# Patient Record
Sex: MALE | Race: WHITE | Employment: OTHER | URBAN - NONMETROPOLITAN AREA
[De-identification: names, ages, dates, MRNs, and addresses within clinical notes are randomized per-mention and may not be internally consistent; named-entity substitution may affect disease eponyms.]

---

## 2017-06-05 LAB
ALBUMIN SERPL-MCNC: 5 G/DL
ALP BLD-CCNC: 123 U/L
ALT SERPL-CCNC: 35 U/L
ANION GAP SERPL CALCULATED.3IONS-SCNC: 6 MMOL/L
AST SERPL-CCNC: 33 U/L
BASOPHILS ABSOLUTE: 0 /ΜL
BASOPHILS RELATIVE PERCENT: 0.8 %
BILIRUB SERPL-MCNC: 0.6 MG/DL (ref 0.1–1.4)
BUN BLDV-MCNC: 18 MG/DL
CALCIUM SERPL-MCNC: 9.5 MG/DL
CHLORIDE BLD-SCNC: 103 MMOL/L
CO2: 27 MMOL/L
CREAT SERPL-MCNC: 0.78 MG/DL
EOSINOPHILS ABSOLUTE: 100 /ΜL
EOSINOPHILS RELATIVE PERCENT: 2.1 %
GFR CALCULATED: NORMAL
GLUCOSE BLD-MCNC: 93 MG/DL
HCT VFR BLD CALC: 44.4 % (ref 41–53)
HCT VFR BLD CALC: NORMAL % (ref 41–53)
HEMOGLOBIN: 14.3 G/DL (ref 13.5–17.5)
HEMOGLOBIN: NORMAL G/DL (ref 13.5–17.5)
LYMPHOCYTES ABSOLUTE: 1000 /ΜL
LYMPHOCYTES RELATIVE PERCENT: 16.3 %
MCH RBC QN AUTO: 27.6 PG
MCHC RBC AUTO-ENTMCNC: 32.2 G/DL
MCV RBC AUTO: 85.6 FL
MONOCYTES ABSOLUTE: 1200 /ΜL
MONOCYTES RELATIVE PERCENT: 19.3 %
NEUTROPHILS ABSOLUTE: 4000 /ΜL
NEUTROPHILS RELATIVE PERCENT: 61.5 %
PDW BLD-RTO: 18.2 %
PLATELET # BLD: 249 K/ΜL
PLATELET # BLD: NORMAL K/ΜL
PMV BLD AUTO: NORMAL FL
POTASSIUM SERPL-SCNC: 5.4 MMOL/L
RBC # BLD: 5.18 10^6/ΜL
SODIUM BLD-SCNC: 136 MMOL/L
TOTAL PROTEIN: 7.3
WBC # BLD: 6.4 10^3/ML
WBC # BLD: NORMAL 10^3/ML

## 2017-07-07 ENCOUNTER — OFFICE VISIT (OUTPATIENT)
Dept: OTOLARYNGOLOGY | Age: 57
End: 2017-07-07

## 2017-07-07 VITALS
DIASTOLIC BLOOD PRESSURE: 100 MMHG | TEMPERATURE: 98.3 F | WEIGHT: 231.5 LBS | BODY MASS INDEX: 29.71 KG/M2 | HEART RATE: 96 BPM | RESPIRATION RATE: 24 BRPM | HEIGHT: 74 IN | SYSTOLIC BLOOD PRESSURE: 154 MMHG

## 2017-07-07 DIAGNOSIS — J38.3 FALSE VOCAL CORD LESION: Primary | ICD-10-CM

## 2017-07-07 DIAGNOSIS — Z01.818 PRE-OP TESTING: ICD-10-CM

## 2017-07-07 DIAGNOSIS — J38.3 VOCAL CORD LEUKOPLAKIA: ICD-10-CM

## 2017-07-07 PROCEDURE — G8427 DOCREV CUR MEDS BY ELIG CLIN: HCPCS | Performed by: OTOLARYNGOLOGY

## 2017-07-07 PROCEDURE — 3017F COLORECTAL CA SCREEN DOC REV: CPT | Performed by: OTOLARYNGOLOGY

## 2017-07-07 PROCEDURE — G8419 CALC BMI OUT NRM PARAM NOF/U: HCPCS | Performed by: OTOLARYNGOLOGY

## 2017-07-07 PROCEDURE — 31575 DIAGNOSTIC LARYNGOSCOPY: CPT | Performed by: OTOLARYNGOLOGY

## 2017-07-07 PROCEDURE — 1036F TOBACCO NON-USER: CPT | Performed by: OTOLARYNGOLOGY

## 2017-07-07 PROCEDURE — 99203 OFFICE O/P NEW LOW 30 MIN: CPT | Performed by: OTOLARYNGOLOGY

## 2017-07-07 RX ORDER — IPRATROPIUM BROMIDE AND ALBUTEROL SULFATE 2.5; .5 MG/3ML; MG/3ML
1 SOLUTION RESPIRATORY (INHALATION) EVERY 4 HOURS
COMMUNITY
End: 2018-02-27 | Stop reason: ALTCHOICE

## 2017-07-07 RX ORDER — VITAMIN B COMPLEX
1 CAPSULE ORAL DAILY
COMMUNITY
End: 2018-02-27 | Stop reason: ALTCHOICE

## 2017-07-07 RX ORDER — LOPERAMIDE HYDROCHLORIDE 2 MG/1
2 CAPSULE ORAL DAILY
COMMUNITY

## 2017-07-07 ASSESSMENT — ENCOUNTER SYMPTOMS
NAUSEA: 0
VOICE CHANGE: 0
STRIDOR: 0
CHEST TIGHTNESS: 0
RHINORRHEA: 0
TROUBLE SWALLOWING: 0
SHORTNESS OF BREATH: 0
FACIAL SWELLING: 0
COUGH: 1
ABDOMINAL PAIN: 0
SINUS PRESSURE: 0
DIARRHEA: 0
CHOKING: 0
VOMITING: 0
WHEEZING: 0
APNEA: 0
COLOR CHANGE: 0
SORE THROAT: 1

## 2017-07-10 ENCOUNTER — TELEPHONE (OUTPATIENT)
Dept: OTOLARYNGOLOGY | Age: 57
End: 2017-07-10

## 2017-07-10 ENCOUNTER — TELEPHONE (OUTPATIENT)
Dept: PULMONOLOGY | Age: 57
End: 2017-07-10

## 2017-07-13 ENCOUNTER — TELEPHONE (OUTPATIENT)
Dept: PULMONOLOGY | Age: 57
End: 2017-07-13

## 2017-07-14 ENCOUNTER — TELEPHONE (OUTPATIENT)
Dept: PULMONOLOGY | Age: 57
End: 2017-07-14

## 2017-07-17 ENCOUNTER — TELEPHONE (OUTPATIENT)
Dept: ENT CLINIC | Age: 57
End: 2017-07-17

## 2017-07-17 RX ORDER — CLINDAMYCIN HYDROCHLORIDE 300 MG/1
300 CAPSULE ORAL 3 TIMES DAILY
Qty: 84 CAPSULE | Refills: 0 | Status: SHIPPED | OUTPATIENT
Start: 2017-07-17 | End: 2017-07-17 | Stop reason: SDUPTHER

## 2017-07-17 RX ORDER — CLINDAMYCIN HYDROCHLORIDE 300 MG/1
300 CAPSULE ORAL 3 TIMES DAILY
Qty: 84 CAPSULE | Refills: 0 | Status: SHIPPED | OUTPATIENT
Start: 2017-07-17 | End: 2017-08-14

## 2017-07-18 ENCOUNTER — OFFICE VISIT (OUTPATIENT)
Dept: ENT CLINIC | Age: 57
End: 2017-07-18
Payer: MEDICARE

## 2017-07-18 ENCOUNTER — HOSPITAL ENCOUNTER (OUTPATIENT)
Dept: RADIATION ONCOLOGY | Age: 57
Discharge: HOME | End: 2017-07-18
Payer: MEDICARE

## 2017-07-18 ENCOUNTER — APPOINTMENT (OUTPATIENT)
Dept: RADIATION ONCOLOGY | Age: 57
End: 2017-07-18
Payer: MEDICAID

## 2017-07-18 VITALS
BODY MASS INDEX: 29.57 KG/M2 | SYSTOLIC BLOOD PRESSURE: 120 MMHG | HEIGHT: 74 IN | HEART RATE: 64 BPM | RESPIRATION RATE: 20 BRPM | DIASTOLIC BLOOD PRESSURE: 80 MMHG | WEIGHT: 230.4 LBS | TEMPERATURE: 97.5 F

## 2017-07-18 DIAGNOSIS — C32.0 SQUAMOUS CELL CARCINOMA OF VOCAL CORD (HCC): Primary | ICD-10-CM

## 2017-07-18 DIAGNOSIS — F10.10 ALCOHOL ABUSE: ICD-10-CM

## 2017-07-18 DIAGNOSIS — K22.2 SCHATZKI'S RING: ICD-10-CM

## 2017-07-18 DIAGNOSIS — C20 RECTAL CANCER METASTASIZED TO INTRAPELVIC LYMPH NODE (HCC): ICD-10-CM

## 2017-07-18 DIAGNOSIS — J41.8 MIXED SIMPLE AND MUCOPURULENT CHRONIC BRONCHITIS (HCC): ICD-10-CM

## 2017-07-18 DIAGNOSIS — C77.5 RECTAL CANCER METASTASIZED TO INTRAPELVIC LYMPH NODE (HCC): ICD-10-CM

## 2017-07-18 PROCEDURE — G8419 CALC BMI OUT NRM PARAM NOF/U: HCPCS | Performed by: OTOLARYNGOLOGY

## 2017-07-18 PROCEDURE — 3017F COLORECTAL CA SCREEN DOC REV: CPT | Performed by: OTOLARYNGOLOGY

## 2017-07-18 PROCEDURE — G8926 SPIRO NO PERF OR DOC: HCPCS | Performed by: OTOLARYNGOLOGY

## 2017-07-18 PROCEDURE — 99213 OFFICE O/P EST LOW 20 MIN: CPT | Performed by: OTOLARYNGOLOGY

## 2017-07-18 PROCEDURE — 1036F TOBACCO NON-USER: CPT | Performed by: OTOLARYNGOLOGY

## 2017-07-18 PROCEDURE — G8427 DOCREV CUR MEDS BY ELIG CLIN: HCPCS | Performed by: OTOLARYNGOLOGY

## 2017-07-18 PROCEDURE — 3023F SPIROM DOC REV: CPT | Performed by: OTOLARYNGOLOGY

## 2017-07-18 RX ORDER — FLUCONAZOLE 100 MG/1
100 TABLET ORAL DAILY
Qty: 14 TABLET | Refills: 0 | Status: SHIPPED | OUTPATIENT
Start: 2017-07-18 | End: 2017-08-01

## 2017-07-18 ASSESSMENT — ENCOUNTER SYMPTOMS
CHEST TIGHTNESS: 0
VOMITING: 0
WHEEZING: 0
CHOKING: 0
SINUS PRESSURE: 0
VOICE CHANGE: 0
FACIAL SWELLING: 0
APNEA: 0
SORE THROAT: 0
TROUBLE SWALLOWING: 0
DIARRHEA: 0
NAUSEA: 0
COUGH: 0
COLOR CHANGE: 0
ABDOMINAL PAIN: 0
STRIDOR: 0
RHINORRHEA: 0
SHORTNESS OF BREATH: 0

## 2017-07-19 ENCOUNTER — NURSE TRIAGE (OUTPATIENT)
Dept: ADMINISTRATIVE | Age: 57
End: 2017-07-19

## 2017-07-21 ENCOUNTER — TELEPHONE (OUTPATIENT)
Dept: ENT CLINIC | Age: 57
End: 2017-07-21

## 2017-07-25 ENCOUNTER — OFFICE VISIT (OUTPATIENT)
Dept: PULMONOLOGY | Age: 57
End: 2017-07-25
Payer: MEDICARE

## 2017-07-25 VITALS
DIASTOLIC BLOOD PRESSURE: 88 MMHG | HEIGHT: 74 IN | OXYGEN SATURATION: 91 % | TEMPERATURE: 98.4 F | SYSTOLIC BLOOD PRESSURE: 136 MMHG | WEIGHT: 230.6 LBS | BODY MASS INDEX: 29.59 KG/M2 | HEART RATE: 75 BPM

## 2017-07-25 DIAGNOSIS — J43.1 PANLOBULAR EMPHYSEMA (HCC): Primary | ICD-10-CM

## 2017-07-25 PROCEDURE — 3017F COLORECTAL CA SCREEN DOC REV: CPT | Performed by: INTERNAL MEDICINE

## 2017-07-25 PROCEDURE — G8427 DOCREV CUR MEDS BY ELIG CLIN: HCPCS | Performed by: INTERNAL MEDICINE

## 2017-07-25 PROCEDURE — G8419 CALC BMI OUT NRM PARAM NOF/U: HCPCS | Performed by: INTERNAL MEDICINE

## 2017-07-25 PROCEDURE — 1036F TOBACCO NON-USER: CPT | Performed by: INTERNAL MEDICINE

## 2017-07-25 PROCEDURE — 99214 OFFICE O/P EST MOD 30 MIN: CPT | Performed by: INTERNAL MEDICINE

## 2017-07-25 PROCEDURE — 3023F SPIROM DOC REV: CPT | Performed by: INTERNAL MEDICINE

## 2017-07-25 PROCEDURE — G8926 SPIRO NO PERF OR DOC: HCPCS | Performed by: INTERNAL MEDICINE

## 2017-07-25 ASSESSMENT — ENCOUNTER SYMPTOMS
SHORTNESS OF BREATH: 1
STRIDOR: 1
WHEEZING: 0
COUGH: 1

## 2017-07-26 ENCOUNTER — HOSPITAL ENCOUNTER (OUTPATIENT)
Dept: PET IMAGING | Age: 57
Discharge: HOME | End: 2017-07-26
Payer: MEDICARE

## 2017-07-26 DIAGNOSIS — C32.0 SQUAMOUS CELL CARCINOMA OF VOCAL CORD (HCC): ICD-10-CM

## 2017-07-26 PROCEDURE — 78815 PET IMAGE W/CT SKULL-THIGH: CPT

## 2017-07-26 PROCEDURE — 3430000000 HC RX DIAGNOSTIC RADIOPHARMACEUTICAL: Performed by: OTOLARYNGOLOGY

## 2017-07-26 PROCEDURE — A9552 F18 FDG: HCPCS | Performed by: OTOLARYNGOLOGY

## 2017-07-26 RX ORDER — FLUDEOXYGLUCOSE F 18 200 MCI/ML
13.7 INJECTION, SOLUTION INTRAVENOUS
Status: COMPLETED | OUTPATIENT
Start: 2017-07-26 | End: 2017-07-26

## 2017-07-26 RX ADMIN — FLUDEOXYGLUCOSE F 18 13.7 MILLICURIE: 200 INJECTION, SOLUTION INTRAVENOUS at 08:05

## 2017-07-28 ENCOUNTER — HOSPITAL ENCOUNTER (OUTPATIENT)
Dept: RADIATION ONCOLOGY | Age: 57
Discharge: HOME | End: 2017-07-28
Payer: MEDICARE

## 2017-07-28 PROCEDURE — 99202 OFFICE O/P NEW SF 15 MIN: CPT | Performed by: RADIOLOGY

## 2017-08-03 ENCOUNTER — TELEPHONE (OUTPATIENT)
Dept: ENT CLINIC | Age: 57
End: 2017-08-03

## 2017-08-03 ENCOUNTER — HOSPITAL ENCOUNTER (OUTPATIENT)
Dept: PULMONOLOGY | Age: 57
Discharge: HOME | End: 2017-08-03
Payer: MEDICARE

## 2017-08-03 DIAGNOSIS — J38.3 VOCAL CORD LEUKOPLAKIA: Primary | ICD-10-CM

## 2017-08-03 DIAGNOSIS — F10.10 ALCOHOL ABUSE: ICD-10-CM

## 2017-08-03 DIAGNOSIS — J38.3 DYSPLASIA OF TRUE VOCAL CORD: ICD-10-CM

## 2017-08-03 DIAGNOSIS — J43.1 PANLOBULAR EMPHYSEMA (HCC): ICD-10-CM

## 2017-08-03 DIAGNOSIS — R49.0 DYSPHONIA: ICD-10-CM

## 2017-08-03 DIAGNOSIS — J38.1 VOCAL CORD POLYP: ICD-10-CM

## 2017-08-03 PROCEDURE — 94729 DIFFUSING CAPACITY: CPT

## 2017-08-03 PROCEDURE — 94060 EVALUATION OF WHEEZING: CPT

## 2017-08-03 PROCEDURE — 94726 PLETHYSMOGRAPHY LUNG VOLUMES: CPT

## 2017-08-08 ENCOUNTER — HOSPITAL ENCOUNTER (OUTPATIENT)
Dept: RADIATION ONCOLOGY | Age: 57
End: 2017-08-08
Payer: MEDICARE

## 2017-08-09 ENCOUNTER — TELEPHONE (OUTPATIENT)
Dept: PULMONOLOGY | Age: 57
End: 2017-08-09

## 2017-08-10 ENCOUNTER — HOSPITAL ENCOUNTER (OUTPATIENT)
Dept: RADIATION ONCOLOGY | Age: 57
Discharge: HOME | End: 2017-08-10
Payer: MEDICARE

## 2017-08-10 ENCOUNTER — HOSPITAL ENCOUNTER (OUTPATIENT)
Dept: CT IMAGING | Age: 57
Discharge: HOME | End: 2017-08-10
Payer: MEDICARE

## 2017-08-10 DIAGNOSIS — C32.1 MALIGNANT NEOPLASM OF SUPRAGLOTTIS (HCC): ICD-10-CM

## 2017-08-10 PROCEDURE — 77014 CT GUIDE PLACEMENT RADIATION THERAPY: CPT

## 2017-08-10 PROCEDURE — 77332 RADIATION TREATMENT AID(S): CPT | Performed by: RADIOLOGY

## 2017-08-10 PROCEDURE — 77334 RADIATION TREATMENT AID(S): CPT | Performed by: RADIOLOGY

## 2017-08-15 ENCOUNTER — OFFICE VISIT (OUTPATIENT)
Dept: ENT CLINIC | Age: 57
End: 2017-08-15
Payer: MEDICARE

## 2017-08-15 VITALS
HEART RATE: 80 BPM | SYSTOLIC BLOOD PRESSURE: 132 MMHG | WEIGHT: 230 LBS | BODY MASS INDEX: 29.52 KG/M2 | DIASTOLIC BLOOD PRESSURE: 80 MMHG | HEIGHT: 74 IN | TEMPERATURE: 97.8 F | RESPIRATION RATE: 14 BRPM

## 2017-08-15 DIAGNOSIS — J41.1 MUCOPURULENT CHRONIC BRONCHITIS (HCC): ICD-10-CM

## 2017-08-15 DIAGNOSIS — C32.0 SQUAMOUS CELL CARCINOMA OF VOCAL CORD (HCC): Primary | ICD-10-CM

## 2017-08-15 PROCEDURE — 1036F TOBACCO NON-USER: CPT | Performed by: OTOLARYNGOLOGY

## 2017-08-15 PROCEDURE — 3017F COLORECTAL CA SCREEN DOC REV: CPT | Performed by: OTOLARYNGOLOGY

## 2017-08-15 PROCEDURE — 3023F SPIROM DOC REV: CPT | Performed by: OTOLARYNGOLOGY

## 2017-08-15 PROCEDURE — G8427 DOCREV CUR MEDS BY ELIG CLIN: HCPCS | Performed by: OTOLARYNGOLOGY

## 2017-08-15 PROCEDURE — G8926 SPIRO NO PERF OR DOC: HCPCS | Performed by: OTOLARYNGOLOGY

## 2017-08-15 PROCEDURE — G8419 CALC BMI OUT NRM PARAM NOF/U: HCPCS | Performed by: OTOLARYNGOLOGY

## 2017-08-15 PROCEDURE — 99213 OFFICE O/P EST LOW 20 MIN: CPT | Performed by: OTOLARYNGOLOGY

## 2017-08-15 RX ORDER — GUAIFENESIN 600 MG/1
1200 TABLET, EXTENDED RELEASE ORAL 2 TIMES DAILY
Qty: 60 TABLET | Refills: 5 | Status: SHIPPED | OUTPATIENT
Start: 2017-08-15 | End: 2017-09-14

## 2017-08-15 RX ORDER — SULFAMETHOXAZOLE AND TRIMETHOPRIM 800; 160 MG/1; MG/1
1 TABLET ORAL 2 TIMES DAILY
Qty: 28 TABLET | Refills: 2 | Status: SHIPPED | OUTPATIENT
Start: 2017-08-15 | End: 2017-10-19 | Stop reason: SDUPTHER

## 2017-08-15 ASSESSMENT — ENCOUNTER SYMPTOMS
TROUBLE SWALLOWING: 0
EYE PAIN: 0
ABDOMINAL PAIN: 0
PHOTOPHOBIA: 0
SORE THROAT: 0
RECTAL PAIN: 0
CONSTIPATION: 0
EYE ITCHING: 0
CHOKING: 0
RHINORRHEA: 0
COUGH: 1
EYE REDNESS: 0
ABDOMINAL DISTENTION: 0
SINUS PRESSURE: 0
DIARRHEA: 0
SHORTNESS OF BREATH: 0
CHEST TIGHTNESS: 0
NAUSEA: 0
VOMITING: 0
VOICE CHANGE: 0
EYE DISCHARGE: 0
STRIDOR: 0
COLOR CHANGE: 0
WHEEZING: 0
ANAL BLEEDING: 0
APNEA: 0
BACK PAIN: 0
BLOOD IN STOOL: 0
FACIAL SWELLING: 0

## 2017-08-16 ENCOUNTER — HOSPITAL ENCOUNTER (OUTPATIENT)
Dept: RADIATION ONCOLOGY | Age: 57
Discharge: HOME | End: 2017-08-16
Payer: MEDICARE

## 2017-08-16 PROCEDURE — 77301 RADIOTHERAPY DOSE PLAN IMRT: CPT | Performed by: RADIOLOGY

## 2017-08-16 PROCEDURE — 77338 DESIGN MLC DEVICE FOR IMRT: CPT | Performed by: RADIOLOGY

## 2017-08-16 PROCEDURE — 77300 RADIATION THERAPY DOSE PLAN: CPT | Performed by: RADIOLOGY

## 2017-08-17 PROCEDURE — 77386 HC NTSTY MODUL RAD TX DLVR CPLX: CPT | Performed by: RADIOLOGY

## 2017-08-18 PROCEDURE — 77386 HC NTSTY MODUL RAD TX DLVR CPLX: CPT | Performed by: RADIOLOGY

## 2017-08-21 ENCOUNTER — HOSPITAL ENCOUNTER (OUTPATIENT)
Dept: RADIATION ONCOLOGY | Age: 57
Discharge: HOME | End: 2017-08-21
Payer: MEDICARE

## 2017-08-21 PROCEDURE — 77336 RADIATION PHYSICS CONSULT: CPT | Performed by: RADIOLOGY

## 2017-08-21 PROCEDURE — 77386 HC NTSTY MODUL RAD TX DLVR CPLX: CPT | Performed by: RADIOLOGY

## 2017-08-22 PROCEDURE — 77386 HC NTSTY MODUL RAD TX DLVR CPLX: CPT | Performed by: RADIOLOGY

## 2017-08-23 PROCEDURE — 77386 HC NTSTY MODUL RAD TX DLVR CPLX: CPT | Performed by: RADIOLOGY

## 2017-08-24 PROCEDURE — 77386 HC NTSTY MODUL RAD TX DLVR CPLX: CPT | Performed by: RADIOLOGY

## 2017-08-25 PROCEDURE — 77386 HC NTSTY MODUL RAD TX DLVR CPLX: CPT | Performed by: RADIOLOGY

## 2017-08-28 ENCOUNTER — HOSPITAL ENCOUNTER (OUTPATIENT)
Dept: RADIATION ONCOLOGY | Age: 57
Discharge: HOME | End: 2017-08-28
Payer: MEDICARE

## 2017-08-28 DIAGNOSIS — J38.3 DYSPLASIA OF TRUE VOCAL CORD: Primary | ICD-10-CM

## 2017-08-28 PROCEDURE — 77386 HC NTSTY MODUL RAD TX DLVR CPLX: CPT | Performed by: RADIOLOGY

## 2017-08-28 PROCEDURE — 77336 RADIATION PHYSICS CONSULT: CPT | Performed by: RADIOLOGY

## 2017-08-29 PROCEDURE — 77386 HC NTSTY MODUL RAD TX DLVR CPLX: CPT | Performed by: RADIOLOGY

## 2017-08-30 ENCOUNTER — OFFICE VISIT (OUTPATIENT)
Dept: PULMONOLOGY | Age: 57
End: 2017-08-30
Payer: MEDICARE

## 2017-08-30 VITALS
TEMPERATURE: 98.3 F | SYSTOLIC BLOOD PRESSURE: 134 MMHG | WEIGHT: 230.8 LBS | OXYGEN SATURATION: 94 % | BODY MASS INDEX: 29.62 KG/M2 | HEIGHT: 74 IN | DIASTOLIC BLOOD PRESSURE: 72 MMHG | HEART RATE: 77 BPM

## 2017-08-30 DIAGNOSIS — C14.0 THROAT CANCER (HCC): ICD-10-CM

## 2017-08-30 DIAGNOSIS — J41.1 MUCOPURULENT CHRONIC BRONCHITIS (HCC): Primary | ICD-10-CM

## 2017-08-30 PROCEDURE — G8427 DOCREV CUR MEDS BY ELIG CLIN: HCPCS | Performed by: INTERNAL MEDICINE

## 2017-08-30 PROCEDURE — G8926 SPIRO NO PERF OR DOC: HCPCS | Performed by: INTERNAL MEDICINE

## 2017-08-30 PROCEDURE — 1036F TOBACCO NON-USER: CPT | Performed by: INTERNAL MEDICINE

## 2017-08-30 PROCEDURE — 77386 HC NTSTY MODUL RAD TX DLVR CPLX: CPT | Performed by: RADIOLOGY

## 2017-08-30 PROCEDURE — 3017F COLORECTAL CA SCREEN DOC REV: CPT | Performed by: INTERNAL MEDICINE

## 2017-08-30 PROCEDURE — 99213 OFFICE O/P EST LOW 20 MIN: CPT | Performed by: INTERNAL MEDICINE

## 2017-08-30 PROCEDURE — 3023F SPIROM DOC REV: CPT | Performed by: INTERNAL MEDICINE

## 2017-08-30 PROCEDURE — G8419 CALC BMI OUT NRM PARAM NOF/U: HCPCS | Performed by: INTERNAL MEDICINE

## 2017-08-30 ASSESSMENT — ENCOUNTER SYMPTOMS
SHORTNESS OF BREATH: 1
STRIDOR: 0
CHEST TIGHTNESS: 0
WHEEZING: 0
APNEA: 0
COUGH: 1

## 2017-08-31 PROCEDURE — 77386 HC NTSTY MODUL RAD TX DLVR CPLX: CPT | Performed by: RADIOLOGY

## 2017-09-01 ENCOUNTER — OFFICE VISIT (OUTPATIENT)
Dept: ENT CLINIC | Age: 57
End: 2017-09-01
Payer: MEDICARE

## 2017-09-01 ENCOUNTER — HOSPITAL ENCOUNTER (OUTPATIENT)
Age: 57
Discharge: HOME | End: 2017-09-01
Payer: MEDICARE

## 2017-09-01 VITALS
RESPIRATION RATE: 20 BRPM | DIASTOLIC BLOOD PRESSURE: 80 MMHG | SYSTOLIC BLOOD PRESSURE: 130 MMHG | BODY MASS INDEX: 29.75 KG/M2 | TEMPERATURE: 98.4 F | HEART RATE: 64 BPM | WEIGHT: 231.7 LBS

## 2017-09-01 DIAGNOSIS — C32.0 SQUAMOUS CELL CARCINOMA OF VOCAL CORD (HCC): Primary | ICD-10-CM

## 2017-09-01 DIAGNOSIS — J38.3 DYSPLASIA OF TRUE VOCAL CORD: ICD-10-CM

## 2017-09-01 DIAGNOSIS — R49.0 DYSPHONIA: ICD-10-CM

## 2017-09-01 DIAGNOSIS — J38.1 VOCAL CORD POLYP: ICD-10-CM

## 2017-09-01 DIAGNOSIS — C77.5 RECTAL CANCER METASTASIZED TO INTRAPELVIC LYMPH NODE (HCC): ICD-10-CM

## 2017-09-01 DIAGNOSIS — F10.10 ALCOHOL ABUSE: ICD-10-CM

## 2017-09-01 DIAGNOSIS — T66.XXXD RADIATION ADVERSE EFFECT, SUBSEQUENT ENCOUNTER: ICD-10-CM

## 2017-09-01 DIAGNOSIS — C20 RECTAL CANCER METASTASIZED TO INTRAPELVIC LYMPH NODE (HCC): ICD-10-CM

## 2017-09-01 DIAGNOSIS — K22.2 SCHATZKI'S RING: ICD-10-CM

## 2017-09-01 DIAGNOSIS — J41.1 MUCOPURULENT CHRONIC BRONCHITIS (HCC): ICD-10-CM

## 2017-09-01 DIAGNOSIS — J38.3 VOCAL CORD LEUKOPLAKIA: ICD-10-CM

## 2017-09-01 LAB
ANISOCYTOSIS: ABNORMAL
BASOPHILS # BLD: 0.5 %
BASOPHILS ABSOLUTE: 0 THOU/MM3 (ref 0–0.1)
EOSINOPHIL # BLD: 2.1 %
EOSINOPHILS ABSOLUTE: 0.1 THOU/MM3 (ref 0–0.4)
HCT VFR BLD CALC: 45.4 % (ref 42–52)
HEMOGLOBIN: 15.1 GM/DL (ref 14–18)
LYMPHOCYTES # BLD: 9.1 %
LYMPHOCYTES ABSOLUTE: 0.5 THOU/MM3 (ref 1–4.8)
MCH RBC QN AUTO: 30.2 PG (ref 27–31)
MCHC RBC AUTO-ENTMCNC: 33.3 GM/DL (ref 33–37)
MCV RBC AUTO: 90.8 FL (ref 80–94)
MONOCYTES # BLD: 21.4 %
MONOCYTES ABSOLUTE: 1.2 THOU/MM3 (ref 0.4–1.3)
NUCLEATED RED BLOOD CELLS: 0 /100 WBC
PDW BLD-RTO: 16.1 % (ref 11.5–14.5)
PLATELET # BLD: 237 THOU/MM3 (ref 130–400)
PMV BLD AUTO: 8.8 MCM (ref 7.4–10.4)
RBC # BLD: 5 MILL/MM3 (ref 4.7–6.1)
RBC # BLD: NORMAL 10*6/UL
SEG NEUTROPHILS: 66.9 %
SEGMENTED NEUTROPHILS ABSOLUTE COUNT: 3.9 THOU/MM3 (ref 1.8–7.7)
WBC # BLD: 5.8 THOU/MM3 (ref 4.8–10.8)

## 2017-09-01 PROCEDURE — 3017F COLORECTAL CA SCREEN DOC REV: CPT | Performed by: OTOLARYNGOLOGY

## 2017-09-01 PROCEDURE — 31575 DIAGNOSTIC LARYNGOSCOPY: CPT | Performed by: OTOLARYNGOLOGY

## 2017-09-01 PROCEDURE — G8926 SPIRO NO PERF OR DOC: HCPCS | Performed by: OTOLARYNGOLOGY

## 2017-09-01 PROCEDURE — 3023F SPIROM DOC REV: CPT | Performed by: OTOLARYNGOLOGY

## 2017-09-01 PROCEDURE — G8417 CALC BMI ABV UP PARAM F/U: HCPCS | Performed by: OTOLARYNGOLOGY

## 2017-09-01 PROCEDURE — G8427 DOCREV CUR MEDS BY ELIG CLIN: HCPCS | Performed by: OTOLARYNGOLOGY

## 2017-09-01 PROCEDURE — 36415 COLL VENOUS BLD VENIPUNCTURE: CPT

## 2017-09-01 PROCEDURE — 99213 OFFICE O/P EST LOW 20 MIN: CPT | Performed by: OTOLARYNGOLOGY

## 2017-09-01 PROCEDURE — 77386 HC NTSTY MODUL RAD TX DLVR CPLX: CPT | Performed by: RADIOLOGY

## 2017-09-01 PROCEDURE — 85025 COMPLETE CBC W/AUTO DIFF WBC: CPT

## 2017-09-01 PROCEDURE — 1036F TOBACCO NON-USER: CPT | Performed by: OTOLARYNGOLOGY

## 2017-09-01 ASSESSMENT — ENCOUNTER SYMPTOMS
ABDOMINAL PAIN: 0
NAUSEA: 0
SINUS PRESSURE: 0
CHOKING: 0
COUGH: 0
SHORTNESS OF BREATH: 0
VOMITING: 0
WHEEZING: 0
STRIDOR: 0
RHINORRHEA: 0
CHEST TIGHTNESS: 0
DIARRHEA: 0
FACIAL SWELLING: 0
COLOR CHANGE: 0
SORE THROAT: 0
VOICE CHANGE: 0
APNEA: 0
TROUBLE SWALLOWING: 0

## 2017-09-05 ENCOUNTER — HOSPITAL ENCOUNTER (OUTPATIENT)
Dept: RADIATION ONCOLOGY | Age: 57
Discharge: HOME | End: 2017-09-05
Payer: MEDICARE

## 2017-09-05 PROCEDURE — 77336 RADIATION PHYSICS CONSULT: CPT | Performed by: RADIOLOGY

## 2017-09-05 PROCEDURE — 77386 HC NTSTY MODUL RAD TX DLVR CPLX: CPT | Performed by: RADIOLOGY

## 2017-09-06 PROCEDURE — 77386 HC NTSTY MODUL RAD TX DLVR CPLX: CPT | Performed by: RADIOLOGY

## 2017-09-07 PROCEDURE — 77386 HC NTSTY MODUL RAD TX DLVR CPLX: CPT | Performed by: RADIOLOGY

## 2017-09-07 PROCEDURE — 77300 RADIATION THERAPY DOSE PLAN: CPT | Performed by: RADIOLOGY

## 2017-09-08 PROCEDURE — 77386 HC NTSTY MODUL RAD TX DLVR CPLX: CPT | Performed by: RADIOLOGY

## 2017-09-11 ENCOUNTER — HOSPITAL ENCOUNTER (OUTPATIENT)
Dept: RADIATION ONCOLOGY | Age: 57
Discharge: HOME | End: 2017-09-11
Payer: MEDICARE

## 2017-09-11 PROCEDURE — 77386 HC NTSTY MODUL RAD TX DLVR CPLX: CPT

## 2017-09-11 PROCEDURE — 77338 DESIGN MLC DEVICE FOR IMRT: CPT

## 2017-09-11 PROCEDURE — 77336 RADIATION PHYSICS CONSULT: CPT

## 2017-09-13 PROCEDURE — 77386 HC NTSTY MODUL RAD TX DLVR CPLX: CPT | Performed by: RADIOLOGY

## 2017-09-14 ENCOUNTER — CLINICAL DOCUMENTATION (OUTPATIENT)
Dept: NUTRITION | Age: 57
End: 2017-09-14

## 2017-09-14 ENCOUNTER — OFFICE VISIT (OUTPATIENT)
Dept: ENT CLINIC | Age: 57
End: 2017-09-14
Payer: MEDICARE

## 2017-09-14 VITALS
HEART RATE: 80 BPM | TEMPERATURE: 98.1 F | SYSTOLIC BLOOD PRESSURE: 126 MMHG | DIASTOLIC BLOOD PRESSURE: 78 MMHG | WEIGHT: 224.1 LBS | BODY MASS INDEX: 28.77 KG/M2 | RESPIRATION RATE: 18 BRPM

## 2017-09-14 DIAGNOSIS — C77.5 RECTAL CANCER METASTASIZED TO INTRAPELVIC LYMPH NODE (HCC): ICD-10-CM

## 2017-09-14 DIAGNOSIS — T66.XXXD RADIATION ADVERSE EFFECT, SUBSEQUENT ENCOUNTER: ICD-10-CM

## 2017-09-14 DIAGNOSIS — J41.1 MUCOPURULENT CHRONIC BRONCHITIS (HCC): ICD-10-CM

## 2017-09-14 DIAGNOSIS — F10.10 ALCOHOL ABUSE: ICD-10-CM

## 2017-09-14 DIAGNOSIS — J38.3 VOCAL CORD LEUKOPLAKIA: ICD-10-CM

## 2017-09-14 DIAGNOSIS — K22.2 SCHATZKI'S RING: ICD-10-CM

## 2017-09-14 DIAGNOSIS — J38.3 FALSE VOCAL CORD LESION: ICD-10-CM

## 2017-09-14 DIAGNOSIS — C32.0 SQUAMOUS CELL CARCINOMA OF VOCAL CORD (HCC): Primary | ICD-10-CM

## 2017-09-14 DIAGNOSIS — C20 RECTAL CANCER METASTASIZED TO INTRAPELVIC LYMPH NODE (HCC): ICD-10-CM

## 2017-09-14 PROCEDURE — 77386 HC NTSTY MODUL RAD TX DLVR CPLX: CPT

## 2017-09-14 PROCEDURE — G8427 DOCREV CUR MEDS BY ELIG CLIN: HCPCS | Performed by: OTOLARYNGOLOGY

## 2017-09-14 PROCEDURE — G8417 CALC BMI ABV UP PARAM F/U: HCPCS | Performed by: OTOLARYNGOLOGY

## 2017-09-14 PROCEDURE — 3023F SPIROM DOC REV: CPT | Performed by: OTOLARYNGOLOGY

## 2017-09-14 PROCEDURE — 3017F COLORECTAL CA SCREEN DOC REV: CPT | Performed by: OTOLARYNGOLOGY

## 2017-09-14 PROCEDURE — 1036F TOBACCO NON-USER: CPT | Performed by: OTOLARYNGOLOGY

## 2017-09-14 PROCEDURE — G8926 SPIRO NO PERF OR DOC: HCPCS | Performed by: OTOLARYNGOLOGY

## 2017-09-14 PROCEDURE — 99213 OFFICE O/P EST LOW 20 MIN: CPT | Performed by: OTOLARYNGOLOGY

## 2017-09-14 PROCEDURE — 31575 DIAGNOSTIC LARYNGOSCOPY: CPT | Performed by: OTOLARYNGOLOGY

## 2017-09-14 ASSESSMENT — ENCOUNTER SYMPTOMS
RHINORRHEA: 0
SINUS PRESSURE: 0
CHOKING: 0
COUGH: 0
CHEST TIGHTNESS: 0
ABDOMINAL PAIN: 0
VOMITING: 0
DIARRHEA: 0
NAUSEA: 0
APNEA: 0
TROUBLE SWALLOWING: 0
FACIAL SWELLING: 0
STRIDOR: 0
VOICE CHANGE: 0
COLOR CHANGE: 0
SHORTNESS OF BREATH: 0
SORE THROAT: 0
WHEEZING: 0

## 2017-09-15 PROCEDURE — 77386 HC NTSTY MODUL RAD TX DLVR CPLX: CPT | Performed by: RADIOLOGY

## 2017-09-18 ENCOUNTER — HOSPITAL ENCOUNTER (OUTPATIENT)
Dept: RADIATION ONCOLOGY | Age: 57
Discharge: HOME | End: 2017-09-18
Payer: MEDICARE

## 2017-09-18 PROCEDURE — 77336 RADIATION PHYSICS CONSULT: CPT | Performed by: RADIOLOGY

## 2017-09-18 PROCEDURE — 77386 HC NTSTY MODUL RAD TX DLVR CPLX: CPT | Performed by: RADIOLOGY

## 2017-09-19 PROCEDURE — 77386 HC NTSTY MODUL RAD TX DLVR CPLX: CPT | Performed by: RADIOLOGY

## 2017-09-20 PROCEDURE — 77386 HC NTSTY MODUL RAD TX DLVR CPLX: CPT | Performed by: RADIOLOGY

## 2017-09-21 PROCEDURE — 77386 HC NTSTY MODUL RAD TX DLVR CPLX: CPT | Performed by: RADIOLOGY

## 2017-09-22 PROCEDURE — 77386 HC NTSTY MODUL RAD TX DLVR CPLX: CPT | Performed by: RADIOLOGY

## 2017-09-25 ENCOUNTER — HOSPITAL ENCOUNTER (OUTPATIENT)
Dept: RADIATION ONCOLOGY | Age: 57
Discharge: HOME | End: 2017-09-25
Payer: MEDICARE

## 2017-09-25 PROCEDURE — 77336 RADIATION PHYSICS CONSULT: CPT | Performed by: RADIOLOGY

## 2017-09-25 PROCEDURE — 77386 HC NTSTY MODUL RAD TX DLVR CPLX: CPT | Performed by: RADIOLOGY

## 2017-09-26 PROCEDURE — 77386 HC NTSTY MODUL RAD TX DLVR CPLX: CPT | Performed by: RADIOLOGY

## 2017-09-27 PROCEDURE — 77386 HC NTSTY MODUL RAD TX DLVR CPLX: CPT | Performed by: RADIOLOGY

## 2017-09-28 PROCEDURE — 77386 HC NTSTY MODUL RAD TX DLVR CPLX: CPT | Performed by: RADIOLOGY

## 2017-09-29 ENCOUNTER — HOSPITAL ENCOUNTER (OUTPATIENT)
Age: 57
Discharge: HOME | End: 2017-09-29
Payer: MEDICARE

## 2017-09-29 LAB
ANISOCYTOSIS: ABNORMAL
BASOPHILS # BLD: 0.4 %
BASOPHILS ABSOLUTE: 0 THOU/MM3 (ref 0–0.1)
EOSINOPHIL # BLD: 2.6 %
EOSINOPHILS ABSOLUTE: 0.1 THOU/MM3 (ref 0–0.4)
HCT VFR BLD CALC: 45.1 % (ref 42–52)
HEMOGLOBIN: 15.2 GM/DL (ref 14–18)
LYMPHOCYTES # BLD: 8.4 %
LYMPHOCYTES ABSOLUTE: 0.5 THOU/MM3 (ref 1–4.8)
MCH RBC QN AUTO: 30.9 PG (ref 27–31)
MCHC RBC AUTO-ENTMCNC: 33.6 GM/DL (ref 33–37)
MCV RBC AUTO: 92 FL (ref 80–94)
MONOCYTES # BLD: 21.8 %
MONOCYTES ABSOLUTE: 1.2 THOU/MM3 (ref 0.4–1.3)
NUCLEATED RED BLOOD CELLS: 0 /100 WBC
PDW BLD-RTO: 15.3 % (ref 11.5–14.5)
PLATELET # BLD: 226 THOU/MM3 (ref 130–400)
PMV BLD AUTO: 8.3 MCM (ref 7.4–10.4)
RBC # BLD: 4.9 MILL/MM3 (ref 4.7–6.1)
RBC # BLD: NORMAL 10*6/UL
SEG NEUTROPHILS: 66.8 %
SEGMENTED NEUTROPHILS ABSOLUTE COUNT: 3.6 THOU/MM3 (ref 1.8–7.7)
WBC # BLD: 5.4 THOU/MM3 (ref 4.8–10.8)

## 2017-09-29 PROCEDURE — 36415 COLL VENOUS BLD VENIPUNCTURE: CPT

## 2017-09-29 PROCEDURE — 85025 COMPLETE CBC W/AUTO DIFF WBC: CPT

## 2017-09-29 PROCEDURE — 77386 HC NTSTY MODUL RAD TX DLVR CPLX: CPT | Performed by: RADIOLOGY

## 2017-10-02 NOTE — PROGRESS NOTES
RADIATION ONCOLOGY [facility]  [facility], [address]  Ph. 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 P16 negative       History of: Adenocarcinoma of the Distal Rectum, T4 N2 M0, Stage IIIB      Treatment Course Number: 1    Treatment Site (s) Modality Dose (cGy From To Fractions/  Elapsed Days   FVC Mass + Nodes 6MV photons 4400 cGy 2017 9/15/2017 20/ 29   FVC Mass Boost 6MV photons 2200 cGy 2017 2017 10/ 11                     Cumulative Dose: 6600 cGy, 30 Fractions, 43 Elapsed Days,   Treatment Modifiers: Intensity/Volume Modulated Arc Therapy; Custom MLC Blocking; Custom Aquaplast Mask Immobilization. History: Mr. Burgess Huff is a 50-year-old gentleman with a history of cancer of the rectum, successfully treated with neoadjuvant chemoradiotherapy and surgical resection in . Some years ago, he developed intermittent hoarseness. Previous otolaryngology evaluation, including biopsies in July and 2011 did not reveal a malignancy. Recent repeat evaluation including CT scanning of the neck soft tissues showed a suspicious lesion prompting re-assessment. Fiberoptic examination in 2017 showed a granular mass involving the right false vocal cord. PET/CT scan showed a hypermetabolic soft tissue mass corresponding to the fiber optic examination. There was no radiographically evident or hypermetabolic lymphadenopathy. Biopsy confirmed squamous cell carcinoma.     Pain Ratin-8/10 (throat pain, starting Week 2 of therapy course) ECOG Performance Status: 1    Treatment Tolerance/Response: Mr. Burgess Huff developed unexpectedly early and pronounced throat pain during his therapy course. After only 9 treatments he had a pain rating of 7 and this increased to around 8 throughout the remainder of the treatment course. The pain was managed with \"MLB Solution\" and Oxycodone/APAP 5/325 mg up to every 4 hours. He also developed moderate skin reaction with erythema and tenderness. Some skin creases were present in the lower aspect of the neck and he developed brisk skin reaction and some peeling in these areas. He did not develop any candidiasis during the course of therapy. Mr. Huber Walker was able to complete his treatment course doubt any prolonged unplanned treatment interruptions. He did not have any frankly unexpected side effects during the therapy, though his throat pain was early onset, and somewhat more pronounced than is typical.    Systemic Therapy: none planned    Follow Up Plan: Mr. Huber Walker received instructions for post-treatment throat and skin care. He is scheduled for a checkup in early November 2017. He was instructed to call for an earlier appointment if he has any additional problems, questions or concerns, or if his severe pain persists and he requires additional pain medication. He will continue care in otolaryngology, and with his other physicians as well.     Electronically signed by Summer Mai MD on 10/2/2017 at 1:19 PM    Radiation Oncology    Cc:  Todd Miller; Janneth Tejada; Kayla Centeno; [address] [place] [address] and Tumor Registry; [facility] Tumor Registry

## 2017-10-19 ENCOUNTER — HOSPITAL ENCOUNTER (OUTPATIENT)
Age: 57
Discharge: HOME | End: 2017-10-19
Payer: MEDICARE

## 2017-10-19 ENCOUNTER — OFFICE VISIT (OUTPATIENT)
Dept: ENT CLINIC | Age: 57
End: 2017-10-19
Payer: MEDICARE

## 2017-10-19 VITALS
DIASTOLIC BLOOD PRESSURE: 96 MMHG | HEIGHT: 74 IN | TEMPERATURE: 97.6 F | HEART RATE: 80 BPM | SYSTOLIC BLOOD PRESSURE: 144 MMHG | BODY MASS INDEX: 29.63 KG/M2 | RESPIRATION RATE: 20 BRPM | WEIGHT: 230.9 LBS

## 2017-10-19 DIAGNOSIS — C32.0 SQUAMOUS CELL CARCINOMA OF VOCAL CORD (HCC): Primary | ICD-10-CM

## 2017-10-19 DIAGNOSIS — J38.3 VOCAL CORD LEUKOPLAKIA: ICD-10-CM

## 2017-10-19 DIAGNOSIS — C77.5 RECTAL CANCER METASTASIZED TO INTRAPELVIC LYMPH NODE (HCC): ICD-10-CM

## 2017-10-19 DIAGNOSIS — C20 RECTAL CANCER METASTASIZED TO INTRAPELVIC LYMPH NODE (HCC): ICD-10-CM

## 2017-10-19 DIAGNOSIS — T66.XXXD RADIATION ADVERSE EFFECT, SUBSEQUENT ENCOUNTER: ICD-10-CM

## 2017-10-19 DIAGNOSIS — J41.1 MUCOPURULENT CHRONIC BRONCHITIS (HCC): ICD-10-CM

## 2017-10-19 DIAGNOSIS — K22.2 SCHATZKI'S RING: ICD-10-CM

## 2017-10-19 DIAGNOSIS — J38.1 VOCAL CORD POLYP: ICD-10-CM

## 2017-10-19 DIAGNOSIS — F10.10 ALCOHOL ABUSE: ICD-10-CM

## 2017-10-19 DIAGNOSIS — Z79.899 LONG-TERM USE OF HIGH-RISK MEDICATION: ICD-10-CM

## 2017-10-19 DIAGNOSIS — J38.3 FALSE VOCAL CORD LESION: ICD-10-CM

## 2017-10-19 DIAGNOSIS — J38.3 DYSPLASIA OF TRUE VOCAL CORD: ICD-10-CM

## 2017-10-19 LAB
CREAT SERPL-MCNC: 0.6 MG/DL (ref 0.4–1.2)
GFR SERPL CREATININE-BSD FRML MDRD: > 90 ML/MIN/1.73M2

## 2017-10-19 PROCEDURE — G8427 DOCREV CUR MEDS BY ELIG CLIN: HCPCS | Performed by: OTOLARYNGOLOGY

## 2017-10-19 PROCEDURE — 82565 ASSAY OF CREATININE: CPT

## 2017-10-19 PROCEDURE — 31575 DIAGNOSTIC LARYNGOSCOPY: CPT | Performed by: OTOLARYNGOLOGY

## 2017-10-19 PROCEDURE — G8926 SPIRO NO PERF OR DOC: HCPCS | Performed by: OTOLARYNGOLOGY

## 2017-10-19 PROCEDURE — 36415 COLL VENOUS BLD VENIPUNCTURE: CPT

## 2017-10-19 PROCEDURE — 1036F TOBACCO NON-USER: CPT | Performed by: OTOLARYNGOLOGY

## 2017-10-19 PROCEDURE — G8417 CALC BMI ABV UP PARAM F/U: HCPCS | Performed by: OTOLARYNGOLOGY

## 2017-10-19 PROCEDURE — 3017F COLORECTAL CA SCREEN DOC REV: CPT | Performed by: OTOLARYNGOLOGY

## 2017-10-19 PROCEDURE — 3023F SPIROM DOC REV: CPT | Performed by: OTOLARYNGOLOGY

## 2017-10-19 PROCEDURE — 99213 OFFICE O/P EST LOW 20 MIN: CPT | Performed by: OTOLARYNGOLOGY

## 2017-10-19 PROCEDURE — G8484 FLU IMMUNIZE NO ADMIN: HCPCS | Performed by: OTOLARYNGOLOGY

## 2017-10-19 RX ORDER — SULFAMETHOXAZOLE AND TRIMETHOPRIM 800; 160 MG/1; MG/1
1 TABLET ORAL 2 TIMES DAILY
Qty: 60 TABLET | Refills: 2 | Status: SHIPPED | OUTPATIENT
Start: 2017-10-19 | End: 2017-11-18

## 2017-10-19 ASSESSMENT — ENCOUNTER SYMPTOMS
SORE THROAT: 0
COLOR CHANGE: 0
VOICE CHANGE: 0
SINUS PRESSURE: 0
CHEST TIGHTNESS: 0
RHINORRHEA: 0
FACIAL SWELLING: 0
COUGH: 0
DIARRHEA: 0
VOMITING: 0
NAUSEA: 0
CHOKING: 0
WHEEZING: 0
STRIDOR: 0
ABDOMINAL PAIN: 0
SHORTNESS OF BREATH: 0
TROUBLE SWALLOWING: 0
APNEA: 0

## 2017-10-19 NOTE — PROGRESS NOTES
Gastrointestinal: Negative for abdominal pain, diarrhea, nausea and vomiting. Endocrine: Negative for cold intolerance, heat intolerance, polydipsia and polyuria. Genitourinary: Negative for dysuria, enuresis and hematuria. Musculoskeletal: Negative for arthralgias, gait problem, neck pain and neck stiffness. Skin: Negative for color change and rash. Allergic/Immunologic: Negative for environmental allergies, food allergies and immunocompromised state. Neurological: Negative for dizziness, syncope, facial asymmetry, speech difficulty, light-headedness and headaches. Hematological: Negative for adenopathy. Does not bruise/bleed easily. Psychiatric/Behavioral: Negative for confusion and sleep disturbance. The patient is not nervous/anxious. Objective:     BP (!) 144/96 (Site: Left Arm, Position: Sitting)   Pulse 80   Temp 97.6 °F (36.4 °C) (Oral)   Resp 20   Ht 6' 2.02\" (1.88 m)   Wt 230 lb 14.4 oz (104.7 kg)   BMI 29.63 kg/m²     Physical Exam     PROCEDURE: FIBEROPTIC LARYNGOSCOPY    A fiberoptic laryngoscopy was performed under topical anesthesia, after using Afrin and Lidocaine spray in the nasal fossa. The nasal fossa, nasopharynx, hypopharynx and larynx were carefully examined. Base of tongue was symmetrical. Epiglottis appeared normal and was not retrodisplaced. True vocal cords had normal mobility. There was no erythema. No mucosal lesions or masses were noted. No pooling in the pyriform sinuses. Mucosa was dry and him crusting was noted. Nothing like the severe crusting from staph aureus he had preoperatively    Data:  All of the past medical history, past surgical history, family history, social history, allergies and current medications were reviewed with the patient. Assessment & Plan   Diagnoses and all orders for this visit:    1. 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 Mucopurulent chronic bronchitis ([address]     3. Vocal cord leukoplakia  [place] LARYNGOSCOPY FLEXIBLE DIAGNOSTIC   4. Alcohol abuse     5. Schatzki's ring     6. Radiation adverse effect, subsequent encounter  [place] LARYNGOSCOPY FLEXIBLE DIAGNOSTIC   7. False vocal cord lesion  [place] LARYNGOSCOPY FLEXIBLE DIAGNOSTIC   8. Vocal cord polyp  [place] LARYNGOSCOPY FLEXIBLE DIAGNOSTIC   9. Rectal cancer metastasized to intrapelvic lymph node ([facility]     10. Dysplasia of true vocal cord     11. Long-term use of high-risk medication  Creatinine       The findings were explained and his questions were answered. I will prescribe another month of antibiotics and have his kidney function tested. I will see him back in 1 month. Return in about 1 month (around 11/19/2017) for Follow-Up. Kathleen Mir CMA ([facility]), am scribing for, and in the presence of Dr. Benito Rosas. Electronically signed by Sina Coleman on 10/19/17 at 11:22 AM.     (Please note that portions of this note were completed with a voice recognition program. Efforts were made to edit the dictations but occasionally words are mis-transcribed.)    I agree to the above documentation placed by my scribe. I have personally evaluated this patient. Additional findings are as noted. I reviewed the scribe's note and agree with the documented findings and plan of care. Any areas of disagreement are corrected. I agree with the chief complaint, past medical history, past surgical history, allergies, medications, social and family history as documented unless otherwise noted below.      Electronically signed by Mario De Leon MD on 11/5/2017 at 1:42 PM

## 2017-11-06 ENCOUNTER — HOSPITAL ENCOUNTER (OUTPATIENT)
Dept: RADIATION ONCOLOGY | Age: 57
Discharge: HOME | End: 2017-11-06
Payer: MEDICARE

## 2017-11-06 PROCEDURE — 99211 OFF/OP EST MAY X REQ PHY/QHP: CPT | Performed by: RADIOLOGY

## 2017-11-06 NOTE — PROGRESS NOTES
Dr. Valentina Renteria. At that time microlaryngoscopy showed a mass involving the right false vocal cord. Biopsy confirmed invasive squamous cell carcinoma. After discussing options, Mr. Steven Alvarez opted to undergo organ preservation with definitive radiation therapy. He completed to his course of treatment in late September 2017. He had greater than usual throat pain from the treatment, likely related to surrounding mucosal dysplasia. The pain was managed with an topical M LB solution and pain medication but remained initiated throughout the course of therapy. INTERVAL HISTORY: Mr. Steven Alvarez returns today for a post-treatment checkup. He was seen by Dr. Valentina Renteria 10/19/2017. He underwent fiber-optic laryngoscopy, and no residual mass was evident. There was note of dry mucosa but no other abnormalities were described. Mr. Steven Alvarez states that his throat pain is improving but he still does have pain as described above. He states this is worse at night and he attributes it to increased dryness. He does keep water at the bedside to help moisten his throat, and he does use over-the-counter preparations for dry mouth. He does not have any other complaints related to radiation therapy at this time. Follow-up scanning has not yet been performed. TESTS:   9/29/2017: WBCs 5.4. Hemoglobin 15.2. Hematocrit 45.1. Platelets 983.       MEDICATIONS:   Current Outpatient Prescriptions   Medication Sig Dispense Refill    sulfamethoxazole-trimethoprim (BACTRIM DS;SEPTRA DS) 800-160 MG per tablet Take 1 tablet by mouth 2 times daily Stay hydrated 60 tablet 2    mometasone-formoterol (DULERA) 100-5 MCG/ACT inhaler Inhale 2 puffs into the lungs 2 times daily 3 Inhaler 3    Dextromethorphan Polistirex (ROBITUSSIN 12 HOUR COUGH PO) Take by mouth      tiotropium (SPIRIVA RESPIMAT) 2.5 MCG/ACT AERS inhaler Inhale 2 puffs into the lungs daily      loperamide (IMODIUM) 2 MG capsule Take 2 mg by mouth as needed for Diarrhea      b complex vitamins capsule Take 1 capsule by mouth daily      ipratropium-albuterol (DUONEB) 0.5-2.5 (3) MG/3ML SOLN nebulizer solution Take 1 vial by nebulization every 4 hours      Multiple Vitamins-Minerals (CENTRUM MEN PO) Take by mouth daily      acetaminophen (TYLENOL) 650 MG CR tablet Take 650 mg by mouth as needed for Pain      albuterol sulfate HFA (VENTOLIN HFA) 108 (90 BASE) MCG/ACT inhaler Inhale 2 puffs into the lungs every 4 hours as needed for Wheezing or Shortness of Breath 3 Inhaler 3     No current facility-administered medications for this encounter. REVIEW OF SYSTEMS:  General/Constitutional: Fatigue. Weight is at pre-treatment baseline. HEENT: Throat pain as described above and xerostomia as noted. Hoarseness persists. Otherwise negative. Respiratory: Dyspnea on exertion. Nonproductive cough. Otherwise negative. Cardiovascular: Negative. Skin: Some dryness. No residual problems related to radiation therapy. GI: Previous APR as described above. Otherwise negative. : Negative. Musculoskeletal: Negative. Metabolic/endocrine negative. Neurologic: Negative. All others negative. EXAMINATION:   GENERAL: Pleasant, well-developed adult male in no obvious cardiorespiratory distress. Alert and oriented ×3. VITAL SIGNS:  Weight 104.1 kg. Temperature 35.8 Celsius. Pulse 67. Respirations 20. Blood pressure 143/88. Pulse oximetry 96% on room air. HEENT: PERRL/EOMI.  ears, nose and lips unremarkable on external examination. Oral cavity and oropharynx shows some moderate xerostomia. No visible mucosal lesions or exudates. NECK: No JVD. No palpable cervical lymphadenopathy. LYMPH: No supraclavicular or axillary adenopathy. LUNGS: Somewhat distant breath sounds, clear to auscultation with exception of few rhonchi. HEART: None tachycardic. ABDOMEN: Unremarkable bowel sounds. EXTREMITIES: No clubbing or cyanosis.     NEUROLOGIC EXAMINATION: Cranial nerves grossly intact. No obvious focal neurologic deficits. Gait unremarkable. FIBEROPTIC LARYNGOSCOPY:  The scope was present still lysed and prepped with anti-followed solution. The nasal passages were prepped with 2% lidocaine spray and Lobo-Synephrine spray. The scope was introduced into the right nostril and advanced along the nasal passage to the nasopharynx. The nasopharynx mucosa was pink and moist without abnormalities. Scope was advanced to the level of the oral pharynx. The vallecula and base of tongue were unremarkable. Pharyngeal walls showed no concerning lesions. There was some mild edema involving the supraglottis including the arytenoids. No supraglottic masses were visualized. The vocal cords moved well bilaterally. There were no abnormal lesions in any of the visualized structures, nor any exudates. The scope was withdrawn without incident. ASSESSMENT AND PLAN: Mr. Steven Alvarez is recuperating fairly well from his treatment. He continues to have some issues with pain but the discomfort is improving over time. He has some xerostomia but does not have any unexpected side effects related to treatment. Examination indicates clinical complete response. We reviewed follow-up recommendations regarding treatment. We should pursue post-treatment baseline imaging. I again reminded Mr. Steven Alvarez that follow-up for head and neck cancers requires frequent and meticulous examination. We reviewed the critical nature of examinations within the first 2 years. I also reminded him that we will need to continue monitoring thyroid function due to significant likelihood of future development of hypothyroidism. I will schedule my next appointment with Mr. Steven Alvarez 4 January 2018. As usual, he was instructed to call for an earlier appointment if he has any questions problems or concerns. He will of course continue care in otolaryngology and with his other physicians.       ATTESTATION: 30 minutes face-to-face time spent with patient, >50% in counseling/education/coordination of care. [facility] Oncology  Electronically signed by Shad Powell MD on 11/6/17 at 5:50 PM      Cc: All follow up physicians; Emil Brito; [facility]. This document was created using a voice-recognition program.  Computer generated transcription errors may be present.

## 2017-12-15 ENCOUNTER — OFFICE VISIT (OUTPATIENT)
Dept: ENT CLINIC | Age: 57
End: 2017-12-15
Payer: MEDICARE

## 2017-12-15 VITALS
WEIGHT: 235 LBS | HEART RATE: 72 BPM | DIASTOLIC BLOOD PRESSURE: 76 MMHG | RESPIRATION RATE: 20 BRPM | BODY MASS INDEX: 29.22 KG/M2 | SYSTOLIC BLOOD PRESSURE: 138 MMHG | HEIGHT: 75 IN | TEMPERATURE: 97.8 F

## 2017-12-15 DIAGNOSIS — T66.XXXD RADIATION ADVERSE EFFECT, SUBSEQUENT ENCOUNTER: ICD-10-CM

## 2017-12-15 DIAGNOSIS — C32.0 SQUAMOUS CELL CARCINOMA OF VOCAL CORD (HCC): ICD-10-CM

## 2017-12-15 DIAGNOSIS — B37.0 THRUSH: Primary | ICD-10-CM

## 2017-12-15 DIAGNOSIS — K21.9 GASTROESOPHAGEAL REFLUX DISEASE WITHOUT ESOPHAGITIS: ICD-10-CM

## 2017-12-15 PROCEDURE — G8427 DOCREV CUR MEDS BY ELIG CLIN: HCPCS | Performed by: OTOLARYNGOLOGY

## 2017-12-15 PROCEDURE — G8484 FLU IMMUNIZE NO ADMIN: HCPCS | Performed by: OTOLARYNGOLOGY

## 2017-12-15 PROCEDURE — G8417 CALC BMI ABV UP PARAM F/U: HCPCS | Performed by: OTOLARYNGOLOGY

## 2017-12-15 PROCEDURE — 3017F COLORECTAL CA SCREEN DOC REV: CPT | Performed by: OTOLARYNGOLOGY

## 2017-12-15 PROCEDURE — 1036F TOBACCO NON-USER: CPT | Performed by: OTOLARYNGOLOGY

## 2017-12-15 PROCEDURE — 99213 OFFICE O/P EST LOW 20 MIN: CPT | Performed by: OTOLARYNGOLOGY

## 2017-12-15 PROCEDURE — 31575 DIAGNOSTIC LARYNGOSCOPY: CPT | Performed by: OTOLARYNGOLOGY

## 2017-12-15 RX ORDER — RANITIDINE 150 MG/1
150 TABLET ORAL 2 TIMES DAILY
Qty: 60 TABLET | Refills: 3 | Status: SHIPPED | OUTPATIENT
Start: 2017-12-15 | End: 2019-04-25 | Stop reason: ALTCHOICE

## 2017-12-15 RX ORDER — CETIRIZINE HYDROCHLORIDE 10 MG/1
10 TABLET ORAL DAILY
COMMUNITY
End: 2018-02-27 | Stop reason: ALTCHOICE

## 2017-12-15 RX ORDER — MECLIZINE HYDROCHLORIDE 25 MG/1
25 TABLET ORAL 3 TIMES DAILY PRN
COMMUNITY
End: 2018-02-27

## 2017-12-15 RX ORDER — ROSUVASTATIN CALCIUM 20 MG/1
20 TABLET, COATED ORAL DAILY
COMMUNITY
End: 2020-08-18

## 2017-12-15 ASSESSMENT — ENCOUNTER SYMPTOMS
FACIAL SWELLING: 0
STRIDOR: 0
COUGH: 0
DIARRHEA: 0
SORE THROAT: 0
SHORTNESS OF BREATH: 0
TROUBLE SWALLOWING: 0
ABDOMINAL PAIN: 0
SINUS PRESSURE: 0
COLOR CHANGE: 0
RHINORRHEA: 0
APNEA: 0
NAUSEA: 0
WHEEZING: 0
CHOKING: 0
VOICE CHANGE: 1
CHEST TIGHTNESS: 0
VOMITING: 0

## 2017-12-15 NOTE — PROGRESS NOTES
 Multiple Vitamins-Minerals (CENTRUM MEN PO) Take by mouth daily      acetaminophen (TYLENOL) 650 MG CR tablet Take 650 mg by mouth as needed for Pain      albuterol sulfate HFA (VENTOLIN HFA) 108 (90 BASE) MCG/ACT inhaler Inhale 2 puffs into the lungs every 4 hours as needed for Wheezing or Shortness of Breath 3 Inhaler 3     No current facility-administered medications for this visit. Past Medical History:   Diagnosis Date    Arthritis     COPD (chronic obstructive pulmonary disease) ([facility]     Pneumonia 01/2017 1/2017 and 2/2017    Thyroid disease       Past Surgical History:   Procedure Laterality Date    COLONOSCOPY  2016    EYE SURGERY      CROSS EYED    HAND SURGERY Bilateral 1995    KNEE ARTHROSCOPY Right 1996    LARYNGOSCOPY  07/12/2017    Biopsy    MICROLARYNGOSCOPY W BIOPSY      RECTAL SURGERY  08/29/2016    colostemy bag     ROTATOR CUFF REPAIR Left 80'S     Family History   Problem Relation Age of Onset    Prostate Cancer Father 48    Cancer Father     Heart Disease Father     Diabetes Mother     Heart Disease Mother     Stroke Mother     Heart Disease Brother     High Blood Pressure Other     Cancer Other      LUNG,PROSTATE    Hearing Loss Other      Social History   Substance Use Topics    Smoking status: Former Smoker     Packs/day: 2.25     Years: 32.00     Start date: 9/20/1976     Quit date: 2/5/2006    Smokeless tobacco: Never Used    Alcohol use No       Subjective:      Review of Systems   Constitutional: Negative for activity change, appetite change, chills, diaphoresis, fatigue, fever and unexpected weight change. HENT: Positive for voice change. Negative for congestion, dental problem, ear discharge, ear pain, facial swelling, hearing loss, mouth sores, nosebleeds, postnasal drip, rhinorrhea, sinus pressure, sneezing, sore throat, tinnitus and trouble swallowing. Eyes: Negative for visual disturbance.    Respiratory: Negative for apnea, cough, choking, chest tightness, shortness of breath, wheezing and stridor. Cardiovascular: Negative for chest pain, palpitations and leg swelling. Gastrointestinal: Negative for abdominal pain, diarrhea, nausea and vomiting. Endocrine: Negative for cold intolerance, heat intolerance, polydipsia and polyuria. Genitourinary: Negative for dysuria, enuresis and hematuria. Musculoskeletal: Negative for arthralgias, gait problem, neck pain and neck stiffness. Skin: Negative for color change and rash. Allergic/Immunologic: Negative for environmental allergies, food allergies and immunocompromised state. Neurological: Negative for dizziness, syncope, facial asymmetry, speech difficulty, light-headedness and headaches. Hematological: Negative for adenopathy. Does not bruise/bleed easily. Psychiatric/Behavioral: Negative for confusion and sleep disturbance. The patient is not nervous/anxious. Objective:   /76 (Site: Right Arm, Position: Sitting)   Pulse 72   Temp 97.8 °F (36.6 °C) (Oral)   Resp 20   Ht 6' 2.5\" (1.892 m)   Wt 235 lb (106.6 kg)   BMI 29.77 kg/m²     Physical Exam   Ears: Normal ear canals and tympanic membranes  Neck: No palpable adenopathy. Mild subcutaneous edema as expected from the radiation    PROCEDURE: FIBEROPTIC LARYNGOSCOPY    A fiberoptic laryngoscopy was performed under topical anesthesia, after using Afrin and Lidocaine spray in the nasal fossa. The nasal fossa, nasopharynx, hypopharynx and larynx were carefully examined. Base of tongue was symmetrical. Epiglottis appeared normal and was not retrodisplaced. True vocal cords had normal mobility. There was a maroon-colored erythema of the arytenoid area bilaterally, with edema. Moderate erythema of the entire hypopharynx. No masses or mucosal lesions were noted. No pooling in the pyriform sinuses. Primary site revealed no evidence of persistent carcinoma (right false vocal cord).      Data:  All of the past medical

## 2017-12-15 NOTE — LETTER
[facility]  [facility]  [address]  [address]  Phone: [phone]  Fax: [phone]    Ally Andres MD        December 15, 2017    Vidya Dial, [address] [facility]. [facility] [address]    Patient: Martín Sanchez   MR Number: [id number]   YOB: 1960   Date of Visit: 12/15/2017     Dear Vidya Dial,    I recently saw your patient, Martín Sanchez, regarding Surveillance for his commercial carcinoma of the right false vocal cord. He has no sign of recurrence but he is having inflammation in his hypopharynx and larynx causing pain. Suspect this is a combination of thrush from his Dulera and acid reflux. Below are the relevant portions of my assessment and plan of care. Assessment & Plan   Diagnoses and all orders for this visit:    1. Thrush  Clotrimazole (MYCELEX) 10 MG LOZG lozenge   2. 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 Gastroesophageal reflux disease without esophagitis  ranitidine (ZANTAC) 150 MG tablet   4. Radiation adverse effect, subsequent encounter  [place] LARYNGOSCOPY FLEXIBLE DIAGNOSTIC    ranitidine (ZANTAC) 150 MG tablet    Clotrimazole (MYCELEX) 10 MG LOZG lozenge       The findings were explained and his questions were answered. The maroon erythema with edema is a new finding. This could easily be thrush. He is instructed to be sure to rinse not just his mouth but also his throat after using the [facility]. This could be aggravated by acid reflux, so I'm placing him on ranitidine as well and a GERD regimen. She is to stop the Q-tips in his ears    Return for Follow-Up, GERD, thrush and CA larynx. If you have questions, please do not hesitate to call me. I look forward to following Crystal Espinal along with you.     Sincerely,          Ally Andres MD

## 2017-12-19 ENCOUNTER — TELEPHONE (OUTPATIENT)
Dept: PULMONOLOGY | Age: 57
End: 2017-12-19

## 2017-12-19 DIAGNOSIS — J43.1 PANLOBULAR EMPHYSEMA (HCC): Primary | ICD-10-CM

## 2017-12-19 NOTE — TELEPHONE ENCOUNTER
PT called and said that he would like to start going to pulmonary rehab. He does not have an appt until May but said that he would like to try to start going there prior to the appointment, could you put in an order for this or would you like to see the pt sooner? Thank You.

## 2017-12-26 ENCOUNTER — TELEPHONE (OUTPATIENT)
Dept: PULMONOLOGY | Age: 57
End: 2017-12-26

## 2017-12-26 DIAGNOSIS — J41.1 MUCOPURULENT CHRONIC BRONCHITIS (HCC): Primary | ICD-10-CM

## 2018-01-11 ENCOUNTER — HOSPITAL ENCOUNTER (OUTPATIENT)
Dept: RADIATION ONCOLOGY | Age: 58
Discharge: HOME | End: 2018-01-11
Payer: MEDICARE

## 2018-01-11 DIAGNOSIS — C32.1 MALIGNANT NEOPLASM OF FALSE VOCAL CORDS (HCC): Primary | ICD-10-CM

## 2018-01-11 PROCEDURE — 99211 OFF/OP EST MAY X REQ PHY/QHP: CPT | Performed by: RADIOLOGY

## 2018-01-11 NOTE — PROGRESS NOTES
mouth daily      ipratropium-albuterol (DUONEB) 0.5-2.5 (3) MG/3ML SOLN nebulizer solution Take 1 vial by nebulization every 4 hours      Multiple Vitamins-Minerals (CENTRUM MEN PO) Take by mouth daily      acetaminophen (TYLENOL) 650 MG CR tablet Take 650 mg by mouth as needed for Pain      albuterol sulfate HFA (VENTOLIN HFA) 108 (90 BASE) MCG/ACT inhaler Inhale 2 puffs into the lungs every 4 hours as needed for Wheezing or Shortness of Breath 3 Inhaler 3     No current facility-administered medications for this encounter. REVIEW OF SYSTEMS:  Constitutional: Denies weight change, fever, chills. HEENT: Throat pain as described above. Voice remains week. , right ear pain, mouth pain. Respiratory: Some dyspnea on exertion. Denies cough, hemoptysis. Cardiovascular: Denies chest pain, palpitations, syncope. GI: Denies nausea, vomiting, hematemesis. Colostomy functioning normally. : Denies dysuria, hematuria. Musculoskeletal: Some aches and pains related to DJD. No new complaints. Integument: No rashes, ulcerations. Metabolic/endocrine: No new complaints. Neurological: No complaints. All others negative. EXAMINATION:   GENERAL: Pleasant, well-developed adult male. Alert and oriented ×3. No cardiovascular distress. VITAL SIGNS:  Weight 108.7 kg. Temperature 35.9. Pulse 80. Respirations 20. Blood pressure 140/90. Pulse oximetry 93% on room air. HEENT: PERRL/EOMI. stable disconjugate gaze. Ears, nose and lips unremarkable on external examination. Oral cavity with some mild xerostomia without lesion or exudate. NECK: No JVD. No cervical lymphadenopathy. LYMPH: No supraclavicular or axillary adenopathy. LUNGS: Somewhat distant breath sounds, clear to auscultation. HEART: Non-tachycardic. ABDOMEN: Nondistended, nontender with unremarkable bowel sounds. EXTREMITIES: No clubbing or cyanosis.   NEUROLOGIC EXAMINATION: Cranial nerves grossly intact with exception of disconjugate

## 2018-01-15 DIAGNOSIS — C14.0 THROAT CANCER (HCC): ICD-10-CM

## 2018-01-15 DIAGNOSIS — J41.1 MUCOPURULENT CHRONIC BRONCHITIS (HCC): ICD-10-CM

## 2018-01-18 ENCOUNTER — HOSPITAL ENCOUNTER (OUTPATIENT)
Dept: PET IMAGING | Age: 58
Discharge: HOME | End: 2018-01-18
Payer: MEDICARE

## 2018-01-18 DIAGNOSIS — C32.1 MALIGNANT NEOPLASM OF FALSE VOCAL CORDS (HCC): ICD-10-CM

## 2018-01-18 PROCEDURE — 3430000000 HC RX DIAGNOSTIC RADIOPHARMACEUTICAL: Performed by: RADIOLOGY

## 2018-01-18 PROCEDURE — 78815 PET IMAGE W/CT SKULL-THIGH: CPT

## 2018-01-18 PROCEDURE — A9552 F18 FDG: HCPCS | Performed by: RADIOLOGY

## 2018-01-18 RX ORDER — FLUDEOXYGLUCOSE F 18 200 MCI/ML
13.1 INJECTION, SOLUTION INTRAVENOUS
Status: COMPLETED | OUTPATIENT
Start: 2018-01-18 | End: 2018-01-18

## 2018-01-18 RX ADMIN — FLUDEOXYGLUCOSE F 18 13.1 MILLICURIE: 200 INJECTION, SOLUTION INTRAVENOUS at 07:33

## 2018-01-30 ENCOUNTER — OFFICE VISIT (OUTPATIENT)
Dept: ENT CLINIC | Age: 58
End: 2018-01-30
Payer: MEDICARE

## 2018-01-30 VITALS
SYSTOLIC BLOOD PRESSURE: 120 MMHG | WEIGHT: 238.6 LBS | DIASTOLIC BLOOD PRESSURE: 74 MMHG | HEART RATE: 78 BPM | RESPIRATION RATE: 20 BRPM | BODY MASS INDEX: 30.62 KG/M2 | HEIGHT: 74 IN | TEMPERATURE: 97.9 F

## 2018-01-30 DIAGNOSIS — T66.XXXD RADIATION ADVERSE EFFECT, SUBSEQUENT ENCOUNTER: ICD-10-CM

## 2018-01-30 DIAGNOSIS — J41.1 MUCOPURULENT CHRONIC BRONCHITIS (HCC): ICD-10-CM

## 2018-01-30 DIAGNOSIS — C32.0 SQUAMOUS CELL CARCINOMA OF VOCAL CORD (HCC): Primary | ICD-10-CM

## 2018-01-30 PROCEDURE — 3017F COLORECTAL CA SCREEN DOC REV: CPT | Performed by: OTOLARYNGOLOGY

## 2018-01-30 PROCEDURE — 99213 OFFICE O/P EST LOW 20 MIN: CPT | Performed by: OTOLARYNGOLOGY

## 2018-01-30 PROCEDURE — 3023F SPIROM DOC REV: CPT | Performed by: OTOLARYNGOLOGY

## 2018-01-30 PROCEDURE — G8417 CALC BMI ABV UP PARAM F/U: HCPCS | Performed by: OTOLARYNGOLOGY

## 2018-01-30 PROCEDURE — G8484 FLU IMMUNIZE NO ADMIN: HCPCS | Performed by: OTOLARYNGOLOGY

## 2018-01-30 PROCEDURE — 31575 DIAGNOSTIC LARYNGOSCOPY: CPT | Performed by: OTOLARYNGOLOGY

## 2018-01-30 PROCEDURE — G8926 SPIRO NO PERF OR DOC: HCPCS | Performed by: OTOLARYNGOLOGY

## 2018-01-30 PROCEDURE — 1036F TOBACCO NON-USER: CPT | Performed by: OTOLARYNGOLOGY

## 2018-01-30 PROCEDURE — G8427 DOCREV CUR MEDS BY ELIG CLIN: HCPCS | Performed by: OTOLARYNGOLOGY

## 2018-01-30 ASSESSMENT — ENCOUNTER SYMPTOMS
NAUSEA: 0
FACIAL SWELLING: 0
VOICE CHANGE: 1
DIARRHEA: 0
TROUBLE SWALLOWING: 0
COLOR CHANGE: 0
SINUS PRESSURE: 0
WHEEZING: 0
ABDOMINAL PAIN: 0
SHORTNESS OF BREATH: 0
CHEST TIGHTNESS: 0
VOMITING: 0
APNEA: 0
STRIDOR: 0
RHINORRHEA: 0
CHOKING: 0
COUGH: 0
SORE THROAT: 0

## 2018-01-30 NOTE — PROGRESS NOTES
state.   Neurological: Negative for dizziness, syncope, facial asymmetry, speech difficulty, light-headedness and headaches. Hematological: Negative for adenopathy. Does not bruise/bleed easily. Psychiatric/Behavioral: Negative for confusion and sleep disturbance. The patient is not nervous/anxious. Objective:   /74 (Site: Left Arm, Position: Sitting)   Pulse 78   Temp 97.9 °F (36.6 °C) (Oral)   Resp 20   Ht 6' 2\" (1.88 m)   Wt 238 lb 9.6 oz (108.2 kg)   BMI 30.63 kg/m²     Physical Exam     PROCEDURE: FIBEROPTIC LARYNGOSCOPY     A fiberoptic laryngoscopy was performed under topical anesthesia, after using Afrin and Lidocaine spray in the nasal fossa. The nasal fossa, nasopharynx, hypopharynx and larynx were carefully examined. Base of tongue was symmetrical. Epiglottis appeared normal and was not retrodisplaced. True vocal cords had normal mobility. There was edema and mild erythema of the entire hypopharynx. No masses or mucosal lesions were noted. No pooling in the pyriform sinuses. Primary site revealed no evidence of persistent carcinoma (right false vocal cord). Data:  All of the past medical history, past surgical history, family history, social history, allergies and current medications were reviewed with the patient. Assessment & Plan   Diagnoses and all orders for this visit:    1. 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 Radiation adverse effect, subsequent encounter  [place] LARYNGOSCOPY FLEXIBLE DIAGNOSTIC   3. Mucopurulent chronic bronchitis ([address]         The findings were explained and his questions were answered. Return in about 1 month (around 2/28/2018) for Follow-Up. Ame Cruz. Ghazala Lr MD    **This report has been created using voice recognition software. It may contain minor errors which are inherent in voice recognition technology. **

## 2018-01-30 NOTE — LETTER
[facility]  [facility]  [address] [facility]  Phone: [phone]  Fax: [phone]    Elsi Tipton MD        March 4, 2018    Boyd Castillo, CNP  [address] [facility] [place] [facility] [address]    Patient: Caitlyn Turner   MR Number: [id number]   YOB: 1960   Date of Visit: 1/30/2018     Dear Boyd Castillo,    I recently saw your patient, Caitlyn Turner, regarding His specific carcinoma cord. This time there is no evidence of persistent tumor on fiberoptic laryngoscopy. I apologize for the delay in getting this to you    Below are the relevant portions of my assessment and plan of care. Assessment & Plan   Diagnoses and all orders for this visit:    1. 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 Radiation adverse effect, subsequent encounter  [place] LARYNGOSCOPY FLEXIBLE DIAGNOSTIC   3. Mucopurulent chronic bronchitis ([facility] [address]         The findings were explained and his questions were answered. Return in about 1 month (around 2/28/2018) for Follow-Up. If you have questions, please do not hesitate to call me. I look forward to following [facility]ly along with you.     Sincerely,          Elsi Tipton MD

## 2018-02-27 ENCOUNTER — OFFICE VISIT (OUTPATIENT)
Dept: ENT CLINIC | Age: 58
End: 2018-02-27
Payer: MEDICARE

## 2018-02-27 ENCOUNTER — HOSPITAL ENCOUNTER (OUTPATIENT)
Dept: CARDIAC REHAB | Age: 58
Setting detail: THERAPIES SERIES
Discharge: HOME | End: 2018-02-27
Payer: MEDICARE

## 2018-02-27 VITALS — BODY MASS INDEX: 29.52 KG/M2 | HEIGHT: 74 IN | WEIGHT: 230 LBS

## 2018-02-27 VITALS
DIASTOLIC BLOOD PRESSURE: 68 MMHG | HEIGHT: 74 IN | HEART RATE: 72 BPM | RESPIRATION RATE: 14 BRPM | SYSTOLIC BLOOD PRESSURE: 122 MMHG | BODY MASS INDEX: 29.39 KG/M2 | WEIGHT: 229 LBS | TEMPERATURE: 98 F

## 2018-02-27 DIAGNOSIS — B95.8 TRACHEITIS DUE TO STAPHYLOCOCCUS INFECTION: ICD-10-CM

## 2018-02-27 DIAGNOSIS — J04.10 TRACHEITIS DUE TO STAPHYLOCOCCUS INFECTION: ICD-10-CM

## 2018-02-27 DIAGNOSIS — T66.XXXD RADIATION ADVERSE EFFECT, SUBSEQUENT ENCOUNTER: ICD-10-CM

## 2018-02-27 DIAGNOSIS — C32.0 SQUAMOUS CELL CARCINOMA OF VOCAL CORD (HCC): Primary | ICD-10-CM

## 2018-02-27 PROCEDURE — G8427 DOCREV CUR MEDS BY ELIG CLIN: HCPCS | Performed by: OTOLARYNGOLOGY

## 2018-02-27 PROCEDURE — 1036F TOBACCO NON-USER: CPT | Performed by: OTOLARYNGOLOGY

## 2018-02-27 PROCEDURE — 3017F COLORECTAL CA SCREEN DOC REV: CPT | Performed by: OTOLARYNGOLOGY

## 2018-02-27 PROCEDURE — 97150 GROUP THERAPEUTIC PROCEDURES: CPT

## 2018-02-27 PROCEDURE — 31575 DIAGNOSTIC LARYNGOSCOPY: CPT | Performed by: OTOLARYNGOLOGY

## 2018-02-27 PROCEDURE — 99213 OFFICE O/P EST LOW 20 MIN: CPT | Performed by: OTOLARYNGOLOGY

## 2018-02-27 PROCEDURE — G8484 FLU IMMUNIZE NO ADMIN: HCPCS | Performed by: OTOLARYNGOLOGY

## 2018-02-27 PROCEDURE — G8417 CALC BMI ABV UP PARAM F/U: HCPCS | Performed by: OTOLARYNGOLOGY

## 2018-02-27 RX ORDER — SULFAMETHOXAZOLE AND TRIMETHOPRIM 800; 160 MG/1; MG/1
1 TABLET ORAL 2 TIMES DAILY
Qty: 60 TABLET | Refills: 1 | Status: SHIPPED | OUTPATIENT
Start: 2018-02-27 | End: 2018-03-29

## 2018-02-27 ASSESSMENT — ENCOUNTER SYMPTOMS
RHINORRHEA: 0
SINUS PRESSURE: 0
CHOKING: 0
WHEEZING: 0
APNEA: 0
SORE THROAT: 0
TROUBLE SWALLOWING: 0
ABDOMINAL PAIN: 0
COUGH: 0
CHEST TIGHTNESS: 0
SHORTNESS OF BREATH: 0
COLOR CHANGE: 0
DIARRHEA: 0
FACIAL SWELLING: 0
VOICE CHANGE: 0
STRIDOR: 0
NAUSEA: 0
VOMITING: 0

## 2018-02-27 NOTE — PLAN OF CARE
does this happen?: depends on activity t/o day, but usually happens about every day. How intense out of 5 is it: 3. How long does it last?: 2-3 min. Fatigue:  none (), mild (x), moderate (), high (). Cough/ Sputum Production:  () No cough    () Non productive cough  () Productive cough only w/ infection    () Productive cough daily <1 Tbsp    (x) Productive cough daily >1Tbsp    () Nasal congestion    Wheeze:  () never, (x) rarely, () occasionally, () often, () only during activity  Blood in phlegm:  (x) never, () rarely, () occasionally, () often  Chest Pain:  (x) never, () rarely, () occasionally, () often. Postnasal drainage:  () never, (x) rarely, () occasionally, () often  Reflux/ Heartburn: (x) never, () rarely, () occasionally, () often  Swelling in ankles:  () never, (x) rarely, () occasionally, () often  Trouble swallowing:  () never, (x) rarely, () occasionally, () often  Extremity pain:  () never, (x) rarely, () occasionally, () often  Feelings of Anxiety, Panic, Fear or Isolation:  (x) never, () rarely, () occasionally, () often  Symptoms of Depression per pt:  (x) never, () rarely, () occasionally, () often    MUSCULOSKELETAL and EXERCISE ASSESSMENT      Any Physical Limitations/ Problems?: (x) Strength, () ROM, () Posture, () Functional ability, () Orthopedic limits, () Transferring abilities, (x) Exercise Tolerance, (x) Exercise Hypoxia, () Gait and Balance    PAIN ASSESSMENT  Location: legs, Duration: with the activity, goes away when activity stops., Intensity: 2/10, Type: muscle aches, usually after activity    ADL ASSESSMENT   Please see [facility]    NUTRITION ASSESSMENT  Height: 6'2\" . Weight:  230 lbs. BMI: 29.6. Any recent weight changes?: No.  Diet habits (type, patterns, etc.); pretty healthy, does like to snack. Who does the shopping/ preparing?: patient. Fluid intake:  11 cups/day.   Does patient take supplemental

## 2018-02-27 NOTE — PLAN OF CARE
predictable time frame. Other Program Components:   (X)6 Minute Walk Test (6 MWT) at program initiation and discharge   (X)Evaluation for oxygen needs while exercising   (X)Titrate Oxygen to maintain >87% SpO2   (X)Stop Exercise or Apply Oxygen if patient desaturates <88%    Measurable Endurance Goal:    -Aerobic endurance goal to be measured in minutes. Start endurance training per patient tolerance at 1-15 minutes per exercise type, progressing to a total of 31-60 minutes using various modes of training (see Exercise Prescription on Individualized Treatment Plan 'ITP'). -Measurable Muscular Strength Goal: Starting at 1-3 lbs x 8 reps, progressing daily/weekly to 5-10 lbs x 15 reps    NOTE:  If patient is a current smoker, patient will participate in tobacco cessation program during Pulmonary Rehab.       Physician Signature/Date/Time:

## 2018-02-27 NOTE — PLAN OF CARE
ASSESSMENT      Current Oxygen Use  Activity:  L/min  () Continuous  () Breath Actuated      Oxygen Titration  () Maintaining >87% Spo2  () Not maintaining -  L/min needed on       Current Home Exercise:  Frequency:  x/wk  Intensity:  /10  Duration:  min  Mode: EXERCISE  RE ASSESSMENT      Current Oxygen Use  Activity:  L/min  () Continuous  () Breath Actuated      Oxygen Titration  () Maintaining >87% Spo2  () Not maintaining -  L/min needed on       Current Home Exercise:  Frequency:  x/wk  Intensity:  /10  Duration:  min  Mode:   EXERCISE  DISCHARGE        Final Oxygen Use  Activity:  L/min  () Continuous  () Breath Actuated      Oxygen Titration  () Maintaining >87% Spo2  () Not maintaining -  L/min needed on       Current Home Exercise:  Frequency:  x/wk  Intensity:  /10  Duration:  min  Mode:       6 Minute Walk Test (6MWT):  O2 used: none  1160 ft walked = 2.68 METs  SpO2: 86-94%  HR: 82-96   RPD: 2  RPE: 3  Number of Breaks: none  Avg time for break:  secs  Assist device used: none         6 Minute Walk Test (6MWT):  O2 used:   ft walked =  METs  SpO2:  %  HR:    RPD:   RPE:  Number of Breaks: Avg time for break:  secs  Assist device used:             OUTCOMES:  6MWD Improvement:  > 98'?  () Yes  () No     Exercise Prescription    THR: 81 - 130 bpm      Frequency:  2-3x/wk in [place], 1-3x/wk home activity    Intensity:  METs (from 6MWT)      Duration:  15-30 total minutes up to 55 minutes max    Modality:  Aerobic (TM, AD, UBE, NuStep), 6 ST, RT 1-10# 8-15 reps    Progression:  Increase 30s - 2 min/mode for Aerobic activity and ST, and increase Intensity 2-5% each week if RPE <5, RPD <5, SpO2 >87% and pt is within [facility] above.    Exercise Prescription    () Pt exercising within THR    Frequency:  2-3x/wk in [place], 1-3x/wk home activity    Intensity:  3-5 on Leilani Dyspnea/ Fatigue scale    Duration:  15-30 total minutes up to 55 minutes max    Modality:  Aerobic (TM, AD, UBE, NuStep), 6 ST, RT 1-10# 8-15 min  UBE:  level,  min  ST:  min  RT  #,  reps Current Levels:  TM:  mph,  min  AD:  level,  min  NuStep:  W,  min  UBE:  level,  min  ST:  min  RT  #,  reps Current Levels:  TM:  mph,  min  AD:  level,  min  NuStep:  W,  min  UBE:  level,  min  ST:  min  RT  #,  reps   Final Levels:  TM:  mph,  min  AD:  level,  min  NuStep:  W,  min  UBE:  level,  min  ST:  min  RT  #,  reps     Physical Limitations  Initial Assessment    () Weakness  () Inflexible  () Poor posture  () Gait abnormality  () Balance  () Orthopedic Issues   Physical Limitation  Plan      (x) Strengthen through squats and RT  (x) Appropriate stretches shown  (x)Verbal cues and reminders for posture and gait given  (x) Proper modalities chosen for ortho issues  (x) Give Home activity / stretches/ Warmup/ [place] info   Physical Limitations Reassessment      () Pt progressing  () Pt not progressing Physical Limitations Reassessment      () Pt progressing  () Pt not progressing Physical Limitations Reassessment      () Pt progressing  () Pt not progressing     Physical Limitations  Discharge      () Issues Addressed  () PT/OT suggested       Exercise Goals   Initial Assessment:    (x) Start to, and commit to exercising regularly     (x) Learn about home activity recommendations        (x) Increase PF shown by increasing 6MWD   Exercise Goals   Plan      -Initiate [place] 2x/week  -Encourage home exercise    -Attend Home Activity EDUCATION class        -Slowly, safely, progress in [place]    Exercise Goals   Reassessment      () Pt is coming regularly  () Pt is sporadic    () Pt has had class  () Pt has not had class        () Pt is progressing  () Pt is not progressing Exercise Goals   Reassessment      () Pt is coming regularly  () Pt is sporadic    () Pt has had class  () Pt has not had class        () Pt is progressing  () Pt is not progressing Exercise Goals   Reassessment      () Pt is coming regularly  () Pt is sporadic    () Pt has had class  () Pt has not had class        () Pt is progressing  () Pt is not progressing   Exercise Goals Discharge      () GOAL Met?  () GOAL not met -      () Pt had EDUCATION class  Date:   () Pt did not have class    () GOAL Met  See 6MWD above  () GOAL not Met:     Exercise Intervention Initial Assessment        () Transportation needed      (x) EDUCATION needed          (x) Motivation needed   Exercise Intervention/ Education   Plan      () [employer] set up        () EDUCATION:  Home activity class to be given        () Positive encouragement, support and motivation to be given   Exercise Intervention/ Education Reassessment      () Pt is attending  () Pt is not attending      () Pt has had class  () Pt has not had class      () Pt is progressing  () Pt not progressing Exercise Intervention/ Education Reassessment      () Pt is attending  () Pt is not attending      () Pt has had class  () Pt has not had class      () Pt is progressing  () Pt not progressing Exercise Intervention/ Education Reassessment      () Pt is attending  () Pt is not attending      () Pt has had class  () Pt has not had class      () Pt is progressing  () Pt not progressing Exercise Intervention/ Edcuation Discharge      () Pt attended most  () Pt cancelled a lot    () Pt has had class  Date: See above  () Pt did not have class    () Pt progressed and did well  () Pt had difficulty-               NUTRITION   INITIAL ASSESSMENT      Height:  62   Weight: 230 lbs  BMI: 29.6    30 day goal: 227 Lbs (2-4lbs)    (x) Overweight  () Underweight  () Normal  () Teeth issues  () GI issues  (x) Nutrition knowledge needed  () DM   NUTRITION   PLAN        Current Weight:  lbs      Goal achieved?:  Next 30 day goal: Lbs   NUTRITION  RE ASSESSMENT      Current Weight:  lbs      Goal achieved?:  Next 30 day goal: Lbs NUTRITION  RE ASSESSMENT      Current Weight:  lbs      Goal achieved?:  Next 30 day goal: Lbs NUTRITION  RE ASSESSMENT      Current Weight:  lbs      Goal achieved?:  Next 30 day goal: Lbs   NUTRITION DISCHARGE        Current Weight:  lbs  BMI:    Goal achieved?:     Nutrition Goals  Initial Assessment  Pt is not Diabetic  () Pt is DM.   Increase nutrition knowledge and glucose control through diet and exercise      (x) Learn weight strategies to lose weight        (x) Learn about general nutrition          -Achievable weight goal for the end of the program:  220 Lbs (2-4lbs per month recommended)   Nutrition Goals   Plan      () DM:  Attend nutrition class and learn about quality carbohydrates and exercises role in DM    () Attend nutrition class          () Attend nutrition class          () Initiate exercise and give pt hints and tips for weight and will have class for information   Nutrition Goals  Reassessment      () DM: Pt has had class  () DM: Pt has not had class           () Pt has had class  () Pt has not had class       () Pt has had class  () Pt has not had class       () Pt progressing  () Pt not progressing     Nutrition Goals  Reassessment      () DM: Pt has had class  () DM: Pt has not had class           () Pt has had class  () Pt has not had class       () Pt has had class  () Pt has not had class       () Pt progressing  () Pt not progressing     Nutrition Goals  Reassessment      () DM: Pt has had class  () DM: Pt has not had class           () Pt has had class  () Pt has not had class       () Pt has had class  () Pt has not had class       () Pt progressing  () Pt not progressing     Nutrition Goals  Discharge      () Pt had nutrition class  Date:  () Pt has not had class        () Pt has had class  Date: See above  () Pt has not had class    () Pt has class  Date: See above  () Pt has not had class      () Final weight goal achieved  () Pt did not reach desired weight goal - (readdressed nutrition and exercise strategies for future goal attainment)        Nutrition Intervention/ Education   Initial Assessment    (x) Pt needs Nutrition education class  () Pt has not had class        Re assessment of support:  Good, pt has been attending PSYCHO SOCIAL  RE ASSESSMENT    () Pt is coping well  () Pt needs more assistance-              () Pt recommended to contact physician for assistance                See below          () Pt has had class  () Pt has not had           Re assessment of support:  Good, pt has been attending PSYCHO SOCIAL  RE ASSESSMENT    () Pt is coping well  () Pt needs more assistance-              () Pt recommended to contact physician for assistance                See below          () Pt has had class  () Pt has not had class        Re assessment of support:  Good, pt has been attending   PSYCHO SOCIAL DISCHARGE      (x) Pt attended class     Date:              Post Rehab PHQ-9   Depression Score:                Post Rehab UCSD SOB  Score:      (x) Pt is less SOB with ADLs            () Pt had class  Date: See above  () Pt did not have class        () Pt completed program  () Pt had to stop         Psychosocial Goals   Initial Assessment    (x) Maintain/ Decrease Depression Levels      (x) Maintain/ Decrease Anxiety Levels        (x) Learn about Emotional Health          (x) Increase QoL   (CAT tool)          (x) Give the CAT tool for HR QoL             Psychosocial Goals  Plan      () Attend Stress/ Anxiety/ Dyspnea - Panic control class      () Attend Stress/ Anxiety/ Dyspnea - Panic control class      () Attend Stress/ Anxiety/ Dyspnea - Panic control class      () Initiate IL, get stronger, more endurance and more confidence      Pre Rehab  CAT score:   / 40     Psychosocial Goals  Reassessment      () Pt has had class  () Pt has not had class      () Pt has had class  () Pt has not had class      () Pt has had class  () Pt has not had class      () Pt is progressing  () Pt is not progressing Psychosocial Goals  Reassessment      () Pt has had class  () Pt has not had class      () Pt has had class  () Pt has not had class      () Pt ache, usually with activity   Pain   Plan      () N/A    () Pts pain is under control  () Pt encouraged to contact physician for pain control   Pain   Reassessment      () N/A    () Pts pain is under control  () Pt encouraged to contact physician for pain control Pain   Reassessment      () N/A    () Pts pain is under control  () Pt encouraged to contact physician for pain control Pain   Reassessment      () N/A    () Pts pain is under control  () Pt encouraged to contact physician for pain control Pain   Discharge      () N/A    () Pts pain is under control  () Pt encouraged to contact physician for pain control           OXYGEN   INITIAL ASSESSMENT    RESTING:  (x) RA  () O2:  L/min  () Continuous  () Breath Actuated  94% SpO2 / 18 bpm    Prescribed Oxygen Use:  No Home O2    DME Company for O2:  N/A   OXYGEN   PLAN      RESTING:  (x) Monitor needs, administer supplemental oxygen if SpO2 <88% OXYGEN   RE ASSESSMENT    RESTING:  (x) Pt SpO2 >87%  () Pt needs higher flow  () Pt needs less flow OXYGEN   RE ASSESSMENT    RESTING:  (x) Pt SpO2 >87%  () Pt needs higher flow  () Pt needs less flow OXYGEN   RE ASSESSMENT    RESTING:  (x) Pt SpO2 >87%  () Pt needs higher flow  () Pt needs less flow OXYGEN   DISCHARGE      RESTING:  () Pt SpO2 remained >87% on * L/min at rest    () Pts doctor and company contacted for change in Rx   Oxygen Goals   Initial Assessment    (x) Wean, Titrate down, or get off O2 if possible   Oxygen Goals   Plan      (x) Closely monitor SpO2 and HR using pulse oximetry for saturation and HR response, and titrate if appropriate   Oxygen Goals  Reassessment      On exertion:  () Pt maintaining FiO2  () Pt tolerating lower O2 needs  () Pt needing more O2   Oxygen Goals  Reassessment      On exertion:  () Pt maintaining FiO2  () Pt tolerating lower O2 needs  () Pt needing more O2 Oxygen Goals  Reassessment      On exertion:  () Pt maintaining FiO2  () Pt tolerating lower O2 needs  () Pt needing more O2     Oxygen Goals   Discharge      With Exertion:  () Pt maintained FiO2  () Pt was found to need less O2 - notification sent to physician  () Pt was found to need more O2, notification sent to physician     Oxygen Intervention/ Education  Initial Assessment    (x) Learn / Review about O2 use, safety and travel      () Pt does not wear or have home oxygen    Oxygen Intervention/ Education  Plan      () Attend O2 Use, safety and travel class        () Assess for SpO2 with each exercise   Oxygen Intervention/ Education  Reassessment      () Pt has had class  () Pt has not had class      () Pt is >87%  () Pt has been found to desaturate - physician notified Oxygen Intervention/ Education  Reassessment      () Pt has had class  () Pt has not had class      () Pt is >87%  () Pt has been found to desaturate - physician notified Oxygen Intervention/ Education  Reassessment      () Pt has had class  () Pt has not had class      () Pt is >87%  () Pt has been found to desaturate - physician notified   Oxygen Intervention/ Education  Discharge      () Pt had class  Date:   () Pt did not have class      () Pt did well  () Pt needed to be put on oxygen           TOBACCO USE  INITIAL ASSESSMENT    (x) Pt currently not smoking    () Pt currently smoking        () Pt needs Tobacco Cessation counseling          Smoked 2.5 ppd for 35 years  Pt quit in 2005.    TOBACCO USE  PLAN      () N/A      Does pt want to quit:   Does pt have a quit date:    () Tobacco Cessation counseling Education if applicable        () Encouraged to contact physician for tools/ nicotine replacement etc.   TOBACCO USE  RE ASSESSMENT    () N/A      Any change in Tobacco use status () N  ()Y      () Pt attended counseling education session            () Pt has contacted physician TOBACCO USE  RE ASSESSMENT    () N/A      Any change in Tobacco use status () N  ()Y      () Pt attended counseling education session            () Pt has contacted physician

## 2018-02-27 NOTE — PROGRESS NOTES
[facility]  [facility]  [address] [facility] [facility]  [address]  Dept: [phone]  Dept Fax: [phone]  Loc: [phone]    Wynelle Lefort is a 61 y.o. male who was referred by No ref. provider found for:  Chief Complaint   Patient presents with    1 Month Follow-Up     Patient is here for 1 month follow up GERD, thrush, and CA larynx   . HPI:     Wynelle Lefort is a 61 y.o. 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 He feels that his voice is getting stronger and he is feeling better gradually. Does still have a cough and prior to radiation he had a very severe staph aureus infection with thick secretions stuck to his subglottic area and trachea. At laryngoscopy these had to be physically removed with forceps because a suction would not lift them away from the tracheal wall. Indigo Olivera History:      Allergies   Allergen Reactions    Betadine [Povidone Iodine]      Surgery prep     Current Outpatient Prescriptions   Medication Sig Dispense Refill    sulfamethoxazole-trimethoprim (BACTRIM DS;SEPTRA DS) 800-160 MG per tablet Take 1 tablet by mouth 2 times daily Stay well hydrated 60 tablet 1    mometasone-formoterol (DULERA) 100-5 MCG/ACT inhaler Inhale 2 puffs into the lungs 2 times daily 3 Inhaler 3    rosuvastatin (CRESTOR) 20 MG tablet Take 20 mg by mouth daily      ranitidine (ZANTAC) 150 MG tablet Take 1 tablet by mouth 2 times daily 60 tablet 3    Dextromethorphan Polistirex (ROBITUSSIN 12 HOUR COUGH PO) Take by mouth      tiotropium (SPIRIVA RESPIMAT) 2.5 MCG/ACT AERS inhaler Inhale 2 puffs into the lungs daily      loperamide (IMODIUM) 2 MG capsule Take 2 mg by mouth as needed for Diarrhea      acetaminophen (TYLENOL) 650 MG CR tablet Take 650 mg by mouth as needed for Pain      albuterol sulfate HFA (VENTOLIN HFA) 108 (90 BASE) MCG/ACT inhaler Inhale 2 puffs into the lungs every 4 hours as needed for Wheezing or Shortness of

## 2018-03-15 NOTE — PROGRESS NOTES
August Ramirez. [facility]  General Surgery  New Patient Evaluation in Office  Pt Name: Georgie Eaton  Date of Birth 1960   Today's Date: 3/16/2018  Medical Record Number: [id number]  Referring Provider: No ref. provider found  Primary Care Provider: Junior Socorro CNP  Chief Complaint   Patient presents with   [facility] Surgical Consult     new pt-self ref-cyst elbow     ASSESSMENT      1. Lipoma of left upper extremity       Past Medical History:   Diagnosis Date    Arthritis     COPD (chronic obstructive pulmonary disease) ([address]     Pneumonia 01/2017 1/2017 and 2/2017    [facility]al [facility] ([facility] [address] [facility]     Thyroid disease           PLANS      1. Schedule Jan Mason for excision of left arm lipoma  The risks complications and benefits of the procedure were discussed with the patient including, but not limited to, infection, bleeding, recurrence, injury to adjacent tissues or organs and open wounds. The patient was given an opportunity to ask questions. Once answered, the patient is agreeable to proceed with the procedure. 2. Status: office procedure  3. Planned anesthesia: local     SUBJECTIVE      Jan Mason is a 61 y.o. male seen in the consultation for evaluation of a subcutaneous mass. The mass was first noted a few years ago. The mass is located left elbow, is not tender. It gradually enlarging since it was first noticed. He admits to personal history of malignancy. He denies fatigue, night sweats, weight loss, recent or current infections and immunosuppression. Current treatment has been observation which has been unsuccessful.   Past Medical History  Past Medical History:   Diagnosis Date    Arthritis     COPD (chronic obstructive pulmonary disease) ([address]     Pneumonia 01/2017 1/2017 and 2/2017    Rectal cancer ([facility] [address]     Thyroid disease      Past Surgical History  Past Surgical History:   Procedure Laterality Date    COLONOSCOPY  2016    EYE SURGERY      CROSS EYED    HAND SURGERY Bilateral 1995    KNEE ARTHROSCOPY Right 1996    LARYNGOSCOPY  07/12/2017    Biopsy    MICROLARYNGOSCOPY W BIOPSY      RECTAL SURGERY  08/29/2016    [place]    ROTATOR CUFF REPAIR Left 1990'S     Medications  Current Outpatient Prescriptions   Medication Sig Dispense Refill    sulfamethoxazole-trimethoprim (BACTRIM DS;SEPTRA DS) 800-160 MG per tablet Take 1 tablet by mouth 2 times daily Stay well hydrated 60 tablet 1    mometasone-formoterol (DULERA) 100-5 MCG/ACT inhaler Inhale 2 puffs into the lungs 2 times daily 3 Inhaler 3    rosuvastatin (CRESTOR) 20 MG tablet Take 20 mg by mouth daily      ranitidine (ZANTAC) 150 MG tablet Take 1 tablet by mouth 2 times daily 60 tablet 3    Dextromethorphan Polistirex (ROBITUSSIN 12 HOUR COUGH PO) Take by mouth 2 times daily       tiotropium (SPIRIVA RESPIMAT) 2.5 MCG/ACT AERS inhaler Inhale 2 puffs into the lungs daily      loperamide (IMODIUM) 2 MG capsule Take 2 mg by mouth as needed for Diarrhea      acetaminophen (TYLENOL) 650 MG CR tablet Take 650 mg by mouth as needed for Pain      albuterol sulfate HFA (VENTOLIN HFA) 108 (90 BASE) MCG/ACT inhaler Inhale 2 puffs into the lungs every 4 hours as needed for Wheezing or Shortness of Breath 3 Inhaler 3     No current facility-administered medications for this visit. Allergies  is allergic to betadine [povidone iodine]. Family History  family history includes Cancer in his father and another family member; Diabetes in his mother; Hearing Loss in an other family member; Heart Disease in his brother, father, and mother; High Blood Pressure in an other family member; Prostate Cancer (age of onset: 48) in his father; Stroke in his mother. Social History   reports that he quit smoking about 12 years ago. He started smoking about 41 years ago. He has a 72.00 pack-year smoking history. He has never used smokeless tobacco. He reports that he does not drink alcohol or use drugs.   [address] Maintenance   Topic Date Due    Hepatitis C screen  1960    HIV screen  11/15/1974    DTaP/Tdap/Td vaccine (1 - Tdap) 11/15/1978    Pneumococcal highest risk (1 of 3 - PCV13) 11/15/1978    Shingles Vaccine (1 of 2 - 2 Dose Series) 11/15/2009    Colon cancer screen colonoscopy  11/15/2009    Smoker: low dose lung CT screening  03/16/2017    Lipid screen  06/12/2017    Flu vaccine (1) 09/01/2017     Review of Systems  Constitutional: Negative for activity change, appetite change, chills, diaphoresis, fatigue, fever and unexpected weight change. HENT: Negative for congestion, dental problem, drooling, ear discharge, ear pain, facial swelling, hearing loss, mouth sores, nosebleeds, postnasal drip, rhinorrhea, sinus pressure, sneezing, sore throat, tinnitus, trouble swallowing and voice change. Eyes: Negative for photophobia, pain, discharge, redness, itching and visual disturbance. Respiratory: Negative for apnea, cough, choking, chest tightness, shortness of breath, wheezing and stridor. Cardiovascular: Negative for chest pain, palpitations and leg swelling. Gastrointestinal: Negative for abdominal distention, abdominal pain, anal bleeding, blood in stool, constipation, diarrhea, nausea, rectal pain and vomiting. Genitourinary: Negative for decreased urine volume, difficulty urinating, discharge, dysuria, enuresis, flank pain, frequency, genital sores, hematuria, penile pain, penile swelling, scrotal swelling, testicular pain and urgency. Musculoskeletal: Negative for arthralgias, back pain, gait problem, joint swelling, myalgias, neck pain and neck stiffness. Skin: Negative for color change, pallor, rash and wound. Neurological: Negative for dizziness, tremors, seizures, syncope, facial asymmetry, speech difficulty, weakness, light-headedness, numbness and headaches. Hematological: Negative for adenopathy. Does not bruise/bleed easily.    Psychiatric/Behavioral: Negative for

## 2018-03-16 ENCOUNTER — OFFICE VISIT (OUTPATIENT)
Dept: SURGERY | Age: 58
End: 2018-03-16
Payer: MEDICARE

## 2018-03-16 VITALS
BODY MASS INDEX: 30.03 KG/M2 | DIASTOLIC BLOOD PRESSURE: 80 MMHG | HEIGHT: 74 IN | TEMPERATURE: 97 F | RESPIRATION RATE: 18 BRPM | SYSTOLIC BLOOD PRESSURE: 138 MMHG | HEART RATE: 80 BPM | WEIGHT: 234 LBS

## 2018-03-16 DIAGNOSIS — D17.22 LIPOMA OF LEFT UPPER EXTREMITY: Primary | ICD-10-CM

## 2018-03-16 PROCEDURE — G8417 CALC BMI ABV UP PARAM F/U: HCPCS | Performed by: SURGERY

## 2018-03-16 PROCEDURE — 3017F COLORECTAL CA SCREEN DOC REV: CPT | Performed by: SURGERY

## 2018-03-16 PROCEDURE — 99202 OFFICE O/P NEW SF 15 MIN: CPT | Performed by: SURGERY

## 2018-03-16 PROCEDURE — G8427 DOCREV CUR MEDS BY ELIG CLIN: HCPCS | Performed by: SURGERY

## 2018-03-16 PROCEDURE — 1036F TOBACCO NON-USER: CPT | Performed by: SURGERY

## 2018-03-16 PROCEDURE — G8484 FLU IMMUNIZE NO ADMIN: HCPCS | Performed by: SURGERY

## 2018-03-16 ASSESSMENT — ENCOUNTER SYMPTOMS
SHORTNESS OF BREATH: 0
DIARRHEA: 0
CHEST TIGHTNESS: 0
FACIAL SWELLING: 0
EYE DISCHARGE: 0
EYE PAIN: 0
SINUS PRESSURE: 0
EYE REDNESS: 0
COUGH: 0
TROUBLE SWALLOWING: 0
ABDOMINAL PAIN: 0
BACK PAIN: 0
APNEA: 0
EYE ITCHING: 0
CONSTIPATION: 0
WHEEZING: 0
RECTAL PAIN: 0
ANAL BLEEDING: 0
PHOTOPHOBIA: 0
SORE THROAT: 0
BLOOD IN STOOL: 0
RHINORRHEA: 0
ABDOMINAL DISTENTION: 0
CHOKING: 0
STRIDOR: 0
VOMITING: 0
NAUSEA: 0
COLOR CHANGE: 0
VOICE CHANGE: 0

## 2018-03-27 ENCOUNTER — HOSPITAL ENCOUNTER (OUTPATIENT)
Dept: CARDIAC REHAB | Age: 58
Setting detail: THERAPIES SERIES
Discharge: HOME | End: 2018-03-27
Payer: MEDICARE

## 2018-03-27 NOTE — PLAN OF CARE
Maintain/ Decrease Depression Levels      (x) Maintain/ Decrease Anxiety Levels        (x) Learn about Emotional Health          (x) Increase QoL   (CAT tool)          (x) Give the CAT tool for HR QoL             Psychosocial Goals   Discharge      (x) Pt had class            (x) Pt had class            (x) Pt had class  Date: See above  () Pt did not have class      Post rehab CAT below          Post Rehab   CAT score:   / 40          OUTCOMES    PHQ-9:  Did pt drop a severity level or maintain in the minimal range?  () Y  () N    UCSD SOB  Did pt decrease their number by 5 or more?  () Y  () N    CAT scores:  Did pt drop by 2 or more points?  () Y  () N        Psychosocial Intervention/ Education   Initial Assessment    () Pt is being treated for depression/ anxiety        (x) Pt would benefit from NHs Psychosocial Issues Class (Stress, Depression, Anxiety, and Intimacy   Psychosocial Intervention/ Education Discharge      () Pt did not need any changes   () Pt needed changes to treatment      (x) Pt had class  Date: See above  () Pt did not have class         Pain   Initial Assessment    () N/A  Location: legs  Duration: usually stops when activity stops.   Intensity:  2/10  Type: muscle ache, usually with activity   Pain   Discharge      () N/A    () Pts pain is under control  () Pt encouraged to contact physician for pain control       OXYGEN   INITIAL ASSESSMENT    RESTING:  (x) RA  () O2:  L/min  () Continuous  () Breath Actuated  94% SpO2 / 18 bpm    Prescribed Oxygen Use:  No Home O2    DME Company for O2:  N/A   OXYGEN   DISCHARGE      RESTING:  () Pt SpO2 remained >87% on * L/min at rest    () Pts doctor and company contacted for change in Rx   Oxygen Goals   Initial Assessment    (x) Wean, Titrate down, or get off O2 if possible   Oxygen Goals   Discharge      With Exertion:  () Pt maintained FiO2  () Pt was found to need less O2 - notification sent to physician  () Pt was found to need more O2, Tracy Gonzales, RCP Respiratory Therapist Sign

## 2018-03-29 ENCOUNTER — TELEPHONE (OUTPATIENT)
Dept: PULMONOLOGY | Age: 58
End: 2018-03-29

## 2018-03-29 DIAGNOSIS — J41.1 CHRONIC BRONCHITIS, MUCOPURULENT (HCC): Primary | ICD-10-CM

## 2018-03-29 RX ORDER — BUDESONIDE AND FORMOTEROL FUMARATE DIHYDRATE 80; 4.5 UG/1; UG/1
2 AEROSOL RESPIRATORY (INHALATION) 2 TIMES DAILY
Qty: 3 INHALER | Refills: 3 | Status: SHIPPED | OUTPATIENT
Start: 2018-03-29 | End: 2018-06-25 | Stop reason: SDUPTHER

## 2018-04-03 ENCOUNTER — OFFICE VISIT (OUTPATIENT)
Dept: ENT CLINIC | Age: 58
End: 2018-04-03
Payer: MEDICARE

## 2018-04-03 VITALS
RESPIRATION RATE: 18 BRPM | TEMPERATURE: 97.8 F | WEIGHT: 234 LBS | DIASTOLIC BLOOD PRESSURE: 62 MMHG | BODY MASS INDEX: 30.04 KG/M2 | HEART RATE: 74 BPM | SYSTOLIC BLOOD PRESSURE: 120 MMHG

## 2018-04-03 DIAGNOSIS — B95.8 TRACHEITIS DUE TO STAPHYLOCOCCUS INFECTION: ICD-10-CM

## 2018-04-03 DIAGNOSIS — C32.0 SQUAMOUS CELL CARCINOMA OF VOCAL CORD (HCC): Primary | ICD-10-CM

## 2018-04-03 DIAGNOSIS — T66.XXXD RADIATION ADVERSE EFFECT, SUBSEQUENT ENCOUNTER: ICD-10-CM

## 2018-04-03 DIAGNOSIS — J04.10 TRACHEITIS DUE TO STAPHYLOCOCCUS INFECTION: ICD-10-CM

## 2018-04-03 PROCEDURE — G8427 DOCREV CUR MEDS BY ELIG CLIN: HCPCS | Performed by: OTOLARYNGOLOGY

## 2018-04-03 PROCEDURE — G8417 CALC BMI ABV UP PARAM F/U: HCPCS | Performed by: OTOLARYNGOLOGY

## 2018-04-03 PROCEDURE — 3017F COLORECTAL CA SCREEN DOC REV: CPT | Performed by: OTOLARYNGOLOGY

## 2018-04-03 PROCEDURE — 1036F TOBACCO NON-USER: CPT | Performed by: OTOLARYNGOLOGY

## 2018-04-03 PROCEDURE — 31575 DIAGNOSTIC LARYNGOSCOPY: CPT | Performed by: OTOLARYNGOLOGY

## 2018-04-03 ASSESSMENT — ENCOUNTER SYMPTOMS
CHOKING: 0
CHEST TIGHTNESS: 0
COLOR CHANGE: 0
BLOOD IN STOOL: 0
DIARRHEA: 0
FACIAL SWELLING: 0
WHEEZING: 0
RECTAL PAIN: 0
VOMITING: 0
APNEA: 0
SHORTNESS OF BREATH: 0
SINUS PRESSURE: 0
EYE ITCHING: 0
COUGH: 0
ABDOMINAL PAIN: 0
RHINORRHEA: 0
PHOTOPHOBIA: 0
EYE PAIN: 0
EYE DISCHARGE: 0
CONSTIPATION: 0
EYE REDNESS: 0
TROUBLE SWALLOWING: 0
ABDOMINAL DISTENTION: 0
NAUSEA: 0
STRIDOR: 0
BACK PAIN: 0
ANAL BLEEDING: 0
VOICE CHANGE: 0
SORE THROAT: 0

## 2018-05-15 ENCOUNTER — OFFICE VISIT (OUTPATIENT)
Dept: ENT CLINIC | Age: 58
End: 2018-05-15
Payer: MEDICARE

## 2018-05-15 VITALS
TEMPERATURE: 97.9 F | WEIGHT: 235.8 LBS | DIASTOLIC BLOOD PRESSURE: 74 MMHG | RESPIRATION RATE: 20 BRPM | HEART RATE: 84 BPM | SYSTOLIC BLOOD PRESSURE: 126 MMHG | BODY MASS INDEX: 30.27 KG/M2

## 2018-05-15 DIAGNOSIS — J04.10 TRACHEITIS DUE TO STAPHYLOCOCCUS INFECTION: ICD-10-CM

## 2018-05-15 DIAGNOSIS — B95.8 TRACHEITIS DUE TO STAPHYLOCOCCUS INFECTION: ICD-10-CM

## 2018-05-15 DIAGNOSIS — K21.9 GASTROESOPHAGEAL REFLUX DISEASE WITHOUT ESOPHAGITIS: ICD-10-CM

## 2018-05-15 DIAGNOSIS — F10.10 ALCOHOL ABUSE: ICD-10-CM

## 2018-05-15 DIAGNOSIS — T66.XXXD RADIATION ADVERSE EFFECT, SUBSEQUENT ENCOUNTER: ICD-10-CM

## 2018-05-15 DIAGNOSIS — C32.0 SQUAMOUS CELL CARCINOMA OF VOCAL CORD (HCC): Primary | ICD-10-CM

## 2018-05-15 PROCEDURE — 99213 OFFICE O/P EST LOW 20 MIN: CPT | Performed by: OTOLARYNGOLOGY

## 2018-05-15 PROCEDURE — G8417 CALC BMI ABV UP PARAM F/U: HCPCS | Performed by: OTOLARYNGOLOGY

## 2018-05-15 PROCEDURE — 1036F TOBACCO NON-USER: CPT | Performed by: OTOLARYNGOLOGY

## 2018-05-15 PROCEDURE — 3017F COLORECTAL CA SCREEN DOC REV: CPT | Performed by: OTOLARYNGOLOGY

## 2018-05-15 PROCEDURE — G8427 DOCREV CUR MEDS BY ELIG CLIN: HCPCS | Performed by: OTOLARYNGOLOGY

## 2018-05-15 PROCEDURE — 31575 DIAGNOSTIC LARYNGOSCOPY: CPT | Performed by: OTOLARYNGOLOGY

## 2018-05-15 ASSESSMENT — ENCOUNTER SYMPTOMS
COUGH: 0
CHEST TIGHTNESS: 0
NAUSEA: 0
ABDOMINAL PAIN: 0
VOICE CHANGE: 0
SORE THROAT: 0
SINUS PRESSURE: 0
TROUBLE SWALLOWING: 0
RHINORRHEA: 0
STRIDOR: 0
COLOR CHANGE: 0
SHORTNESS OF BREATH: 0
APNEA: 0
VOMITING: 0
CHOKING: 0
FACIAL SWELLING: 0
WHEEZING: 0
DIARRHEA: 0

## 2018-05-18 ENCOUNTER — HOSPITAL ENCOUNTER (OUTPATIENT)
Dept: RADIATION ONCOLOGY | Age: 58
Discharge: HOME | End: 2018-05-18
Payer: MEDICARE

## 2018-05-18 PROCEDURE — 99211 OFF/OP EST MAY X REQ PHY/QHP: CPT | Performed by: RADIOLOGY

## 2018-05-30 ENCOUNTER — OFFICE VISIT (OUTPATIENT)
Dept: PULMONOLOGY | Age: 58
End: 2018-05-30
Payer: MEDICARE

## 2018-05-30 VITALS
BODY MASS INDEX: 30.03 KG/M2 | SYSTOLIC BLOOD PRESSURE: 128 MMHG | HEART RATE: 75 BPM | DIASTOLIC BLOOD PRESSURE: 86 MMHG | WEIGHT: 234 LBS | HEIGHT: 74 IN | OXYGEN SATURATION: 91 % | TEMPERATURE: 98.8 F

## 2018-05-30 DIAGNOSIS — J41.1 MUCOPURULENT CHRONIC BRONCHITIS (HCC): Primary | ICD-10-CM

## 2018-05-30 DIAGNOSIS — J43.9 PULMONARY EMPHYSEMA, UNSPECIFIED EMPHYSEMA TYPE (HCC): ICD-10-CM

## 2018-05-30 DIAGNOSIS — C14.0 THROAT CANCER (HCC): ICD-10-CM

## 2018-05-30 PROCEDURE — 3023F SPIROM DOC REV: CPT | Performed by: NURSE PRACTITIONER

## 2018-05-30 PROCEDURE — 1036F TOBACCO NON-USER: CPT | Performed by: NURSE PRACTITIONER

## 2018-05-30 PROCEDURE — G8926 SPIRO NO PERF OR DOC: HCPCS | Performed by: NURSE PRACTITIONER

## 2018-05-30 PROCEDURE — G8417 CALC BMI ABV UP PARAM F/U: HCPCS | Performed by: NURSE PRACTITIONER

## 2018-05-30 PROCEDURE — G8427 DOCREV CUR MEDS BY ELIG CLIN: HCPCS | Performed by: NURSE PRACTITIONER

## 2018-05-30 PROCEDURE — 3017F COLORECTAL CA SCREEN DOC REV: CPT | Performed by: NURSE PRACTITIONER

## 2018-05-30 PROCEDURE — 99214 OFFICE O/P EST MOD 30 MIN: CPT | Performed by: NURSE PRACTITIONER

## 2018-05-30 ASSESSMENT — ENCOUNTER SYMPTOMS
HEMOPTYSIS: 0
WHEEZING: 0
NAUSEA: 0
DIARRHEA: 0
COUGH: 1
SPUTUM PRODUCTION: 1
BLURRED VISION: 0
DOUBLE VISION: 0
HOARSE VOICE: 1
VOMITING: 0
SHORTNESS OF BREATH: 1

## 2018-05-30 ASSESSMENT — COPD QUESTIONNAIRES: COPD: 1

## 2018-06-21 ENCOUNTER — OFFICE VISIT (OUTPATIENT)
Dept: ENT CLINIC | Age: 58
End: 2018-06-21
Payer: MEDICARE

## 2018-06-21 ENCOUNTER — TELEPHONE (OUTPATIENT)
Dept: PULMONOLOGY | Age: 58
End: 2018-06-21

## 2018-06-21 VITALS
WEIGHT: 233 LBS | DIASTOLIC BLOOD PRESSURE: 84 MMHG | RESPIRATION RATE: 16 BRPM | SYSTOLIC BLOOD PRESSURE: 126 MMHG | HEART RATE: 80 BPM | TEMPERATURE: 98.3 F | HEIGHT: 74 IN | BODY MASS INDEX: 29.9 KG/M2

## 2018-06-21 DIAGNOSIS — R49.0 DYSPHONIA: ICD-10-CM

## 2018-06-21 DIAGNOSIS — J38.3 DYSPLASIA OF TRUE VOCAL CORD: ICD-10-CM

## 2018-06-21 DIAGNOSIS — T66.XXXD RADIATION ADVERSE EFFECT, SUBSEQUENT ENCOUNTER: ICD-10-CM

## 2018-06-21 DIAGNOSIS — K21.9 GASTROESOPHAGEAL REFLUX DISEASE WITHOUT ESOPHAGITIS: ICD-10-CM

## 2018-06-21 DIAGNOSIS — C32.0 SQUAMOUS CELL CARCINOMA OF VOCAL CORD (HCC): Primary | ICD-10-CM

## 2018-06-21 PROCEDURE — 31575 DIAGNOSTIC LARYNGOSCOPY: CPT | Performed by: OTOLARYNGOLOGY

## 2018-06-21 PROCEDURE — G8427 DOCREV CUR MEDS BY ELIG CLIN: HCPCS | Performed by: OTOLARYNGOLOGY

## 2018-06-21 PROCEDURE — 3017F COLORECTAL CA SCREEN DOC REV: CPT | Performed by: OTOLARYNGOLOGY

## 2018-06-21 PROCEDURE — 99213 OFFICE O/P EST LOW 20 MIN: CPT | Performed by: OTOLARYNGOLOGY

## 2018-06-21 PROCEDURE — G8417 CALC BMI ABV UP PARAM F/U: HCPCS | Performed by: OTOLARYNGOLOGY

## 2018-06-21 PROCEDURE — 1036F TOBACCO NON-USER: CPT | Performed by: OTOLARYNGOLOGY

## 2018-06-21 ASSESSMENT — ENCOUNTER SYMPTOMS
SORE THROAT: 0
COLOR CHANGE: 0
CHOKING: 0
FACIAL SWELLING: 0
APNEA: 0
SHORTNESS OF BREATH: 0
CHEST TIGHTNESS: 0
VOICE CHANGE: 0
STRIDOR: 0
NAUSEA: 0
WHEEZING: 0
ABDOMINAL PAIN: 0
SINUS PRESSURE: 0
RHINORRHEA: 0
DIARRHEA: 0
TROUBLE SWALLOWING: 0
COUGH: 0
VOMITING: 0

## 2018-06-25 DIAGNOSIS — J41.1 CHRONIC BRONCHITIS, MUCOPURULENT (HCC): ICD-10-CM

## 2018-06-25 RX ORDER — BUDESONIDE AND FORMOTEROL FUMARATE DIHYDRATE 80; 4.5 UG/1; UG/1
2 AEROSOL RESPIRATORY (INHALATION) 2 TIMES DAILY
Qty: 3 INHALER | Refills: 3 | Status: SHIPPED | OUTPATIENT
Start: 2018-06-25 | End: 2019-04-17 | Stop reason: SDUPTHER

## 2018-07-02 DIAGNOSIS — J41.1 MUCOPURULENT CHRONIC BRONCHITIS (HCC): ICD-10-CM

## 2018-07-02 RX ORDER — ALBUTEROL SULFATE 90 UG/1
2 AEROSOL, METERED RESPIRATORY (INHALATION) EVERY 4 HOURS PRN
Qty: 3 INHALER | Refills: 3 | Status: SHIPPED | OUTPATIENT
Start: 2018-07-02 | End: 2019-09-26 | Stop reason: SDUPTHER

## 2018-07-02 NOTE — TELEPHONE ENCOUNTER
Sandra Frost called requesting a refill on the following medications:  Requested Prescriptions     Pending Prescriptions Disp Refills    albuterol sulfate HFA (VENTOLIN HFA) 108 (90 Base) MCG/ACT inhaler 3 Inhaler 3     Sig: Inhale 2 puffs into the lungs every 4 hours as needed for Wheezing or Shortness of Breath     Pharmacy verified:  [address]      Date of last visit: 5/30/2018  Date of next visit (if applicable): 51/98/9769

## 2018-07-13 LAB
ALBUMIN SERPL-MCNC: NORMAL G/DL
ALP BLD-CCNC: NORMAL U/L
ALT SERPL-CCNC: 35 U/L
ALT SERPL-CCNC: NORMAL U/L
ANION GAP SERPL CALCULATED.3IONS-SCNC: 4 MMOL/L
AST SERPL-CCNC: 30 U/L
AST SERPL-CCNC: NORMAL U/L
BILIRUB SERPL-MCNC: NORMAL MG/DL (ref 0.1–1.4)
BUN BLDV-MCNC: 15 MG/DL
CALCIUM SERPL-MCNC: 9 MG/DL
CHLORIDE BLD-SCNC: 107 MMOL/L
CHOLESTEROL, TOTAL: 140 MG/DL
CHOLESTEROL/HDL RATIO: 3.5
CO2: 27 MMOL/L
CREAT SERPL-MCNC: 0.83 MG/DL
GFR CALCULATED: NORMAL
GLUCOSE BLD-MCNC: 98 MG/DL
HDLC SERPL-MCNC: 39 MG/DL (ref 35–70)
LDL CHOLESTEROL CALCULATED: 95 MG/DL (ref 0–160)
POTASSIUM SERPL-SCNC: 5 MMOL/L
SODIUM BLD-SCNC: 138 MMOL/L
TOTAL PROTEIN: NORMAL
TRIGL SERPL-MCNC: 130 MG/DL
VLDLC SERPL CALC-MCNC: 26 MG/DL

## 2018-08-20 ENCOUNTER — OFFICE VISIT (OUTPATIENT)
Dept: UROLOGY | Age: 58
End: 2018-08-20
Payer: MEDICARE

## 2018-08-20 VITALS
HEIGHT: 74 IN | DIASTOLIC BLOOD PRESSURE: 82 MMHG | BODY MASS INDEX: 29.52 KG/M2 | SYSTOLIC BLOOD PRESSURE: 138 MMHG | WEIGHT: 230 LBS

## 2018-08-20 DIAGNOSIS — N40.1 BPH WITH OBSTRUCTION/LOWER URINARY TRACT SYMPTOMS: ICD-10-CM

## 2018-08-20 DIAGNOSIS — N13.8 BPH WITH OBSTRUCTION/LOWER URINARY TRACT SYMPTOMS: ICD-10-CM

## 2018-08-20 DIAGNOSIS — R39.198 DECREASED URINE STREAM: Primary | ICD-10-CM

## 2018-08-20 DIAGNOSIS — N52.9 ERECTILE DYSFUNCTION, UNSPECIFIED ERECTILE DYSFUNCTION TYPE: ICD-10-CM

## 2018-08-20 LAB
BILIRUBIN URINE: NEGATIVE
BLOOD URINE, POC: NEGATIVE
CHARACTER, URINE: CLEAR
COLOR, URINE: YELLOW
GLUCOSE URINE: NEGATIVE MG/DL
KETONES, URINE: NEGATIVE
LEUKOCYTE CLUMPS, URINE: NEGATIVE
NITRITE, URINE: NEGATIVE
PH, URINE: 5.5
POST VOID RESIDUAL (PVR): 45 ML
PROSTATE SPECIFIC ANTIGEN: 0.95 NG/ML
PROSTATE SPECIFIC ANTIGEN: 0.95 NG/ML
PROTEIN, URINE: NEGATIVE MG/DL
SPECIFIC GRAVITY, URINE: 1.02 (ref 1–1.03)
UROBILINOGEN, URINE: 0.2 EU/DL

## 2018-08-20 PROCEDURE — 51798 US URINE CAPACITY MEASURE: CPT | Performed by: NURSE PRACTITIONER

## 2018-08-20 PROCEDURE — G8427 DOCREV CUR MEDS BY ELIG CLIN: HCPCS | Performed by: NURSE PRACTITIONER

## 2018-08-20 PROCEDURE — 81003 URINALYSIS AUTO W/O SCOPE: CPT | Performed by: NURSE PRACTITIONER

## 2018-08-20 PROCEDURE — 99214 OFFICE O/P EST MOD 30 MIN: CPT | Performed by: NURSE PRACTITIONER

## 2018-08-20 PROCEDURE — 3017F COLORECTAL CA SCREEN DOC REV: CPT | Performed by: NURSE PRACTITIONER

## 2018-08-20 PROCEDURE — G8417 CALC BMI ABV UP PARAM F/U: HCPCS | Performed by: NURSE PRACTITIONER

## 2018-08-20 PROCEDURE — 1036F TOBACCO NON-USER: CPT | Performed by: NURSE PRACTITIONER

## 2018-08-20 RX ORDER — SILDENAFIL CITRATE 20 MG/1
20-100 TABLET ORAL PRN
Qty: 30 TABLET | Refills: 5 | Status: ON HOLD | OUTPATIENT
Start: 2018-08-20 | End: 2019-07-12 | Stop reason: ALTCHOICE

## 2018-08-20 RX ORDER — TAMSULOSIN HYDROCHLORIDE 0.4 MG/1
0.4 CAPSULE ORAL NIGHTLY
Qty: 30 CAPSULE | Refills: 5 | Status: SHIPPED | OUTPATIENT
Start: 2018-08-20 | End: 2019-04-02

## 2018-08-20 NOTE — PROGRESS NOTES
daily      Dextromethorphan Polistirex (ROBITUSSIN 12 HOUR COUGH PO) Take by mouth 2 times daily       loperamide (IMODIUM) 2 MG capsule Take 2 mg by mouth as needed for Diarrhea      acetaminophen (TYLENOL) 650 MG CR tablet Take 650 mg by mouth as needed for Pain      ranitidine (ZANTAC) 150 MG tablet Take 1 tablet by mouth 2 times daily 60 tablet 3     No current facility-administered medications for this visit. Past Medical History  Katrina Byrd  has a past medical history of Arthritis; COPD (chronic obstructive pulmonary disease) ([facility] [address] Pneumonia; Rectal cancer ([facility] [address] and Thyroid disease. Past Surgical History  The patient  has a past surgical history that includes MICROLARYNGOSCOPY W BIOPSY; Knee arthroscopy (Right, 1996); Hand surgery (Bilateral, 1995); Rotator cuff repair (Left, 1990'S); eye surgery; Colonoscopy (2016); Rectal surgery (08/29/2016); and laryngoscopy (07/12/2017). Family History  This patient's family history includes Cancer in his father and another family member; Diabetes in his mother; Hearing Loss in an other family member; Heart Disease in his brother, father, and mother; High Blood Pressure in an other family member; Prostate Cancer (age of onset: 48) in his father; Stroke in his mother. Social History  Katrina Byrd  reports that he quit smoking about 12 years ago. He started smoking about 43 years ago. He has a 69.75 pack-year smoking history. He has never used smokeless tobacco. He reports that he does not drink alcohol or use drugs. Subjective:     Review of Systems  No problems with ears, nose or throat. No problems with eyes. No chest pain, shortness of breath, abdominal pain, extremity pain or weakness, and no neurological deficits. No rashes.  symptoms per HPI. The remainder of the review of symptoms is negative.     Objective:     PE:   Vitals:    08/20/18 0828   BP: 138/82   Weight: 230 lb (104.3 kg)   Height: 6' 2\" (1.88 m)       Constitutional: Alert and nocturia. PVR is 45 ml today. Will start Flomax 0.4 mg daily, side effect profile discussed with patient. Ed secondary to hx of Rectal cancer, S/p radiation & chemo- Plan to start Revatio  mg PRN, if no improvement, penile injections as next step    PSA for prostate cancer screening. Unable to perform [place] today. + family hx of prostate cancer     Return in about 3 months (around 11/20/2018) for BPH, ED.     JOSH Madrigal  Urology

## 2018-08-30 ENCOUNTER — HOSPITAL ENCOUNTER (OUTPATIENT)
Dept: RADIATION ONCOLOGY | Age: 58
Discharge: HOME | End: 2018-08-30
Payer: MEDICARE

## 2018-08-30 DIAGNOSIS — C32.1 MALIGNANT NEOPLASM OF SUPRAGLOTTIS (HCC): Primary | ICD-10-CM

## 2018-08-30 PROCEDURE — 99211 OFF/OP EST MAY X REQ PHY/QHP: CPT | Performed by: RADIOLOGY

## 2018-08-31 ENCOUNTER — OFFICE VISIT (OUTPATIENT)
Dept: ENT CLINIC | Age: 58
End: 2018-08-31
Payer: MEDICARE

## 2018-08-31 VITALS
HEIGHT: 74 IN | SYSTOLIC BLOOD PRESSURE: 130 MMHG | TEMPERATURE: 97.6 F | RESPIRATION RATE: 12 BRPM | DIASTOLIC BLOOD PRESSURE: 72 MMHG | HEART RATE: 80 BPM | WEIGHT: 228 LBS | BODY MASS INDEX: 29.26 KG/M2

## 2018-08-31 DIAGNOSIS — C32.0 SQUAMOUS CELL CARCINOMA OF VOCAL CORD (HCC): Primary | ICD-10-CM

## 2018-08-31 DIAGNOSIS — R49.0 DYSPHONIA: ICD-10-CM

## 2018-08-31 DIAGNOSIS — T66.XXXD RADIATION ADVERSE EFFECT, SUBSEQUENT ENCOUNTER: ICD-10-CM

## 2018-08-31 PROCEDURE — G8427 DOCREV CUR MEDS BY ELIG CLIN: HCPCS | Performed by: OTOLARYNGOLOGY

## 2018-08-31 PROCEDURE — 3017F COLORECTAL CA SCREEN DOC REV: CPT | Performed by: OTOLARYNGOLOGY

## 2018-08-31 PROCEDURE — 1036F TOBACCO NON-USER: CPT | Performed by: OTOLARYNGOLOGY

## 2018-08-31 PROCEDURE — 99213 OFFICE O/P EST LOW 20 MIN: CPT | Performed by: OTOLARYNGOLOGY

## 2018-08-31 PROCEDURE — 31575 DIAGNOSTIC LARYNGOSCOPY: CPT | Performed by: OTOLARYNGOLOGY

## 2018-08-31 PROCEDURE — G8417 CALC BMI ABV UP PARAM F/U: HCPCS | Performed by: OTOLARYNGOLOGY

## 2018-08-31 ASSESSMENT — ENCOUNTER SYMPTOMS
FACIAL SWELLING: 0
APNEA: 0
COUGH: 0
SHORTNESS OF BREATH: 0
WHEEZING: 0
SORE THROAT: 0
RHINORRHEA: 0
CHEST TIGHTNESS: 0
COLOR CHANGE: 0
VOMITING: 0
NAUSEA: 0
CHOKING: 0
DIARRHEA: 0
ABDOMINAL PAIN: 0
TROUBLE SWALLOWING: 0
STRIDOR: 0
VOICE CHANGE: 0
SINUS PRESSURE: 0

## 2018-08-31 NOTE — PROGRESS NOTES
visual disturbance. Respiratory: Negative for apnea, cough, choking, chest tightness, shortness of breath, wheezing and stridor. Cardiovascular: Negative for chest pain, palpitations and leg swelling. Gastrointestinal: Negative for abdominal pain, diarrhea, nausea and vomiting. Endocrine: Negative for cold intolerance, heat intolerance, polydipsia and polyuria. Genitourinary: Negative for dysuria, enuresis and hematuria. Musculoskeletal: Negative for arthralgias, gait problem, neck pain and neck stiffness. Skin: Negative for color change and rash. Allergic/Immunologic: Negative for environmental allergies, food allergies and immunocompromised state. Neurological: Negative for dizziness, syncope, facial asymmetry, speech difficulty, light-headedness and headaches. Hematological: Negative for adenopathy. Does not bruise/bleed easily. Psychiatric/Behavioral: Negative for confusion and sleep disturbance. The patient is not nervous/anxious. Objective:     /72 (Site: Left Arm, Position: Sitting)   Pulse 80   Temp 97.6 °F (36.4 °C) (Oral)   Resp 12   Ht 6' 2\" (1.88 m)   Wt 228 lb (103.4 kg)   BMI 29.27 kg/m²     Physical Exam  Nose:.convex septal deviation to the left, right posterior-inferior spur,       PROCEDURE: FIBEROPTIC LARYNGOSCOPY    A fiberoptic laryngoscopy was performed under topical anesthesia, after using Afrin and Lidocaine spray in the nasal fossa. The nasal fossa, nasopharynx, hypopharynx and larynx were carefully examined. Base of tongue was symmetrical. Epiglottis appeared normal and was not retrodisplaced. True vocal cords had normal mobility. There was Mild erythema with minimal swelling of left aryepiglottic fold. No mucosal lesions or masses were noted. No pooling in the pyriform sinuses    Data:  All of the past medical history, past surgical history, family history, social history, allergies and current medications were reviewed with the patient. Assessment & Plan   Diagnoses and all orders for this visit:     Diagnosis Orders   1. 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 Radiation adverse effect, subsequent encounter  FL LARYNGOSCOPY FLEXIBLE DIAGNOSTIC    PET CT Skull Base To Mid Thigh   3. Dysphonia  FL LARYNGOSCOPY FLEXIBLE DIAGNOSTIC    PET CT Skull Base To Mid Thigh       The findings were explained and his questions were answered. I talked to the patient about getting a PET/CT Scan. I spoke with Dr. Liane Wells and she will cancel her MRI that was scheduled for 9/12/18. Return in about 5 weeks (around 10/5/2018) for Follow-Up PET/CT, laryngeal CA. I, Carmen Deluna CMA ([facility]), am scribing for, and in the presence of Dr. Yodit Arciniega. Electronically signed by Senthil Trejo CMA ([facility]) on 8/31/18 at 1:49 PM.     (Please note that portions of this note were completed with a voice recognition program. Efforts were made to edit the dictations but occasionally words are mis-transcribed.)    I agree to the above documentation placed by my scribe. I have personally evaluated this patient. Additional findings are as noted. I reviewed the scribe's note and agree with the documented findings and plan of care. Any areas of disagreement are corrected. I agree with the chief complaint, past medical history, past surgical history, allergies, medications, social and family history as documented unless otherwise noted below.      Electronically signed by Marilee Park MD on 9/16/2018 at 7:00 PM

## 2018-08-31 NOTE — LETTER
[address] [facility] [address] [address] [address] [place] [address] [address] [address]  Phone: [phone]  Fax: [phone]    Wolfgang Lira MD        September 16, 2018    Saad Mortensen, APRN - [facility]  [address] [place] [address]    Patient: Lorraine Mejia   MR Number: [id number]   YOB: 1960   Date of Visit: 8/31/2018     Dear Saad Mortensen,    I recently saw your patient, Lorraine Mejia, regarding His laryngeal carcinoma. He still has some edema and swelling of the left aryepiglottic fold. I ordered a PET/CT, and fortunately this showed no evidence of local, regional, or distant uptake. There was some increased uptake in some of the musculature of her shoulder without any CT correlation. Below are the relevant portions of my assessment and plan of care. Assessment & Plan   Diagnoses and all orders for this visit:     Diagnosis Orders   1. 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 Radiation adverse effect, subsequent encounter  DE LARYNGOSCOPY FLEXIBLE DIAGNOSTIC    PET CT Skull Base To Mid Thigh   3. Dysphonia  DE LARYNGOSCOPY FLEXIBLE DIAGNOSTIC    PET CT Skull Base To Mid Thigh       The findings were explained and his questions were answered. I talked to the patient about getting a PET/CT Scan. I spoke with Dr. Berkley West and she will cancel her MRI that was scheduled for 9/12/18. Return in about 5 weeks (around 10/5/2018) for Follow-Up PET/CT, laryngeal CA. If you have questions, please do not hesitate to call me. I look forward to following Bruce Console along with you.     Sincerely,          Wolfgang Lira MD

## 2018-09-10 ENCOUNTER — HOSPITAL ENCOUNTER (OUTPATIENT)
Dept: PET IMAGING | Age: 58
Discharge: HOME | End: 2018-09-10
Payer: MEDICARE

## 2018-09-10 DIAGNOSIS — R49.0 DYSPHONIA: ICD-10-CM

## 2018-09-10 DIAGNOSIS — C32.0 SQUAMOUS CELL CARCINOMA OF VOCAL CORD (HCC): ICD-10-CM

## 2018-09-10 DIAGNOSIS — T66.XXXD RADIATION ADVERSE EFFECT, SUBSEQUENT ENCOUNTER: ICD-10-CM

## 2018-09-10 PROCEDURE — 78815 PET IMAGE W/CT SKULL-THIGH: CPT

## 2018-09-10 PROCEDURE — A9552 F18 FDG: HCPCS | Performed by: OTOLARYNGOLOGY

## 2018-09-10 PROCEDURE — 3430000000 HC RX DIAGNOSTIC RADIOPHARMACEUTICAL: Performed by: OTOLARYNGOLOGY

## 2018-09-10 RX ORDER — FLUDEOXYGLUCOSE F 18 200 MCI/ML
10 INJECTION, SOLUTION INTRAVENOUS
Status: COMPLETED | OUTPATIENT
Start: 2018-09-10 | End: 2018-09-10

## 2018-09-10 RX ADMIN — FLUDEOXYGLUCOSE F 18 13.13 MILLICURIE: 200 INJECTION, SOLUTION INTRAVENOUS at 07:49

## 2018-09-13 ENCOUNTER — TELEPHONE (OUTPATIENT)
Dept: ENT CLINIC | Age: 58
End: 2018-09-13

## 2018-09-13 NOTE — TELEPHONE ENCOUNTER
I discussed with dr Favio Garcia- he said to let the patient know that the previous areas of concern have all significantly improved.

## 2018-09-13 NOTE — TELEPHONE ENCOUNTER
Patient called in and states he just had his Pet CT Scan done and he would like results since he does not come back in to see Dr Shanel Westbrook until 10/5/18. Patient was notified that we will call him back after Rosalva or Dr Shanel Westbrook reviews the results.

## 2018-11-06 ENCOUNTER — OFFICE VISIT (OUTPATIENT)
Dept: ENT CLINIC | Age: 58
End: 2018-11-06
Payer: MEDICARE

## 2018-11-06 VITALS
BODY MASS INDEX: 29.71 KG/M2 | HEART RATE: 80 BPM | TEMPERATURE: 97.6 F | WEIGHT: 231.5 LBS | SYSTOLIC BLOOD PRESSURE: 132 MMHG | HEIGHT: 74 IN | DIASTOLIC BLOOD PRESSURE: 90 MMHG | RESPIRATION RATE: 20 BRPM

## 2018-11-06 DIAGNOSIS — K21.9 GASTROESOPHAGEAL REFLUX DISEASE WITHOUT ESOPHAGITIS: ICD-10-CM

## 2018-11-06 DIAGNOSIS — R49.0 DYSPHONIA: ICD-10-CM

## 2018-11-06 DIAGNOSIS — C32.0 SQUAMOUS CELL CARCINOMA OF VOCAL CORD (HCC): Primary | ICD-10-CM

## 2018-11-06 DIAGNOSIS — T66.XXXD RADIATION ADVERSE EFFECT, SUBSEQUENT ENCOUNTER: ICD-10-CM

## 2018-11-06 DIAGNOSIS — J41.1 MUCOPURULENT CHRONIC BRONCHITIS (HCC): ICD-10-CM

## 2018-11-06 DIAGNOSIS — F10.10 ALCOHOL ABUSE: ICD-10-CM

## 2018-11-06 DIAGNOSIS — K22.2 SCHATZKI'S RING: ICD-10-CM

## 2018-11-06 PROCEDURE — G8484 FLU IMMUNIZE NO ADMIN: HCPCS | Performed by: OTOLARYNGOLOGY

## 2018-11-06 PROCEDURE — G8417 CALC BMI ABV UP PARAM F/U: HCPCS | Performed by: OTOLARYNGOLOGY

## 2018-11-06 PROCEDURE — 1036F TOBACCO NON-USER: CPT | Performed by: OTOLARYNGOLOGY

## 2018-11-06 PROCEDURE — 3017F COLORECTAL CA SCREEN DOC REV: CPT | Performed by: OTOLARYNGOLOGY

## 2018-11-06 PROCEDURE — 31575 DIAGNOSTIC LARYNGOSCOPY: CPT | Performed by: OTOLARYNGOLOGY

## 2018-11-06 PROCEDURE — G8926 SPIRO NO PERF OR DOC: HCPCS | Performed by: OTOLARYNGOLOGY

## 2018-11-06 PROCEDURE — 99213 OFFICE O/P EST LOW 20 MIN: CPT | Performed by: OTOLARYNGOLOGY

## 2018-11-06 PROCEDURE — G8427 DOCREV CUR MEDS BY ELIG CLIN: HCPCS | Performed by: OTOLARYNGOLOGY

## 2018-11-06 PROCEDURE — 3023F SPIROM DOC REV: CPT | Performed by: OTOLARYNGOLOGY

## 2018-11-06 ASSESSMENT — ENCOUNTER SYMPTOMS
APNEA: 0
SINUS PRESSURE: 0
FACIAL SWELLING: 0
COUGH: 1
NAUSEA: 0
WHEEZING: 0
VOICE CHANGE: 0
DIARRHEA: 0
SHORTNESS OF BREATH: 0
VOMITING: 0
RHINORRHEA: 0
TROUBLE SWALLOWING: 0
ABDOMINAL PAIN: 0
SORE THROAT: 0
COLOR CHANGE: 0
CHOKING: 0
CHEST TIGHTNESS: 0
STRIDOR: 0

## 2018-11-06 NOTE — LETTER
[address]  Phone: [phone]  Fax: [phone]    Sarah Reynolds MD        November 25, 2018    Blinda Sallies, APRN - CNP  [facility] [address]    Patient: Sissy Rodgers   MR Number: [id number]   YOB: 1960   Date of Visit: 11/6/2018     Dear Mavis Warren,    I recently saw your patient, Sissy Rodgers, regarding His laryngeal carcinoma. He has no evidence of recurrence. His PET scan was clean. Below are the relevant portions of my assessment and plan of care. Assessment & Plan   Diagnoses and all orders for this visit:     Diagnosis Orders   1. 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 Radiation adverse effect, subsequent encounter  NM LARYNGOSCOPY FLEXIBLE DIAGNOSTIC   3. Dysphonia  NM LARYNGOSCOPY FLEXIBLE DIAGNOSTIC   4. Gastroesophageal reflux disease without esophagitis     5. Alcohol abuse  NM LARYNGOSCOPY FLEXIBLE DIAGNOSTIC   6. Schatzki's ring     7. Mucopurulent chronic bronchitis ([address]         The findings were explained and his questions were answered. I advised him to see his PCP for his cough and chest heaviness. Return in 2 months. If you have questions, please do not hesitate to call me. I look forward to following Gordon Go along with you.     Sincerely,          Sarah Reynolds MD

## 2018-11-06 NOTE — PROGRESS NOTES
puffs into the lungs 2 times daily 3 Inhaler 3    rosuvastatin (CRESTOR) 20 MG tablet Take 20 mg by mouth daily      Dextromethorphan Polistirex (ROBITUSSIN 12 HOUR COUGH PO) Take by mouth 2 times daily       loperamide (IMODIUM) 2 MG capsule Take 2 mg by mouth as needed for Diarrhea      acetaminophen (TYLENOL) 650 MG CR tablet Take 650 mg by mouth as needed for Pain      ranitidine (ZANTAC) 150 MG tablet Take 1 tablet by mouth 2 times daily 60 tablet 3     No current facility-administered medications for this visit. Past Medical History:   Diagnosis Date    Arthritis     [facility]ronic obstructive [facility] disease) [facility] [address]     Pneumonia 01/2017 1/2017 and 2/2017    Rectal cancer ([facility] [address]     Thyroid disease       Past Surgical History:   Procedure Laterality Date    COLONOSCOPY  2016    EYE SURGERY      CROSS EYED    HAND SURGERY Bilateral 1995    KNEE ARTHROSCOPY Right 1996    LARYNGOSCOPY  07/12/2017    Biopsy    MICROLARYNGOSCOPY W BIOPSY      RECTAL SURG[place]  08/29/[place]    ROTATOR CUFF REPAIR Left 80'S     Family History   Problem Relation Age of Onset    Prostate Cancer Father 48    Cancer Father     Heart Disease Father     Diabetes Mother     Heart Disease Mother     Stroke Mother     Heart Disease Brother     High Blood Pressure Other     Cancer Other         LUNG,PROSTATE    Hearing Loss Other      Social History   Substance Use Topics    Smoking status: Former Smoker     Packs/day: 2.25     Years: 31.00     Start date: 64     Quit date: 2/5/2006    Smokeless tobacco: Never Used    Alcohol use No       Subjective:      Review of Systems   Constitutional: Negative for activity change, appetite change, chills, diaphoresis, fatigue, fever and unexpected weight change.    HENT: Negative for congestion, dental problem, ear discharge, ear pain, facial swelling, hearing loss, mouth sores, nosebleeds, postnasal drip, rhinorrhea, sinus pressure,

## 2018-11-16 ENCOUNTER — OFFICE VISIT (OUTPATIENT)
Dept: PULMONOLOGY | Age: 58
End: 2018-11-16
Payer: MEDICARE

## 2018-11-16 VITALS
HEIGHT: 74 IN | WEIGHT: 233.2 LBS | DIASTOLIC BLOOD PRESSURE: 82 MMHG | SYSTOLIC BLOOD PRESSURE: 138 MMHG | TEMPERATURE: 97.7 F | BODY MASS INDEX: 29.93 KG/M2 | OXYGEN SATURATION: 92 % | HEART RATE: 73 BPM

## 2018-11-16 DIAGNOSIS — J43.9 PULMONARY EMPHYSEMA, UNSPECIFIED EMPHYSEMA TYPE (HCC): ICD-10-CM

## 2018-11-16 DIAGNOSIS — J41.1 MUCOPURULENT CHRONIC BRONCHITIS (HCC): Primary | ICD-10-CM

## 2018-11-16 DIAGNOSIS — C14.0 THROAT CANCER (HCC): ICD-10-CM

## 2018-11-16 PROCEDURE — 99214 OFFICE O/P EST MOD 30 MIN: CPT | Performed by: NURSE PRACTITIONER

## 2018-11-16 PROCEDURE — 1036F TOBACCO NON-USER: CPT | Performed by: NURSE PRACTITIONER

## 2018-11-16 PROCEDURE — G8484 FLU IMMUNIZE NO ADMIN: HCPCS | Performed by: NURSE PRACTITIONER

## 2018-11-16 PROCEDURE — 3023F SPIROM DOC REV: CPT | Performed by: NURSE PRACTITIONER

## 2018-11-16 PROCEDURE — G8427 DOCREV CUR MEDS BY ELIG CLIN: HCPCS | Performed by: NURSE PRACTITIONER

## 2018-11-16 PROCEDURE — 3017F COLORECTAL CA SCREEN DOC REV: CPT | Performed by: NURSE PRACTITIONER

## 2018-11-16 PROCEDURE — G8417 CALC BMI ABV UP PARAM F/U: HCPCS | Performed by: NURSE PRACTITIONER

## 2018-11-16 PROCEDURE — G8926 SPIRO NO PERF OR DOC: HCPCS | Performed by: NURSE PRACTITIONER

## 2018-11-16 ASSESSMENT — ENCOUNTER SYMPTOMS
SPUTUM PRODUCTION: 1
HEMOPTYSIS: 0
WHEEZING: 0
CHEST TIGHTNESS: 0
DIARRHEA: 0
COUGH: 1
SHORTNESS OF BREATH: 1
STRIDOR: 0
NAUSEA: 0

## 2018-11-16 ASSESSMENT — COPD QUESTIONNAIRES: COPD: 1

## 2018-11-16 NOTE — PROGRESS NOTES
are normal.   Neck: Normal range of motion. Neck supple. No tracheal deviation present. Cardiovascular: Normal rate, regular rhythm and normal heart sounds. No murmur heard. Pulmonary/Chest: Effort normal and breath sounds normal. No respiratory distress. He has no wheezes. He has no rales. He exhibits no tenderness. Abdominal: Soft. Bowel sounds are normal. He exhibits no distension. There is no tenderness. Musculoskeletal: Normal range of motion. He exhibits no edema. Neurological: He is alert and oriented to person, place, and time. Skin: Skin is warm and dry. Psychiatric: He has a normal mood and affect. His behavior is normal. Judgment and thought content normal.   Vitals reviewed. Test results   Lung Nodule Screening     [] Qualifies    [x] Does not qualify   [] Declined    [] Completed   Quit 2006 currently under surveillance with PET/CT last imaging 9/10/18     [facility] annual screening for lung cancer with low-dose computed tomography (LDCT) in adults aged 54 to [de-identified] years who have a 30 pack-year smoking history and currently smoke or have quit within the past 15 years. Screeningshould be discontinued once a person has not smoked for 15 years or develops a health problem that substantially limits life expectancy or the ability or willingness to have curative lung surgery. 910/18  PET CT SKULL BASE MID THIGH RESTAGE  1. Mostly activity seen in the oral pharyngeal region on the prior exam has resolved. Minimal activity is seen in the region of the vocal cords which is considered physiologic. 2. Areas of increased activity are otherwise seen in the neck and right shoulder muscles with no CT correlate. 3. No evidence of metabolically active distant metastases. Assessment      Diagnosis Orders   1. Mucopurulent chronic bronchitis (HCC)  Spirometry With Bronchodilator   2.  Throat cancer ([address]     3. Pulmonary emphysema, unspecified emphysema type ([address]  Spirometry With Bronchodilator         Plan   -Recovering from exacerbation due to environmental dust covered empirically with ATB's advised to finish course no active wheezing  -Doing well on current regimen, counciled on using respiratory protection in high airborne particle situations   -Consuella Innocent prior to next appointment to follow Pulmonary function last test 2017  -Denies need for refill at this time advised to call office if needs  -Advised to maintain pneumonia vaccine with PCP and to take flu vaccine this coming season.  -Advised patient to call office with any changes, questions, or concerns regarding respiratory status    Will see Shikha Doll in: 12 months with spirometry    Chiquita Nissen CNP  11/16/2018

## 2019-01-29 ENCOUNTER — OFFICE VISIT (OUTPATIENT)
Dept: ENT CLINIC | Age: 59
End: 2019-01-29
Payer: MEDICARE

## 2019-01-29 VITALS
HEART RATE: 80 BPM | HEIGHT: 74 IN | SYSTOLIC BLOOD PRESSURE: 138 MMHG | DIASTOLIC BLOOD PRESSURE: 64 MMHG | TEMPERATURE: 97.5 F | WEIGHT: 237.4 LBS | RESPIRATION RATE: 20 BRPM | BODY MASS INDEX: 30.47 KG/M2

## 2019-01-29 DIAGNOSIS — R49.0 DYSPHONIA: ICD-10-CM

## 2019-01-29 DIAGNOSIS — K21.9 GASTROESOPHAGEAL REFLUX DISEASE WITHOUT ESOPHAGITIS: ICD-10-CM

## 2019-01-29 DIAGNOSIS — C32.0 SQUAMOUS CELL CARCINOMA OF VOCAL CORD (HCC): Primary | ICD-10-CM

## 2019-01-29 DIAGNOSIS — F10.10 ALCOHOL ABUSE: ICD-10-CM

## 2019-01-29 DIAGNOSIS — T66.XXXD RADIATION ADVERSE EFFECT, SUBSEQUENT ENCOUNTER: ICD-10-CM

## 2019-01-29 DIAGNOSIS — K22.2 SCHATZKI'S RING: ICD-10-CM

## 2019-01-29 PROCEDURE — G8427 DOCREV CUR MEDS BY ELIG CLIN: HCPCS | Performed by: OTOLARYNGOLOGY

## 2019-01-29 PROCEDURE — G8417 CALC BMI ABV UP PARAM F/U: HCPCS | Performed by: OTOLARYNGOLOGY

## 2019-01-29 PROCEDURE — G8484 FLU IMMUNIZE NO ADMIN: HCPCS | Performed by: OTOLARYNGOLOGY

## 2019-01-29 PROCEDURE — 99213 OFFICE O/P EST LOW 20 MIN: CPT | Performed by: OTOLARYNGOLOGY

## 2019-01-29 PROCEDURE — 3017F COLORECTAL CA SCREEN DOC REV: CPT | Performed by: OTOLARYNGOLOGY

## 2019-01-29 PROCEDURE — 1036F TOBACCO NON-USER: CPT | Performed by: OTOLARYNGOLOGY

## 2019-01-29 PROCEDURE — 31575 DIAGNOSTIC LARYNGOSCOPY: CPT | Performed by: OTOLARYNGOLOGY

## 2019-01-29 ASSESSMENT — ENCOUNTER SYMPTOMS
NAUSEA: 0
CHEST TIGHTNESS: 0
APNEA: 0
WHEEZING: 0
ABDOMINAL PAIN: 0
DIARRHEA: 0
STRIDOR: 0
SINUS PRESSURE: 0
SHORTNESS OF BREATH: 0
VOICE CHANGE: 0
RHINORRHEA: 0
FACIAL SWELLING: 0
COLOR CHANGE: 0
SORE THROAT: 0
VOMITING: 0
COUGH: 0
TROUBLE SWALLOWING: 0
CHOKING: 0

## 2019-04-02 ENCOUNTER — OFFICE VISIT (OUTPATIENT)
Dept: ENT CLINIC | Age: 59
End: 2019-04-02
Payer: MEDICARE

## 2019-04-02 VITALS
SYSTOLIC BLOOD PRESSURE: 130 MMHG | TEMPERATURE: 97.4 F | BODY MASS INDEX: 30.42 KG/M2 | DIASTOLIC BLOOD PRESSURE: 80 MMHG | HEART RATE: 84 BPM | HEIGHT: 74 IN | RESPIRATION RATE: 20 BRPM | WEIGHT: 237 LBS

## 2019-04-02 DIAGNOSIS — K22.2 SCHATZKI'S RING: ICD-10-CM

## 2019-04-02 DIAGNOSIS — J04.0 ACUTE LARYNGITIS: ICD-10-CM

## 2019-04-02 DIAGNOSIS — J02.9 SORE THROAT: ICD-10-CM

## 2019-04-02 DIAGNOSIS — F10.10 ALCOHOL ABUSE: ICD-10-CM

## 2019-04-02 DIAGNOSIS — T66.XXXD RADIATION ADVERSE EFFECT, SUBSEQUENT ENCOUNTER: ICD-10-CM

## 2019-04-02 DIAGNOSIS — R49.0 DYSPHONIA: ICD-10-CM

## 2019-04-02 DIAGNOSIS — K21.9 GASTROESOPHAGEAL REFLUX DISEASE WITHOUT ESOPHAGITIS: ICD-10-CM

## 2019-04-02 DIAGNOSIS — C32.0 SQUAMOUS CELL CARCINOMA OF VOCAL CORD (HCC): Primary | ICD-10-CM

## 2019-04-02 PROCEDURE — G8417 CALC BMI ABV UP PARAM F/U: HCPCS | Performed by: OTOLARYNGOLOGY

## 2019-04-02 PROCEDURE — G8427 DOCREV CUR MEDS BY ELIG CLIN: HCPCS | Performed by: OTOLARYNGOLOGY

## 2019-04-02 PROCEDURE — 1036F TOBACCO NON-USER: CPT | Performed by: OTOLARYNGOLOGY

## 2019-04-02 PROCEDURE — 99213 OFFICE O/P EST LOW 20 MIN: CPT | Performed by: OTOLARYNGOLOGY

## 2019-04-02 PROCEDURE — 31575 DIAGNOSTIC LARYNGOSCOPY: CPT | Performed by: OTOLARYNGOLOGY

## 2019-04-02 PROCEDURE — 3017F COLORECTAL CA SCREEN DOC REV: CPT | Performed by: OTOLARYNGOLOGY

## 2019-04-02 RX ORDER — SULFAMETHOXAZOLE AND TRIMETHOPRIM 800; 160 MG/1; MG/1
1 TABLET ORAL 2 TIMES DAILY
Qty: 28 TABLET | Refills: 2 | Status: SHIPPED | OUTPATIENT
Start: 2019-04-02 | End: 2019-04-16

## 2019-04-02 ASSESSMENT — ENCOUNTER SYMPTOMS
RHINORRHEA: 0
NAUSEA: 0
SORE THROAT: 1
FACIAL SWELLING: 0
WHEEZING: 0
STRIDOR: 0
SHORTNESS OF BREATH: 0
SINUS PRESSURE: 0
COUGH: 0
COLOR CHANGE: 0
CHEST TIGHTNESS: 0
APNEA: 0
CHOKING: 0
VOICE CHANGE: 1
VOMITING: 0
ABDOMINAL PAIN: 0
TROUBLE SWALLOWING: 0
DIARRHEA: 0

## 2019-04-02 NOTE — PROGRESS NOTES
[address] [facility]  [place] [address]  Kerona Jacobo Hortalícias [address] [address]  Dept: [phone]  Dept Fax: [phone]  Loc: [phone]    Raiza Robin is a 60 y.o. male who was referred byNo ref. provider found for:  Chief Complaint   Patient presents with    Follow-up     2 month follow up. Patient currently has a sore throat x 2 weeks. Hellen Melton HPI:     Raiza Robin is a 60 y.o. 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 He complains of having a sore throat for 2 weeks. It hurts more in the evening when he is at home relaxing. Also has lots of phlegm. He does have a productive cough. The phlegm is green/yellow in color. He is on Symbicort and Spiriva inhalers. He is not smoking. History:      Allergies   Allergen Reactions    Betadine [Povidone Iodine] Rash     Surgery prep     Current Outpatient Medications   Medication Sig Dispense Refill    sulfamethoxazole-trimethoprim (BACTRIM DS;SEPTRA DS) 800-160 MG per tablet Take 1 tablet by mouth 2 times daily for 14 days 28 tablet 2    tiotropium (SPIRIVA RESPIMAT) 2.5 MCG/ACT AERS inhaler Inhale 2 puffs into the lungs daily 3 Inhaler 3    budesonide-formoterol (SYMBICORT) 80-4.5 MCG/ACT AERO Inhale 2 puffs into the lungs 2 times daily 3 Inhaler 3    rosuvastatin (CRESTOR) 20 MG tablet Take 20 mg by mouth daily      sildenafil (REVATIO) 20 MG tablet Take 1-5 tablets by mouth as needed (ED) 30 tablet 5    albuterol sulfate HFA (VENTOLIN HFA) 108 (90 Base) MCG/ACT inhaler Inhale 2 puffs into the lungs every 4 hours as needed for Wheezing or Shortness of Breath 3 Inhaler 3    ranitidine (ZANTAC) 150 MG tablet Take 1 tablet by mouth 2 times daily 60 tablet 3    Dextromethorphan Polistirex (ROBITUSSIN 12 HOUR COUGH PO) Take by mouth 2 times daily       loperamide (IMODIUM) 2 MG capsule Take 2 mg by mouth as needed for Diarrhea      acetaminophen (TYLENOL) 650 MG CR tablet Take 650 mg by mouth as needed for Pain       No current facility-administered medications for this visit. Past Medical History:   Diagnosis Date    Arthritis     [facility]     Pneumonia 2017 and 2017    Rectal cancer [facility]     Thyroid disease       Past Surgical History:   Procedure Laterality Date    COLONOSCOPY  2016    EYE SURGERY      CROSS EYED    HAND SURGERY Bilateral 1995    KNEE ARTHROSCOPY Right 1996    LARYNGOSCOPY  2017    Biopsy    MICROLARYNGOSCOPY W BIOPSY      RE[place]    ROTATOR CUFF REPAIR Left 80'S     Family History   Problem Relation Age of Onset    Prostate Cancer Father 48    Cancer Father     Heart Disease Father     Diabetes Mother     Heart Disease Mother     Stroke Mother     Heart Disease Brother     High Blood Pressure Other     Cancer Other         LUNG,PROSTATE    Hearing Loss Other      Social History     Tobacco Use    Smoking status: Former Smoker     Packs/day: 2.25     Years: 31.00     Pack years: 69.75     Start date:      Last attempt to quit: 2006     Years since quittin.1    Smokeless tobacco: Never Used   Substance Use Topics    Alcohol use: No     Alcohol/week: 0.0 oz       Subjective:      Review of Systems   Constitutional: Negative for activity change, appetite change, chills, diaphoresis, fatigue, fever and unexpected weight change. HENT: Positive for sore throat and voice change. Negative for congestion, dental problem, ear discharge, ear pain, facial swelling, hearing loss, mouth sores, nosebleeds, postnasal drip, rhinorrhea, sinus pressure, sneezing, tinnitus and trouble swallowing. Eyes: Negative for visual disturbance. Respiratory: Negative for apnea, cough, choking, chest tightness, shortness of breath, wheezing and stridor.     Cardiovascular: Negative for chest pain, palpitations and leg swelling. Gastrointestinal: Negative for abdominal pain, diarrhea, nausea and vomiting. Endocrine: Negative for cold intolerance, heat intolerance, polydipsia and polyuria. Genitourinary: Negative for dysuria, enuresis and hematuria. Musculoskeletal: Negative for arthralgias, gait problem, neck pain and neck stiffness. Skin: Negative for color change and rash. Allergic/Immunologic: Negative for environmental allergies, food allergies and immunocompromised state. Neurological: Negative for dizziness, syncope, facial asymmetry, speech difficulty, light-headedness and headaches. Hematological: Negative for adenopathy. Does not bruise/bleed easily. Psychiatric/Behavioral: Negative for confusion and sleep disturbance. The patient is not nervous/anxious. Objective:     /80   Pulse 84   Temp 97.4 °F (36.3 °C)   Resp 20   Ht 6' 2\" (1.88 m)   Wt 237 lb (107.5 kg)   BMI 30.43 kg/m²     Physical Exam     PROCEDURE: FIBEROPTIC LARYNGOSCOPY    A fiberoptic laryngoscopy was performed under topical anesthesia, after using Afrin and Lidocaine spray in the nasal fossa. The nasal fossa, nasopharynx, hypopharynx and larynx were carefully examined. Bilateral nasal obstruction, right septal spur Base of tongue was symmetrical. Epiglottis appeared normal and was not retrodisplaced. True vocal cords had normal mobility. There was no erythema. No mucosal lesions or masses were noted. No pooling in the pyriform sinuses. Fibrinous crusting on right side extending down below vocal cords, mucosal irregularities on right vocal cord. Culture taken with flexible metal cotton-tipped applicator    Data:  All of the past medical history, past surgical history, family history,social history, allergies and current medications were reviewed with the patient. Assessment & Plan   Diagnoses and all orders for this visit:     Diagnosis Orders   1.  Squamous cell carcinoma of false vocal

## 2019-04-06 LAB
AEROBIC CULTURE: ABNORMAL
GRAM STAIN RESULT: ABNORMAL
ORGANISM: ABNORMAL
ORGANISM: ABNORMAL

## 2019-04-08 DIAGNOSIS — J02.9 SORE THROAT: ICD-10-CM

## 2019-04-08 DIAGNOSIS — R49.0 DYSPHONIA: ICD-10-CM

## 2019-04-08 DIAGNOSIS — B37.0 THRUSH: Primary | ICD-10-CM

## 2019-04-08 RX ORDER — FLUCONAZOLE 100 MG/1
100 TABLET ORAL DAILY
Qty: 14 TABLET | Refills: 2 | Status: SHIPPED | OUTPATIENT
Start: 2019-04-08 | End: 2019-04-22

## 2019-04-08 RX ORDER — CLOTRIMAZOLE 10 MG/1
10 LOZENGE ORAL; TOPICAL
Qty: 70 TABLET | Refills: 1 | Status: SHIPPED | OUTPATIENT
Start: 2019-04-08 | End: 2019-05-06 | Stop reason: SDUPTHER

## 2019-04-17 DIAGNOSIS — J41.1 CHRONIC BRONCHITIS, MUCOPURULENT (HCC): ICD-10-CM

## 2019-04-17 RX ORDER — BUDESONIDE AND FORMOTEROL FUMARATE DIHYDRATE 80; 4.5 UG/1; UG/1
2 AEROSOL RESPIRATORY (INHALATION) 2 TIMES DAILY
Qty: 3 INHALER | Refills: 3 | Status: SHIPPED | OUTPATIENT
Start: 2019-04-17 | End: 2020-03-18

## 2019-04-25 ENCOUNTER — OFFICE VISIT (OUTPATIENT)
Dept: ENT CLINIC | Age: 59
End: 2019-04-25
Payer: MEDICARE

## 2019-04-25 VITALS
RESPIRATION RATE: 14 BRPM | WEIGHT: 230 LBS | HEIGHT: 74 IN | DIASTOLIC BLOOD PRESSURE: 76 MMHG | BODY MASS INDEX: 29.52 KG/M2 | SYSTOLIC BLOOD PRESSURE: 124 MMHG | TEMPERATURE: 97.7 F | HEART RATE: 72 BPM

## 2019-04-25 DIAGNOSIS — J04.0 ACUTE LARYNGITIS: ICD-10-CM

## 2019-04-25 DIAGNOSIS — K21.9 GASTROESOPHAGEAL REFLUX DISEASE WITHOUT ESOPHAGITIS: ICD-10-CM

## 2019-04-25 DIAGNOSIS — C32.0 SQUAMOUS CELL CARCINOMA OF VOCAL CORD (HCC): ICD-10-CM

## 2019-04-25 DIAGNOSIS — B37.0 THRUSH: ICD-10-CM

## 2019-04-25 DIAGNOSIS — T66.XXXD RADIATION ADVERSE EFFECT, SUBSEQUENT ENCOUNTER: ICD-10-CM

## 2019-04-25 DIAGNOSIS — R49.0 DYSPHONIA: Primary | ICD-10-CM

## 2019-04-25 PROCEDURE — 1036F TOBACCO NON-USER: CPT | Performed by: OTOLARYNGOLOGY

## 2019-04-25 PROCEDURE — 3017F COLORECTAL CA SCREEN DOC REV: CPT | Performed by: OTOLARYNGOLOGY

## 2019-04-25 PROCEDURE — G8417 CALC BMI ABV UP PARAM F/U: HCPCS | Performed by: OTOLARYNGOLOGY

## 2019-04-25 PROCEDURE — G8427 DOCREV CUR MEDS BY ELIG CLIN: HCPCS | Performed by: OTOLARYNGOLOGY

## 2019-04-25 PROCEDURE — 31575 DIAGNOSTIC LARYNGOSCOPY: CPT | Performed by: OTOLARYNGOLOGY

## 2019-04-25 PROCEDURE — 99213 OFFICE O/P EST LOW 20 MIN: CPT | Performed by: OTOLARYNGOLOGY

## 2019-04-25 ASSESSMENT — ENCOUNTER SYMPTOMS
VOICE CHANGE: 0
CHOKING: 0
APNEA: 0
VOMITING: 0
SINUS PRESSURE: 0
ABDOMINAL PAIN: 0
RHINORRHEA: 0
WHEEZING: 0
SORE THROAT: 1
STRIDOR: 0
TROUBLE SWALLOWING: 0
COUGH: 0
CHEST TIGHTNESS: 0
SHORTNESS OF BREATH: 0
NAUSEA: 0
FACIAL SWELLING: 0
COLOR CHANGE: 0
DIARRHEA: 0

## 2019-04-25 NOTE — PROGRESS NOTES
[address] [place] [facility]  [place] [address]  Patrick Jacobo Hortal[address]cias [address] [address]  Dept: [phone]  Dept Fax: [phone]  Loc: [phone]    Gretchen Ellis is a 60 y.o. male who was referred byNo ref. provider found for:  Chief Complaint   Patient presents with    Follow-up     Patient is here for 2 week follow up for throat check    . HPI:     Gretchen Ellis is a 60 y.o. male who presents today for follow-up on a rather pronounced laryngitis scoped 2 weeks ago. Cultures revealed the following:  Organism Abnormal  04/02/2019 12:00  [facility]   Proteus mirabilis    Aerobic Culture 04/02/2019 12:00  [facility]   light growth    Organism Abnormal  04/02/2019 12:00  [facility]   Candida lipolytica    Aerobic Culture 04/02/2019 12:00  [facility]   light growth      . History:      Allergies   Allergen Reactions    Betadine [Povidone Iodine] Rash     Surgery prep     Current Outpatient Medications   Medication Sig Dispense Refill    Sulfamethoxazole-Trimethoprim (BACTRIM PO) Take by mouth      Fluconazole (DIFLUCAN PO) Take by mouth      Clotrimazole (MYCELEX) 10 MG LOZG lozenge Take by mouth 5 times daily      budesonide-formoterol (SYMBICORT) 80-4.5 MCG/ACT AERO Inhale 2 puffs into the lungs 2 times daily 3 Inhaler 3    tiotropium (SPIRIVA RESPIMAT) 2.5 MCG/ACT AERS inhaler Inhale 2 puffs into the lungs daily 3 Inhaler 3    sildenafil (REVATIO) 20 MG tablet Take 1-5 tablets by mouth as needed (ED) 30 tablet 5    albuterol sulfate HFA (VENTOLIN HFA) 108 (90 Base) MCG/ACT inhaler Inhale 2 puffs into the lungs every 4 hours as needed for Wheezing or Shortness of Breath 3 Inhaler 3    rosuvastatin (CRESTOR) 20 MG tablet Take 20 mg by mouth daily      Dextromethorphan Polistirex (ROBITUSSIN 12 HOUR COUGH PO) Take by mouth 2 times daily       loperamide (IMODIUM) 2 MG capsule Take 2 mg by mouth as needed for Diarrhea      acetaminophen (TYLENOL) 650 MG CR tablet Take 650 mg by mouth as needed for Pain      Clotrimazole (MYCELEX) 10 MG LOZG lozenge DISSOLVE 1 TABLET BY MOUTH FIVE TIMES DAILY FOR 14 DAYS 70 lozenge 1     No current facility-administered medications for this visit. Past Medical History:   Diagnosis Date    Arthritis     [facility]     Pneumonia 2017 and 2017    [facility] ([facility]     Thyroid disease       Past Surgical History:   Procedure Laterality Date    COLONOSCOPY      EYE SURGERY      CROSS EYED    HAND SURGERY Bilateral     KNEE ARTHROSCOPY Right     LARYNGOSCOPY  2017    Biopsy    MICROLARYNGOSCOPY W BIOPSY      RE[place]AL [place]    ROTATOR CUFF REPAIR Left 80'S     Family History   Problem Relation Age of Onset    Prostate Cancer Father 48    Cancer Father     Heart Disease Father     Diabetes Mother     Heart Disease Mother     Stroke Mother     Heart Disease Brother     High Blood Pressure Other     Cancer Other         LUNG,PROSTATE    Hearing Loss Other      Social History     Tobacco Use    Smoking status: Former Smoker     Packs/day: 2.25     Years: 31.00     Pack years: 69.75     Start date:      Last attempt to quit: 2006     Years since quittin.2    Smokeless tobacco: Never Used   Substance Use Topics    Alcohol use: No     Alcohol/week: 0.0 oz       Subjective:      Review of Systems   Constitutional: Negative for activity change, appetite change, chills, diaphoresis, fatigue, fever and unexpected weight change. HENT: Positive for sore throat. Negative for congestion, dental problem, ear discharge, ear pain, facial swelling, hearing loss, mouth sores, nosebleeds, postnasal drip, rhinorrhea, sinus pressure, sneezing, tinnitus, trouble swallowing and voice change.     Eyes: Negative for visual

## 2019-05-06 DIAGNOSIS — R49.0 DYSPHONIA: ICD-10-CM

## 2019-05-06 DIAGNOSIS — B37.0 THRUSH: ICD-10-CM

## 2019-05-06 DIAGNOSIS — J02.9 SORE THROAT: ICD-10-CM

## 2019-05-06 NOTE — TELEPHONE ENCOUNTER
After Visit Summary   5/18/2018    Syd Joyce    MRN: [id number]           Patient Information     Date Of Birth          1954        Visit Information        Provider Department      5/18/2018 8:30 PM BED 4  SLEEP [facility]        Today's Diagnoses     Benign essential hypertension        Snoring        BMI 32.0-32.9,adult           Follow-ups after your visit        Your next 10 appointments already scheduled     May 29, 2018 11:15 AM CDT   Neuro Treatment with Kaden Maguire, PT   [facility] ([facility])    [address] [place] [address] [address]  [facility] [address]   [phone]            May 30, 2018  1:30 PM CDT   Return Sleep Patient with Bennett Ezra Goltz, PA-C   [facility] ([facility])    [address] [place] [address] [address]  [place] [address]   [phone]            Jun 11, 2018 11:45 AM CDT   Neuro Treatment with Danisha Lopez, PT   [facility] ([facility])    [address] [place] [address] [address]  [facility] [address]   [phone]            Jun 21, 2018 12:00 PM CDT   SHORT with Shan Arenas MD   [facility]    [address] [place] [address] [phone]            Jun 25, 2018 11:45 AM CDT   Neuro Treatment with Danisha Lopez, PT   [facility] ([facility])    [address]  [facility] [address]   [phone]            Jul 09, 2018  1:30 PM CDT   Neuro Treatment with Danisha Lopez, PT   [facility] ([facility])    [address]  [facility] [address]   [phone]              Who to contact     If you have questions or need follow up information about today's clinic visit or your schedule please contact [facility] directly at [phone].  Normal or non-critical lab and imaging results will be  Noted. I will discuss with Lauren tomorrow. His note is not done and I am unsure if this medication is needed going forward. communicated to you by MyChart, letter or phone within 4 business days after the clinic has received the results. If you do not hear from us within 7 days, please contact the clinic through [employer] or phone. If you have a critical or abnormal lab result, we will notify you by phone as soon as possible.  Submit refill requests through [employer] or call your pharmacy and they will forward the refill request to us. Please allow 3 business days for your refill to be completed.          Additional Information About Your Visit        [employer] Information     [employer] gives you secure access to your electronic health record. If you see a primary care provider, you can also send messages to your care team and make appointments. If you have questions, please call your primary care clinic.  If you do not have a primary care provider, please call [phone] and they will assist you.        Care EveryWhere ID     This is your Care EveryWhere ID. This could be used by other organizations to access your [facility] medical records  [id number]         Blood Pressure from Last 3 Encounters:   04/19/18 132/70   04/03/18 148/77   02/28/18 158/78    Weight from Last 3 Encounters:   04/19/18 95.7 kg (211 lb)   04/03/18 97.1 kg (214 lb)   02/28/18 100.2 kg (220 lb 14.4 oz)              We Performed the Following     Comprehensive Sleep Study        Primary Care Provider Office Phone # Fax #    Shan Arenas [phone] [address]  [facility] [address]        Equal Access to Services     GAIL GRIFFITHS : Hadii aad ku hadasho Soomaali, waaxda luqadaha, qaybta kaalmada adeegyada, waxay evelyn hayflavio kang . So [facility] [phone].    ATENCIÓN: Si habla español, tiene a persaud disposición servicios gratuitos de asistencia lingüística. Llame al [phone].    We comply with applicable federal civil rights laws and [place] laws. We do not discriminate on the basis of race, color, national origin,  age, disability, sex, sexual orientation, or gender identity.            Thank you!     Thank you for choosing [facility]  for your care. Our goal is always to provide you with excellent care. Hearing back from our patients is one way we can continue to improve our services. Please take a few minutes to complete the written survey that you may receive in the mail after your visit with us. Thank you!             Your Updated Medication List - Protect others around you: Learn how to safely use, store and throw away your medicines at www.disposemymeds.org.          This list is accurate as of 5/18/18 11:59 PM.  Always use your most recent med list.                   Brand Name Dispense Instructions for use Diagnosis    amLODIPine 10 MG tablet    NORVASC    90 tablet    TAKE 1 TABLET(10 MG) BY MOUTH DAILY    Essential hypertension with goal blood pressure less than 130/80       aspirin 81 MG EC tablet     90 tablet    Take 1 tablet (81 mg) by mouth daily Start tomorrow morning.    Atherosclerosis of native coronary artery of native heart without angina pectoris       blood glucose monitoring meter device kit     1 kit    Use to test blood sugar 4 to 5 times weekly or as directed.    Refill clinic medication management patient       blood glucose monitoring test strip    ACCU-CHEK MED PLUS    150 each    USE 4 TO 5 TIMES PER WEEK OR AS DIRECTED    Refill clinic medication management patient       carvedilol 25 MG tablet    COREG    360 tablet    Take 2 tablets (50 mg) by mouth 2 times daily Hold IF heart rate less than 55.        cyclobenzaprine 10 MG tablet    FLEXERIL    90 tablet    Take 0.5-1 tablets (5-10 mg) by mouth 3 times daily as needed for muscle spasms    S/P lumbar laminectomy       lisinopril-hydrochlorothiazide 20-12.5 MG per tablet    PRINZIDE/ZESTORETIC    90 tablet    TAKE 1 TABLET BY MOUTH DAILY    Essential hypertension with goal blood pressure less than 130/80       losartan 100  MG tablet    COZAAR    90 tablet    TAKE 1 TABLET BY MOUTH EVERY DAY    Essential hypertension with goal blood pressure less than 130/80       metFORMIN 500 MG 24 hr tablet    GLUCOPHAGE-XR    180 tablet    TAKE 2 TABLETS BY MOUTH EVERY MORNING    Type 2 diabetes mellitus with diabetic autonomic neuropathy, without long-term current use of insulin (H)       pantoprazole 40 MG EC tablet    PROTONIX    90 tablet    TAKE 1 TABLET(40 MG) BY MOUTH DAILY    Encounter for medication refill       simvastatin 20 MG tablet    ZOCOR    90 tablet    Take 1 tablet (20 mg) by mouth every evening        TRADJENTA 5 MG Tabs tablet   Generic drug:  linagliptin     90 tablet    TAKE 1 TABLET(5 MG) BY MOUTH DAILY    Type 2 diabetes mellitus with diabetic autonomic neuropathy, without long-term current use of insulin (H)       TUMS PO      Take 2 chew tab by mouth daily (with dinner)        zolpidem 10 MG tablet    AMBIEN    90 tablet    TAKE 1 TABLET BY MOUTH EVERY NIGHT AT BEDTIME    Encounter for medication refill

## 2019-06-07 ENCOUNTER — OFFICE VISIT (OUTPATIENT)
Dept: ENT CLINIC | Age: 59
End: 2019-06-07
Payer: MEDICARE

## 2019-06-07 VITALS
DIASTOLIC BLOOD PRESSURE: 82 MMHG | HEART RATE: 80 BPM | BODY MASS INDEX: 29.52 KG/M2 | TEMPERATURE: 97.5 F | WEIGHT: 230 LBS | RESPIRATION RATE: 20 BRPM | SYSTOLIC BLOOD PRESSURE: 128 MMHG | HEIGHT: 74 IN

## 2019-06-07 DIAGNOSIS — K22.2 SCHATZKI'S RING: ICD-10-CM

## 2019-06-07 DIAGNOSIS — B37.0 THRUSH: ICD-10-CM

## 2019-06-07 DIAGNOSIS — K21.9 GASTROESOPHAGEAL REFLUX DISEASE WITHOUT ESOPHAGITIS: ICD-10-CM

## 2019-06-07 DIAGNOSIS — T66.XXXD RADIATION ADVERSE EFFECT, SUBSEQUENT ENCOUNTER: ICD-10-CM

## 2019-06-07 DIAGNOSIS — J04.10 TRACHEITIS DUE TO STAPHYLOCOCCUS INFECTION: ICD-10-CM

## 2019-06-07 DIAGNOSIS — J38.3 DYSPLASIA OF TRUE VOCAL CORD: ICD-10-CM

## 2019-06-07 DIAGNOSIS — Z51.81 ENCOUNTER FOR MEDICATION MONITORING: ICD-10-CM

## 2019-06-07 DIAGNOSIS — C32.0 SQUAMOUS CELL CARCINOMA OF VOCAL CORD (HCC): Primary | ICD-10-CM

## 2019-06-07 DIAGNOSIS — B95.8 TRACHEITIS DUE TO STAPHYLOCOCCUS INFECTION: ICD-10-CM

## 2019-06-07 PROCEDURE — 31575 DIAGNOSTIC LARYNGOSCOPY: CPT | Performed by: OTOLARYNGOLOGY

## 2019-06-07 PROCEDURE — G8427 DOCREV CUR MEDS BY ELIG CLIN: HCPCS | Performed by: OTOLARYNGOLOGY

## 2019-06-07 PROCEDURE — 3017F COLORECTAL CA SCREEN DOC REV: CPT | Performed by: OTOLARYNGOLOGY

## 2019-06-07 PROCEDURE — 99213 OFFICE O/P EST LOW 20 MIN: CPT | Performed by: OTOLARYNGOLOGY

## 2019-06-07 PROCEDURE — 1036F TOBACCO NON-USER: CPT | Performed by: OTOLARYNGOLOGY

## 2019-06-07 PROCEDURE — G8417 CALC BMI ABV UP PARAM F/U: HCPCS | Performed by: OTOLARYNGOLOGY

## 2019-06-07 ASSESSMENT — ENCOUNTER SYMPTOMS
ABDOMINAL PAIN: 0
DIARRHEA: 0
APNEA: 0
TROUBLE SWALLOWING: 0
SORE THROAT: 0
RHINORRHEA: 0
CHEST TIGHTNESS: 0
VOMITING: 0
VOICE CHANGE: 0
NAUSEA: 0
STRIDOR: 0
CHOKING: 0
COLOR CHANGE: 0
SHORTNESS OF BREATH: 0
SINUS PRESSURE: 0
FACIAL SWELLING: 0
WHEEZING: 0
COUGH: 0

## 2019-06-07 NOTE — PROGRESS NOTES
[address], NOSE AND THROAT  [address]  [place] [address] [address]  Dept: [phone]  Dept Fax: [phone]  Loc: [phone]    Gretchen Ellis is a 60 y.o. male who was referred byNo ref. provider found for:  Chief Complaint   Patient presents with    1 Month Follow-Up     Patient here for 1 Month Follow-up and scope   . HPI:     Gretchen Ellis is a 60 y.o. male who presents today for 1 month follow up and scope. Patient is asking to have another culture done, he doesn't feel all cleared up. He had a slight improvement but it is not clearing up completely. He is thinking that the radiation has something to do with this. The Mucinex is not helping to thin out the mucus. He took the Fluconazole for a month. History: Allergies   Allergen Reactions    Betadine [Povidone Iodine] Rash     Surgery prep     Current Outpatient Medications   Medication Sig Dispense Refill    budesonide-formoterol (SYMBICORT) 80-4.5 MCG/ACT AERO Inhale 2 puffs into the lungs 2 times daily 3 Inhaler 3    tiotropium (SPIRIVA RESPIMAT) 2.5 MCG/ACT AERS inhaler Inhale 2 puffs into the lungs daily 3 Inhaler 3    albuterol sulfate HFA (VENTOLIN HFA) 108 (90 Base) MCG/ACT inhaler Inhale 2 puffs into the lungs every 4 hours as needed for Wheezing or Shortness of Breath 3 Inhaler 3    rosuvastatin (CRESTOR) 20 MG tablet Take 20 mg by mouth daily      Dextromethorphan Polistirex (ROBITUSSIN 12 HOUR COUGH PO) Take by mouth 2 times daily as needed       loperamide (IMODIUM) 2 MG capsule Take 2 mg by mouth as needed for Diarrhea      acetaminophen (TYLENOL) 650 MG CR tablet Take 650 mg by mouth as needed for Pain      sildenafil (REVATIO) 20 MG tablet Take 1-5 tablets by mouth as needed (ED) 30 tablet 5     No current facility-administered medications for this visit.       Past Medical History:   Diagnosis Date    Arthritis     COPD (chronic obstructive pulmonary disease) ([facility] [address]     Pneumonia 2017 and 2017    [facility]     Thyroid disease       Past Surgical History:   Procedure Laterality Date    COLONOSCOPY  2016    EYE SURGERY      CROSS EYED    HAND SURGERY Bilateral 1995    KNEE ARTHROSCOPY Right 1996    LARYNGOSCOPY  2017    Biopsy    MICROLARYNGOSCOPY W BIOPSY      [place]    ROTATOR CUFF REPAIR Left 80'S     Family History   Problem Relation Age of Onset    Prostate Cancer Father 48    Cancer Father     Heart Disease Father     Diabetes Mother     Heart Disease Mother     Stroke Mother     Heart Disease Brother     High Blood Pressure Other     Cancer Other         LUNG,PROSTATE    Hearing Loss Other      Social History     Tobacco Use    Smoking status: Former Smoker     Packs/day: 2.25     Years: 31.00     Pack years: 69.75     Start date:      Last attempt to quit: 2006     Years since quittin.3    Smokeless tobacco: Never Used   Substance Use Topics    Alcohol use: No     Alcohol/week: 0.0 oz       Subjective:       Review of Systems   Constitutional: Negative for activity change, appetite change, chills, diaphoresis, fatigue, fever and unexpected weight change. HENT: Negative for congestion, dental problem, ear discharge, ear pain, facial swelling, hearing loss, mouth sores, nosebleeds, postnasal drip, rhinorrhea, sinus pressure, sneezing, sore throat, tinnitus, trouble swallowing and voice change. Eyes: Negative for visual disturbance. Respiratory: Negative for apnea, cough, choking, chest tightness, shortness of breath, wheezing and stridor. Cardiovascular: Negative for chest pain, palpitations and leg swelling. Gastrointestinal: Negative for abdominal pain, diarrhea, nausea and vomiting. Endocrine: Negative for cold intolerance, heat intolerance, polydipsia and polyuria.    Genitourinary: Negative for dysuria, enuresis and hematuria. Musculoskeletal: Negative for arthralgias, gait problem, neck pain and neck stiffness. Skin: Negative for color change and rash. Allergic/Immunologic: Negative for environmental allergies, food allergies and immunocompromised state. Neurological: Negative for dizziness, syncope, facial asymmetry, speech difficulty, light-headedness and headaches. Hematological: Negative for adenopathy. Does not bruise/bleed easily. Psychiatric/Behavioral: Negative for confusion and sleep disturbance. The patient is not nervous/anxious. Objective:     /82 (Site: Left Upper Arm, Position: Sitting, Cuff Size: Medium Adult)   Pulse 80   Temp 97.5 °F (36.4 °C) (Oral)   Resp 20   Ht 6' 2\" (1.88 m)   Wt 230 lb (104.3 kg)   BMI 29.53 kg/m²     Physical Exam  Neck: No palpable adenopathy    PROCEDURE: FIBEROPTIC LARYNGOSCOPY    A fiberoptic laryngoscopy was performed under topical anesthesia, after using Afrin and Lidocaine spray in the nasal fossa. The nasal fossa, nasopharynx, hypopharynx and larynx were carefully examined. Base of tongue was symmetrical. Epiglottis appeared normal and was not retrodisplaced. True vocal cords had normal mobility. There was diffuse erythema and some edema but no sign of tumor. The appearance of the larynx was symmetrical.  No mucosal lesions or masses were noted. No pooling in the pyriform sinuses. Ulcer was taken of the larynx directly using a flexible culture swab. Data:  All of the past medical history, past surgical history, family history,social history, allergies and current medications were reviewed with the patient. Assessment & Plan   Diagnoses and all orders for this visit:     Diagnosis Orders   1. Squamous cell carcinoma of false vocal cord (HCC)  Throat culture    Comprehensive Metabolic Panel    Hepatic Function Panel    KY LARYNGOSCOPY FLEXIBLE DIAGNOSTIC   2.  Radiation adverse effect, subsequent encounter  Throat culture    Comprehensive Metabolic Panel    Hepatic Function Panel    RI LARYNGOSCOPY FLEXIBLE DIAGNOSTIC   3. Gastroesophageal reflux disease without esophagitis     4. Schatzki's ring     5. Dysplasia of true vocal cord     6. Tracheitis due to Staphylococcus infection  Throat culture   7. Encounter for medication monitoring  Comprehensive Metabolic Panel    Hepatic Function Panel   8. Thrush  Throat culture       The findings were explained and his questions were answered. Options were discussed including taking a larynx culture, drawing labs. He agreed. Return in about 1 month (around 7/7/2019). I, Anna Lomax CMA ([facility]), am scribing for, and in the presence of Dr. Lucho Senior. Electronically signed by Kuldip Ritter CMA ([facility]) on 6/7/19 at 9:16 AM.     (Please note that portions of this note were completed with a voice recognition program. Efforts were made to edit the dictations butoccasionally words are mis-transcribed.)    I agree to the above documentation placed by my scribe. I have personally evaluated this patient. Additional findings are as noted. I reviewed the scribe's note and agree with the documented findings and plan of care. Any areas of disagreement are corrected. I agree with the chief complaint, past medical history, past surgical history, allergies, medications, social and family history as documented unless otherwise noted below.      Electronically signed by Inocente Harrison MD on 6/17/2019 at 7:15 PM

## 2019-06-07 NOTE — PROGRESS NOTES
[address], NOSE AND THROAT  [address]  [place] [address] [address]  Dept: [phone]  Dept Fax: [phone]  Loc: [phone]    Lamar Hatch is a 60 y.o. male who was referred byNo ref. provider found for:  Chief Complaint   Patient presents with    1 Month Follow-Up     Patient here for 1 Month Follow-up and scope   . HPI:     Lamar Hatch is a 60 y.o. male who presents today for ***. History: Allergies   Allergen Reactions    Betadine [Povidone Iodine] Rash     Surgery prep     Current Outpatient Medications   Medication Sig Dispense Refill    budesonide-formoterol (SYMBICORT) 80-4.5 MCG/ACT AERO Inhale 2 puffs into the lungs 2 times daily 3 Inhaler 3    tiotropium (SPIRIVA RESPIMAT) 2.5 MCG/ACT AERS inhaler Inhale 2 puffs into the lungs daily 3 Inhaler 3    albuterol sulfate HFA (VENTOLIN HFA) 108 (90 Base) MCG/ACT inhaler Inhale 2 puffs into the lungs every 4 hours as needed for Wheezing or Shortness of Breath 3 Inhaler 3    rosuvastatin (CRESTOR) 20 MG tablet Take 20 mg by mouth daily      Dextromethorphan Polistirex (ROBITUSSIN 12 HOUR COUGH PO) Take by mouth 2 times daily as needed       loperamide (IMODIUM) 2 MG capsule Take 2 mg by mouth as needed for Diarrhea      acetaminophen (TYLENOL) 650 MG CR tablet Take 650 mg by mouth as needed for Pain      sildenafil (REVATIO) 20 MG tablet Take 1-5 tablets by mouth as needed (ED) 30 tablet 5     No current facility-administered medications for this visit.       Past Medical History:   Diagnosis Date    Arthritis     [facility]) ([facility] [address]     Pneumonia 01/2017 1/2017 and 2/2017    Rectal cancer ([facility] [address]     Thyroid disease       Past Surgical History:   Procedure Laterality Date    COLONOSCOPY  2016    EYE SURGERY      CROSS EYED    HAND SURGERY Bilateral 1995    KNEE ARTHROSCOPY Right 1996    LARYNGOSCOPY  07/12/2017    Biopsy    MICROLARYNGOSCOPY W BIOPSY      RECT[place]    ROTATOR CUFF REPAIR Left 80'S     Family History   Problem Relation Age of Onset    Prostate Cancer Father 48    Cancer Father     Heart Disease Father     Diabetes Mother     Heart Disease Mother     Stroke Mother     Heart Disease Brother     High Blood Pressure Other     Cancer Other         LUNG,PROSTATE    Hearing Loss Other      Social History     Tobacco Use    Smoking status: Former Smoker     Packs/day: 2.25     Years: 31.00     Pack years: 69.75     Start date:      Last attempt to quit: 2006     Years since quittin.3    Smokeless tobacco: Never Used   Substance Use Topics    Alcohol use: No     Alcohol/week: 0.0 oz       Subjective:      Review of Systems   Constitutional: Negative for activity change, appetite change, chills, diaphoresis, fatigue, fever and unexpected weight change. HENT: Negative for congestion, dental problem, ear discharge, ear pain, facial swelling, hearing loss, mouth sores, nosebleeds, postnasal drip, rhinorrhea, sinus pressure, sneezing, sore throat, tinnitus, trouble swallowing and voice change. Eyes: Negative for visual disturbance. Respiratory: Negative for apnea, cough, choking, chest tightness, shortness of breath, wheezing and stridor. Cardiovascular: Negative for chest pain, palpitations and leg swelling. Gastrointestinal: Negative for abdominal pain, diarrhea, nausea and vomiting. Endocrine: Negative for cold intolerance, heat intolerance, polydipsia and polyuria. Genitourinary: Negative for dysuria, enuresis and hematuria. Musculoskeletal: Negative for arthralgias, gait problem, neck pain and neck stiffness. Skin: Negative for color change and rash. Allergic/Immunologic: Negative for environmental allergies, food allergies and immunocompromised state.    Neurological: Negative for dizziness, syncope, facial asymmetry, speech difficulty, light-headedness and headaches. Hematological: Negative for adenopathy. Does not bruise/bleed easily. Psychiatric/Behavioral: Negative for confusion and sleep disturbance. The patient is not nervous/anxious. Objective:   /82 (Site: Left Upper Arm, Position: Sitting, Cuff Size: Medium Adult)   Pulse 80   Temp 97.5 °F (36.4 °C) (Oral)   Resp 20   Ht 6' 2\" (1.88 m)   Wt 230 lb (104.3 kg)   BMI 29.53 kg/m²     Physical Exam    Data:  All of the past medical history, past surgical history, family history,social history, allergies and current medications were reviewed with the patient. Assessment & Plan   Diagnoses and all orders for this visit:    {No diagnosis found. (Refresh or delete this SmartLink)}    The findings were explained and his questions were answered. No follow-ups on file. Kassy Castro. Melisa Lowe MD    **This report has been created using voice recognition software. It may contain minor errors which are inherent in voicerecognition technology. **

## 2019-06-10 LAB
AEROBIC CULTURE: ABNORMAL
AEROBIC CULTURE: ABNORMAL
GRAM STAIN RESULT: ABNORMAL
ORGANISM: ABNORMAL

## 2019-06-11 ENCOUNTER — TELEPHONE (OUTPATIENT)
Dept: ENT CLINIC | Age: 59
End: 2019-06-11

## 2019-06-11 DIAGNOSIS — C32.0 SQUAMOUS CELL CARCINOMA OF VOCAL CORD (HCC): Primary | ICD-10-CM

## 2019-06-11 DIAGNOSIS — J37.0 CHRONIC LARYNGITIS: ICD-10-CM

## 2019-06-11 DIAGNOSIS — T66.XXXD RADIATION ADVERSE EFFECT, SUBSEQUENT ENCOUNTER: ICD-10-CM

## 2019-06-11 LAB
A/G RATIO: 1.7 (ref 1.5–2.5)
ALBUMIN SERPL-MCNC: 4.1 GM/DL (ref 3.5–5)
ALP BLD-CCNC: 130 IU/L (ref 41–137)
ALT SERPL-CCNC: 23 IU/L (ref 10–40)
ANION GAP SERPL CALCULATED.3IONS-SCNC: 3 MMOL/L (ref 4–12)
AST SERPL-CCNC: 19 IU/L (ref 15–41)
BILIRUB SERPL-MCNC: 0.5 MG/DL (ref 0.2–1)
BILIRUBIN DIRECT: 0.1 MG/DL (ref 0.1–0.2)
BUN BLDV-MCNC: 14 MG/DL (ref 7–20)
CALCIUM SERPL-MCNC: 8.8 MG/DL (ref 8.8–10.5)
CHLORIDE BLD-SCNC: 105 MEQ/L (ref 101–111)
CO2: 31 MEQ/L (ref 21–32)
CREAT SERPL-MCNC: 0.7 MG/DL (ref 0.6–1.3)
CREATININE CLEARANCE: >60
GLUCOSE: 98 MG/DL (ref 70–110)
POTASSIUM SERPL-SCNC: 5.2 MEQ/L (ref 3.6–5)
SODIUM BLD-SCNC: 139 MEQ/L (ref 135–145)
TOTAL PROTEIN: 6.5 G/DL (ref 6.2–8)

## 2019-06-11 RX ORDER — LEVOFLOXACIN 750 MG/1
750 TABLET ORAL DAILY
Qty: 10 TABLET | Refills: 0 | Status: SHIPPED | OUTPATIENT
Start: 2019-06-11 | End: 2019-06-21

## 2019-06-11 NOTE — TELEPHONE ENCOUNTER
Patient left message on the clinical voicemail asking for culture results from last office visit with Dr John Shin on 06/07/19. His throat is still sore and he stilll has thick mucus. Please advise.

## 2019-06-11 NOTE — TELEPHONE ENCOUNTER
Called patient. Informed him Culture grew Proteus species again. Told him Crystal discussed with Dr Kwame Grace. Dr Kwame Grace will put patient on Levaquin since he has failed to resolve infection on the Bactrim DS previously. Told him Dr Kwame Grace also wants patient to see ID. Told patient Alessandra Manzano at the  will be calling him on Thursday or Friday with the appointment date and time for ID. Patient verbalized understanding and thanked me.

## 2019-06-25 ENCOUNTER — OFFICE VISIT (OUTPATIENT)
Dept: INFECTIOUS DISEASES | Age: 59
End: 2019-06-25
Payer: MEDICARE

## 2019-06-25 VITALS
BODY MASS INDEX: 29.85 KG/M2 | WEIGHT: 232.6 LBS | HEART RATE: 78 BPM | TEMPERATURE: 99 F | SYSTOLIC BLOOD PRESSURE: 137 MMHG | HEIGHT: 74 IN | DIASTOLIC BLOOD PRESSURE: 88 MMHG

## 2019-06-25 DIAGNOSIS — J04.0 LARYNGITIS: ICD-10-CM

## 2019-06-25 DIAGNOSIS — J44.1 COPD EXACERBATION (HCC): Primary | ICD-10-CM

## 2019-06-25 PROCEDURE — 99203 OFFICE O/P NEW LOW 30 MIN: CPT | Performed by: INTERNAL MEDICINE

## 2019-06-25 NOTE — PROGRESS NOTES
History     Tobacco Use    Smoking status: Former Smoker     Packs/day: 2.25     Years: 31.00     Pack years: 69.75     Start date:      Last attempt to quit: 2006     Years since quittin.3    Smokeless tobacco: Never Used   Substance Use Topics    Alcohol use: No     Alcohol/week: 0.0 oz    Drug use: No       ALLERGIES    Allergies   Allergen Reactions    Povidone Iodine Rash     Surgery prep       MEDICATIONS    Current Outpatient Medications on File Prior to Visit   Medication Sig Dispense Refill    budesonide-formoterol (SYMBICORT) 80-4.5 MCG/ACT AERO Inhale 2 puffs into the lungs 2 times daily 3 Inhaler 3    tiotropium (SPIRIVA RESPIMAT) 2.5 MCG/ACT AERS inhaler Inhale 2 puffs into the lungs daily 3 Inhaler 3    sildenafil (REVATIO) 20 MG tablet Take 1-5 tablets by mouth as needed (ED) 30 tablet 5    albuterol sulfate HFA (VENTOLIN HFA) 108 (90 Base) MCG/ACT inhaler Inhale 2 puffs into the lungs every 4 hours as needed for Wheezing or Shortness of Breath 3 Inhaler 3    rosuvastatin (CRESTOR) 20 MG tablet Take 20 mg by mouth daily      Dextromethorphan Polistirex (ROBITUSSIN 12 HOUR COUGH PO) Take by mouth 2 times daily as needed       loperamide (IMODIUM) 2 MG capsule Take 2 mg by mouth as needed for Diarrhea      acetaminophen (TYLENOL) 650 MG CR tablet Take 650 mg by mouth as needed for Pain       No current facility-administered medications on file prior to visit. REVIEW OF SYSTEMS  Constitutional: no fever, no night sweats, no fatigue. Head: no head ache , no head injury, no migranes. Eye: no blurring of vision, no double vision.   Ears: no hearing difficulty, no tinnitus  Mouth/throat: no ulceration, dental caries , dysphagia  Lungs: + Cough no chest pain, voice change  CVS: no palpitation, no chest pain, no shortness of breath  GI: no abdominal pain, no nausea , he had colostomy   ALDEN: no dysuria, frequency and urgency, no hematuria, no kidney stones  Musculoskeletal: no joint pain, swelling , stiffness,  Endocrine: no polyuria, polydipsia, no cold or heat intolerance  Hematology: no anemia, no easy brusing or bleeding, no hx of clotting disorder  Dermatology: no skin rash, no eczema, no pruritis,  Psychiatry: no depression, no anxiety,no panic attacks, no suicide ideation    Objective:      /88 (Site: Left Upper Arm, Position: Sitting, Cuff Size: Large Adult)   Pulse 78   Temp 99 °F (37.2 °C)   Ht 6' 2.02\" (1.88 m)   Wt 232 lb 9.6 oz (105.5 kg)   BMI 29.85 kg/m²     Wt Readings from Last 3 Encounters:   06/25/19 232 lb 9.6 oz (105.5 kg)   06/07/19 230 lb (104.3 kg)   04/25/19 230 lb (104.3 kg)         There is no immunization history on file for this patient. PHYSICAL EXAM  /88 (Site: Left Upper Arm, Position: Sitting, Cuff Size: Large Adult)   Pulse 78   Temp 99 °F (37.2 °C)   Ht 6' 2.02\" (1.88 m)   Wt 232 lb 9.6 oz (105.5 kg)   BMI 29.85 kg/m²   General:  Awake, alert, not in distress. HEENT: pink conjunctiva, unicteric sclera, moist oral mucosa. Chest:    Diminished breath sounds  Cardiovascular:  RRR ,S1S2, no murmur or gallop. Abdomen:  Soft, non tender to palpation. Extremities: No edema   Skin:  Warm and dry.   CNS oriented           LABS       CBC:   Lab Results   Component Value Date    WBC 5.4 09/29/2017    HGB 15.2 09/29/2017    HCT 45.1 09/29/2017    MCV 92.0 09/29/2017     09/29/2017     BMP:   Lab Results   Component Value Date     06/11/2019    K 5.2 06/11/2019     06/11/2019    CO2 31 06/11/2019    PHOS 4.1 07/08/2017    BUN 14 06/11/2019    CREATININE 0.70 06/11/2019     PT/INR: No results found for: PROTIME, INR  Prealbumin: No results found for: PREALBUMIN  Albumin:  Lab Results   Component Value Date    LABALBU 4.1 06/11/2019    LABALBU 4.6 12/06/2011     Sed Rate:No results found for: SEDRATE  CRP: No results found for: CRP  Micro: No results found for: BC   Hemoglobin A1C: No results found for: LABA1C    Assessment:     Chronic tracheobronchitis  COPD  History of head and neck cancer  History of rectal cancer    Patient will continue to have recurrent upper airway symptoms due to his radiation and chronic infection. He was advised to take antibiotics when he has flareup. Advised on humidifier to assist with secretions  Prescription was given for Levaquin to be taken during flareup. 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 Antibiotics: Levaquin  Follow up: Patient will call with questions  Please see attached Discharge Instructions    Written patient dismissal instructions given to patient and signed by patient or POA.              Electronically signed by Basilia Barrera MD,FACP@ TD@ at 2:45 PM

## 2019-07-05 ENCOUNTER — OFFICE VISIT (OUTPATIENT)
Dept: ENT CLINIC | Age: 59
End: 2019-07-05
Payer: MEDICARE

## 2019-07-05 VITALS
HEART RATE: 84 BPM | BODY MASS INDEX: 30.54 KG/M2 | SYSTOLIC BLOOD PRESSURE: 142 MMHG | TEMPERATURE: 97.6 F | HEIGHT: 74 IN | RESPIRATION RATE: 20 BRPM | WEIGHT: 238 LBS | DIASTOLIC BLOOD PRESSURE: 82 MMHG

## 2019-07-05 DIAGNOSIS — K21.9 GASTROESOPHAGEAL REFLUX DISEASE WITHOUT ESOPHAGITIS: ICD-10-CM

## 2019-07-05 DIAGNOSIS — T66.XXXD RADIATION ADVERSE EFFECT, SUBSEQUENT ENCOUNTER: ICD-10-CM

## 2019-07-05 DIAGNOSIS — J37.0 CHRONIC LARYNGITIS: ICD-10-CM

## 2019-07-05 DIAGNOSIS — A49.8 PROTEUS MIRABILIS INFECTION: ICD-10-CM

## 2019-07-05 DIAGNOSIS — K22.2 SCHATZKI'S RING: ICD-10-CM

## 2019-07-05 DIAGNOSIS — J42 CHRONIC BRONCHITIS, UNSPECIFIED CHRONIC BRONCHITIS TYPE (HCC): ICD-10-CM

## 2019-07-05 DIAGNOSIS — Z01.818 PRE-OP TESTING: ICD-10-CM

## 2019-07-05 DIAGNOSIS — C32.0 SQUAMOUS CELL CARCINOMA OF VOCAL CORD (HCC): Primary | ICD-10-CM

## 2019-07-05 PROCEDURE — G8417 CALC BMI ABV UP PARAM F/U: HCPCS | Performed by: OTOLARYNGOLOGY

## 2019-07-05 PROCEDURE — 3017F COLORECTAL CA SCREEN DOC REV: CPT | Performed by: OTOLARYNGOLOGY

## 2019-07-05 PROCEDURE — 31575 DIAGNOSTIC LARYNGOSCOPY: CPT | Performed by: OTOLARYNGOLOGY

## 2019-07-05 PROCEDURE — G8926 SPIRO NO PERF OR DOC: HCPCS | Performed by: OTOLARYNGOLOGY

## 2019-07-05 PROCEDURE — 99213 OFFICE O/P EST LOW 20 MIN: CPT | Performed by: OTOLARYNGOLOGY

## 2019-07-05 PROCEDURE — 1036F TOBACCO NON-USER: CPT | Performed by: OTOLARYNGOLOGY

## 2019-07-05 PROCEDURE — 3023F SPIROM DOC REV: CPT | Performed by: OTOLARYNGOLOGY

## 2019-07-05 PROCEDURE — G8427 DOCREV CUR MEDS BY ELIG CLIN: HCPCS | Performed by: OTOLARYNGOLOGY

## 2019-07-05 RX ORDER — LEVOFLOXACIN 500 MG/1
TABLET, FILM COATED ORAL
Refills: 0 | COMMUNITY
Start: 2019-06-29 | End: 2019-07-29 | Stop reason: ALTCHOICE

## 2019-07-05 ASSESSMENT — ENCOUNTER SYMPTOMS
RHINORRHEA: 0
VOICE CHANGE: 0
COUGH: 0
DIARRHEA: 0
STRIDOR: 0
VOMITING: 0
NAUSEA: 0
COLOR CHANGE: 0
ABDOMINAL PAIN: 0
CHEST TIGHTNESS: 0
APNEA: 0
WHEEZING: 0
CHOKING: 0
FACIAL SWELLING: 0
TROUBLE SWALLOWING: 0
SHORTNESS OF BREATH: 0
SINUS PRESSURE: 0
SORE THROAT: 0

## 2019-07-05 NOTE — PROGRESS NOTES
mg by mouth as needed for Pain      guaiFENesin (MUCINEX) 600 MG extended release tablet Take 1,200 mg by mouth 2 times daily      VITAMIN D, ERGOCALCIFEROL, PO Take 1 tablet by mouth daily      TURMERIC CURCUMIN PO Take 1 tablet by mouth daily       No current facility-administered medications for this visit. Past Medical History:   Diagnosis Date    Arthritis     [facility] [address]     Pneumonia 2017 and 2017    [facility]     Thyroid disease       Past Surgical History:   Procedure Laterality Date    COLONOSCOPY      EYE SURGERY      CROSS EYED    HAND SURGERY Bilateral     KNEE ARTHROSCOPY Right     LARYNGOSCOPY  2017    Biopsy    MICROLARYNGOSCOPY W BIOPSY      [place]    ROTATOR CUFF REPAIR Left 80'S     Family History   Problem Relation Age of Onset    Prostate Cancer Father 48    Cancer Father     Heart Disease Father     Diabetes Mother     Heart Disease Mother     Stroke Mother     Heart Disease Brother     High Blood Pressure Other     Cancer Other         LUNG,PROSTATE    Hearing Loss Other      Social History     Tobacco Use    Smoking status: Former Smoker     Packs/day: 2.25     Years: 31.00     Pack years: 69.75     Start date:      Last attempt to quit: 2006     Years since quittin.4    Smokeless tobacco: Never Used   Substance Use Topics    Alcohol use: No     Alcohol/week: 0.0 oz       Subjective:       Review of Systems   Constitutional: Negative for activity change, appetite change, chills, diaphoresis, fatigue, fever and unexpected weight change. HENT: Negative for congestion, dental problem, ear discharge, ear pain, facial swelling, hearing loss, mouth sores, nosebleeds, postnasal drip, rhinorrhea, sinus pressure, sneezing, sore throat, tinnitus, trouble swallowing and voice change. Eyes: Negative for visual disturbance.

## 2019-07-08 ENCOUNTER — HOSPITAL ENCOUNTER (OUTPATIENT)
Age: 59
Discharge: HOME | End: 2019-07-08
Payer: MEDICARE

## 2019-07-08 ENCOUNTER — TELEPHONE (OUTPATIENT)
Dept: ENT CLINIC | Age: 59
End: 2019-07-08

## 2019-07-08 ENCOUNTER — HOSPITAL ENCOUNTER (OUTPATIENT)
Dept: GENERAL RADIOLOGY | Age: 59
Discharge: HOME | End: 2019-07-08
Payer: MEDICARE

## 2019-07-08 DIAGNOSIS — C32.0 SQUAMOUS CELL CARCINOMA OF VOCAL CORD (HCC): ICD-10-CM

## 2019-07-08 DIAGNOSIS — Z01.818 PRE-OP TESTING: ICD-10-CM

## 2019-07-08 LAB
ALBUMIN SERPL-MCNC: 4.3 G/DL (ref 3.5–5.1)
ALP BLD-CCNC: 207 U/L (ref 38–126)
ALT SERPL-CCNC: 41 U/L (ref 11–66)
ANION GAP SERPL CALCULATED.3IONS-SCNC: 11 MEQ/L (ref 8–16)
AST SERPL-CCNC: 34 U/L (ref 5–40)
BASOPHILS # BLD: 0.3 %
BASOPHILS ABSOLUTE: 0 THOU/MM3 (ref 0–0.1)
BILIRUB SERPL-MCNC: 0.5 MG/DL (ref 0.3–1.2)
BUN BLDV-MCNC: 16 MG/DL (ref 7–22)
CALCIUM SERPL-MCNC: 9.4 MG/DL (ref 8.5–10.5)
CHLORIDE BLD-SCNC: 102 MEQ/L (ref 98–111)
CO2: 26 MEQ/L (ref 23–33)
CREAT SERPL-MCNC: 0.8 MG/DL (ref 0.4–1.2)
EKG ATRIAL RATE: 75 BPM
EKG P AXIS: 66 DEGREES
EKG P-R INTERVAL: 138 MS
EKG Q-T INTERVAL: 386 MS
EKG QRS DURATION: 88 MS
EKG QTC CALCULATION (BAZETT): 431 MS
EKG R AXIS: 81 DEGREES
EKG T AXIS: 72 DEGREES
EKG VENTRICULAR RATE: 75 BPM
EOSINOPHIL # BLD: 1.6 %
EOSINOPHILS ABSOLUTE: 0.1 THOU/MM3 (ref 0–0.4)
ERYTHROCYTE [DISTWIDTH] IN BLOOD BY AUTOMATED COUNT: 12.7 % (ref 11.5–14.5)
ERYTHROCYTE [DISTWIDTH] IN BLOOD BY AUTOMATED COUNT: 44.5 FL (ref 35–45)
GFR SERPL CREATININE-BSD FRML MDRD: > 90 ML/MIN/1.73M2
GLUCOSE BLD-MCNC: 96 MG/DL (ref 70–108)
HCT VFR BLD CALC: 48.7 % (ref 42–52)
HEMOGLOBIN: 16 GM/DL (ref 14–18)
IMMATURE GRANS (ABS): 0.04 THOU/MM3 (ref 0–0.07)
IMMATURE GRANULOCYTES: 0.6 %
LYMPHOCYTES # BLD: 17.9 %
LYMPHOCYTES ABSOLUTE: 1.3 THOU/MM3 (ref 1–4.8)
MCH RBC QN AUTO: 31.3 PG (ref 26–33)
MCHC RBC AUTO-ENTMCNC: 32.9 GM/DL (ref 32.2–35.5)
MCV RBC AUTO: 95.1 FL (ref 80–94)
MONOCYTES # BLD: 17.4 %
MONOCYTES ABSOLUTE: 1.2 THOU/MM3 (ref 0.4–1.3)
NUCLEATED RED BLOOD CELLS: 0 /100 WBC
PLATELET # BLD: 229 THOU/MM3 (ref 130–400)
PMV BLD AUTO: 10.4 FL (ref 9.4–12.4)
POTASSIUM SERPL-SCNC: 5.2 MEQ/L (ref 3.5–5.2)
RBC # BLD: 5.12 MILL/MM3 (ref 4.7–6.1)
SEG NEUTROPHILS: 62.2 %
SEGMENTED NEUTROPHILS ABSOLUTE COUNT: 4.4 THOU/MM3 (ref 1.8–7.7)
SODIUM BLD-SCNC: 139 MEQ/L (ref 135–145)
TOTAL PROTEIN: 7.1 G/DL (ref 6.1–8)
WBC # BLD: 7 THOU/MM3 (ref 4.8–10.8)

## 2019-07-08 PROCEDURE — 85025 COMPLETE CBC W/AUTO DIFF WBC: CPT

## 2019-07-08 PROCEDURE — 93010 ELECTROCARDIOGRAM REPORT: CPT | Performed by: INTERNAL MEDICINE

## 2019-07-08 PROCEDURE — 93005 ELECTROCARDIOGRAM TRACING: CPT | Performed by: OTOLARYNGOLOGY

## 2019-07-08 PROCEDURE — 80053 COMPREHEN METABOLIC PANEL: CPT

## 2019-07-08 PROCEDURE — 71046 X-RAY EXAM CHEST 2 VIEWS: CPT

## 2019-07-08 PROCEDURE — 36415 COLL VENOUS BLD VENIPUNCTURE: CPT

## 2019-07-08 RX ORDER — GUAIFENESIN 600 MG/1
1200 TABLET, EXTENDED RELEASE ORAL 2 TIMES DAILY
Status: ON HOLD | COMMUNITY
End: 2020-10-19 | Stop reason: HOSPADM

## 2019-07-11 ENCOUNTER — PREP FOR PROCEDURE (OUTPATIENT)
Dept: ENT CLINIC | Age: 59
End: 2019-07-11

## 2019-07-11 PROBLEM — C32.0 SQUAMOUS CELL CARCINOMA OF VOCAL CORD (HCC): Status: ACTIVE | Noted: 2019-07-11

## 2019-07-11 PROBLEM — T66.XXXD: Status: ACTIVE | Noted: 2019-07-11

## 2019-07-11 PROBLEM — J37.0 CHRONIC LARYNGITIS: Status: ACTIVE | Noted: 2019-07-11

## 2019-07-11 PROBLEM — J42 CHRONIC BRONCHITIS (HCC): Status: ACTIVE | Noted: 2019-07-11

## 2019-07-11 PROBLEM — K21.9 GASTROESOPHAGEAL REFLUX DISEASE WITHOUT ESOPHAGITIS: Status: ACTIVE | Noted: 2019-07-11

## 2019-07-11 PROBLEM — A49.8 PROTEUS MIRABILIS INFECTION: Status: ACTIVE | Noted: 2019-07-11

## 2019-07-12 ENCOUNTER — ANESTHESIA EVENT (OUTPATIENT)
Dept: OPERATING ROOM | Age: 59
End: 2019-07-12
Payer: MEDICARE

## 2019-07-12 ENCOUNTER — ANESTHESIA (OUTPATIENT)
Dept: OPERATING ROOM | Age: 59
End: 2019-07-12
Payer: MEDICARE

## 2019-07-12 ENCOUNTER — HOSPITAL ENCOUNTER (OUTPATIENT)
Age: 59
Setting detail: OUTPATIENT SURGERY
Discharge: HOME | End: 2019-07-12
Attending: OTOLARYNGOLOGY | Admitting: OTOLARYNGOLOGY
Payer: MEDICARE

## 2019-07-12 VITALS
SYSTOLIC BLOOD PRESSURE: 128 MMHG | BODY MASS INDEX: 29.31 KG/M2 | RESPIRATION RATE: 18 BRPM | DIASTOLIC BLOOD PRESSURE: 75 MMHG | HEIGHT: 74 IN | OXYGEN SATURATION: 92 % | TEMPERATURE: 97.9 F | WEIGHT: 228.4 LBS | HEART RATE: 85 BPM

## 2019-07-12 VITALS
TEMPERATURE: 98.6 F | OXYGEN SATURATION: 98 % | DIASTOLIC BLOOD PRESSURE: 64 MMHG | SYSTOLIC BLOOD PRESSURE: 117 MMHG | RESPIRATION RATE: 12 BRPM

## 2019-07-12 DIAGNOSIS — T66.XXXD RADIATION ADVERSE EFFECT, SUBSEQUENT ENCOUNTER: ICD-10-CM

## 2019-07-12 DIAGNOSIS — C32.0 SQUAMOUS CELL CARCINOMA OF VOCAL CORD (HCC): Primary | ICD-10-CM

## 2019-07-12 PROBLEM — D38.0 NEOPLASM OF UNCERTAIN BEHAVIOR OF LARYNGEAL SURFACE OF EPIGLOTTIS: Status: ACTIVE | Noted: 2019-07-12

## 2019-07-12 PROBLEM — C32.8: Status: ACTIVE | Noted: 2019-07-12

## 2019-07-12 PROCEDURE — 3600000002 HC SURGERY LEVEL 2 BASE: Performed by: OTOLARYNGOLOGY

## 2019-07-12 PROCEDURE — 2709999900 HC NON-CHARGEABLE SUPPLY: Performed by: OTOLARYNGOLOGY

## 2019-07-12 PROCEDURE — 6360000002 HC RX W HCPCS: Performed by: OTOLARYNGOLOGY

## 2019-07-12 PROCEDURE — 2580000003 HC RX 258: Performed by: NURSE ANESTHETIST, CERTIFIED REGISTERED

## 2019-07-12 PROCEDURE — 2580000003 HC RX 258: Performed by: OTOLARYNGOLOGY

## 2019-07-12 PROCEDURE — 88305 TISSUE EXAM BY PATHOLOGIST: CPT

## 2019-07-12 PROCEDURE — 7100000011 HC PHASE II RECOVERY - ADDTL 15 MIN: Performed by: OTOLARYNGOLOGY

## 2019-07-12 PROCEDURE — 31536 LARYNGOSCOPY W/BX & OP SCOPE: CPT | Performed by: OTOLARYNGOLOGY

## 2019-07-12 PROCEDURE — 6370000000 HC RX 637 (ALT 250 FOR IP): Performed by: NURSE ANESTHETIST, CERTIFIED REGISTERED

## 2019-07-12 PROCEDURE — 3600000012 HC SURGERY LEVEL 2 ADDTL 15MIN: Performed by: OTOLARYNGOLOGY

## 2019-07-12 PROCEDURE — 2500000003 HC RX 250 WO HCPCS: Performed by: NURSE ANESTHETIST, CERTIFIED REGISTERED

## 2019-07-12 PROCEDURE — 6360000002 HC RX W HCPCS: Performed by: NURSE ANESTHETIST, CERTIFIED REGISTERED

## 2019-07-12 PROCEDURE — 7100000000 HC PACU RECOVERY - FIRST 15 MIN: Performed by: OTOLARYNGOLOGY

## 2019-07-12 PROCEDURE — 7100000010 HC PHASE II RECOVERY - FIRST 15 MIN: Performed by: OTOLARYNGOLOGY

## 2019-07-12 PROCEDURE — 88312 SPECIAL STAINS GROUP 1: CPT

## 2019-07-12 PROCEDURE — 7100000001 HC PACU RECOVERY - ADDTL 15 MIN: Performed by: OTOLARYNGOLOGY

## 2019-07-12 PROCEDURE — 6370000000 HC RX 637 (ALT 250 FOR IP)

## 2019-07-12 PROCEDURE — 3700000000 HC ANESTHESIA ATTENDED CARE: Performed by: OTOLARYNGOLOGY

## 2019-07-12 PROCEDURE — 6370000000 HC RX 637 (ALT 250 FOR IP): Performed by: OTOLARYNGOLOGY

## 2019-07-12 PROCEDURE — 3700000001 HC ADD 15 MINUTES (ANESTHESIA): Performed by: OTOLARYNGOLOGY

## 2019-07-12 RX ORDER — IPRATROPIUM BROMIDE AND ALBUTEROL SULFATE 2.5; .5 MG/3ML; MG/3ML
1 SOLUTION RESPIRATORY (INHALATION) ONCE
Status: COMPLETED | OUTPATIENT
Start: 2019-07-12 | End: 2019-07-12

## 2019-07-12 RX ORDER — IPRATROPIUM BROMIDE AND ALBUTEROL SULFATE 2.5; .5 MG/3ML; MG/3ML
SOLUTION RESPIRATORY (INHALATION)
Status: COMPLETED
Start: 2019-07-12 | End: 2019-07-12

## 2019-07-12 RX ORDER — LIDOCAINE HYDROCHLORIDE 40 MG/ML
SOLUTION TOPICAL PRN
Status: DISCONTINUED | OUTPATIENT
Start: 2019-07-12 | End: 2019-07-12 | Stop reason: SDUPTHER

## 2019-07-12 RX ORDER — EPINEPHRINE 1 MG/ML
INJECTION, SOLUTION, CONCENTRATE INTRAVENOUS PRN
Status: DISCONTINUED | OUTPATIENT
Start: 2019-07-12 | End: 2019-07-12 | Stop reason: ALTCHOICE

## 2019-07-12 RX ORDER — PHENYLEPHRINE HYDROCHLORIDE 10 MG/ML
INJECTION INTRAVENOUS PRN
Status: DISCONTINUED | OUTPATIENT
Start: 2019-07-12 | End: 2019-07-12 | Stop reason: SDUPTHER

## 2019-07-12 RX ORDER — LABETALOL 20 MG/4 ML (5 MG/ML) INTRAVENOUS SYRINGE
5 EVERY 10 MIN PRN
Status: DISCONTINUED | OUTPATIENT
Start: 2019-07-12 | End: 2019-07-12 | Stop reason: HOSPADM

## 2019-07-12 RX ORDER — HYDROCODONE BITARTRATE AND ACETAMINOPHEN 5; 325 MG/1; MG/1
1 TABLET ORAL EVERY 4 HOURS PRN
Qty: 18 TABLET | Refills: 0 | Status: SHIPPED | OUTPATIENT
Start: 2019-07-12 | End: 2019-07-15

## 2019-07-12 RX ORDER — DEXAMETHASONE SODIUM PHOSPHATE 4 MG/ML
8 INJECTION, SOLUTION INTRA-ARTICULAR; INTRALESIONAL; INTRAMUSCULAR; INTRAVENOUS; SOFT TISSUE
Status: DISCONTINUED | OUTPATIENT
Start: 2019-07-12 | End: 2019-07-12 | Stop reason: HOSPADM

## 2019-07-12 RX ORDER — FENTANYL CITRATE 50 UG/ML
25 INJECTION, SOLUTION INTRAMUSCULAR; INTRAVENOUS EVERY 5 MIN PRN
Status: DISCONTINUED | OUTPATIENT
Start: 2019-07-12 | End: 2019-07-12 | Stop reason: HOSPADM

## 2019-07-12 RX ORDER — SODIUM CHLORIDE 9 MG/ML
INJECTION, SOLUTION INTRAVENOUS CONTINUOUS PRN
Status: DISCONTINUED | OUTPATIENT
Start: 2019-07-12 | End: 2019-07-12 | Stop reason: SDUPTHER

## 2019-07-12 RX ORDER — PROPOFOL 10 MG/ML
INJECTION, EMULSION INTRAVENOUS PRN
Status: DISCONTINUED | OUTPATIENT
Start: 2019-07-12 | End: 2019-07-12 | Stop reason: SDUPTHER

## 2019-07-12 RX ORDER — PROMETHAZINE HYDROCHLORIDE 25 MG/ML
12.5 INJECTION, SOLUTION INTRAMUSCULAR; INTRAVENOUS
Status: DISCONTINUED | OUTPATIENT
Start: 2019-07-12 | End: 2019-07-12 | Stop reason: HOSPADM

## 2019-07-12 RX ORDER — FENTANYL CITRATE 50 UG/ML
50 INJECTION, SOLUTION INTRAMUSCULAR; INTRAVENOUS EVERY 5 MIN PRN
Status: DISCONTINUED | OUTPATIENT
Start: 2019-07-12 | End: 2019-07-12 | Stop reason: HOSPADM

## 2019-07-12 RX ORDER — MEPERIDINE HYDROCHLORIDE 25 MG/ML
12.5 INJECTION INTRAMUSCULAR; INTRAVENOUS; SUBCUTANEOUS EVERY 5 MIN PRN
Status: DISCONTINUED | OUTPATIENT
Start: 2019-07-12 | End: 2019-07-12 | Stop reason: HOSPADM

## 2019-07-12 RX ORDER — METOCLOPRAMIDE HYDROCHLORIDE 5 MG/ML
10 INJECTION INTRAMUSCULAR; INTRAVENOUS
Status: DISCONTINUED | OUTPATIENT
Start: 2019-07-12 | End: 2019-07-12 | Stop reason: HOSPADM

## 2019-07-12 RX ORDER — SODIUM CHLORIDE 9 MG/ML
INJECTION, SOLUTION INTRAVENOUS ONCE
Status: COMPLETED | OUTPATIENT
Start: 2019-07-12 | End: 2019-07-12

## 2019-07-12 RX ORDER — ROCURONIUM BROMIDE 10 MG/ML
INJECTION, SOLUTION INTRAVENOUS PRN
Status: DISCONTINUED | OUTPATIENT
Start: 2019-07-12 | End: 2019-07-12 | Stop reason: SDUPTHER

## 2019-07-12 RX ORDER — FENTANYL CITRATE 50 UG/ML
INJECTION, SOLUTION INTRAMUSCULAR; INTRAVENOUS PRN
Status: DISCONTINUED | OUTPATIENT
Start: 2019-07-12 | End: 2019-07-12 | Stop reason: SDUPTHER

## 2019-07-12 RX ORDER — ONDANSETRON 2 MG/ML
INJECTION INTRAMUSCULAR; INTRAVENOUS PRN
Status: DISCONTINUED | OUTPATIENT
Start: 2019-07-12 | End: 2019-07-12 | Stop reason: SDUPTHER

## 2019-07-12 RX ORDER — HYDROCODONE BITARTRATE AND ACETAMINOPHEN 5; 325 MG/1; MG/1
1 TABLET ORAL EVERY 4 HOURS PRN
Status: DISCONTINUED | OUTPATIENT
Start: 2019-07-12 | End: 2019-07-12 | Stop reason: HOSPADM

## 2019-07-12 RX ORDER — SUCCINYLCHOLINE/SOD CL,ISO/PF 200MG/10ML
SYRINGE (ML) INTRAVENOUS PRN
Status: DISCONTINUED | OUTPATIENT
Start: 2019-07-12 | End: 2019-07-12 | Stop reason: SDUPTHER

## 2019-07-12 RX ORDER — MIDAZOLAM HYDROCHLORIDE 1 MG/ML
INJECTION INTRAMUSCULAR; INTRAVENOUS PRN
Status: DISCONTINUED | OUTPATIENT
Start: 2019-07-12 | End: 2019-07-12 | Stop reason: SDUPTHER

## 2019-07-12 RX ORDER — DIPHENHYDRAMINE HYDROCHLORIDE 50 MG/ML
12.5 INJECTION INTRAMUSCULAR; INTRAVENOUS
Status: DISCONTINUED | OUTPATIENT
Start: 2019-07-12 | End: 2019-07-12 | Stop reason: HOSPADM

## 2019-07-12 RX ADMIN — LIDOCAINE HYDROCHLORIDE 2 ML: 40 SOLUTION TOPICAL at 07:38

## 2019-07-12 RX ADMIN — IPRATROPIUM BROMIDE AND ALBUTEROL SULFATE 1 AMPULE: 2.5; .5 SOLUTION RESPIRATORY (INHALATION) at 09:35

## 2019-07-12 RX ADMIN — MIDAZOLAM HYDROCHLORIDE 2 MG: 1 INJECTION, SOLUTION INTRAMUSCULAR; INTRAVENOUS at 07:30

## 2019-07-12 RX ADMIN — ONDANSETRON HYDROCHLORIDE 4 MG: 4 INJECTION, SOLUTION INTRAMUSCULAR; INTRAVENOUS at 07:51

## 2019-07-12 RX ADMIN — PHENYLEPHRINE HYDROCHLORIDE 100 MCG: 10 INJECTION INTRAVENOUS at 08:11

## 2019-07-12 RX ADMIN — Medication 160 MG: at 07:38

## 2019-07-12 RX ADMIN — PROPOFOL 150 MG: 10 INJECTION, EMULSION INTRAVENOUS at 07:38

## 2019-07-12 RX ADMIN — PHENYLEPHRINE HYDROCHLORIDE 100 MCG: 10 INJECTION INTRAVENOUS at 08:00

## 2019-07-12 RX ADMIN — SODIUM CHLORIDE: 9 INJECTION, SOLUTION INTRAVENOUS at 07:23

## 2019-07-12 RX ADMIN — PHENYLEPHRINE HYDROCHLORIDE 100 MCG: 10 INJECTION INTRAVENOUS at 08:42

## 2019-07-12 RX ADMIN — DEXAMETHASONE SODIUM PHOSPHATE 8 MG: 4 INJECTION, SOLUTION INTRAMUSCULAR; INTRAVENOUS at 07:00

## 2019-07-12 RX ADMIN — SODIUM CHLORIDE: 9 INJECTION, SOLUTION INTRAVENOUS at 08:52

## 2019-07-12 RX ADMIN — SUGAMMADEX 200 MG: 100 INJECTION, SOLUTION INTRAVENOUS at 09:08

## 2019-07-12 RX ADMIN — ROCURONIUM BROMIDE 30 MG: 10 INJECTION INTRAVENOUS at 07:50

## 2019-07-12 RX ADMIN — HYDROCODONE BITARTRATE AND ACETAMINOPHEN 1 TABLET: 5; 325 TABLET ORAL at 11:24

## 2019-07-12 RX ADMIN — ROCURONIUM BROMIDE 10 MG: 10 INJECTION INTRAVENOUS at 09:02

## 2019-07-12 RX ADMIN — SODIUM CHLORIDE: 9 INJECTION, SOLUTION INTRAVENOUS at 06:47

## 2019-07-12 RX ADMIN — ROCURONIUM BROMIDE 10 MG: 10 INJECTION INTRAVENOUS at 08:21

## 2019-07-12 RX ADMIN — IPRATROPIUM BROMIDE AND ALBUTEROL SULFATE 1 AMPULE: .5; 3 SOLUTION RESPIRATORY (INHALATION) at 09:35

## 2019-07-12 RX ADMIN — FENTANYL CITRATE 100 MCG: 50 INJECTION INTRAMUSCULAR; INTRAVENOUS at 07:36

## 2019-07-12 ASSESSMENT — PULMONARY FUNCTION TESTS
PIF_VALUE: 1
PIF_VALUE: 20
PIF_VALUE: 21
PIF_VALUE: 14
PIF_VALUE: 21
PIF_VALUE: 23
PIF_VALUE: 20
PIF_VALUE: 21
PIF_VALUE: 14
PIF_VALUE: 21
PIF_VALUE: 21
PIF_VALUE: 8
PIF_VALUE: 4
PIF_VALUE: 20
PIF_VALUE: 21
PIF_VALUE: 17
PIF_VALUE: 16
PIF_VALUE: 18
PIF_VALUE: 21
PIF_VALUE: 1
PIF_VALUE: 15
PIF_VALUE: 21
PIF_VALUE: 20
PIF_VALUE: 23
PIF_VALUE: 20
PIF_VALUE: 21
PIF_VALUE: 8
PIF_VALUE: 21
PIF_VALUE: 0
PIF_VALUE: 21
PIF_VALUE: 16
PIF_VALUE: 0
PIF_VALUE: 21
PIF_VALUE: 20
PIF_VALUE: 27
PIF_VALUE: 21
PIF_VALUE: 20
PIF_VALUE: 1
PIF_VALUE: 21
PIF_VALUE: 17
PIF_VALUE: 23
PIF_VALUE: 17
PIF_VALUE: 21
PIF_VALUE: 14
PIF_VALUE: 20
PIF_VALUE: 21
PIF_VALUE: 17
PIF_VALUE: 21
PIF_VALUE: 21
PIF_VALUE: 0
PIF_VALUE: 21
PIF_VALUE: 26
PIF_VALUE: 20
PIF_VALUE: 21
PIF_VALUE: 1
PIF_VALUE: 21
PIF_VALUE: 21
PIF_VALUE: 23
PIF_VALUE: 21
PIF_VALUE: 21
PIF_VALUE: 20
PIF_VALUE: 17
PIF_VALUE: 21
PIF_VALUE: 20
PIF_VALUE: 21
PIF_VALUE: 0
PIF_VALUE: 20
PIF_VALUE: 21
PIF_VALUE: 26
PIF_VALUE: 21
PIF_VALUE: 2
PIF_VALUE: 21
PIF_VALUE: 21
PIF_VALUE: 20
PIF_VALUE: 21
PIF_VALUE: 14
PIF_VALUE: 21
PIF_VALUE: 23
PIF_VALUE: 20
PIF_VALUE: 18
PIF_VALUE: 1
PIF_VALUE: 21
PIF_VALUE: 21
PIF_VALUE: 18
PIF_VALUE: 21
PIF_VALUE: 21
PIF_VALUE: 17
PIF_VALUE: 17
PIF_VALUE: 21
PIF_VALUE: 23
PIF_VALUE: 21
PIF_VALUE: 35
PIF_VALUE: 21
PIF_VALUE: 20

## 2019-07-12 ASSESSMENT — PAIN SCALES - GENERAL
PAINLEVEL_OUTOF10: 4
PAINLEVEL_OUTOF10: 2

## 2019-07-12 ASSESSMENT — PAIN - FUNCTIONAL ASSESSMENT: PAIN_FUNCTIONAL_ASSESSMENT: 0-10

## 2019-07-12 ASSESSMENT — PAIN DESCRIPTION - LOCATION
LOCATION: THROAT
LOCATION: THROAT

## 2019-07-12 ASSESSMENT — PAIN DESCRIPTION - PAIN TYPE
TYPE: SURGICAL PAIN
TYPE: SURGICAL PAIN

## 2019-07-12 ASSESSMENT — PAIN DESCRIPTION - DESCRIPTORS
DESCRIPTORS: OTHER (COMMENT)
DESCRIPTORS: ACHING;BURNING;DISCOMFORT

## 2019-07-12 ASSESSMENT — PAIN DESCRIPTION - FREQUENCY: FREQUENCY: CONTINUOUS

## 2019-07-12 ASSESSMENT — LIFESTYLE VARIABLES: SMOKING_STATUS: 0

## 2019-07-12 NOTE — PROGRESS NOTES
921 Arouses to name on arrival to PACU with O2 41/2L NC , HOB elevated   925 awake and oriented , pt encouraged to take deep breathes and cough , lungs very diminished , pt denies lungs feeling tight or SOB   935 Duoneb aerosol TX stared   942 Tx complete , lungs clear   950 incentive spirometry performed by pt   955 Dr Santi Angeles given an update about low SPO2 will continue to watch and monitor for now , O2 down to 3L  , Pt States SPO2 runs low normally  1005 SPO2 down to 84 -85 % O2 back to 4L  Incentive Spirometry performed by pt   1020 Dr Santi Angeles to bedside updated on pt , O.k for pt to go to [facility] with O2 41/2 L  Pt denies any pain, nausea or SOB   1024 meets criteria for discharge , transported to [facility] family aware

## 2019-07-12 NOTE — PROGRESS NOTES
4214:  Patient admitted to [facility] room 6.  Donavon, sister, at bedside. Arm bands applied. Bilat. Socks, SCD's applied. Call light in reach. Patient states he has his own colostomy supplies with him today.

## 2019-07-12 NOTE — PROGRESS NOTES
1242:  Patient's pulse ox 89-90% on room air. Oxygen on 2L per nasal cannula. Will continue to monitor.

## 2019-07-16 ENCOUNTER — OFFICE VISIT (OUTPATIENT)
Dept: ENT CLINIC | Age: 59
End: 2019-07-16
Payer: MEDICARE

## 2019-07-16 VITALS
SYSTOLIC BLOOD PRESSURE: 142 MMHG | BODY MASS INDEX: 27.72 KG/M2 | RESPIRATION RATE: 14 BRPM | DIASTOLIC BLOOD PRESSURE: 92 MMHG | HEART RATE: 68 BPM | WEIGHT: 216 LBS | TEMPERATURE: 98.1 F | HEIGHT: 74 IN

## 2019-07-16 DIAGNOSIS — B49 INVASIVE FUNGAL INFECTION: Primary | ICD-10-CM

## 2019-07-16 DIAGNOSIS — C32.0 SQUAMOUS CELL CARCINOMA OF VOCAL CORD (HCC): ICD-10-CM

## 2019-07-16 DIAGNOSIS — J37.1: ICD-10-CM

## 2019-07-16 DIAGNOSIS — A49.8 PROTEUS MIRABILIS INFECTION: ICD-10-CM

## 2019-07-16 DIAGNOSIS — T66.XXXD RADIATION ADVERSE EFFECT, SUBSEQUENT ENCOUNTER: ICD-10-CM

## 2019-07-16 PROCEDURE — 99214 OFFICE O/P EST MOD 30 MIN: CPT | Performed by: OTOLARYNGOLOGY

## 2019-07-16 PROCEDURE — 1036F TOBACCO NON-USER: CPT | Performed by: OTOLARYNGOLOGY

## 2019-07-16 PROCEDURE — G8417 CALC BMI ABV UP PARAM F/U: HCPCS | Performed by: OTOLARYNGOLOGY

## 2019-07-16 PROCEDURE — G8427 DOCREV CUR MEDS BY ELIG CLIN: HCPCS | Performed by: OTOLARYNGOLOGY

## 2019-07-16 PROCEDURE — 3017F COLORECTAL CA SCREEN DOC REV: CPT | Performed by: OTOLARYNGOLOGY

## 2019-07-16 ASSESSMENT — ENCOUNTER SYMPTOMS
SORE THROAT: 0
FACIAL SWELLING: 0
VOICE CHANGE: 0
NAUSEA: 0
SINUS PRESSURE: 0
STRIDOR: 0
DIARRHEA: 0
COLOR CHANGE: 0
VOMITING: 0
COUGH: 0
TROUBLE SWALLOWING: 0
CHEST TIGHTNESS: 0
WHEEZING: 0
SHORTNESS OF BREATH: 0
CHOKING: 0
RHINORRHEA: 0
APNEA: 0
ABDOMINAL PAIN: 0

## 2019-07-16 NOTE — LETTER
In the past, he has grown MSSA and Candida glabrata    We will have him see Dr. Hermila Villareal at their next clinic. I will reexamine him in approximately 5 to 6 weeks. I debulked the tissue at surgery and his airway and voice are improved at this time. I apologize for the delay in getting this letter to you. If you have questions, please do not hesitate to call me. I look forward to following Abdi Madison along with you.     Sincerely,          Truong Abdul MD

## 2019-07-17 ENCOUNTER — TELEPHONE (OUTPATIENT)
Dept: ENT CLINIC | Age: 59
End: 2019-07-17

## 2019-07-18 LAB — THROAT/NOSE CULTURE: NORMAL

## 2019-07-19 LAB
GRAM STAIN RESULT: ABNORMAL
GRAM STAIN RESULT: ABNORMAL
ORGANISM: ABNORMAL
ORGANISM: ABNORMAL
THROAT/NOSE CULTURE: ABNORMAL

## 2019-07-23 ENCOUNTER — OFFICE VISIT (OUTPATIENT)
Dept: INFECTIOUS DISEASES | Age: 59
End: 2019-07-23
Payer: MEDICARE

## 2019-07-23 VITALS
TEMPERATURE: 98.1 F | HEART RATE: 76 BPM | HEIGHT: 74 IN | SYSTOLIC BLOOD PRESSURE: 130 MMHG | BODY MASS INDEX: 29 KG/M2 | WEIGHT: 226 LBS | DIASTOLIC BLOOD PRESSURE: 84 MMHG
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
## 2019-07-23 DIAGNOSIS — L59.8 SOFT TISSUE RADIONECROSIS: ICD-10-CM

## 2019-07-23 DIAGNOSIS — B37.9 CANDIDA GLABRATA INFECTION: Primary | ICD-10-CM

## 2019-07-23 LAB
FUNGUS IDENTIFIED: ABNORMAL
FUNGUS SMEAR: ABNORMAL
FUNGUS SMEAR: ABNORMAL
ORGANISM: ABNORMAL

## 2019-07-23 PROCEDURE — 99213 OFFICE O/P EST LOW 20 MIN: CPT | Performed by: INTERNAL MEDICINE

## 2019-07-23 NOTE — PATIENT INSTRUCTIONS
Follow-up in 2 weeks. 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

## 2019-07-24 ENCOUNTER — TELEPHONE (OUTPATIENT)
Dept: INFECTIOUS DISEASES | Age: 59
End: 2019-07-24

## 2019-07-25 ENCOUNTER — TELEPHONE (OUTPATIENT)
Dept: HYPERBARIC MEDICINE | Age: 59
End: 2019-07-25

## 2019-07-25 NOTE — TELEPHONE ENCOUNTER
Received approval from [facility] for voriconazole 200mg tablet. See attached scan. Notified pt's sister, Shaheed.

## 2019-07-29 ENCOUNTER — OFFICE VISIT (OUTPATIENT)
Dept: PULMONOLOGY | Age: 59
End: 2019-07-29
Payer: MEDICARE

## 2019-07-29 VITALS
OXYGEN SATURATION: 95 % | HEIGHT: 74 IN | DIASTOLIC BLOOD PRESSURE: 80 MMHG | BODY MASS INDEX: 29.26 KG/M2 | SYSTOLIC BLOOD PRESSURE: 138 MMHG | WEIGHT: 228 LBS | HEART RATE: 71 BPM

## 2019-07-29 DIAGNOSIS — J41.1 CHRONIC BRONCHITIS, MUCOPURULENT (HCC): Primary | ICD-10-CM

## 2019-07-29 DIAGNOSIS — C14.0 THROAT CANCER (HCC): ICD-10-CM

## 2019-07-29 DIAGNOSIS — J43.9 PULMONARY EMPHYSEMA, UNSPECIFIED EMPHYSEMA TYPE (HCC): ICD-10-CM

## 2019-07-29 PROCEDURE — 99214 OFFICE O/P EST MOD 30 MIN: CPT | Performed by: NURSE PRACTITIONER

## 2019-07-29 PROCEDURE — G8926 SPIRO NO PERF OR DOC: HCPCS | Performed by: NURSE PRACTITIONER

## 2019-07-29 PROCEDURE — 3017F COLORECTAL CA SCREEN DOC REV: CPT | Performed by: NURSE PRACTITIONER

## 2019-07-29 PROCEDURE — 3023F SPIROM DOC REV: CPT | Performed by: NURSE PRACTITIONER

## 2019-07-29 PROCEDURE — G8417 CALC BMI ABV UP PARAM F/U: HCPCS | Performed by: NURSE PRACTITIONER

## 2019-07-29 PROCEDURE — G8427 DOCREV CUR MEDS BY ELIG CLIN: HCPCS | Performed by: NURSE PRACTITIONER

## 2019-07-29 PROCEDURE — 1036F TOBACCO NON-USER: CPT | Performed by: NURSE PRACTITIONER

## 2019-07-29 RX ORDER — VORICONAZOLE 200 MG/1
200 TABLET, FILM COATED ORAL 2 TIMES DAILY
COMMUNITY
End: 2019-08-28 | Stop reason: SDUPTHER

## 2019-07-29 ASSESSMENT — ENCOUNTER SYMPTOMS
STRIDOR: 0
NAUSEA: 0
COUGH: 0
SHORTNESS OF BREATH: 0
VOMITING: 0
DIARRHEA: 0
CHEST TIGHTNESS: 0

## 2019-07-29 NOTE — PROGRESS NOTES
albuterol sulfate HFA (VENTOLIN HFA) 108 (90 Base) MCG/ACT inhaler Inhale 2 puffs into the lungs every 4 hours as needed for Wheezing or Shortness of Breath 3 Inhaler 3    rosuvastatin (CRESTOR) 20 MG tablet Take 20 mg by mouth daily      Dextromethorphan Polistirex (ROBITUSSIN 12 HOUR COUGH PO) Take by mouth 2 times daily as needed       loperamide (IMODIUM) 2 MG capsule Take 2 mg by mouth daily       acetaminophen (TYLENOL) 650 MG CR tablet Take 650 mg by mouth as needed for Pain       No current facility-administered medications for this visit. [facility]   Review of Systems   Constitutional: Negative for chills, fever and unexpected weight change. Respiratory: Negative for cough, chest tightness, shortness of breath and stridor. Hoarse voice quality   Cardiovascular: Negative for chest pain and leg swelling. Gastrointestinal: Negative for diarrhea, nausea and vomiting. Colostomy     Genitourinary: Negative for dysuria. Physical exam   /80 (Site: Right Upper Arm, Position: Sitting, Cuff Size: Medium Adult)   Pulse 71   Ht 6' 2\" (1.88 m)   Wt 228 lb (103.4 kg)   SpO2 95% Comment: on RA  BMI 29.27 kg/m²    Wt Readings from Last 3 Encounters:   07/29/19 228 lb (103.4 kg)   07/23/19 226 lb (102.5 kg)   07/16/19 216 lb (98 kg)       Physical Exam   Constitutional: He is oriented to person, place, and time. He appears well-developed and well-nourished. No distress. HENT:   Head: Normocephalic and atraumatic. Mouth/Throat: Oropharynx is clear and moist.   Neck: Neck supple. No tracheal deviation present. Cardiovascular: Normal rate, regular rhythm and normal heart sounds. No murmur heard. Pulmonary/Chest: No stridor. No respiratory distress. He has no wheezes. He has no rales. He exhibits no tenderness. Hoarse voice quality   Abdominal: Soft. Bowel sounds are normal.   Musculoskeletal: He exhibits no edema.    Neurological: He is alert and oriented to person, place, 7/18/19  Pseudoepitheliomatous hyperplasia and keratosis with colonization by oral margarita    Throat Culture(7/16/19)-Staph aureus  Throat Culture(7/16/19)-Normal Margarita  Fungus Culture(7/16/19)-Candida glabrata  Fungus Culture(7/16/19)-Candida albicans    Assessment      Diagnosis Orders   1. Chronic bronchitis, mucopurulent ([address]  CT Chest WO Contrast   2. Throat cancer ([address]  CT Chest WO Contrast   3.  Pulmonary emphysema, unspecified emphysema type ([address]  CT Chest WO Contrast         Plan   -CT of chest to evaluate lung fields prior to hyperbaric therapy, dicussed with patient possible pulmonary complications in hyperbaric oxygen therapy in a patient with Chronic obstructive Pulmonary Disease and emphysema including but not limited to pneumothorax, will obtain baseline CT of chest to evaluate for extent of emphysema, ?bullae will call patient with results  -No pulmonary complaint a this time needs no refills   -Will call with CT chest results  -Advised to maintain pneumonia vaccine with PCP and to take flu vaccine this coming season.  -Advised patient to call office with any changes, questions, or concerns regarding respiratory status    Will see Molly Chandler back at previously scheduled appointment  Nelly Duran CNP  7/29/2019

## 2019-07-31 ENCOUNTER — HOSPITAL ENCOUNTER (OUTPATIENT)
Dept: CT IMAGING | Age: 59
Discharge: HOME | End: 2019-07-31
Payer: MEDICARE

## 2019-07-31 DIAGNOSIS — J41.1 CHRONIC BRONCHITIS, MUCOPURULENT (HCC): ICD-10-CM

## 2019-07-31 DIAGNOSIS — J43.9 PULMONARY EMPHYSEMA, UNSPECIFIED EMPHYSEMA TYPE (HCC): ICD-10-CM

## 2019-07-31 DIAGNOSIS — C14.0 THROAT CANCER (HCC): ICD-10-CM

## 2019-07-31 PROCEDURE — 71250 CT THORAX DX C-: CPT

## 2019-08-02 ENCOUNTER — TELEPHONE (OUTPATIENT)
Dept: PULMONOLOGY | Age: 59
End: 2019-08-02

## 2019-08-08 ENCOUNTER — TELEPHONE (OUTPATIENT)
Dept: HYPERBARIC MEDICINE | Age: 59
End: 2019-08-08

## 2019-08-12 ENCOUNTER — HOSPITAL ENCOUNTER (OUTPATIENT)
Age: 59
Discharge: HOME | End: 2019-08-12
Payer: MEDICARE

## 2019-08-12 DIAGNOSIS — B37.9 CANDIDA GLABRATA INFECTION: ICD-10-CM

## 2019-08-12 LAB
ALT SERPL-CCNC: 23 U/L (ref 11–66)
ANION GAP SERPL CALCULATED.3IONS-SCNC: 12 MEQ/L (ref 8–16)
AST SERPL-CCNC: 25 U/L (ref 5–40)
BUN BLDV-MCNC: 14 MG/DL (ref 7–22)
CALCIUM SERPL-MCNC: 9.2 MG/DL (ref 8.5–10.5)
CHLORIDE BLD-SCNC: 103 MEQ/L (ref 98–111)
CHOLESTEROL, TOTAL: 136 MG/DL (ref 100–199)
CO2: 26 MEQ/L (ref 23–33)
CREAT SERPL-MCNC: 0.7 MG/DL (ref 0.4–1.2)
ERYTHROCYTE [DISTWIDTH] IN BLOOD BY AUTOMATED COUNT: 13.2 % (ref 11.5–14.5)
ERYTHROCYTE [DISTWIDTH] IN BLOOD BY AUTOMATED COUNT: 47.8 FL (ref 35–45)
GFR SERPL CREATININE-BSD FRML MDRD: > 90 ML/MIN/1.73M2
GLUCOSE BLD-MCNC: 98 MG/DL (ref 70–108)
HCT VFR BLD CALC: 48.1 % (ref 42–52)
HDLC SERPL-MCNC: 46 MG/DL
HEMOGLOBIN: 15.1 GM/DL (ref 14–18)
LDL CHOLESTEROL CALCULATED: 65 MG/DL
MCH RBC QN AUTO: 30.9 PG (ref 26–33)
MCHC RBC AUTO-ENTMCNC: 31.4 GM/DL (ref 32.2–35.5)
MCV RBC AUTO: 98.4 FL (ref 80–94)
PLATELET # BLD: 205 THOU/MM3 (ref 130–400)
PMV BLD AUTO: 10.6 FL (ref 9.4–12.4)
POTASSIUM SERPL-SCNC: 5.1 MEQ/L (ref 3.5–5.2)
RBC # BLD: 4.89 MILL/MM3 (ref 4.7–6.1)
SODIUM BLD-SCNC: 141 MEQ/L (ref 135–145)
TRIGL SERPL-MCNC: 126 MG/DL (ref 0–199)
WBC # BLD: 5.8 THOU/MM3 (ref 4.8–10.8)

## 2019-08-12 PROCEDURE — 80048 BASIC METABOLIC PNL TOTAL CA: CPT

## 2019-08-12 PROCEDURE — 84450 TRANSFERASE (AST) (SGOT): CPT

## 2019-08-12 PROCEDURE — 85027 COMPLETE CBC AUTOMATED: CPT

## 2019-08-12 PROCEDURE — 36415 COLL VENOUS BLD VENIPUNCTURE: CPT

## 2019-08-12 PROCEDURE — 80061 LIPID PANEL: CPT

## 2019-08-12 PROCEDURE — 84460 ALANINE AMINO (ALT) (SGPT): CPT

## 2019-08-13 ENCOUNTER — OFFICE VISIT (OUTPATIENT)
Dept: INFECTIOUS DISEASES | Age: 59
End: 2019-08-13
Payer: MEDICARE

## 2019-08-13 VITALS
SYSTOLIC BLOOD PRESSURE: 139 MMHG | HEIGHT: 74 IN | BODY MASS INDEX: 29.67 KG/M2 | WEIGHT: 231.2 LBS | HEART RATE: 78 BPM | TEMPERATURE: 99.6 F | DIASTOLIC BLOOD PRESSURE: 88 MMHG

## 2019-08-13 DIAGNOSIS — B37.9 INFECTION DUE TO CANDIDA GLABRATA: ICD-10-CM

## 2019-08-13 PROCEDURE — 99213 OFFICE O/P EST LOW 20 MIN: CPT | Performed by: INTERNAL MEDICINE

## 2019-08-13 RX ORDER — CEPHALEXIN 500 MG/1
500 CAPSULE ORAL 3 TIMES DAILY
Refills: 0 | COMMUNITY
Start: 2019-07-23 | End: 2019-10-03 | Stop reason: ALTCHOICE

## 2019-08-13 NOTE — PROGRESS NOTES
[facility] Infectious Disease         Progress Note      Chino Greenwood  MEDICAL RECORD NUMBER:  [id number]  AGE: 60 y.o. GENDER: male  : 1960  EPISODE DATE:  2019    Subjective:     Chief Complaint   Patient presents with    Follow-up     Candida glabrata infection          HISTORY of PRESENT ILLNESS HPI  58-year-old with past medical history significant for COPD and history of head and neck cancer had biopsy of his upper airway and was noted to have recurrent fungal infection. He was referred for recommendation. Patient has been growing Candida albicans, Candida glabrata and Candida lipoidica and MSSA. He continues to have symptoms with voice change . Patient was previously treated with radiation treatment for head and neck cancer had also history of rectal cancer. He denies any fever or chills . His pathology report was reviewed. There was not any recurrent cancer however he had polypoid growth he was evaluated for HBO. He has advanced lung disease with bullae. it was felt high risk for barotrauma/pneumothorax and HBOT evaluation was cancelled.    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[address]     Thyroid disease        PAST SURGICAL HISTORY    Past Surgical History:   Procedure Laterality Date    COLONOSCOPY  2016    EYE SURGERY      CROSS EYED    HAND SURGERY Bilateral     KNEE ARTHROSCOPY Right     LARYNGOSCOPY  2017    Biopsy    LARYNGOSCOPY N/A 2019    MICRO LARYNGOSCOPY W/ BIOPSY performed by Albert Ledesma MD at [address] [facility] [address] RECTAL SURGERY  2016    [place]    ROTATOR CUFF REPAIR Left        FAMILY HISTORY    Family History   Problem Relation Age of Onset    Prostate Cancer Father 48    Cancer Father     for Pain       No current facility-administered medications on file prior to visit. REVIEW OF SYSTEMS    Constitutional: no fever, no night sweats, no fatigue. Head: no head ache , no head injury, no migranes. Eye: no blurring of vision, no double vision. Ears: no hearing difficulty, no tinnitus  Mouth/throat: no ulceration, dental caries , dysphagia  Lungs: shortness of breath  CVS: no palpitation, no chest pain, no shortness of breath  GI: no abdominal pain, no nausea , no vomiting, no constipation  ALDEN: no dysuria, frequency and urgency, no hematuria, no kidney stones  Musculoskeletal: no joint pain, swelling , stiffness,  Endocrine: no polyuria, polydipsia, no cold or heat intolerance  Hematology: no anemia, no easy brusing or bleeding, no hx of clotting disorder  Dermatology: no skin rash, no eczema, no pruritis,  Psychiatry: no depression, no anxiety,no panic attacks, no suicide ideation      Objective:      Ht 6' 2.02\" (1.88 m)   Wt 231 lb 3.2 oz (104.9 kg)   BMI 29.67 kg/m²     Wt Readings from Last 3 Encounters:   08/13/19 231 lb 3.2 oz (104.9 kg)   07/29/19 228 lb (103.4 kg)   07/23/19 226 lb (102.5 kg)         There is no immunization history on file for this patient. PHYSICAL EXAM    Ht 6' 2.02\" (1.88 m)   Wt 231 lb 3.2 oz (104.9 kg)   BMI 29.67 kg/m²   General:  Awake, alert,  Diminished breath sound  HEENT: pink conjunctiva, unicteric sclera, moist oral mucosa. Chest:   No edema. diminished breath sounds  Cardiovascular:  RRR ,S1S2, no murmur or gallop. Abdomen:  Soft, non tender to palpation. Extremities: No edema   skin:  Warm and dry.   CNS:oriented         LABS       CBC:   Lab Results   Component Value Date    WBC 5.8 08/12/2019    HGB 15.1 08/12/2019    HCT 48.1 08/12/2019    MCV 98.4 08/12/2019     08/12/2019     BMP:   Lab Results   Component Value Date     08/12/2019    K 5.1 08/12/2019     08/12/2019    CO2 26 08/12/2019    PHOS 4.1 07/08/2017    BUN 14 2019    CREATININE 0.7 2019     PT/INR: No results found for: PROTIME, INR  Prealbumin: No results found for: PREALBUMIN  Albumin:  Lab Results   Component Value Date    LABALBU 4.3 2019    LABALBU 4.6 2011     Sed Rate:No results found for: SEDRATE  CRP: No results found for: CRP  Micro: No results found for: BC   Hemoglobin A1C: No results found for: LABA1C    Assessment:   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 Chronic laryngitis due to infection due to Candida species and MSSA. Continue current treatment   Follow-up will be scheduled in 4 weeks. 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 Please see attached Discharge Instructions    Written patient dismissal instructions given to patient and signed by patient or POA.              Electronically signed by Jean Barrera MD,FACP@ TD@ at 1:07 PM

## 2019-08-22 ENCOUNTER — TELEPHONE (OUTPATIENT)
Dept: NEUROSURGERY | Age: 59
End: 2019-08-22

## 2019-08-27 ENCOUNTER — OFFICE VISIT (OUTPATIENT)
Dept: ENT CLINIC | Age: 59
End: 2019-08-27
Payer: MEDICARE

## 2019-08-27 ENCOUNTER — TELEPHONE (OUTPATIENT)
Dept: INFECTIOUS DISEASES | Age: 59
End: 2019-08-27

## 2019-08-27 VITALS
WEIGHT: 232 LBS | BODY MASS INDEX: 29.77 KG/M2 | DIASTOLIC BLOOD PRESSURE: 70 MMHG | HEIGHT: 74 IN | HEART RATE: 72 BPM | RESPIRATION RATE: 16 BRPM | TEMPERATURE: 98 F | SYSTOLIC BLOOD PRESSURE: 116 MMHG

## 2019-08-27 DIAGNOSIS — B49 INVASIVE FUNGAL INFECTION: Primary | ICD-10-CM

## 2019-08-27 DIAGNOSIS — A49.8 PROTEUS MIRABILIS INFECTION: ICD-10-CM

## 2019-08-27 DIAGNOSIS — C32.0 SQUAMOUS CELL CARCINOMA OF VOCAL CORD (HCC): ICD-10-CM

## 2019-08-27 DIAGNOSIS — J37.1: ICD-10-CM

## 2019-08-27 DIAGNOSIS — T66.XXXD RADIATION ADVERSE EFFECT, SUBSEQUENT ENCOUNTER: ICD-10-CM

## 2019-08-27 PROCEDURE — G8417 CALC BMI ABV UP PARAM F/U: HCPCS | Performed by: OTOLARYNGOLOGY

## 2019-08-27 PROCEDURE — 3017F COLORECTAL CA SCREEN DOC REV: CPT | Performed by: OTOLARYNGOLOGY

## 2019-08-27 PROCEDURE — G8427 DOCREV CUR MEDS BY ELIG CLIN: HCPCS | Performed by: OTOLARYNGOLOGY

## 2019-08-27 PROCEDURE — 1036F TOBACCO NON-USER: CPT | Performed by: OTOLARYNGOLOGY

## 2019-08-27 PROCEDURE — 31575 DIAGNOSTIC LARYNGOSCOPY: CPT | Performed by: OTOLARYNGOLOGY

## 2019-08-27 PROCEDURE — 99213 OFFICE O/P EST LOW 20 MIN: CPT | Performed by: OTOLARYNGOLOGY

## 2019-08-27 RX ORDER — VORICONAZOLE 200 MG/1
200 TABLET, FILM COATED ORAL 2 TIMES DAILY
Qty: 28 TABLET | Refills: 0 | Status: CANCELLED | OUTPATIENT
Start: 2019-08-27 | End: 2019-09-10

## 2019-08-27 ASSESSMENT — ENCOUNTER SYMPTOMS
APNEA: 0
VOICE CHANGE: 0
FACIAL SWELLING: 0
RHINORRHEA: 0
SHORTNESS OF BREATH: 0
WHEEZING: 0
ABDOMINAL PAIN: 0
VOMITING: 0
TROUBLE SWALLOWING: 0
CHOKING: 0
NAUSEA: 0
DIARRHEA: 0
SINUS PRESSURE: 0
COUGH: 0
COLOR CHANGE: 0
STRIDOR: 0
CHEST TIGHTNESS: 0
SORE THROAT: 0

## 2019-09-16 ENCOUNTER — OFFICE VISIT (OUTPATIENT)
Dept: INFECTIOUS DISEASES | Age: 59
End: 2019-09-16
Payer: MEDICARE

## 2019-09-16 VITALS
HEIGHT: 74 IN | WEIGHT: 234.4 LBS | BODY MASS INDEX: 30.08 KG/M2 | SYSTOLIC BLOOD PRESSURE: 158 MMHG | DIASTOLIC BLOOD PRESSURE: 94 MMHG | TEMPERATURE: 99.4 F | HEART RATE: 79 BPM

## 2019-09-16 DIAGNOSIS — B37.9 CANDIDA GLABRATA INFECTION: Primary | ICD-10-CM

## 2019-09-16 DIAGNOSIS — L59.8 SOFT TISSUE RADIONECROSIS: ICD-10-CM
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
## 2019-09-16 PROCEDURE — G8417 CALC BMI ABV UP PARAM F/U: HCPCS | Performed by: NURSE PRACTITIONER

## 2019-09-16 PROCEDURE — G8427 DOCREV CUR MEDS BY ELIG CLIN: HCPCS | Performed by: NURSE PRACTITIONER

## 2019-09-16 PROCEDURE — 99213 OFFICE O/P EST LOW 20 MIN: CPT | Performed by: NURSE PRACTITIONER

## 2019-09-16 PROCEDURE — 1036F TOBACCO NON-USER: CPT | Performed by: NURSE PRACTITIONER

## 2019-09-16 PROCEDURE — 3017F COLORECTAL CA SCREEN DOC REV: CPT | Performed by: NURSE PRACTITIONER

## 2019-09-16 RX ORDER — HYDROCODONE BITARTRATE AND ACETAMINOPHEN 5; 325 MG/1; MG/1
TABLET ORAL PRN
Refills: 0 | Status: ON HOLD | COMMUNITY
Start: 2019-08-30 | End: 2019-12-30

## 2019-09-16 RX ORDER — CETIRIZINE HYDROCHLORIDE 10 MG/1
10 TABLET ORAL DAILY
Refills: 0 | Status: ON HOLD | COMMUNITY
Start: 2019-08-30 | End: 2019-12-30

## 2019-09-16 NOTE — PROGRESS NOTES
Breath 3 Inhaler 3    rosuvastatin (CRESTOR) 20 MG tablet Take 20 mg by mouth daily      Dextromethorphan Polistirex (ROBITUSSIN 12 HOUR COUGH PO) Take by mouth 2 times daily as needed       loperamide (IMODIUM) 2 MG capsule Take 2 mg by mouth daily       acetaminophen (TYLENOL) 650 MG CR tablet Take 650 mg by mouth as needed for Pain       No current facility-administered medications on file prior to visit. REVIEW OF SYSTEMS    Constitutional: negative for chills, fevers and sweats  Eyes: negative for irritation and redness  Ears, nose, mouth, throat, and face: positive for hoarseness, sore mouth and voice change, negative for hearing loss  Respiratory: positive for cough, negative for shortness of breath  Cardiovascular: negative for chest pain  Gastrointestinal: negative for abdominal pain, nausea and vomiting  Integument/breast: negative for rash  Musculoskeletal:negative for muscle weakness  Neurological: negative for gait problems, memory problems and speech problems  Behavioral/Psych: positive for good mood    Objective:      BP (!) 158/94 (Site: Left Upper Arm, Position: Sitting, Cuff Size: Large Adult)   Pulse 79   Temp 99.4 °F (37.4 °C)   Ht 6' 2.02\" (1.88 m)   Wt 234 lb 6.4 oz (106.3 kg)   BMI 30.08 kg/m²     Wt Readings from Last 3 Encounters:   09/16/19 234 lb 6.4 oz (106.3 kg)   08/27/19 232 lb (105.2 kg)   08/13/19 231 lb 3.2 oz (104.9 kg)         There is no immunization history on file for this patient.     PHYSICAL EXAM    General Appearance: alert and oriented to person, place and time  Skin: warm and dry  Head: normocephalic and atraumatic  Eyes: conjunctivae normal  ENT: hearing grossly normal bilaterally and oropharynx erythematous without exudate  Pulmonary/Chest: clear to auscultation bilaterally- no wheezes, rales or rhonchi, normal air movement, no respiratory distress  Cardiovascular: normal rate and regular rhythm  Abdomen: soft, non-tender and bowel sounds Patient completed 4 weeks worth of voriconazole. An additional 2 weeks worth was sent to the pharmacy however patient states that it was not received and he did not call. He has continued issues with hoarseness and feeling a lump in the back of his throat. Scope done by Dr. Norma Regalado showed 90% improvement of the infection a few weeks ago. Discussed case with Dr. Deborah Alfred. Sputum culture ordered to be done prior to initiating any further antibiotics to determine if patient still has active infection    Antibiotics: No    Follow up: 2 weeks    Please see attached Discharge Instructions    Written patient dismissal instructions given to patient and signed by patient or POA.              Electronically signed by JOSH Vega CNP on 9/16/2019 at 8:24 AM

## 2019-09-17 ENCOUNTER — NURSE ONLY (OUTPATIENT)
Dept: LAB | Age: 59
End: 2019-09-17

## 2019-09-17 DIAGNOSIS — A49.8 PROTEUS MIRABILIS INFECTION: Primary | ICD-10-CM

## 2019-09-19 ENCOUNTER — TELEPHONE (OUTPATIENT)
Dept: NEUROSURGERY | Age: 59
End: 2019-09-19

## 2019-09-19 LAB
GRAM STAIN RESULT: ABNORMAL
ORGANISM: ABNORMAL
RESPIRATORY CULTURE: ABNORMAL

## 2019-09-19 RX ORDER — CIPROFLOXACIN 500 MG/1
500 TABLET, FILM COATED ORAL 2 TIMES DAILY
Qty: 28 TABLET | Refills: 0 | Status: SHIPPED | OUTPATIENT
Start: 2019-09-19 | End: 2019-10-03 | Stop reason: ALTCHOICE

## 2019-09-26 DIAGNOSIS — J41.1 MUCOPURULENT CHRONIC BRONCHITIS (HCC): ICD-10-CM

## 2019-09-26 RX ORDER — ALBUTEROL SULFATE 90 UG/1
2 AEROSOL, METERED RESPIRATORY (INHALATION) EVERY 6 HOURS PRN
Qty: 3 INHALER | Refills: 3 | Status: SHIPPED | OUTPATIENT
Start: 2019-09-26 | End: 2020-01-01 | Stop reason: SDUPTHER

## 2019-09-30 ENCOUNTER — OFFICE VISIT (OUTPATIENT)
Dept: INFECTIOUS DISEASES | Age: 59
End: 2019-09-30
Payer: MEDICARE

## 2019-09-30 VITALS
HEART RATE: 84 BPM | SYSTOLIC BLOOD PRESSURE: 162 MMHG | BODY MASS INDEX: 30.52 KG/M2 | DIASTOLIC BLOOD PRESSURE: 93 MMHG | WEIGHT: 237.8 LBS | HEIGHT: 74 IN | TEMPERATURE: 99.2 F
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
## 2019-09-30 DIAGNOSIS — B37.9 CANDIDA GLABRATA INFECTION: ICD-10-CM

## 2019-09-30 DIAGNOSIS — L59.8 SOFT TISSUE RADIONECROSIS: Primary | ICD-10-CM

## 2019-09-30 DIAGNOSIS — Z22.9 COLONIZATION STATUS: ICD-10-CM

## 2019-09-30 PROCEDURE — 1036F TOBACCO NON-USER: CPT | Performed by: NURSE PRACTITIONER

## 2019-09-30 PROCEDURE — G8427 DOCREV CUR MEDS BY ELIG CLIN: HCPCS | Performed by: NURSE PRACTITIONER

## 2019-09-30 PROCEDURE — G8417 CALC BMI ABV UP PARAM F/U: HCPCS | Performed by: NURSE PRACTITIONER

## 2019-09-30 PROCEDURE — 3017F COLORECTAL CA SCREEN DOC REV: CPT | Performed by: NURSE PRACTITIONER

## 2019-09-30 PROCEDURE — 99213 OFFICE O/P EST LOW 20 MIN: CPT | Performed by: NURSE PRACTITIONER

## 2019-09-30 NOTE — PROGRESS NOTES
[facility] Infectious Disease         Progress Note      Meliton Ojeda  MEDICAL RECORD NUMBER:  [id number]  AGE: 60 y.o. GENDER: male  : 1960  EPISODE DATE:  2019    Subjective:     Chief Complaint   Patient presents with    Follow-up     Candida glabrata infection          HISTORY of PRESENT ILLNESS HPI     Meliton Ojeda is a 60 y.o. male Established patient referred by Dr. Estuardo Burks, who presents today for infectious disease evaluation. History of Infectious Disease Context: Patient has history of head and neck cancer with history of radiation in the past.  He has been having issues with hoarseness, voice change, sore throat, and feeling as if he has a lump in his throat. Laryngoscopy biopsy from 2019 was positive for Candida albicans, Candida glabrata and Candida lipoidica and MSSA. Treated with voriconazole and Keflex x4 weeks. An additional 2 weeks of voriconazole was called to pharmacy however patient did not receive. He is not a candidate for hyperbaric oxygen therapy due to advanced COPD with blebs making him at risk for barotrauma/pneumothorax. Sputum culture from 2019 was positive for Proteus Mirabilis, treated with Cipro x 14 days.   Pain Timing/Severity: mild  Quality of pain: aching, tender  Severity: 5 / 10   Modifying Factors: Pain worsens with swallowing  Associated Signs/Symptoms: pain        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[address]     Thyroid disease        PAST SURGICAL HISTORY    Past Surgical History:   Procedure Laterality Date    COLONOSCOPY  2016    EYE SURGERY      CROSS EYED    HAND SURGERY Bilateral     KNEE ARTHROSCOPY Right 1996    LARYNGOSCOPY  2017    Biopsy    LARYNGOSCOPY N/A 2019    MICRO LARYNGOSCOPY W/ BIOPSY performed by Sha Walton right side C32.1    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 Patient's repeat sputum culture was positive for Proteus Mirabilli's, currently on treatment with Cipro which he is tolerating well, no Candida noted. Discussed case with Dr. Torrie Hernández. Patient likely has colonization. He denies any fevers or chills. He continues to have hoarseness and sore throat which is likely related to his radiation damage, not a candidate for hyperbaric oxygen therapy due to his chronic lung disease. Continue Cipro until gone    Patient to continue to follow with Dr. Que Villa    Antibiotics: No    Follow up: as needed    Please see attached Discharge Instructions    Written patient dismissal instructions given to patient and signed by patient or POA.              Electronically signed by JOSH Reveles CNP on 9/30/2019 at 8:53 AM

## 2019-10-02 RX ORDER — VORICONAZOLE 200 MG/1
200 TABLET, FILM COATED ORAL 2 TIMES DAILY
Qty: 28 TABLET | Refills: 0 | Status: SHIPPED | OUTPATIENT
Start: 2019-10-02 | End: 2019-10-16

## 2019-10-03 ENCOUNTER — OFFICE VISIT (OUTPATIENT)
Dept: ENT CLINIC | Age: 59
End: 2019-10-03
Payer: MEDICARE

## 2019-10-03 VITALS
BODY MASS INDEX: 30.29 KG/M2 | WEIGHT: 236 LBS | HEART RATE: 68 BPM | SYSTOLIC BLOOD PRESSURE: 130 MMHG | DIASTOLIC BLOOD PRESSURE: 76 MMHG | RESPIRATION RATE: 12 BRPM

## 2019-10-03 DIAGNOSIS — C32.0 SQUAMOUS CELL CARCINOMA OF VOCAL CORD (HCC): ICD-10-CM

## 2019-10-03 DIAGNOSIS — A49.8 PROTEUS MIRABILIS INFECTION: ICD-10-CM

## 2019-10-03 DIAGNOSIS — J37.0 CHRONIC LARYNGITIS: ICD-10-CM

## 2019-10-03 DIAGNOSIS — B49 INVASIVE FUNGAL INFECTION: Primary | ICD-10-CM

## 2019-10-03 DIAGNOSIS — T66.XXXD RADIATION ADVERSE EFFECT, SUBSEQUENT ENCOUNTER: ICD-10-CM

## 2019-10-03 PROCEDURE — 31575 DIAGNOSTIC LARYNGOSCOPY: CPT | Performed by: OTOLARYNGOLOGY

## 2019-10-03 PROCEDURE — 1036F TOBACCO NON-USER: CPT | Performed by: OTOLARYNGOLOGY

## 2019-10-03 PROCEDURE — G8417 CALC BMI ABV UP PARAM F/U: HCPCS | Performed by: OTOLARYNGOLOGY

## 2019-10-03 PROCEDURE — 99213 OFFICE O/P EST LOW 20 MIN: CPT | Performed by: OTOLARYNGOLOGY

## 2019-10-03 PROCEDURE — G8427 DOCREV CUR MEDS BY ELIG CLIN: HCPCS | Performed by: OTOLARYNGOLOGY

## 2019-10-03 PROCEDURE — 3017F COLORECTAL CA SCREEN DOC REV: CPT | Performed by: OTOLARYNGOLOGY

## 2019-10-03 PROCEDURE — G8484 FLU IMMUNIZE NO ADMIN: HCPCS | Performed by: OTOLARYNGOLOGY

## 2019-10-03 ASSESSMENT — ENCOUNTER SYMPTOMS
APNEA: 0
DIARRHEA: 0
CHEST TIGHTNESS: 0
VOMITING: 0
RHINORRHEA: 0
FACIAL SWELLING: 0
SORE THROAT: 0
SINUS PRESSURE: 0
VOICE CHANGE: 0
WHEEZING: 0
COLOR CHANGE: 0
TROUBLE SWALLOWING: 0
STRIDOR: 0
SHORTNESS OF BREATH: 0
NAUSEA: 0
ABDOMINAL PAIN: 0
COUGH: 0
CHOKING: 0

## 2019-11-11 ENCOUNTER — HOSPITAL ENCOUNTER (EMERGENCY)
Age: 59
Discharge: HOME | End: 2019-11-11
Attending: FAMILY MEDICINE
Payer: MEDICARE

## 2019-11-11 ENCOUNTER — APPOINTMENT (OUTPATIENT)
Dept: GENERAL RADIOLOGY | Age: 59
End: 2019-11-11
Payer: MEDICARE

## 2019-11-11 VITALS
BODY MASS INDEX: 29.52 KG/M2 | WEIGHT: 230 LBS | HEART RATE: 85 BPM | SYSTOLIC BLOOD PRESSURE: 145 MMHG | RESPIRATION RATE: 19 BRPM | HEIGHT: 74 IN | OXYGEN SATURATION: 92 % | DIASTOLIC BLOOD PRESSURE: 97 MMHG | TEMPERATURE: 98.1 F

## 2019-11-11 DIAGNOSIS — R09.89 GLOBUS SENSATION: Primary | ICD-10-CM

## 2019-11-11 DIAGNOSIS — R68.89 EXCESSIVE ORAL SECRETIONS: ICD-10-CM

## 2019-11-11 DIAGNOSIS — J43.8 OTHER EMPHYSEMA (HCC): ICD-10-CM

## 2019-11-11 DIAGNOSIS — R06.02 SHORTNESS OF BREATH: ICD-10-CM

## 2019-11-11 LAB
ALBUMIN SERPL-MCNC: 4.8 G/DL (ref 3.5–5.1)
ALP BLD-CCNC: 145 U/L (ref 38–126)
ALT SERPL-CCNC: 23 U/L (ref 11–66)
ANION GAP SERPL CALCULATED.3IONS-SCNC: 13 MEQ/L (ref 8–16)
AST SERPL-CCNC: 30 U/L (ref 5–40)
BASOPHILS # BLD: 0.6 %
BASOPHILS ABSOLUTE: 0 THOU/MM3 (ref 0–0.1)
BILIRUB SERPL-MCNC: 0.3 MG/DL (ref 0.3–1.2)
BUN BLDV-MCNC: 13 MG/DL (ref 7–22)
CALCIUM SERPL-MCNC: 9.1 MG/DL (ref 8.5–10.5)
CHLORIDE BLD-SCNC: 99 MEQ/L (ref 98–111)
CO2: 25 MEQ/L (ref 23–33)
CREAT SERPL-MCNC: 0.7 MG/DL (ref 0.4–1.2)
EKG ATRIAL RATE: 74 BPM
EKG P AXIS: 75 DEGREES
EKG P-R INTERVAL: 144 MS
EKG Q-T INTERVAL: 376 MS
EKG QRS DURATION: 86 MS
EKG QTC CALCULATION (BAZETT): 417 MS
EKG R AXIS: 76 DEGREES
EKG T AXIS: 69 DEGREES
EKG VENTRICULAR RATE: 74 BPM
EOSINOPHIL # BLD: 1.6 %
EOSINOPHILS ABSOLUTE: 0.1 THOU/MM3 (ref 0–0.4)
ERYTHROCYTE [DISTWIDTH] IN BLOOD BY AUTOMATED COUNT: 13.2 % (ref 11.5–14.5)
ERYTHROCYTE [DISTWIDTH] IN BLOOD BY AUTOMATED COUNT: 46 FL (ref 35–45)
GFR SERPL CREATININE-BSD FRML MDRD: > 90 ML/MIN/1.73M2
GLUCOSE BLD-MCNC: 102 MG/DL (ref 70–108)
HCT VFR BLD CALC: 46.6 % (ref 42–52)
HEMOGLOBIN: 15.3 GM/DL (ref 14–18)
IMMATURE GRANS (ABS): 0.03 THOU/MM3 (ref 0–0.07)
IMMATURE GRANULOCYTES: 0.5 %
LYMPHOCYTES # BLD: 16.6 %
LYMPHOCYTES ABSOLUTE: 1 THOU/MM3 (ref 1–4.8)
MCH RBC QN AUTO: 31.2 PG (ref 26–33)
MCHC RBC AUTO-ENTMCNC: 32.8 GM/DL (ref 32.2–35.5)
MCV RBC AUTO: 94.9 FL (ref 80–94)
MONOCYTES # BLD: 14.7 %
MONOCYTES ABSOLUTE: 0.9 THOU/MM3 (ref 0.4–1.3)
NUCLEATED RED BLOOD CELLS: 0 /100 WBC
OSMOLALITY CALCULATION: 274.1 MOSMOL/KG (ref 275–300)
PLATELET # BLD: 192 THOU/MM3 (ref 130–400)
PMV BLD AUTO: 10.6 FL (ref 9.4–12.4)
POTASSIUM SERPL-SCNC: 4.1 MEQ/L (ref 3.5–5.2)
PRO-BNP: 98.6 PG/ML (ref 0–900)
RBC # BLD: 4.91 MILL/MM3 (ref 4.7–6.1)
SEG NEUTROPHILS: 66 %
SEGMENTED NEUTROPHILS ABSOLUTE COUNT: 4.1 THOU/MM3 (ref 1.8–7.7)
SODIUM BLD-SCNC: 137 MEQ/L (ref 135–145)
TOTAL PROTEIN: 7.5 G/DL (ref 6.1–8)
TROPONIN T: < 0.01 NG/ML
WBC # BLD: 6.2 THOU/MM3 (ref 4.8–10.8)

## 2019-11-11 PROCEDURE — 6360000002 HC RX W HCPCS: Performed by: FAMILY MEDICINE

## 2019-11-11 PROCEDURE — 94640 AIRWAY INHALATION TREATMENT: CPT

## 2019-11-11 PROCEDURE — 83880 ASSAY OF NATRIURETIC PEPTIDE: CPT

## 2019-11-11 PROCEDURE — 99285 EMERGENCY DEPT VISIT HI MDM: CPT

## 2019-11-11 PROCEDURE — 85025 COMPLETE CBC W/AUTO DIFF WBC: CPT

## 2019-11-11 PROCEDURE — 93010 ELECTROCARDIOGRAM REPORT: CPT | Performed by: INTERNAL MEDICINE

## 2019-11-11 PROCEDURE — 96374 THER/PROPH/DIAG INJ IV PUSH: CPT

## 2019-11-11 PROCEDURE — 80053 COMPREHEN METABOLIC PANEL: CPT

## 2019-11-11 PROCEDURE — 93005 ELECTROCARDIOGRAM TRACING: CPT | Performed by: EMERGENCY MEDICINE

## 2019-11-11 PROCEDURE — 70360 X-RAY EXAM OF NECK: CPT

## 2019-11-11 PROCEDURE — 84484 ASSAY OF TROPONIN QUANT: CPT

## 2019-11-11 PROCEDURE — 36415 COLL VENOUS BLD VENIPUNCTURE: CPT

## 2019-11-11 RX ORDER — KETOROLAC TROMETHAMINE 30 MG/ML
30 INJECTION, SOLUTION INTRAMUSCULAR; INTRAVENOUS ONCE
Status: COMPLETED | OUTPATIENT
Start: 2019-11-11 | End: 2019-11-11

## 2019-11-11 RX ORDER — ACETYLCYSTEINE 100 MG/ML
4 SOLUTION ORAL; RESPIRATORY (INHALATION) EVERY 4 HOURS
Qty: 120 ML | Refills: 0 | Status: SHIPPED | OUTPATIENT
Start: 2019-11-11 | End: 2019-11-12 | Stop reason: SDUPTHER

## 2019-11-11 RX ORDER — ACETYLCYSTEINE 200 MG/ML
600 SOLUTION ORAL; RESPIRATORY (INHALATION) 2 TIMES DAILY
Status: DISCONTINUED | OUTPATIENT
Start: 2019-11-11 | End: 2019-11-11 | Stop reason: HOSPADM

## 2019-11-11 RX ADMIN — ACETYLCYSTEINE 600 MG: 200 SOLUTION ORAL; RESPIRATORY (INHALATION) at 18:27

## 2019-11-11 RX ADMIN — KETOROLAC TROMETHAMINE 30 MG: 30 INJECTION, SOLUTION INTRAMUSCULAR at 19:40

## 2019-11-11 RX ADMIN — ALBUTEROL SULFATE 2.5 MG: 2.5 SOLUTION RESPIRATORY (INHALATION) at 18:27

## 2019-11-11 ASSESSMENT — ENCOUNTER SYMPTOMS
COUGH: 0
NAUSEA: 0
ABDOMINAL PAIN: 0
SHORTNESS OF BREATH: 0
WHEEZING: 0
EYE REDNESS: 0
EYE DISCHARGE: 0
RHINORRHEA: 0
VOICE CHANGE: 1
BACK PAIN: 0
TROUBLE SWALLOWING: 0
SORE THROAT: 0
VOMITING: 0
DIARRHEA: 0

## 2019-11-11 ASSESSMENT — PAIN SCALES - GENERAL
PAINLEVEL_OUTOF10: 6
PAINLEVEL_OUTOF10: 5

## 2019-11-12 RX ORDER — ACETYLCYSTEINE 100 MG/ML
4 SOLUTION ORAL; RESPIRATORY (INHALATION) EVERY 4 HOURS
Qty: 120 ML | Refills: 0 | Status: SHIPPED | OUTPATIENT
Start: 2019-11-12 | End: 2020-02-10 | Stop reason: ALTCHOICE

## 2019-11-18 ENCOUNTER — TELEPHONE (OUTPATIENT)
Dept: PULMONOLOGY | Age: 59
End: 2019-11-18

## 2019-11-18 DIAGNOSIS — J43.9 PULMONARY EMPHYSEMA, UNSPECIFIED EMPHYSEMA TYPE (HCC): ICD-10-CM

## 2019-11-18 DIAGNOSIS — J41.1 MUCOPURULENT CHRONIC BRONCHITIS (HCC): Primary | ICD-10-CM

## 2019-11-19 ENCOUNTER — HOSPITAL ENCOUNTER (OUTPATIENT)
Dept: PULMONOLOGY | Age: 59
Discharge: HOME | End: 2019-11-19
Payer: MEDICARE

## 2019-11-19 DIAGNOSIS — J43.9 PULMONARY EMPHYSEMA, UNSPECIFIED EMPHYSEMA TYPE (HCC): ICD-10-CM

## 2019-11-19 DIAGNOSIS — J41.1 MUCOPURULENT CHRONIC BRONCHITIS (HCC): ICD-10-CM

## 2019-11-19 PROCEDURE — 94060 EVALUATION OF WHEEZING: CPT

## 2019-11-20 ENCOUNTER — OFFICE VISIT (OUTPATIENT)
Dept: PULMONOLOGY | Age: 59
End: 2019-11-20
Payer: MEDICARE

## 2019-11-20 VITALS
TEMPERATURE: 98.2 F | HEIGHT: 74 IN | SYSTOLIC BLOOD PRESSURE: 120 MMHG | DIASTOLIC BLOOD PRESSURE: 80 MMHG | OXYGEN SATURATION: 94 % | HEART RATE: 86 BPM | WEIGHT: 236.8 LBS | BODY MASS INDEX: 30.39 KG/M2 | RESPIRATION RATE: 24 BRPM

## 2019-11-20 DIAGNOSIS — Z87.891 PERSONAL HISTORY OF TOBACCO USE: ICD-10-CM

## 2019-11-20 DIAGNOSIS — C14.0 THROAT CANCER (HCC): ICD-10-CM

## 2019-11-20 DIAGNOSIS — J41.1 MUCOPURULENT CHRONIC BRONCHITIS (HCC): Primary | ICD-10-CM

## 2019-11-20 DIAGNOSIS — J43.9 PULMONARY EMPHYSEMA, UNSPECIFIED EMPHYSEMA TYPE (HCC): ICD-10-CM

## 2019-11-20 PROCEDURE — 3023F SPIROM DOC REV: CPT | Performed by: NURSE PRACTITIONER

## 2019-11-20 PROCEDURE — G8484 FLU IMMUNIZE NO ADMIN: HCPCS | Performed by: NURSE PRACTITIONER

## 2019-11-20 PROCEDURE — 99214 OFFICE O/P EST MOD 30 MIN: CPT | Performed by: NURSE PRACTITIONER

## 2019-11-20 PROCEDURE — 3017F COLORECTAL CA SCREEN DOC REV: CPT | Performed by: NURSE PRACTITIONER

## 2019-11-20 PROCEDURE — G8926 SPIRO NO PERF OR DOC: HCPCS | Performed by: NURSE PRACTITIONER

## 2019-11-20 PROCEDURE — G8417 CALC BMI ABV UP PARAM F/U: HCPCS | Performed by: NURSE PRACTITIONER

## 2019-11-20 PROCEDURE — 1036F TOBACCO NON-USER: CPT | Performed by: NURSE PRACTITIONER

## 2019-11-20 PROCEDURE — G0296 VISIT TO DETERM LDCT ELIG: HCPCS | Performed by: NURSE PRACTITIONER

## 2019-11-20 PROCEDURE — G8427 DOCREV CUR MEDS BY ELIG CLIN: HCPCS | Performed by: NURSE PRACTITIONER

## 2019-11-20 RX ORDER — ALBUTEROL SULFATE 2.5 MG/3ML
2.5 SOLUTION RESPIRATORY (INHALATION) EVERY 6 HOURS PRN
Qty: 360 VIAL | Refills: 3 | Status: SHIPPED | OUTPATIENT
Start: 2019-11-20 | End: 2020-01-01 | Stop reason: SDUPTHER

## 2019-11-20 ASSESSMENT — ENCOUNTER SYMPTOMS
VOMITING: 0
HOARSE VOICE: 1
CHEST TIGHTNESS: 0
SHORTNESS OF BREATH: 1
WHEEZING: 0
COUGH: 1
DIARRHEA: 0
STRIDOR: 0
NAUSEA: 0
SPUTUM PRODUCTION: 1

## 2019-11-20 ASSESSMENT — COPD QUESTIONNAIRES: COPD: 1

## 2019-12-19 ENCOUNTER — OFFICE VISIT (OUTPATIENT)
Dept: ENT CLINIC | Age: 59
End: 2019-12-19
Payer: MEDICARE

## 2019-12-19 VITALS
DIASTOLIC BLOOD PRESSURE: 82 MMHG | SYSTOLIC BLOOD PRESSURE: 134 MMHG | BODY MASS INDEX: 30.4 KG/M2 | WEIGHT: 236.8 LBS | RESPIRATION RATE: 16 BRPM | HEART RATE: 72 BPM

## 2019-12-19 DIAGNOSIS — B49 INVASIVE FUNGAL INFECTION: ICD-10-CM

## 2019-12-19 DIAGNOSIS — J37.0 CHRONIC LARYNGITIS: Primary | ICD-10-CM

## 2019-12-19 DIAGNOSIS — T66.XXXD RADIATION ADVERSE EFFECT, SUBSEQUENT ENCOUNTER: ICD-10-CM

## 2019-12-19 DIAGNOSIS — R49.0 DYSPHONIA: ICD-10-CM

## 2019-12-19 PROCEDURE — G8427 DOCREV CUR MEDS BY ELIG CLIN: HCPCS | Performed by: OTOLARYNGOLOGY

## 2019-12-19 PROCEDURE — 31575 DIAGNOSTIC LARYNGOSCOPY: CPT | Performed by: OTOLARYNGOLOGY

## 2019-12-19 PROCEDURE — 1036F TOBACCO NON-USER: CPT | Performed by: OTOLARYNGOLOGY

## 2019-12-19 PROCEDURE — G8417 CALC BMI ABV UP PARAM F/U: HCPCS | Performed by: OTOLARYNGOLOGY

## 2019-12-19 PROCEDURE — G8484 FLU IMMUNIZE NO ADMIN: HCPCS | Performed by: OTOLARYNGOLOGY

## 2019-12-19 PROCEDURE — 99213 OFFICE O/P EST LOW 20 MIN: CPT | Performed by: OTOLARYNGOLOGY

## 2019-12-19 PROCEDURE — 3017F COLORECTAL CA SCREEN DOC REV: CPT | Performed by: OTOLARYNGOLOGY

## 2019-12-19 ASSESSMENT — ENCOUNTER SYMPTOMS
CHEST TIGHTNESS: 0
CHOKING: 0
FACIAL SWELLING: 0
COUGH: 0
SINUS PRESSURE: 0
NAUSEA: 0
DIARRHEA: 0
APNEA: 0
SHORTNESS OF BREATH: 0
VOICE CHANGE: 0
SORE THROAT: 0
RHINORRHEA: 0
VOMITING: 0
STRIDOR: 0
COLOR CHANGE: 0
ABDOMINAL PAIN: 0
TROUBLE SWALLOWING: 0
WHEEZING: 0

## 2019-12-21 LAB — THROAT/NOSE CULTURE: NORMAL

## 2019-12-26 ENCOUNTER — TELEPHONE (OUTPATIENT)
Dept: PULMONOLOGY | Age: 59
End: 2019-12-26

## 2019-12-26 DIAGNOSIS — R68.89 EXCESSIVE ORAL SECRETIONS: ICD-10-CM

## 2019-12-26 DIAGNOSIS — R09.89 GLOBUS SENSATION: ICD-10-CM

## 2019-12-27 ENCOUNTER — OFFICE VISIT (OUTPATIENT)
Dept: ENT CLINIC | Age: 59
End: 2019-12-27
Payer: MEDICARE

## 2019-12-27 VITALS
DIASTOLIC BLOOD PRESSURE: 84 MMHG | SYSTOLIC BLOOD PRESSURE: 138 MMHG | RESPIRATION RATE: 18 BRPM | BODY MASS INDEX: 30.4 KG/M2 | WEIGHT: 236.77 LBS | HEART RATE: 78 BPM

## 2019-12-27 DIAGNOSIS — J42 CHRONIC BRONCHITIS, UNSPECIFIED CHRONIC BRONCHITIS TYPE (HCC): ICD-10-CM

## 2019-12-27 DIAGNOSIS — C32.0 SQUAMOUS CELL CARCINOMA OF VOCAL CORD (HCC): ICD-10-CM

## 2019-12-27 DIAGNOSIS — T66.XXXD RADIATION ADVERSE EFFECT, SUBSEQUENT ENCOUNTER: ICD-10-CM

## 2019-12-27 DIAGNOSIS — B49 INVASIVE FUNGAL INFECTION: ICD-10-CM

## 2019-12-27 DIAGNOSIS — J37.0 CHRONIC LARYNGITIS: ICD-10-CM

## 2019-12-27 DIAGNOSIS — J38.7 MASS OF LARYNX: Primary | ICD-10-CM

## 2019-12-27 PROCEDURE — 3017F COLORECTAL CA SCREEN DOC REV: CPT | Performed by: OTOLARYNGOLOGY

## 2019-12-27 PROCEDURE — G8427 DOCREV CUR MEDS BY ELIG CLIN: HCPCS | Performed by: OTOLARYNGOLOGY

## 2019-12-27 PROCEDURE — 1036F TOBACCO NON-USER: CPT | Performed by: OTOLARYNGOLOGY

## 2019-12-27 PROCEDURE — G8484 FLU IMMUNIZE NO ADMIN: HCPCS | Performed by: OTOLARYNGOLOGY

## 2019-12-27 PROCEDURE — 3023F SPIROM DOC REV: CPT | Performed by: OTOLARYNGOLOGY

## 2019-12-27 PROCEDURE — G8926 SPIRO NO PERF OR DOC: HCPCS | Performed by: OTOLARYNGOLOGY

## 2019-12-27 PROCEDURE — 99213 OFFICE O/P EST LOW 20 MIN: CPT | Performed by: OTOLARYNGOLOGY

## 2019-12-27 PROCEDURE — G8417 CALC BMI ABV UP PARAM F/U: HCPCS | Performed by: OTOLARYNGOLOGY

## 2019-12-27 ASSESSMENT — ENCOUNTER SYMPTOMS
TROUBLE SWALLOWING: 0
CHEST TIGHTNESS: 0
WHEEZING: 0
SORE THROAT: 0
CHOKING: 0
DIARRHEA: 0
NAUSEA: 0
VOMITING: 0
APNEA: 0
ABDOMINAL PAIN: 0
RHINORRHEA: 0
COUGH: 0
COLOR CHANGE: 0
STRIDOR: 0
SINUS PRESSURE: 0
VOICE CHANGE: 0
FACIAL SWELLING: 0
SHORTNESS OF BREATH: 0

## 2019-12-29 ENCOUNTER — ANESTHESIA EVENT (OUTPATIENT)
Dept: OPERATING ROOM | Age: 59
End: 2019-12-29
Payer: MEDICARE

## 2019-12-29 PROBLEM — J44.9 COPD (CHRONIC OBSTRUCTIVE PULMONARY DISEASE) (HCC): Status: ACTIVE | Noted: 2019-12-29

## 2019-12-29 PROBLEM — J38.7 MASS OF LARYNX: Status: ACTIVE | Noted: 2019-12-29

## 2019-12-29 PROBLEM — B49 INVASIVE FUNGAL INFECTION: Status: ACTIVE | Noted: 2019-12-29

## 2019-12-29 RX ORDER — HYDROCODONE BITARTRATE AND ACETAMINOPHEN 5; 325 MG/1; MG/1
1 TABLET ORAL EVERY 6 HOURS PRN
Qty: 18 TABLET | Refills: 0 | Status: CANCELLED | OUTPATIENT
Start: 2019-12-29 | End: 2020-01-01

## 2019-12-29 NOTE — H&P
48    Cancer Father      Heart Disease Father      Diabetes Mother      Heart Disease Mother      Stroke Mother      Heart Disease Brother      High Blood Pressure Other      Cancer Other           LUNG,PROSTATE    Hearing Loss Other           Social History            Tobacco Use    Smoking status: Former Smoker       Packs/day: 2.25       Years: 31.00       Pack years: 69.75       Start date: 64       Last attempt to quit: 2006       Years since quittin.8    Smokeless tobacco: Never Used   Substance Use Topics    Alcohol use: No       Alcohol/week: 0.0 standard drinks         Subjective:      Review of Systems   Constitutional: Negative for activity change, appetite change, chills, diaphoresis, fatigue, fever and unexpected weight change. HENT: Negative for congestion, dental problem, ear discharge, ear pain, facial swelling, hearing loss, mouth sores, nosebleeds, postnasal drip, rhinorrhea, sinus pressure, sneezing, sore throat, tinnitus, trouble swallowing and voice change. Eyes: Negative for visual disturbance. Respiratory: Negative for apnea, cough, choking, chest tightness, shortness of breath, wheezing and stridor. Cardiovascular: Negative for chest pain, palpitations and leg swelling. Gastrointestinal: Negative for abdominal pain, diarrhea, nausea and vomiting. Endocrine: Negative for cold intolerance, heat intolerance, polydipsia and polyuria. Genitourinary: Negative for dysuria, enuresis and hematuria. Musculoskeletal: Negative for arthralgias, gait problem, neck pain and neck stiffness. Skin: Negative for color change and rash. Allergic/Immunologic: Negative for environmental allergies, food allergies and immunocompromised state. Neurological: Negative for dizziness, syncope, facial asymmetry, speech difficulty, light-headedness and headaches. Hematological: Negative for adenopathy. Does not bruise/bleed easily.    Psychiatric/Behavioral: Negative for anesthesia ASAP  This will be performed in the outpatient surgery center due to lack of block time available in the main operating room  Requires a small endotracheal tube. Stayed a few hours after last LxBx with his low SaO2, but that is his baseline     Return in about 6 days (around 1/2/2020) for Follow-Up, Lab Results Review.        Brian Bates MD     **This report has been created using voice recognition software. It may contain minor errors which are inherent in voicerecognition technology. **

## 2019-12-30 ENCOUNTER — HOSPITAL ENCOUNTER (OUTPATIENT)
Age: 59
Setting detail: OUTPATIENT SURGERY
Discharge: HOME | End: 2019-12-30
Attending: OTOLARYNGOLOGY | Admitting: OTOLARYNGOLOGY
Payer: MEDICARE

## 2019-12-30 ENCOUNTER — ANESTHESIA (OUTPATIENT)
Dept: OPERATING ROOM | Age: 59
End: 2019-12-30
Payer: MEDICARE

## 2019-12-30 VITALS
DIASTOLIC BLOOD PRESSURE: 93 MMHG | TEMPERATURE: 96.8 F | SYSTOLIC BLOOD PRESSURE: 159 MMHG | RESPIRATION RATE: 1 BRPM | OXYGEN SATURATION: 93 %

## 2019-12-30 VITALS
BODY MASS INDEX: 29.65 KG/M2 | TEMPERATURE: 98.6 F | DIASTOLIC BLOOD PRESSURE: 80 MMHG | WEIGHT: 231 LBS | SYSTOLIC BLOOD PRESSURE: 139 MMHG | OXYGEN SATURATION: 90 % | HEART RATE: 86 BPM | RESPIRATION RATE: 18 BRPM | HEIGHT: 74 IN

## 2019-12-30 PROCEDURE — 6360000002 HC RX W HCPCS: Performed by: OTOLARYNGOLOGY

## 2019-12-30 PROCEDURE — 7100000000 HC PACU RECOVERY - FIRST 15 MIN: Performed by: OTOLARYNGOLOGY

## 2019-12-30 PROCEDURE — 3700000000 HC ANESTHESIA ATTENDED CARE: Performed by: OTOLARYNGOLOGY

## 2019-12-30 PROCEDURE — 87075 CULTR BACTERIA EXCEPT BLOOD: CPT

## 2019-12-30 PROCEDURE — 6370000000 HC RX 637 (ALT 250 FOR IP): Performed by: OTOLARYNGOLOGY

## 2019-12-30 PROCEDURE — 3600000014 HC SURGERY LEVEL 4 ADDTL 15MIN: Performed by: OTOLARYNGOLOGY

## 2019-12-30 PROCEDURE — 2580000003 HC RX 258: Performed by: OTOLARYNGOLOGY

## 2019-12-30 PROCEDURE — 87116 MYCOBACTERIA CULTURE: CPT

## 2019-12-30 PROCEDURE — 87077 CULTURE AEROBIC IDENTIFY: CPT

## 2019-12-30 PROCEDURE — 87205 SMEAR GRAM STAIN: CPT

## 2019-12-30 PROCEDURE — 6370000000 HC RX 637 (ALT 250 FOR IP)

## 2019-12-30 PROCEDURE — 2709999900 HC NON-CHARGEABLE SUPPLY: Performed by: OTOLARYNGOLOGY

## 2019-12-30 PROCEDURE — 31536 LARYNGOSCOPY W/BX & OP SCOPE: CPT | Performed by: OTOLARYNGOLOGY

## 2019-12-30 PROCEDURE — 7100000010 HC PHASE II RECOVERY - FIRST 15 MIN: Performed by: OTOLARYNGOLOGY

## 2019-12-30 PROCEDURE — 87147 CULTURE TYPE IMMUNOLOGIC: CPT

## 2019-12-30 PROCEDURE — 6360000002 HC RX W HCPCS: Performed by: ANESTHESIOLOGY

## 2019-12-30 PROCEDURE — 87102 FUNGUS ISOLATION CULTURE: CPT

## 2019-12-30 PROCEDURE — 2500000003 HC RX 250 WO HCPCS: Performed by: ANESTHESIOLOGY

## 2019-12-30 PROCEDURE — 88305 TISSUE EXAM BY PATHOLOGIST: CPT

## 2019-12-30 PROCEDURE — 6370000000 HC RX 637 (ALT 250 FOR IP): Performed by: ANESTHESIOLOGY

## 2019-12-30 PROCEDURE — 3700000001 HC ADD 15 MINUTES (ANESTHESIA): Performed by: OTOLARYNGOLOGY

## 2019-12-30 PROCEDURE — 7100000001 HC PACU RECOVERY - ADDTL 15 MIN: Performed by: OTOLARYNGOLOGY

## 2019-12-30 PROCEDURE — 3600000004 HC SURGERY LEVEL 4 BASE: Performed by: OTOLARYNGOLOGY

## 2019-12-30 PROCEDURE — 87070 CULTURE OTHR SPECIMN AEROBIC: CPT

## 2019-12-30 PROCEDURE — 7100000011 HC PHASE II RECOVERY - ADDTL 15 MIN: Performed by: OTOLARYNGOLOGY

## 2019-12-30 PROCEDURE — 87186 SC STD MICRODIL/AGAR DIL: CPT

## 2019-12-30 PROCEDURE — 87106 FUNGI IDENTIFICATION YEAST: CPT

## 2019-12-30 RX ORDER — HYDROCODONE BITARTRATE AND ACETAMINOPHEN 10; 325 MG/1; MG/1
1 TABLET ORAL EVERY 6 HOURS PRN
Qty: 12 TABLET | Refills: 0 | Status: SHIPPED | OUTPATIENT
Start: 2019-12-30 | End: 2020-01-02

## 2019-12-30 RX ORDER — EPHEDRINE SULFATE/0.9% NACL/PF 50 MG/5 ML
SYRINGE (ML) INTRAVENOUS PRN
Status: DISCONTINUED | OUTPATIENT
Start: 2019-12-30 | End: 2019-12-30 | Stop reason: SDUPTHER

## 2019-12-30 RX ORDER — SENNOSIDES 8.6 MG
650 CAPSULE ORAL PRN
Qty: 60 TABLET | Refills: 3 | Status: ON HOLD | OUTPATIENT
Start: 2019-12-30 | End: 2021-01-01 | Stop reason: HOSPADM

## 2019-12-30 RX ORDER — FENTANYL CITRATE 50 UG/ML
25 INJECTION, SOLUTION INTRAMUSCULAR; INTRAVENOUS EVERY 5 MIN PRN
Status: DISCONTINUED | OUTPATIENT
Start: 2019-12-30 | End: 2019-12-30 | Stop reason: HOSPADM

## 2019-12-30 RX ORDER — SODIUM CHLORIDE FOR INHALATION 0.9 %
3 VIAL, NEBULIZER (ML) INHALATION ONCE
Status: COMPLETED | OUTPATIENT
Start: 2019-12-30 | End: 2019-12-30

## 2019-12-30 RX ORDER — EPINEPHRINE 1 MG/ML
INJECTION, SOLUTION, CONCENTRATE INTRAVENOUS PRN
Status: DISCONTINUED | OUTPATIENT
Start: 2019-12-30 | End: 2019-12-30 | Stop reason: ALTCHOICE

## 2019-12-30 RX ORDER — LABETALOL 20 MG/4 ML (5 MG/ML) INTRAVENOUS SYRINGE
5 EVERY 10 MIN PRN
Status: DISCONTINUED | OUTPATIENT
Start: 2019-12-30 | End: 2019-12-30 | Stop reason: HOSPADM

## 2019-12-30 RX ORDER — PROPOFOL 10 MG/ML
INJECTION, EMULSION INTRAVENOUS PRN
Status: DISCONTINUED | OUTPATIENT
Start: 2019-12-30 | End: 2019-12-30 | Stop reason: SDUPTHER

## 2019-12-30 RX ORDER — FENTANYL CITRATE 50 UG/ML
50 INJECTION, SOLUTION INTRAMUSCULAR; INTRAVENOUS EVERY 5 MIN PRN
Status: DISCONTINUED | OUTPATIENT
Start: 2019-12-30 | End: 2019-12-30 | Stop reason: HOSPADM

## 2019-12-30 RX ORDER — HYDROCODONE BITARTRATE AND ACETAMINOPHEN 5; 325 MG/1; MG/1
2 TABLET ORAL EVERY 6 HOURS PRN
Status: DISCONTINUED | OUTPATIENT
Start: 2019-12-30 | End: 2019-12-30 | Stop reason: HOSPADM

## 2019-12-30 RX ORDER — SODIUM CHLORIDE FOR INHALATION 0.9 %
3 VIAL, NEBULIZER (ML) INHALATION EVERY 4 HOURS PRN
Status: DISCONTINUED | OUTPATIENT
Start: 2019-12-30 | End: 2019-12-30

## 2019-12-30 RX ORDER — DEXAMETHASONE SODIUM PHOSPHATE 4 MG/ML
8 INJECTION, SOLUTION INTRA-ARTICULAR; INTRALESIONAL; INTRAMUSCULAR; INTRAVENOUS; SOFT TISSUE
Status: DISCONTINUED | OUTPATIENT
Start: 2019-12-30 | End: 2019-12-30 | Stop reason: HOSPADM

## 2019-12-30 RX ORDER — SUCCINYLCHOLINE/SOD CL,ISO/PF 200MG/10ML
SYRINGE (ML) INTRAVENOUS PRN
Status: DISCONTINUED | OUTPATIENT
Start: 2019-12-30 | End: 2019-12-30 | Stop reason: SDUPTHER

## 2019-12-30 RX ORDER — SODIUM CHLORIDE 9 MG/ML
INJECTION, SOLUTION INTRAVENOUS CONTINUOUS
Status: DISCONTINUED | OUTPATIENT
Start: 2019-12-30 | End: 2019-12-30 | Stop reason: HOSPADM

## 2019-12-30 RX ORDER — FENTANYL CITRATE 50 UG/ML
INJECTION, SOLUTION INTRAMUSCULAR; INTRAVENOUS PRN
Status: DISCONTINUED | OUTPATIENT
Start: 2019-12-30 | End: 2019-12-30 | Stop reason: SDUPTHER

## 2019-12-30 RX ORDER — MEPERIDINE HYDROCHLORIDE 25 MG/ML
12.5 INJECTION INTRAMUSCULAR; INTRAVENOUS; SUBCUTANEOUS EVERY 5 MIN PRN
Status: DISCONTINUED | OUTPATIENT
Start: 2019-12-30 | End: 2019-12-30 | Stop reason: HOSPADM

## 2019-12-30 RX ORDER — ROCURONIUM BROMIDE 10 MG/ML
INJECTION, SOLUTION INTRAVENOUS PRN
Status: DISCONTINUED | OUTPATIENT
Start: 2019-12-30 | End: 2019-12-30 | Stop reason: SDUPTHER

## 2019-12-30 RX ORDER — PROMETHAZINE HYDROCHLORIDE 25 MG/ML
12.5 INJECTION, SOLUTION INTRAMUSCULAR; INTRAVENOUS
Status: DISCONTINUED | OUTPATIENT
Start: 2019-12-30 | End: 2019-12-30 | Stop reason: HOSPADM

## 2019-12-30 RX ADMIN — ROCURONIUM BROMIDE 10 MG: 10 INJECTION INTRAVENOUS at 14:38

## 2019-12-30 RX ADMIN — ROCURONIUM BROMIDE 5 MG: 10 INJECTION INTRAVENOUS at 13:58

## 2019-12-30 RX ADMIN — SODIUM CHLORIDE: 9 INJECTION, SOLUTION INTRAVENOUS at 13:35

## 2019-12-30 RX ADMIN — RACEPINEPHRINE HYDROCHLORIDE 11.25 MG: 11.25 SOLUTION RESPIRATORY (INHALATION) at 15:02

## 2019-12-30 RX ADMIN — Medication 10 MG: at 14:12

## 2019-12-30 RX ADMIN — ISODIUM CHLORIDE 3 ML: 0.03 SOLUTION RESPIRATORY (INHALATION) at 15:20

## 2019-12-30 RX ADMIN — Medication 150 MG: at 13:58

## 2019-12-30 RX ADMIN — ROCURONIUM BROMIDE 25 MG: 10 INJECTION INTRAVENOUS at 14:06

## 2019-12-30 RX ADMIN — PROPOFOL 150 MG: 10 INJECTION, EMULSION INTRAVENOUS at 13:58

## 2019-12-30 RX ADMIN — HYDROCODONE BITARTRATE AND ACETAMINOPHEN 2 TABLET: 5; 325 TABLET ORAL at 17:25

## 2019-12-30 RX ADMIN — FENTANYL CITRATE 100 MCG: 50 INJECTION INTRAMUSCULAR; INTRAVENOUS at 13:58

## 2019-12-30 RX ADMIN — DEXAMETHASONE SODIUM PHOSPHATE 8 MG: 4 INJECTION, SOLUTION INTRAMUSCULAR; INTRAVENOUS at 13:35

## 2019-12-30 RX ADMIN — SUGAMMADEX 200 MG: 100 INJECTION, SOLUTION INTRAVENOUS at 14:53

## 2019-12-30 RX ADMIN — SODIUM CHLORIDE: 9 INJECTION, SOLUTION INTRAVENOUS at 13:55

## 2019-12-30 ASSESSMENT — PULMONARY FUNCTION TESTS
PIF_VALUE: 26
PIF_VALUE: 24
PIF_VALUE: 26
PIF_VALUE: 26
PIF_VALUE: 6
PIF_VALUE: 3
PIF_VALUE: 15
PIF_VALUE: 26
PIF_VALUE: 26
PIF_VALUE: 27
PIF_VALUE: 26
PIF_VALUE: 1
PIF_VALUE: 26
PIF_VALUE: 26
PIF_VALUE: 16
PIF_VALUE: 26
PIF_VALUE: 26
PIF_VALUE: 27
PIF_VALUE: 0
PIF_VALUE: 29
PIF_VALUE: 7
PIF_VALUE: 26
PIF_VALUE: 26
PIF_VALUE: 27
PIF_VALUE: 27
PIF_VALUE: 29
PIF_VALUE: 27
PIF_VALUE: 27
PIF_VALUE: 26
PIF_VALUE: 26
PIF_VALUE: 28
PIF_VALUE: 26
PIF_VALUE: 27
PIF_VALUE: 27
PIF_VALUE: 3
PIF_VALUE: 16
PIF_VALUE: 26
PIF_VALUE: 1
PIF_VALUE: 12
PIF_VALUE: 24
PIF_VALUE: 26
PIF_VALUE: 27
PIF_VALUE: 26
PIF_VALUE: 0
PIF_VALUE: 26
PIF_VALUE: 17
PIF_VALUE: 15
PIF_VALUE: 16
PIF_VALUE: 26
PIF_VALUE: 26
PIF_VALUE: 27
PIF_VALUE: 27
PIF_VALUE: 26
PIF_VALUE: 1
PIF_VALUE: 27
PIF_VALUE: 24
PIF_VALUE: 27
PIF_VALUE: 26

## 2019-12-30 ASSESSMENT — PAIN - FUNCTIONAL ASSESSMENT: PAIN_FUNCTIONAL_ASSESSMENT: 0-10

## 2019-12-30 ASSESSMENT — ENCOUNTER SYMPTOMS: SHORTNESS OF BREATH: 1

## 2019-12-30 ASSESSMENT — PAIN SCALES - GENERAL
PAINLEVEL_OUTOF10: 7
PAINLEVEL_OUTOF10: 0
PAINLEVEL_OUTOF10: 5
PAINLEVEL_OUTOF10: 8
PAINLEVEL_OUTOF10: 7

## 2019-12-30 ASSESSMENT — COPD QUESTIONNAIRES: CAT_SEVERITY: MODERATE

## 2019-12-30 NOTE — FLOWSHEET NOTE
Pt returned to [facility] room 15. Vitals and assessment as charted. 0.9 infusing, @200ml to count from PACU. Pt has crackers and ginger ale. Family at the bedside. Pt and family verbalized understanding of discharge criteria and call light use. Call light in reach.

## 2019-12-30 NOTE — PROGRESS NOTES
1458: To have racemic epi breathing treatment ready for when patient comes to pacu per Dr. Lilia Rosa request.   [phone]: Patient arrived to PACU. Report received from Dr. Lilia Rosa CRNA and Drew Grady RN. Patient placed on cardiac monitor. Breathing treatment started at this time. 1512: Breathing treatment complete at this time. Patient placed on 4 liters per nc.   1520: Pt resting in cart. Denies needs. 1535: Patient meets pacu discharge criteria. Patient transported to [facility] in stable condition on 4 liters per nc. Report given to Sophie cardona RN. Patient's family notified in waiting room.

## 2019-12-30 NOTE — BRIEF OP NOTE
Brief Postoperative Note  ______________________________________________________________    Patient: Angelina Hidalgo  YOB: 1960  MRN: [id number]  Date of Procedure: 12/30/2019    Pre-Op Diagnosis: LARYNGIO MASS    Post-Op Diagnosis: Same       Procedure(s):  DIRECT LARYNGOSCOPY WITH BIOPSY    Anesthesia: General    Surgeon(s):  Kimberly Meadows MD    Assistant: none    Estimated Blood Loss (mL): less than 50     Complications: None    Specimens:   ID Type Source Tests Collected by Time Destination   1 : RIGHT SUBGLOTTIC BX- FOR CULTURE Tissue Larynx FUNGUS CULTURE, SMEAR FUNGUS Kimberly Meadows MD 12/30/2019 1448    A : LARYNGEAL CULTURE- TISSUE BIOPSY Tissue Biopsy SURGICAL PATHOLOGY, ANAEROBIC CULTURE (Canceled), FUNGUS CULTURE, GRAM STAIN, AFB STAIN, ACID FAST CULTURE WITH SMEAR, ANAEROBIC AND AEROBIC CULTURE, SMEAR FUNGUS Kimberly Meadows MD 12/30/2019 1425    B : ANTERIOR TRACHEAL WALL 2 CM SUBGLOTTIC Tissue Larynx SURGICAL PATHOLOGY Kimberly Meadows MD 12/30/2019 1511    C : LEFT FALSE VOCAL CORD Tissue Vocal Cord SURGICAL PATHOLOGY Kimberly Meadows MD 12/30/2019 1513        Implants:  * No implants in log *      Drains:   Colostomy (Active)       Findings: Extensive crusting and mucosal grannular lesions. Anterior tracheal wall >2cm, lower epiglottis,  TVC's and FVC's worse on the right, large fungating ulceration right subglottic area. Multiple biopsies for cultures and histopathology. Subglottic deep crater on right much worse than last LxBx. Concern for tracheal perforation and pneumomediastinium if progresses. He is a candidate for total laryngectomy, with extension to upper trachea., unless there is no malignancy AND he has a miraculous response to antimicrobials.       Sadaf Pineda MD  Date: 12/30/2019  Time: 3:15 PM

## 2019-12-31 NOTE — OP NOTE
[address] [place] [address]                                OPERATIVE REPORT    PATIENT NAME: Jess Wheeler                    :        1960  MED REC NO:   [id number]                           ROOM:  ACCOUNT NO:   [de-identified]                           ADMIT DATE: 2019  PROVIDER:     Kassidy Campbell. JAZZ Perez Runner:  2019    PREOPERATIVE DIAGNOSES:  Hoarseness; laryngeal mass; invasive fungal  infection of larynx; transglottic squamous cell carcinoma of larynx,  status post radiation therapy. POSTOPERATIVE DIAGNOSES:  Hoarseness; laryngeal mass; invasive fungal  infection of larynx; transglottic squamous cell carcinoma of larynx,  status post radiation therapy; plus large deep ulceration of subglottic  area on the right; plus COPD. OPERATION PERFORMED:  Microlaryngoscopy with biopsy. SURGEON:  Kassidy Campbell. Fortino Sharif MD    ANESTHESIA:  General endotracheal.    HISTORY AND OPERATIVE FINDINGS:  This 60-year-old male who is post  radiation therapy for transglottic carcinoma. He has severe COPD as  well. He did well for a period of time and then developed increasing  hoarseness and what appeared to be a large recurrence. This occurred  several months ago and I took him to the operating room for biopsies. This also opened up his airway. To my surprise, the biopsy showed  invasive fungus rather than squamous cell carcinoma. He was referred to  Infectious Disease who placed him on voriconazole for six weeks. Apparently, the pharmacy did not give him the full six weeks  consecutively and there were several weeks between the fourth week and  the fifth week. He did get a total of six weeks. After four weeks, he  was markedly improved and I could document that in the office. He then  got worse again before restarting the medication.     Recent swab of his endolarynx did not produce any growth of other fungus  or bacteria. Dr. Dinah Gan indicated that we would need tissue diagnosis  and therefore, we are here today. At surgery, the patient had extensive crusting of the entire endolarynx  with granular surface of the true and false cords, worse on the right. There were some raised areas on the laryngeal surface of the epiglottis. More significantly, there was a raised granular area on the anterior  tracheal wall and the most significant finding was a huge crater-like  defect in the subglottic trachea on the right side. Extensive  photographs of all of these. Biopsies were taken for both histology and  fungus from most of these areas. Crusting was cleaned away as best as  was possible prior to finishing the procedure. The patient will be followed very closely and if the biopsies are  positive for malignancy, he will be immediately referred to major  centers such as [facility] for a salvage laryngectomy, extended to the upper  trachea. Even if he does not have any malignancy, the extent of this  fungal disease is making his larynx stiff, obstructed, and the  ulceration is way too deep to be safe. Unless he has a miraculous  recovery from antifungals and there is no squamous cell carcinoma, this  larynx is probably going to have to be removed. All of this was  explained to his spouse. The patient will return in three days for pathology results and culture  results as they come in. OPERATIVE PROCEDURE:  After adequate level of general endotracheal  anesthesia had been obtained, the table was rotated 90 degrees. A Dedo  commissure laryngoscope was introduced in the hypopharynx. The piriform  sinuses, vallecula, and epiglottis were examined. Vallecula was clean,  but the epiglottis was slightly thickened. Endolarynx was brought into view. A 30 degree urology telescope with video was used for visualization and photography throughout.   Photography was obtained of the extensive crusting actually obstructing the lumen. Photographs were  taken as needed throughout. Scope was passed between the vocal cords and biopsies were first taken  from the reddish granular area on the anterior wall that began about 2  cm below the true vocal cords and extended inferiorly another couple of  centimeters. This did not look like the rest of the deep tissue. Biopsies from the false vocal cords were obtained. Some of the tissue  was sent for histopathology and some for multiple cultures and stains  including fungus and fungal stain. With the crusting removed from the vocal cords, I was able to visualize  the deep ulceration on the right side. This had extensive necrotic  tissue. It was biopsied for both histopathology and culture. The dead  necrotic tissue was debrided and care was taken not to get outside the  trachea. The laryngoscope was passed behind the endotracheal tube. Posterior  wall was seen to be extensively involved subglotically with this process  as well. There were multiple encrustations to remove from this area. At least one of the biopsy specimens was sent for acid-fast culture and  stain. Topical epinephrine was applied almost prophylactically to the biopsy  sites as they did not bleed. The stomach was suctioned and then the  scope was withdrawn. The patient was then awakened, extubated, and  taken to recovery room where he will be observed very carefully in  relation to his pulmonary status. He had rather prolonged mild hypoxia  following the last procedure. Estimated blood loss was less than 5 mL. There were no complications. He tolerated the procedure well. Cha Cuevas M.D.    D: 12/30/2019 15:48:06       T: 12/30/2019 23:39:12     [email or web address]  Job#: [id number]     Doc#: [id number]    CC:  KACIE Garcia.  Shellee Goltz, M.D.

## 2020-01-01 ENCOUNTER — TELEPHONE (OUTPATIENT)
Dept: ENT CLINIC | Age: 60
End: 2020-01-01

## 2020-01-01 ENCOUNTER — OFFICE VISIT (OUTPATIENT)
Dept: PULMONOLOGY | Age: 60
End: 2020-01-01
Payer: MEDICARE

## 2020-01-01 ENCOUNTER — OFFICE VISIT (OUTPATIENT)
Dept: ENT CLINIC | Age: 60
End: 2020-01-01
Payer: MEDICARE

## 2020-01-01 ENCOUNTER — TELEPHONE (OUTPATIENT)
Dept: ADMINISTRATIVE | Age: 60
End: 2020-01-01

## 2020-01-01 VITALS
TEMPERATURE: 98.1 F | BODY MASS INDEX: 28.7 KG/M2 | HEIGHT: 74 IN | SYSTOLIC BLOOD PRESSURE: 138 MMHG | WEIGHT: 223.6 LBS | HEART RATE: 82 BPM | DIASTOLIC BLOOD PRESSURE: 82 MMHG | RESPIRATION RATE: 18 BRPM

## 2020-01-01 VITALS
DIASTOLIC BLOOD PRESSURE: 88 MMHG | HEART RATE: 84 BPM | BODY MASS INDEX: 29.39 KG/M2 | WEIGHT: 229 LBS | TEMPERATURE: 97.6 F | HEIGHT: 74 IN | SYSTOLIC BLOOD PRESSURE: 134 MMHG | OXYGEN SATURATION: 93 %

## 2020-01-01 LAB — AFB CULTURE & SMEAR: NORMAL

## 2020-01-01 PROCEDURE — 3023F SPIROM DOC REV: CPT | Performed by: NURSE PRACTITIONER

## 2020-01-01 PROCEDURE — G8926 SPIRO NO PERF OR DOC: HCPCS | Performed by: NURSE PRACTITIONER

## 2020-01-01 PROCEDURE — 3017F COLORECTAL CA SCREEN DOC REV: CPT | Performed by: NURSE PRACTITIONER

## 2020-01-01 PROCEDURE — 1036F TOBACCO NON-USER: CPT | Performed by: NURSE PRACTITIONER

## 2020-01-01 PROCEDURE — 31575 DIAGNOSTIC LARYNGOSCOPY: CPT | Performed by: OTOLARYNGOLOGY

## 2020-01-01 PROCEDURE — G8417 CALC BMI ABV UP PARAM F/U: HCPCS | Performed by: NURSE PRACTITIONER

## 2020-01-01 PROCEDURE — G0296 VISIT TO DETERM LDCT ELIG: HCPCS | Performed by: NURSE PRACTITIONER

## 2020-01-01 PROCEDURE — G8427 DOCREV CUR MEDS BY ELIG CLIN: HCPCS | Performed by: NURSE PRACTITIONER

## 2020-01-01 PROCEDURE — 99214 OFFICE O/P EST MOD 30 MIN: CPT | Performed by: NURSE PRACTITIONER

## 2020-01-01 PROCEDURE — G8427 DOCREV CUR MEDS BY ELIG CLIN: HCPCS | Performed by: OTOLARYNGOLOGY

## 2020-01-01 PROCEDURE — 1036F TOBACCO NON-USER: CPT | Performed by: OTOLARYNGOLOGY

## 2020-01-01 PROCEDURE — G8417 CALC BMI ABV UP PARAM F/U: HCPCS | Performed by: OTOLARYNGOLOGY

## 2020-01-01 PROCEDURE — 99215 OFFICE O/P EST HI 40 MIN: CPT | Performed by: OTOLARYNGOLOGY

## 2020-01-01 PROCEDURE — 3023F SPIROM DOC REV: CPT | Performed by: OTOLARYNGOLOGY

## 2020-01-01 PROCEDURE — G8926 SPIRO NO PERF OR DOC: HCPCS | Performed by: OTOLARYNGOLOGY

## 2020-01-01 PROCEDURE — G8484 FLU IMMUNIZE NO ADMIN: HCPCS | Performed by: OTOLARYNGOLOGY

## 2020-01-01 PROCEDURE — 94618 PULMONARY STRESS TESTING: CPT | Performed by: NURSE PRACTITIONER

## 2020-01-01 PROCEDURE — G8484 FLU IMMUNIZE NO ADMIN: HCPCS | Performed by: NURSE PRACTITIONER

## 2020-01-01 PROCEDURE — 3017F COLORECTAL CA SCREEN DOC REV: CPT | Performed by: OTOLARYNGOLOGY

## 2020-01-01 RX ORDER — ALBUTEROL SULFATE 90 UG/1
2 AEROSOL, METERED RESPIRATORY (INHALATION) EVERY 6 HOURS PRN
Qty: 3 INHALER | Refills: 3 | Status: SHIPPED | OUTPATIENT
Start: 2020-01-01

## 2020-01-01 RX ORDER — ACETYLCYSTEINE 200 MG/ML
SOLUTION ORAL; RESPIRATORY (INHALATION)
Qty: 360 ML | Refills: 5 | Status: SHIPPED | OUTPATIENT
Start: 2020-01-01 | End: 2020-01-01 | Stop reason: ALTCHOICE

## 2020-01-01 RX ORDER — ALBUTEROL SULFATE 2.5 MG/3ML
2.5 SOLUTION RESPIRATORY (INHALATION) EVERY 6 HOURS PRN
Qty: 360 VIAL | Refills: 3 | Status: SHIPPED | OUTPATIENT
Start: 2020-01-01 | End: 2021-12-07

## 2020-01-01 RX ORDER — ACETYLCYSTEINE 200 MG/ML
SOLUTION ORAL; RESPIRATORY (INHALATION)
Qty: 360 ML | Refills: 5 | Status: SHIPPED | OUTPATIENT
Start: 2020-01-01

## 2020-01-01 RX ORDER — ACETYLCYSTEINE 200 MG/ML
4 SOLUTION ORAL; RESPIRATORY (INHALATION) 3 TIMES DAILY
Qty: 360 ML | Refills: 5 | Status: SHIPPED | OUTPATIENT
Start: 2020-01-01 | End: 2020-01-01 | Stop reason: ALTCHOICE

## 2020-01-01 ASSESSMENT — ENCOUNTER SYMPTOMS
HOARSE VOICE: 1
VOMITING: 0
SPUTUM PRODUCTION: 1
NAUSEA: 0
COUGH: 1
HEMOPTYSIS: 0
STRIDOR: 0
WHEEZING: 0
DIARRHEA: 0
CHEST TIGHTNESS: 0
SHORTNESS OF BREATH: 1

## 2020-01-01 ASSESSMENT — COPD QUESTIONNAIRES: COPD: 1

## 2020-01-02 LAB
FUNGUS IDENTIFIED: ABNORMAL
FUNGUS SMEAR: ABNORMAL
ORGANISM: ABNORMAL

## 2020-01-03 ENCOUNTER — TELEPHONE (OUTPATIENT)
Dept: ENT CLINIC | Age: 60
End: 2020-01-03

## 2020-01-03 LAB
AEROBIC CULTURE: ABNORMAL
AEROBIC CULTURE: ABNORMAL
ANAEROBIC CULTURE: ABNORMAL
GRAM STAIN RESULT: ABNORMAL
ORGANISM: ABNORMAL

## 2020-01-03 RX ORDER — SULFAMETHOXAZOLE AND TRIMETHOPRIM 800; 160 MG/1; MG/1
1 TABLET ORAL 2 TIMES DAILY
Qty: 20 TABLET | Refills: 0 | Status: SHIPPED | OUTPATIENT
Start: 2020-01-03 | End: 2020-01-13

## 2020-01-04 LAB
AEROBIC CULTURE: ABNORMAL
ANAEROBIC CULTURE: ABNORMAL
GRAM STAIN RESULT: ABNORMAL
ORGANISM: ABNORMAL
ORGANISM: ABNORMAL

## 2020-01-05 LAB
FUNGUS SMEAR: ABNORMAL
ORGANISM: ABNORMAL

## 2020-01-06 ENCOUNTER — OFFICE VISIT (OUTPATIENT)
Dept: INFECTIOUS DISEASES | Age: 60
End: 2020-01-06
Payer: MEDICARE

## 2020-01-06 VITALS
RESPIRATION RATE: 20 BRPM | DIASTOLIC BLOOD PRESSURE: 82 MMHG | HEART RATE: 74 BPM | BODY MASS INDEX: 30.29 KG/M2 | WEIGHT: 236 LBS | HEIGHT: 74 IN | TEMPERATURE: 98.8 F | SYSTOLIC BLOOD PRESSURE: 130 MMHG

## 2020-01-06 PROCEDURE — 99214 OFFICE O/P EST MOD 30 MIN: CPT | Performed by: NURSE PRACTITIONER

## 2020-01-06 RX ORDER — VORICONAZOLE 200 MG/1
200 TABLET, FILM COATED ORAL 2 TIMES DAILY
Qty: 84 TABLET | Refills: 0 | Status: SHIPPED | OUTPATIENT
Start: 2020-01-06 | End: 2020-02-10 | Stop reason: SDUPTHER

## 2020-01-06 RX ORDER — CEPHALEXIN 500 MG/1
500 CAPSULE ORAL 4 TIMES DAILY
Qty: 40 CAPSULE | Refills: 0 | Status: SHIPPED | OUTPATIENT
Start: 2020-01-06 | End: 2020-01-16

## 2020-01-06 NOTE — PROGRESS NOTES
[facility] Infectious Disease         Progress Note      Arie Castañeda  MEDICAL RECORD NUMBER:  [id number]  AGE: 60 y.o. GENDER: male  : 1960  EPISODE DATE:  2020    Subjective:     Chief Complaint   Patient presents with    Follow-up     throat infection         HISTORY of PRESENT ILLNESS HPI     Arie Castañeda is a 60 y.o. male Established patient referred by Dr. Jonnathan Cardoso, who presents today for infectious disease evaluation. History of Infectious Disease Context: Patient has history of head and neck cancer with history of radiation in the past.  He has been having issues with hoarseness, voice change, sore throat, and feeling as if he has a lump in his throat. Laryngoscopy biopsy from 2019 was positive for Candida albicans, Candida glabrata and Candida lipoidica and MSSA. Treated with voriconazole and Keflex x4 weeks. An additional 2 weeks of voriconazole was called to pharmacy however patient did not receive it. He is not a candidate for hyperbaric oxygen therapy due to advanced COPD with blebs making him at risk for barotrauma/pneumothorax. Sputum culture from 2019 was positive for Proteus Mirabilis, treated with Cipro x 14 days. He continued to have issues with hoarseness and throat pain, tissue culture from 1/3/2020 was positive for MSSA, alpha hemolytic strep, candida glabrata, candida albicans, and saccharomyces cerevisae, started on Bactrim on 1/3/2020 by Dr. Jonnathan Cardoso.   Pain Timing/Severity: moderate  Quality of pain: dull  Severity:  7 / 10   Modifying Factors: Pain worsens with talking  Associated Signs/Symptoms: pain        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[address]     Thyroid disease        PAST SURGICAL HISTORY    Past Surgical History:   Procedure Laterality Date    history on file for this patient.     PHYSICAL EXAM    General Appearance: alert and oriented to person, place and time  Skin: warm and dry  Head: normocephalic and atraumatic  Eyes: conjunctivae normal  ENT: hearing grossly normal bilaterally and oropharynx intact without exudate  Pulmonary/Chest: clear to and diminished to auscultation bilaterally- no wheezes, rales or rhonchi, normal air movement  Cardiovascular: normal rate and regular rhythm  Abdomen: soft, non-tender and bowel sounds normal  Extremities: no cyanosis and no clubbing  Musculoskeletal: normal range of motion of extremities x 4  Neurologic: gait and coordination normal and speech normal    LABS       CBC:   Lab Results   Component Value Date    WBC 6.2 11/11/2019    HGB 15.3 11/11/2019    HCT 46.6 11/11/2019    MCV 94.9 11/11/2019     11/11/2019     BMP:   Lab Results   Component Value Date     11/11/2019    K 4.1 11/11/2019    CL 99 11/11/2019    CO2 25 11/11/2019    PHOS 4.1 07/08/2017    BUN 13 11/11/2019    CREATININE 0.7 11/11/2019     PT/INR: No results found for: PROTIME, INR  Prealbumin: No results found for: PREALBUMIN  Albumin:  Lab Results   Component Value Date    LABALBU 4.8 11/11/2019    LABALBU 4.6 12/06/2011     Sed Rate:No results found for: Gerald Earthly  CRP: No results found for: CRP  Micro: No results found for: BC   Hemoglobin A1C: No results found for: LABA1C    Assessment:     Infection of larynx  Candida glabrata infection  Soft tissue radionecrosis  Colonization with drug resistant bacteria     Contributing Factors: radiation damage    Patient Active Problem List   Diagnosis Code    Dysphonia R49.0    Alcohol abuse F10.10    FHx: smoking Z81.2    Deviated septum J34.2    Hypertrophy of nasal turbinates J34.3    Rhinitis J31.0    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

## 2020-01-06 NOTE — PATIENT INSTRUCTIONS
Visit Discharge/Physician Orders:    Stop Bactrim    Start Keflex    Start Voriconazole twice daily x 6 weeks     Labs: CBC, BMP, hepatic panel in 3-4 weeks    Keep next scheduled appointment. Please give 24 hour notice if unable to keep appointment. [phone]    If you experience any of the following, please call the Infectious Disease Clinic during business hours: [phone]     *Increase in pain   *Temperature over 101   *Increase in drainage from your wound or a foul odor   *Uncontrolled swelling   *Need for compression bandage changes due to slippage, breakthrough drainage    If you need medical attention outside of business hours, please contact your Primary Care Doctor or go to the nearest emergency room.

## 2020-01-17 ENCOUNTER — TELEPHONE (OUTPATIENT)
Dept: ENT CLINIC | Age: 60
End: 2020-01-17

## 2020-01-21 ENCOUNTER — HOSPITAL ENCOUNTER (OUTPATIENT)
Dept: CT IMAGING | Age: 60
Discharge: HOME | End: 2020-01-21
Payer: MEDICARE

## 2020-01-21 LAB
CREAT SERPL-MCNC: 0.8 MG/DL (ref 0.5–1.2)
GFR, ESTIMATED: > 90 ML/MIN/1.73M2

## 2020-01-21 PROCEDURE — 82565 ASSAY OF CREATININE: CPT

## 2020-01-21 PROCEDURE — 6360000004 HC RX CONTRAST MEDICATION: Performed by: PHYSICIAN ASSISTANT

## 2020-01-21 PROCEDURE — 70491 CT SOFT TISSUE NECK W/DYE: CPT

## 2020-01-21 RX ADMIN — IOPAMIDOL 75 ML: 755 INJECTION, SOLUTION INTRAVENOUS at 09:12

## 2020-01-24 ENCOUNTER — OFFICE VISIT (OUTPATIENT)
Dept: ENT CLINIC | Age: 60
End: 2020-01-24
Payer: MEDICARE

## 2020-01-24 VITALS
WEIGHT: 236.8 LBS | RESPIRATION RATE: 16 BRPM | HEART RATE: 68 BPM | BODY MASS INDEX: 30.4 KG/M2 | DIASTOLIC BLOOD PRESSURE: 86 MMHG | SYSTOLIC BLOOD PRESSURE: 136 MMHG

## 2020-01-24 PROCEDURE — 3017F COLORECTAL CA SCREEN DOC REV: CPT | Performed by: OTOLARYNGOLOGY

## 2020-01-24 PROCEDURE — 1036F TOBACCO NON-USER: CPT | Performed by: OTOLARYNGOLOGY

## 2020-01-24 PROCEDURE — G8427 DOCREV CUR MEDS BY ELIG CLIN: HCPCS | Performed by: OTOLARYNGOLOGY

## 2020-01-24 PROCEDURE — 31575 DIAGNOSTIC LARYNGOSCOPY: CPT | Performed by: OTOLARYNGOLOGY

## 2020-01-24 PROCEDURE — 99213 OFFICE O/P EST LOW 20 MIN: CPT | Performed by: OTOLARYNGOLOGY

## 2020-01-24 PROCEDURE — G8417 CALC BMI ABV UP PARAM F/U: HCPCS | Performed by: OTOLARYNGOLOGY

## 2020-01-24 PROCEDURE — G8484 FLU IMMUNIZE NO ADMIN: HCPCS | Performed by: OTOLARYNGOLOGY

## 2020-01-24 ASSESSMENT — ENCOUNTER SYMPTOMS
NAUSEA: 0
WHEEZING: 0
DIARRHEA: 0
COUGH: 0
SINUS PRESSURE: 0
SORE THROAT: 0
VOICE CHANGE: 0
STRIDOR: 0
APNEA: 0
RHINORRHEA: 0
CHOKING: 0
FACIAL SWELLING: 0
TROUBLE SWALLOWING: 0
COLOR CHANGE: 0
SHORTNESS OF BREATH: 0
CHEST TIGHTNESS: 0
VOMITING: 0
ABDOMINAL PAIN: 0

## 2020-01-24 NOTE — PATIENT INSTRUCTIONS
Make sure that the pharmacy will dispense the additional 2 weeks of voriconazole. Check with him a few days beforehand.

## 2020-01-24 NOTE — PROGRESS NOTES
[facility] PROFESSIONAL SERVS  [facility] EAR, NOSE AND THROAT  [address] [facility]  Dept: [phone]  Dept Fax: [phone]  Loc: [phone]    Nimo Diaz is a 60 y.o. male who was referred byNo ref. provider found for:  Chief Complaint   Patient presents with    Follow-up     Patient here for CT neck results and follow up. Tatyana Ibrahim HPI:     Nimo Diaz is a 60 y.o. male who presents today for new of his CT follow-up for his fungal infection. I did not see any impending penetration of the tracheal wall. Voriconazole and states that he feels as though he is improving. He just hopes he is on it long enough time, so it does not come back. He is not having as much difficulty breathing through his laryngotracheal complex as before. History:      Allergies   Allergen Reactions    Povidone Iodine Rash     Surgery prep     Current Outpatient Medications   Medication Sig Dispense Refill    voriconazole (VFEND) 200 MG tablet Take 1 tablet by mouth 2 times daily Take 6 mg/kg twice daily 2 doses, then 4mg/kg twice daily x 6 weeks 84 tablet 0    acetaminophen (TYLENOL) 650 MG extended release tablet Take 1 tablet by mouth as needed for Pain Do not take with hydrocodone/acetominophen 60 tablet 3    albuterol (PROVENTIL) (2.5 MG/3ML) 0.083% nebulizer solution Take 3 mLs by nebulization every 6 hours as needed for Wheezing or Shortness of Breath 360 vial 3    acetylcysteine (MUCOMYST) 10 % nebulizer solution Inhale 4 mLs into the lungs every 4 hours 120 mL 0    albuterol sulfate HFA (VENTOLIN HFA) 108 (90 Base) MCG/ACT inhaler Inhale 2 puffs into the lungs every 6 hours as needed for Wheezing or Shortness of Breath 3 Inhaler 3    guaiFENesin (MUCINEX) 600 MG extended release tablet Take 1,200 mg by mouth 2 times daily      VITAMIN D, ERGOCALCIFEROL, PO Take 1 tablet by mouth daily      TURMERIC CURCUMIN PO Take 1 tablet by mouth daily      budesonide-formoterol (SYMBICORT) 80-4.5 MCG/ACT AERO Inhale 2 puffs into the lungs 2 times daily 3 Inhaler 3    tiotropium (SPIRIVA RESPIMAT) 2.5 MCG/ACT AERS inhaler Inhale 2 puffs into the lungs daily 3 Inhaler 3    rosuvastatin (CRESTOR) 20 MG tablet Take 20 mg by mouth daily      Dextromethorphan Polistirex (ROBITUSSIN 12 HOUR COUGH PO) Take by mouth 2 times daily as needed       loperamide (IMODIUM) 2 MG capsule Take 2 mg by mouth daily        No current facility-administered medications for this visit.       Past Medical History:   Diagnosis Date    Arthritis     [facility] disease) [facility]     Pneumonia 2017 and 2017    Rectal cancer ([facility]     Squamous cell carcinoma of vocal cord ([facility]     Thyroid disease       Past Surgical History:   Procedure Laterality Date    COLONOSCOPY      EYE SURGERY      CROSS EYED    HAND SURGERY Bilateral     KNEE ARTHROSCOPY Right     LARYNGOSCOPY  2017    Biopsy    LARYNGOSCOPY N/A 2019    MICRO LARYNGOSCOPY W/ BIOPSY performed by Chanel Sanchez MD at [address] [facility] [address] 2019    DIRECT LARYNGOSCOPY WITH BIOPSY performed by Chanel Sanchez MD at [address] [facility] [address] RECTAL SURGERY  2016    [facility] [place], [place]    ROTATOR CUFF REPAIR Left 80'S     Family History   Problem Relation Age of Onset    Prostate Cancer Father 48    Cancer Father     Heart Disease Father     Diabetes Mother     Heart Disease Mother     Stroke Mother     Heart Disease Brother     High Blood Pressure Other     Cancer Other         LUNG,PROSTATE    Hearing Loss Other      Social History     Tobacco Use    Smoking status: Former Smoker     Packs/day: 2.25     Years: 31.00     Pack years: 69.75     Start date:      Last attempt to quit: 2006     Years since quittin.0    Smokeless tobacco: Never Used   Substance Use Topics    Alcohol use: No     Alcohol/week: 0.0 standard drinks Subjective:      Review of Systems   Constitutional: Negative for activity change, appetite change, chills, diaphoresis, fatigue, fever and unexpected weight change. HENT: Negative for congestion, dental problem, ear discharge, ear pain, facial swelling, hearing loss, mouth sores, nosebleeds, postnasal drip, rhinorrhea, sinus pressure, sneezing, sore throat, tinnitus, trouble swallowing and voice change. Eyes: Negative for visual disturbance. Respiratory: Negative for apnea, cough, choking, chest tightness, shortness of breath, wheezing and stridor. Cardiovascular: Negative for chest pain, palpitations and leg swelling. Gastrointestinal: Negative for abdominal pain, diarrhea, nausea and vomiting. Endocrine: Negative for cold intolerance, heat intolerance, polydipsia and polyuria. Genitourinary: Negative for dysuria, enuresis and hematuria. Musculoskeletal: Negative for arthralgias, gait problem, neck pain and neck stiffness. Skin: Negative for color change and rash. Allergic/Immunologic: Negative for environmental allergies, food allergies and immunocompromised state. Neurological: Negative for dizziness, syncope, facial asymmetry, speech difficulty, light-headedness and headaches. Hematological: Negative for adenopathy. Does not bruise/bleed easily. Psychiatric/Behavioral: Negative for confusion and sleep disturbance. The patient is not nervous/anxious. Objective:   /86 (Site: Left Upper Arm, Position: Sitting)   Pulse 68   Resp 16   Wt 236 lb 12.8 oz (107.4 kg)   BMI 30.40 kg/m²     Physical Exam     PROCEDURE: FIBEROPTIC LARYNGOSCOPY    A fiberoptic laryngoscopy was performed under topical anesthesia, after using Afrin and Lidocaine spray in the nasal fossa. The nasal fossa, nasopharynx, hypopharynx and larynx were carefully examined. Base of tongue was symmetrical. Epiglottis appeared thickened and was not retrodisplaced.  True vocal cords had there-normal

## 2020-02-06 LAB
ABSOLUTE BASO #: 0 /CMM (ref 0–200)
ABSOLUTE EOS #: 100 /CMM (ref 0–500)
ABSOLUTE LYMPH #: 900 /CMM (ref 1000–4800)
ABSOLUTE MONO #: 1000 /CMM (ref 0–800)
ABSOLUTE NEUT #: 4400 /CMM (ref 1800–7700)
ALBUMIN SERPL-MCNC: 4.5 GM/DL (ref 3.5–5)
ALP BLD-CCNC: 119 IU/L (ref 41–137)
ALT SERPL-CCNC: 26 IU/L (ref 10–40)
ANION GAP SERPL CALCULATED.3IONS-SCNC: 5 MMOL/L (ref 4–12)
AST SERPL-CCNC: 24 IU/L (ref 15–41)
BASOPHILS RELATIVE PERCENT: 0.5 % (ref 0–2)
BILIRUB SERPL-MCNC: 0.5 MG/DL (ref 0.2–1)
BILIRUBIN DIRECT: 0.1 MG/DL (ref 0.1–0.2)
BUN BLDV-MCNC: 18 MG/DL (ref 7–20)
CALCIUM SERPL-MCNC: 8.7 MG/DL (ref 8.8–10.5)
CHLORIDE BLD-SCNC: 102 MEQ/L (ref 101–111)
CO2: 29 MEQ/L (ref 21–32)
CREAT SERPL-MCNC: 0.78 MG/DL (ref 0.6–1.3)
CREATININE CLEARANCE: >60
EOSINOPHILS RELATIVE PERCENT: 1.7 % (ref 0–6)
GLUCOSE, FASTING: 84 MG/DL (ref 70–110)
HCT VFR BLD CALC: 46.4 % (ref 40–49)
HEMOGLOBIN: 15.4 GM/DL (ref 13.5–16.5)
LYMPHOCYTES RELATIVE PERCENT: 13.6 % (ref 15–45)
MCH RBC QN AUTO: 31.2 PG (ref 27.5–33)
MCHC RBC AUTO-ENTMCNC: 33.2 GM/DL (ref 33–36)
MCV RBC AUTO: 93.9 CU MIC (ref 80–97)
MONOCYTES RELATIVE PERCENT: 15.8 % (ref 2–10)
NEUTROPHILS RELATIVE PERCENT: 68.4 % (ref 40–70)
NUCLEATED RBCS: 0.1 /100 WBC
PDW BLD-RTO: 13.6 % (ref 12–16)
PLATELET # BLD: 195 TH/CMM (ref 150–400)
POTASSIUM SERPL-SCNC: 4.4 MEQ/L (ref 3.6–5)
RBC # BLD: 4.94 MIL/CMM (ref 4.5–6)
SODIUM BLD-SCNC: 136 MEQ/L (ref 135–145)
TOTAL PROTEIN: 6.6 G/DL (ref 6.2–8)
WBC # BLD: 6.5 TH/CMM (ref 4.4–10.5)

## 2020-02-10 ENCOUNTER — TELEPHONE (OUTPATIENT)
Dept: INFECTIOUS DISEASES | Age: 60
End: 2020-02-10

## 2020-02-10 ENCOUNTER — OFFICE VISIT (OUTPATIENT)
Dept: INFECTIOUS DISEASES | Age: 60
End: 2020-02-10
Payer: MEDICARE

## 2020-02-10 VITALS
HEIGHT: 74 IN | RESPIRATION RATE: 16 BRPM | SYSTOLIC BLOOD PRESSURE: 137 MMHG | DIASTOLIC BLOOD PRESSURE: 81 MMHG | WEIGHT: 235 LBS | TEMPERATURE: 98.6 F | HEART RATE: 72 BPM | BODY MASS INDEX: 30.16 KG/M2

## 2020-02-10 PROCEDURE — 99213 OFFICE O/P EST LOW 20 MIN: CPT | Performed by: NURSE PRACTITIONER

## 2020-02-10 RX ORDER — VORICONAZOLE 200 MG/1
200 TABLET, FILM COATED ORAL 2 TIMES DAILY
Qty: 28 TABLET | Refills: 0 | Status: SHIPPED | OUTPATIENT
Start: 2020-02-10 | End: 2020-02-24

## 2020-02-10 RX ORDER — VORICONAZOLE 200 MG/1
200 TABLET, FILM COATED ORAL DAILY
Qty: 30 TABLET | Refills: 2 | Status: SHIPPED | OUTPATIENT
Start: 2020-03-02 | End: 2020-05-14 | Stop reason: SDUPTHER

## 2020-02-24 LAB — AFB CULTURE & SMEAR: NORMAL

## 2020-02-25 ENCOUNTER — OFFICE VISIT (OUTPATIENT)
Dept: ENT CLINIC | Age: 60
End: 2020-02-25
Payer: MEDICARE

## 2020-02-25 VITALS
WEIGHT: 231 LBS | BODY MASS INDEX: 29.66 KG/M2 | RESPIRATION RATE: 16 BRPM | HEART RATE: 68 BPM | DIASTOLIC BLOOD PRESSURE: 90 MMHG | SYSTOLIC BLOOD PRESSURE: 146 MMHG

## 2020-02-25 PROCEDURE — G8427 DOCREV CUR MEDS BY ELIG CLIN: HCPCS | Performed by: OTOLARYNGOLOGY

## 2020-02-25 PROCEDURE — G8484 FLU IMMUNIZE NO ADMIN: HCPCS | Performed by: OTOLARYNGOLOGY

## 2020-02-25 PROCEDURE — G8417 CALC BMI ABV UP PARAM F/U: HCPCS | Performed by: OTOLARYNGOLOGY

## 2020-02-25 PROCEDURE — 1036F TOBACCO NON-USER: CPT | Performed by: OTOLARYNGOLOGY

## 2020-02-25 PROCEDURE — 3023F SPIROM DOC REV: CPT | Performed by: OTOLARYNGOLOGY

## 2020-02-25 PROCEDURE — 3017F COLORECTAL CA SCREEN DOC REV: CPT | Performed by: OTOLARYNGOLOGY

## 2020-02-25 PROCEDURE — G8926 SPIRO NO PERF OR DOC: HCPCS | Performed by: OTOLARYNGOLOGY

## 2020-02-25 PROCEDURE — 31575 DIAGNOSTIC LARYNGOSCOPY: CPT | Performed by: OTOLARYNGOLOGY

## 2020-02-25 PROCEDURE — 99213 OFFICE O/P EST LOW 20 MIN: CPT | Performed by: OTOLARYNGOLOGY

## 2020-02-25 ASSESSMENT — ENCOUNTER SYMPTOMS
CHOKING: 0
COUGH: 0
APNEA: 0
SHORTNESS OF BREATH: 0
COLOR CHANGE: 0
RHINORRHEA: 0
NAUSEA: 0
VOMITING: 0
FACIAL SWELLING: 0
STRIDOR: 0
WHEEZING: 0
SINUS PRESSURE: 0
DIARRHEA: 0
TROUBLE SWALLOWING: 0
SORE THROAT: 0
VOICE CHANGE: 1
ABDOMINAL PAIN: 0
CHEST TIGHTNESS: 0

## 2020-02-25 NOTE — LETTER
[address] and Throat  [address] [facility]  Phone: [phone]  Fax: [phone]    Leodan Ghotra MD        March 23, 2020    [facility]ell [facility] [address]  [facility] [address]    Patient: Kmaar Stephen   MR Number: [id number]   YOB: 1960   Date of Visit: 2/25/2020     Dear Cass Rivera,    I recently saw your patient, Kamar Stephen, regarding progress on his fungal and pharynx infection of his larynx and subglottic area. He has made great progress. Below are the relevant portions of my assessment and plan of care. Assessment & Plan   Diagnoses and all orders for this visit:     Diagnosis Orders   1. Invasive fungal infection, larynx  [facility] LARYNGOSCOPY FLEXIBLE DIAGNOSTIC   2. Chronic laryngitis  [facility] LARYNGOSCOPY FLEXIBLE DIAGNOSTIC   3. Dysphonia  [place] LARYNGOSCOPY FLEXIBLE DIAGNOSTIC   4. Radiation adverse effect, subsequent encounter  [facility] LARYNGOSCOPY FLEXIBLE DIAGNOSTIC   5. 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 Chronic bronchitis, unspecified chronic bronchitis type ([address]         The findings were explained and his questions were answered. I called the infectious disease physician who indicated that he was not going to just stop the voriconazole but rather put him on a daily dose for 6 months. He did not expect the initial treatment to adequately and permanently eradicate the fungal infection which is quite invasive. Review of his CT previously showed that this did not appear to be about to penetrate his trachea or subglottic region, which was a concern at laryngoscopy. Return in about 3 weeks (around 3/17/2020). If you have questions, please do not hesitate to call me. I look forward to following Salima Ballard along with you.     Sincerely,          Leodan Ghotra MD

## 2020-02-25 NOTE — PROGRESS NOTES
change. Negative for congestion, dental problem, ear discharge, ear pain, facial swelling, hearing loss, mouth sores, nosebleeds, postnasal drip, rhinorrhea, sinus pressure, sneezing, sore throat, tinnitus and trouble swallowing. Eyes: Negative for visual disturbance. Respiratory: Negative for apnea, cough, choking, chest tightness, shortness of breath, wheezing and stridor. Cardiovascular: Negative for chest pain, palpitations and leg swelling. Gastrointestinal: Negative for abdominal pain, diarrhea, nausea and vomiting. Endocrine: Negative for cold intolerance, heat intolerance, polydipsia and polyuria. Genitourinary: Negative for dysuria, enuresis and hematuria. Musculoskeletal: Negative for arthralgias, gait problem, neck pain and neck stiffness. Skin: Negative for color change and rash. Allergic/Immunologic: Negative for environmental allergies, food allergies and immunocompromised state. Neurological: Negative for dizziness, syncope, facial asymmetry, speech difficulty, light-headedness and headaches. Hematological: Negative for adenopathy. Does not bruise/bleed easily. Psychiatric/Behavioral: Negative for confusion and sleep disturbance. The patient is not nervous/anxious. Objective:   BP (!) 146/90 (Site: Left Upper Arm, Position: Sitting)   Pulse 68   Resp 16   Wt 231 lb (104.8 kg)   BMI 29.66 kg/m²     Physical Exam  Voice: Somewhat better but still moderately hoarse. No stridor    PROCEDURE: FIBEROPTIC LARYNGOSCOPY     A fiberoptic laryngoscopy was performed under topical anesthesia, after using Afrin and Lidocaine spray in the nasal fossa. The nasal fossa, nasopharynx, hypopharynx and larynx were carefully examined. Base of tongue was symmetrical. Epiglottis appeared thickened and was not retrodisplaced. True vocal cords had there-normal mobility. There was diffuse some crusting was still present.  The extent of the crusting was much improved, not as thick, but still

## 2020-04-01 NOTE — PROGRESS NOTES
[facility] Infectious Disease         Progress Note      Nimo Diaz  MEDICAL RECORD NUMBER:  [id number]  AGE: 60 y.o. GENDER: male  : 1960  EPISODE DATE:  2/10/2020    Subjective:     Chief Complaint   Patient presents with    Follow-up     throat infection         HISTORY of PRESENT ILLNESS HPI     Nimo Diaz is a 60 y.o. male Established patient referred by Dr. Rimma Diaz, who presents today for infectious disease evaluation. History of Infectious Disease Context: Patient has history of head and neck cancer with history of radiation in the past.  He has been having issues with hoarseness, voice change, sore throat, and feeling as if he has a lump in his throat. Laryngoscopy biopsy from 2019 was positive for Candida albicans, Candida glabrata and Candida lipoidica and MSSA. Treated with voriconazole and Keflex x4 weeks. An additional 2 weeks of voriconazole was called to pharmacy however patient did not receive it. He is not a candidate for hyperbaric oxygen therapy due to advanced COPD with blebs making him at risk for barotrauma/pneumothorax. Sputum culture from 2019 was positive for Proteus Mirabilis, treated with Cipro x 14 days. He continued to have issues with hoarseness and throat pain, tissue culture from 1/3/2020 was positive for MSSA, alpha hemolytic strep, candida glabrata, candida albicans, and saccharomyces cerevisae, started on Bactrim on 1/3/2020 by Dr. Rimma Diaz which was discontinued on 2020 and treated with keflex x 14 days. Voriconazole twice daily x 6 weeks initiated on 2020.   Pain Timing/Severity: moderate  Quality of pain: dull  Severity:  7 / 10   Modifying Factors: Pain worsens with talking  Associated Signs/Symptoms: pain        PAST MEDICAL HISTORY        Diagnosis Date    Arthritis     [facility] [address]     Pneumonia 2017 and 2017    Rectal cancer ([facility] [address]     Squamous cell carcinoma of Srinivasa Farris is a 70 y.o. female who was seen by synchronous (real-time) audio-video technology on 4/1/2020. Consent:  She and/or her healthcare decision maker is aware that this patient-initiated Telehealth encounter is a billable service, with coverage as determined by her insurance carrier. She is aware that she may receive a bill and has provided verbal consent to proceed: Yes    712  Subjective:   Srinivasa Farris was seen for Irregular Heart Beat (post echo)    Patient is 69-year-old female with history of proximal atrial fibrillation and prior TIA on Xarelto. No new symptoms since last appointment. Has occasional palpitations with no syncope or near syncope. No chest pain or shortness of breath. Coding Help - Use CPT Codes 79111-92473, 77270-30070 for Established and New Patients respectively, either employing EM elements or Time rules. Other codes (example consult codes) may also apply. Family History   Problem Relation Age of Onset    Hypertension Father     High Cholesterol Father     Diabetes Father     Stroke Father     Heart Attack Father     Cancer Father     Heart Disease Father     Depression Sister     Breast Cancer Maternal Grandmother      Past Surgical History:   Procedure Laterality Date    DILATION AND CURETTAGE  1987    HX BREAST AUGMENTATION  1984    HX DILATION AND CURETTAGE  1/2013    HX TONSILLECTOMY      IMPLANT BREAST SILICONE/EQ      IA TARSAL TUNNEL RELEASE  1994    Right     Allergies   Allergen Reactions    Plaquenil [Hydroxychloroquine] Nausea and Vomiting    Pcn [Penicillins] Itching and Rash    Prednisone Nausea and Vomiting     She reports a plaquenil/prednisone come causes vomiting    Zithromax [Azithromycin] Rash, Other (comments) and Hives     [facility] ca's records state skin peeling.   High fever, peeling       Current Outpatient Medications:     metoprolol tartrate (LOPRESSOR) 25 mg tablet, TAKE ONE-HALF TABLET BY MOUTH TWICE A DAY, Disp: 90 Tab, Rfl: 1    abatacept (ORENCIA SC), by SubCUTAneous route every month., Disp: , Rfl:     XARELTO 20 mg tab tablet, Take 1 Tab by mouth daily (with dinner). , Disp: 30 Tab, Rfl: 6    methotrexate (RHEUMATREX) 2.5 mg tablet, , Disp: , Rfl:     folic acid (FOLVITE) 1 mg tablet, Take  by mouth daily. , Disp: , Rfl:     gabapentin (NEURONTIN) 300 mg capsule, Take 2 Caps by mouth nightly., Disp: 180 Cap, Rfl: 1    cholecalciferol, VITAMIN D3, (VITAMIN D3) 5,000 unit tab tablet, Take 5,000 Units by mouth daily. , Disp: , Rfl:     cyanocobalamin 1,000 mcg tablet, Take 1,000 mcg by mouth two (2) times a day., Disp: , Rfl:     diclofenac (VOLTAREN) 1 % gel, Apply  to affected area as needed. , Disp: , Rfl:     acetaminophen (TYLENOL) 650 mg CR tablet, Take 650 mg by mouth three (3) times daily as needed for Pain (taking 2 tid). , Disp: , Rfl:     ascorbic acid (VITAMIN C) 500 mg tablet, Take 500 mg by mouth two (2) times a day.  , Disp: , Rfl:     digoxin (LANOXIN) 0.125 mg tablet, Take 1 Tab by mouth daily. , Disp: 30 Tab, Rfl: 3    B.infantis-B.ani-B.long-B.bifi (PROBIOTIC 4X) 10-15 mg TbEC, Take  by mouth., Disp: , Rfl:     melatonin tab tablet, Take 8 mg by mouth nightly., Disp: , Rfl:     calcium-vitamin D (CALCIUM 500+D) 500 mg(1,250mg) -200 unit per tablet, Take 1 Tab by mouth two (2) times daily (with meals). , Disp: , Rfl:   Allergies   Allergen Reactions    Plaquenil [Hydroxychloroquine] Nausea and Vomiting    Pcn [Penicillins] Itching and Rash    Prednisone Nausea and Vomiting     She reports a plaquenil/prednisone come causes vomiting    Zithromax [Azithromycin] Rash, Other (comments) and Hives     [facility]'s records state skin peeling. High fever, peeling         Review of Systems   Review of Systems   Constitutional: Negative for chills and fever. HENT: Negative for nosebleeds. Eyes: Negative for blurred vision and double vision.    Respiratory: Negative for cough, hemoptysis, hours as needed for Wheezing or Shortness of Breath 3 Inhaler 3    guaiFENesin (MUCINEX) 600 MG extended release tablet Take 1,200 mg by mouth 2 times daily      budesonide-formoterol (SYMBICORT) 80-4.5 MCG/ACT AERO Inhale 2 puffs into the lungs 2 times daily 3 Inhaler 3    tiotropium (SPIRIVA RESPIMAT) 2.5 MCG/ACT AERS inhaler Inhale 2 puffs into the lungs daily 3 Inhaler 3    Dextromethorphan Polistirex (ROBITUSSIN 12 HOUR COUGH PO) Take by mouth 2 times daily as needed       loperamide (IMODIUM) 2 MG capsule Take 2 mg by mouth daily       rosuvastatin (CRESTOR) 20 MG tablet Take 20 mg by mouth daily       No current facility-administered medications on file prior to visit. REVIEW OF SYSTEMS    Constitutional: negative for chills, fevers and sweats  Eyes: negative for irritation and redness  Ears, nose, mouth, throat, and face: positive for hoarseness, sore mouth/throat, and voice change, negative for hearing loss  Respiratory: positive for cough, negative for shortness of breath  Cardiovascular: negative for chest pain  Gastrointestinal: negative for abdominal pain, nausea and vomiting  Integument/breast: negative for rash  Musculoskeletal:negative for muscle weakness  Neurological: negative for gait problems, memory problems and speech problems  Behavioral/Psych: positive for good mood    Objective:      /81 (Site: Left Upper Arm, Position: Sitting, Cuff Size: Medium Adult)   Pulse 72   Temp 98.6 °F (37 °C) (Oral)   Resp 16   Ht 6' 2\" (1.88 m)   Wt 235 lb (106.6 kg) Comment: pt stated  BMI 30.17 kg/m²     Wt Readings from Last 3 Encounters:   02/10/20 235 lb (106.6 kg)   01/24/20 236 lb 12.8 oz (107.4 kg)   01/06/20 236 lb (107 kg)         There is no immunization history on file for this patient.     PHYSICAL EXAM    General Appearance: alert and oriented to person, place and time  Skin: warm and dry  Head: normocephalic and atraumatic  Eyes: conjunctivae normal  ENT: hearing grossly sputum production, shortness of breath and wheezing. Cardiovascular: Negative for chest pain, palpitations, orthopnea, claudication, leg swelling and PND. Gastrointestinal: Negative for abdominal pain, heartburn, nausea and vomiting. Musculoskeletal: Negative for myalgias. Skin: Negative for rash. Neurological: Negative for dizziness, weakness and headaches. Endo/Heme/Allergies: Does not bruise/bleed easily.    PHYSICAL EXAMINATION:  [ INSTRUCTIONS:  \"[x]\" Indicates a positive item  \"[]\" Indicates a negative item  -- DELETE ALL ITEMS NOT EXAMINED]  Vital Signs: (As obtained by patient/caregiver at home)  Visit Vitals  /73   Pulse 78   Ht 5' 5\" (1.651 m)   Wt 61.2 kg (135 lb)   LMP 04/25/2016   BMI 22.47 kg/m²        Constitutional: [x] Appears well-developed and well-nourished [x] No apparent distress      [] Abnormal -     Mental status: [x] Alert and awake  [x] Oriented to person/place/time [x] Able to follow commands    [] Abnormal -     Eyes:   EOM    [x]  Normal    [] Abnormal -   Sclera  [x]  Normal    [] Abnormal -          Discharge [x]  None visible   [] Abnormal -  HENT: [x] Normocephalic, atraumatic  [] Abnormal - [x] Mouth/Throat: Mucous membranes are moist    External Ears [x] Normal  [] Abnormal -    Neck: [x] No visualized mass,NO JVD [] Abnormal -     Pulmonary/Chest: [x] Respiratory effort normal   [x] No visualized signs of difficulty breathing or respiratory distress        [] Abnormal -    Musculoskeletal:   [x] Normal gait with no signs of ataxia         [x] Normal range of motion of neck        [] Abnormal -   Neurological:        [x] No Facial Asymmetry (Cranial nerve 7 motor function) (limited exam due to video visit)          [x] No gaze palsy        [] Abnormal -         Skin:        [x] No significant exanthematous lesions ,no bruising       [] Abnormal -          Psychiatric:       [x] Normal Affect [] Abnormal -     [x] No Hallucinations  Extremities:           No Edema    Other pertinent observable physical exam findings:-    Pertinent LAB and reports  I have reviewed available  pertinent notes, labs and reports and included in current evaluation of this patient. Assessment & Plan:   Diagnoses and all orders for this visit:    1. PAF (paroxysmal atrial fibrillation) ([facility] [address]    2. Transient cerebral ischemia, unspecified type      Currently on optimal medical therapy. Recent echo report reviewed with patient. No new symptoms. Follow-up in 6 months. Follow-up and Dispositions    · Return in about 6 months (around 10/1/2020). No orders of the defined types were placed in this encounter. Follow-up and Dispositions    · Return in about 6 months (around 10/1/2020). We discussed the expected course, resolution and complications of the diagnosis(es) in detail. Medication risks, benefits, costs, interactions, and alternatives were discussed as indicated. I advised her to contact the office if her condition worsens, changes or fails to improve as anticipated. She expressed understanding with the diagnosis(es) and plan. I was in the office while conducting this encounter. Pursuant to the emergency declaration under the [facility] waiver authority and the [employer] and [employer] General Act, this Virtual  Visit was conducted, with patient's consent, to reduce the patient's risk of exposure to COVID-19 and provide continuity of care for an established patient. Services were provided through a video synchronous discussion virtually to substitute for in-person clinic visit.     Ranjana Arshad MD

## 2020-04-02 ENCOUNTER — OFFICE VISIT (OUTPATIENT)
Dept: ENT CLINIC | Age: 60
End: 2020-04-02
Payer: MEDICARE

## 2020-04-02 VITALS
DIASTOLIC BLOOD PRESSURE: 70 MMHG | RESPIRATION RATE: 16 BRPM | TEMPERATURE: 97.7 F | SYSTOLIC BLOOD PRESSURE: 128 MMHG | HEART RATE: 72 BPM | BODY MASS INDEX: 29.26 KG/M2 | HEIGHT: 74 IN | WEIGHT: 228 LBS

## 2020-04-02 PROCEDURE — G8926 SPIRO NO PERF OR DOC: HCPCS | Performed by: OTOLARYNGOLOGY

## 2020-04-02 PROCEDURE — 31575 DIAGNOSTIC LARYNGOSCOPY: CPT | Performed by: OTOLARYNGOLOGY

## 2020-04-02 PROCEDURE — G8417 CALC BMI ABV UP PARAM F/U: HCPCS | Performed by: OTOLARYNGOLOGY

## 2020-04-02 PROCEDURE — G8427 DOCREV CUR MEDS BY ELIG CLIN: HCPCS | Performed by: OTOLARYNGOLOGY

## 2020-04-02 PROCEDURE — 99213 OFFICE O/P EST LOW 20 MIN: CPT | Performed by: OTOLARYNGOLOGY

## 2020-04-02 PROCEDURE — 1036F TOBACCO NON-USER: CPT | Performed by: OTOLARYNGOLOGY

## 2020-04-02 PROCEDURE — 3023F SPIROM DOC REV: CPT | Performed by: OTOLARYNGOLOGY

## 2020-04-02 PROCEDURE — 3017F COLORECTAL CA SCREEN DOC REV: CPT | Performed by: OTOLARYNGOLOGY

## 2020-04-02 ASSESSMENT — ENCOUNTER SYMPTOMS
TROUBLE SWALLOWING: 0
CHOKING: 0
NAUSEA: 0
VOICE CHANGE: 0
STRIDOR: 0
CHEST TIGHTNESS: 0
FACIAL SWELLING: 0
SHORTNESS OF BREATH: 0
WHEEZING: 0
APNEA: 0
RHINORRHEA: 0
ABDOMINAL PAIN: 0
COLOR CHANGE: 0
VOMITING: 0
SINUS PRESSURE: 0
COUGH: 0
SORE THROAT: 0
DIARRHEA: 0

## 2020-04-02 NOTE — PROGRESS NOTES
the past medical history, past surgical history, family history,social history, allergies and current medications were reviewed with the patient. Assessment & Plan   Diagnoses and all orders for this visit:     Diagnosis Orders   1. Invasive fungal infection, larynx  [facility] LARYNGOSCOPY FLEXIBLE DIAGNOSTIC   2. Chronic laryngitis  [facility] LARYNGOSCOPY FLEXIBLE DIAGNOSTIC   3. Dysphonia  [facility] LARYNGOSCOPY FLEXIBLE DIAGNOSTIC   4. 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 Radiation adverse effect, subsequent encounter  [facility] LARYNGOSCOPY FLEXIBLE DIAGNOSTIC   6. Chronic bronchitis, unspecified chronic bronchitis type ([facility] [address]         The findings were explained and his questions were answered. He is doing better but I suspect we are merely suppressing the chronic fungal infection in his radiated laryngeal tissues. This represents improvement over his last exam however should not be interpreted as justification for stopping the voriconazole. I strongly recommend that it be continued. He should call us if he has any worsening of symptoms and otherwise we will check him in about 6 weeks. Hopefully the pandemic guidelines will have subsided by then. Mayo Guerra. Radha Garrett MD    **This report has been created using voice recognition software. It may contain minor errors which are inherent in voicerecognition technology. **

## 2020-04-21 ENCOUNTER — TELEPHONE (OUTPATIENT)
Dept: PULMONOLOGY | Age: 60
End: 2020-04-21

## 2020-04-21 RX ORDER — PREDNISONE 20 MG/1
20 TABLET ORAL 2 TIMES DAILY
Qty: 10 TABLET | Refills: 0 | Status: SHIPPED | OUTPATIENT
Start: 2020-04-21 | End: 2020-04-26

## 2020-04-21 NOTE — TELEPHONE ENCOUNTER
Prednisone sent to [facility], needs appt if no improvement

## 2020-05-11 RX ORDER — TIOTROPIUM BROMIDE INHALATION SPRAY 3.12 UG/1
SPRAY, METERED RESPIRATORY (INHALATION)
Qty: 3 INHALER | Refills: 3 | Status: SHIPPED | OUTPATIENT
Start: 2020-05-11 | End: 2021-01-01

## 2020-05-11 NOTE — TELEPHONE ENCOUNTER
Requested Prescriptions     Pending Prescriptions Disp Refills    SPIRIVA RESPIMAT 2.5 MCG/ACT AERS inhaler [Pharmacy Med Name: Spiriva Respimat 2.5 MCG/ACT Inhalation Aerosol Solution] 12 g 0     Sig: INHALE 2 PUFFS BY MOUTH ONCE DAILY       LOV 11-20-19    Next OV  8-17-20

## 2020-05-14 ENCOUNTER — TELEPHONE (OUTPATIENT)
Dept: PULMONOLOGY | Age: 60
End: 2020-05-14

## 2020-05-14 ENCOUNTER — OFFICE VISIT (OUTPATIENT)
Dept: ENT CLINIC | Age: 60
End: 2020-05-14
Payer: MEDICARE

## 2020-05-14 ENCOUNTER — TELEPHONE (OUTPATIENT)
Dept: ENT CLINIC | Age: 60
End: 2020-05-14

## 2020-05-14 VITALS
WEIGHT: 233 LBS | HEIGHT: 74 IN | SYSTOLIC BLOOD PRESSURE: 130 MMHG | HEART RATE: 64 BPM | TEMPERATURE: 98.7 F | DIASTOLIC BLOOD PRESSURE: 82 MMHG | RESPIRATION RATE: 20 BRPM | BODY MASS INDEX: 29.9 KG/M2

## 2020-05-14 PROCEDURE — G8417 CALC BMI ABV UP PARAM F/U: HCPCS | Performed by: OTOLARYNGOLOGY

## 2020-05-14 PROCEDURE — 99213 OFFICE O/P EST LOW 20 MIN: CPT | Performed by: OTOLARYNGOLOGY

## 2020-05-14 PROCEDURE — 1036F TOBACCO NON-USER: CPT | Performed by: OTOLARYNGOLOGY

## 2020-05-14 PROCEDURE — 31575 DIAGNOSTIC LARYNGOSCOPY: CPT | Performed by: OTOLARYNGOLOGY

## 2020-05-14 PROCEDURE — G8427 DOCREV CUR MEDS BY ELIG CLIN: HCPCS | Performed by: OTOLARYNGOLOGY

## 2020-05-14 PROCEDURE — 3017F COLORECTAL CA SCREEN DOC REV: CPT | Performed by: OTOLARYNGOLOGY

## 2020-05-14 RX ORDER — VORICONAZOLE 200 MG/1
200 TABLET, FILM COATED ORAL DAILY
Qty: 90 TABLET | Refills: 0 | Status: SHIPPED | OUTPATIENT
Start: 2020-05-14 | End: 2020-08-12

## 2020-05-14 ASSESSMENT — ENCOUNTER SYMPTOMS
CHEST TIGHTNESS: 0
DIARRHEA: 0
VOICE CHANGE: 0
FACIAL SWELLING: 0
VOMITING: 0
COUGH: 0
SHORTNESS OF BREATH: 0
RHINORRHEA: 0
NAUSEA: 0
APNEA: 0
TROUBLE SWALLOWING: 0
ABDOMINAL PAIN: 0
STRIDOR: 0
CHOKING: 0
WHEEZING: 0
SORE THROAT: 0
SINUS PRESSURE: 0
COLOR CHANGE: 0

## 2020-05-14 NOTE — PROGRESS NOTES
[facility] PROFESSIONAL SERVS  [facility] EAR, NOSE AND THROAT  [address] [facility]  Dept: [phone]  Dept Fax: [phone]  Loc: [phone]    Narinder Lane is a 60 y.o. male who was referred byNo ref. provider found for:  Chief Complaint   Patient presents with    Follow-up     Patient is here for a 6 week follow up. c/o thick mucus   . HPI:     Narinder Lane is a 60 y.o. male who presents today for follow-up on the fungal infection in his larynx and subglottic area. Currently on maintenance voriconazole because even after couple months of voriconazole and stopping it the infection came back immediately and obstructively. Patient states that he thinks he is improving slowly and that he has only 1 week of voriconazole left. He does not have an appointment for follow-up with infectious disease and he has no more refills. .    History:      Allergies   Allergen Reactions    Povidone Iodine Rash     Surgery prep     Current Outpatient Medications   Medication Sig Dispense Refill    Acetylcysteine (NAC PO) Take by mouth      SPIRIVA RESPIMAT 2.5 MCG/ACT AERS inhaler INHALE 2 PUFFS BY MOUTH ONCE DAILY 3 Inhaler 3    SYMBICORT 80-4.5 MCG/ACT AERO Inhale 2 puffs into the lungs 2 times daily Rinse mouth after doses and spit 3 Inhaler 3    acetaminophen (TYLENOL) 650 MG extended release tablet Take 1 tablet by mouth as needed for Pain Do not take with hydrocodone/acetominophen 60 tablet 3    albuterol (PROVENTIL) (2.5 MG/3ML) 0.083% nebulizer solution Take 3 mLs by nebulization every 6 hours as needed for Wheezing or Shortness of Breath 360 vial 3    albuterol sulfate HFA (VENTOLIN HFA) 108 (90 Base) MCG/ACT inhaler Inhale 2 puffs into the lungs every 6 hours as needed for Wheezing or Shortness of Breath 3 Inhaler 3    guaiFENesin (MUCINEX) 600 MG extended release tablet Take 1,200 mg by mouth 2 times daily      rosuvastatin (CRESTOR) 20 MG tablet Take 20 mg

## 2020-05-15 RX ORDER — NEBULIZER ACCESSORIES
EACH MISCELLANEOUS
Qty: 1 EACH | Refills: 0 | Status: SHIPPED | OUTPATIENT
Start: 2020-05-15 | End: 2020-05-15 | Stop reason: SDUPTHER

## 2020-05-15 RX ORDER — NEBULIZER ACCESSORIES
EACH MISCELLANEOUS
Qty: 1 EACH | Refills: 0 | Status: SHIPPED | OUTPATIENT
Start: 2020-05-15 | End: 2020-10-09

## 2020-06-12 ENCOUNTER — OFFICE VISIT (OUTPATIENT)
Dept: ENT CLINIC | Age: 60
End: 2020-06-12
Payer: MEDICARE

## 2020-06-12 VITALS
HEART RATE: 76 BPM | TEMPERATURE: 98.6 F | RESPIRATION RATE: 20 BRPM | BODY MASS INDEX: 29.56 KG/M2 | WEIGHT: 230.2 LBS | SYSTOLIC BLOOD PRESSURE: 130 MMHG | DIASTOLIC BLOOD PRESSURE: 84 MMHG

## 2020-06-12 LAB
ABSOLUTE BASO #: 0 /CMM (ref 0–200)
ABSOLUTE EOS #: 192 /CMM (ref 0–500)
ABSOLUTE LYMPH #: 1088 /CMM (ref 1000–4800)
ABSOLUTE MONO #: 320 /CMM (ref 0–800)
ABSOLUTE NEUT #: 4800 /CMM (ref 1800–7700)
ALBUMIN SERPL-MCNC: 4.3 GM/DL (ref 3.5–5)
ALP BLD-CCNC: 124 IU/L (ref 41–137)
ALT SERPL-CCNC: 31 IU/L (ref 10–40)
ANION GAP SERPL CALCULATED.3IONS-SCNC: 7 MMOL/L (ref 4–12)
ANISOCYTOSIS: NORMAL
AST SERPL-CCNC: 22 IU/L (ref 15–41)
BILIRUB SERPL-MCNC: 0.3 MG/DL (ref 0.2–1)
BILIRUBIN DIRECT: 0.1 MG/DL (ref 0.1–0.2)
BUN BLDV-MCNC: 19 MG/DL (ref 7–20)
CALCIUM SERPL-MCNC: 9.1 MG/DL (ref 8.8–10.5)
CHLORIDE BLD-SCNC: 102 MEQ/L (ref 101–111)
CO2: 29 MEQ/L (ref 21–32)
CREAT SERPL-MCNC: 0.71 MG/DL (ref 0.6–1.3)
CREATININE CLEARANCE: >60
EOSINOPHIL # BLD: 3 % (ref 0–6)
GLUCOSE: 94 MG/DL (ref 70–110)
HCT VFR BLD CALC: 45.3 % (ref 40–49)
HEMOGLOBIN: 15.3 GM/DL (ref 13.5–16.5)
LYMPHOCYTES # BLD: 17 % (ref 15–45)
MCH RBC QN AUTO: 31.4 PG (ref 27.5–33)
MCHC RBC AUTO-ENTMCNC: 33.8 GM/DL (ref 33–36)
MCV RBC AUTO: 93 CU MIC (ref 80–97)
MONOCYTES: 5 % (ref 2–10)
NEUTROPHILS SEGMENTED: 75 % (ref 40–70)
PDW BLD-RTO: 13.8 % (ref 12–16)
PLATELET # BLD: 247 TH/CMM (ref 150–400)
POTASSIUM SERPL-SCNC: 5.2 MEQ/L (ref 3.6–5)
RBC # BLD: 4.87 MIL/CMM (ref 4.5–6)
SODIUM BLD-SCNC: 138 MEQ/L (ref 135–145)
TOTAL PROTEIN: 6.5 G/DL (ref 6.2–8)
WBC # BLD: 6.4 TH/CMM (ref 4.4–10.5)

## 2020-06-12 PROCEDURE — G8417 CALC BMI ABV UP PARAM F/U: HCPCS | Performed by: OTOLARYNGOLOGY

## 2020-06-12 PROCEDURE — 99213 OFFICE O/P EST LOW 20 MIN: CPT | Performed by: OTOLARYNGOLOGY

## 2020-06-12 PROCEDURE — G8427 DOCREV CUR MEDS BY ELIG CLIN: HCPCS | Performed by: OTOLARYNGOLOGY

## 2020-06-12 PROCEDURE — 31575 DIAGNOSTIC LARYNGOSCOPY: CPT | Performed by: OTOLARYNGOLOGY

## 2020-06-12 PROCEDURE — 3017F COLORECTAL CA SCREEN DOC REV: CPT | Performed by: OTOLARYNGOLOGY

## 2020-06-12 PROCEDURE — 1036F TOBACCO NON-USER: CPT | Performed by: OTOLARYNGOLOGY

## 2020-06-12 ASSESSMENT — ENCOUNTER SYMPTOMS
STRIDOR: 0
TROUBLE SWALLOWING: 0
VOICE CHANGE: 0
COLOR CHANGE: 0
NAUSEA: 0
ABDOMINAL PAIN: 0
SHORTNESS OF BREATH: 0
CHOKING: 0
DIARRHEA: 0
CHEST TIGHTNESS: 0
SINUS PRESSURE: 0
RHINORRHEA: 0
FACIAL SWELLING: 0
COUGH: 0
VOMITING: 0
SORE THROAT: 0
WHEEZING: 0
APNEA: 0

## 2020-06-12 NOTE — PROGRESS NOTES
[facility] PROFESSIONAL SERVS  [facility] EAR, NOSE AND THROAT  [address] [facility]  Dept: [phone]  Dept Fax: [phone]  Loc: [phone]    Loi Clark is a 60 y.o. male who was referred byNo ref. provider found for:  Chief Complaint   Patient presents with    Follow-up     Patient here for 1 month follow up. Meet Shaver HPI:     Loi Clark is a 60 y.o. male who presents today for follow-up on his fungal infection in his subglottic area. He thinks his airway is improving. He also complains of a sore in his nose and corner of his mouth. .    History: Allergies   Allergen Reactions    Povidone Iodine Rash     Surgery prep     Current Outpatient Medications   Medication Sig Dispense Refill    Respiratory Therapy Supplies (NEBULIZER AIR TUBE/PLUGS) MISC Please provide nebulizer kit and supplies.  1 each 0    Acetylcysteine (NAC PO) Take by mouth      voriconazole (VFEND) 200 MG tablet Take 1 tablet by mouth daily Start after twice daily dose is completed 90 tablet 0    SPIRIVA RESPIMAT 2.5 MCG/ACT AERS inhaler INHALE 2 PUFFS BY MOUTH ONCE DAILY 3 Inhaler 3    SYMBICORT 80-4.5 MCG/ACT AERO Inhale 2 puffs into the lungs 2 times daily Rinse mouth after doses and spit 3 Inhaler 3    acetaminophen (TYLENOL) 650 MG extended release tablet Take 1 tablet by mouth as needed for Pain Do not take with hydrocodone/acetominophen 60 tablet 3    albuterol (PROVENTIL) (2.5 MG/3ML) 0.083% nebulizer solution Take 3 mLs by nebulization every 6 hours as needed for Wheezing or Shortness of Breath 360 vial 3    albuterol sulfate HFA (VENTOLIN HFA) 108 (90 Base) MCG/ACT inhaler Inhale 2 puffs into the lungs every 6 hours as needed for Wheezing or Shortness of Breath 3 Inhaler 3    guaiFENesin (MUCINEX) 600 MG extended release tablet Take 1,200 mg by mouth 2 times daily      rosuvastatin (CRESTOR) 20 MG tablet Take 20 mg by mouth daily      Dextromethorphan Polistirex nosebleeds, postnasal drip, rhinorrhea, sinus pressure, sneezing, sore throat, tinnitus, trouble swallowing and voice change. Eyes: Negative for visual disturbance. Respiratory: Negative for apnea, cough, choking, chest tightness, shortness of breath, wheezing and stridor. Cardiovascular: Negative for chest pain, palpitations and leg swelling. Gastrointestinal: Negative for abdominal pain, diarrhea, nausea and vomiting. Endocrine: Negative for cold intolerance, heat intolerance, polydipsia and polyuria. Genitourinary: Negative for dysuria, enuresis and hematuria. Musculoskeletal: Negative for arthralgias, gait problem, neck pain and neck stiffness. Skin: Negative for color change and rash. Allergic/Immunologic: Negative for environmental allergies, food allergies and immunocompromised state. Neurological: Negative for dizziness, syncope, facial asymmetry, speech difficulty, light-headedness and headaches. Hematological: Negative for adenopathy. Does not bruise/bleed easily. Psychiatric/Behavioral: Negative for confusion and sleep disturbance. The patient is not nervous/anxious. Objective:   /84 (Site: Right Upper Arm, Position: Sitting)   Pulse 76   Temp 98.6 °F (37 °C) (Infrared)   Resp 20   Wt 230 lb 3.2 oz (104.4 kg)   BMI 29.56 kg/m²     Physical Exam   Voice: Voice is less hoarse and there is less breathy stridor. PROCEDURE: FIBEROPTIC LARYNGOSCOPY    A fiberoptic laryngoscopy was performed under topical anesthesia, after using Afrin and Lidocaine spray in the nasal fossa. The nasal fossa, nasopharynx, hypopharynx and larynx were carefully examined. Base of tongue was symmetrical. Epiglottis appeared normal and was not retrodisplaced. True vocal cords had normal mobility. There was some crusting visible between the vocal cords. There was no erythema. No mucosal lesions or masses were noted. No pooling in the pyriform sinuses.     Data:  All of the past medical history, past surgical history, family history,social history, allergies and current medications were reviewed with the patient. Assessment & Plan   Diagnoses and all orders for this visit:     Diagnosis Orders   1. Invasive fungal infection, larynx and subglottis     2. 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 Radiation adverse effect, subsequent encounter     4. Chronic laryngitis, with chronic subglottic tracheitis         The findings were explained and his questions were answered. Laryngoscopy shows improvement but some persistence of the crusting in the subglottic area. The maintenance dosing of the voriconazole must be continued. We will  recheck him in 2 months. I suggested peroxide, hot soaks and topical bacitracin or Polysporin for the sore in his corner of his mouth and his nose. Return in about 2 months (around 8/12/2020). Sylvia Madison. Lamar Escalante MD    **This report has been created using voice recognition software. It may contain minor errors which are inherent in voicerecognition technology. **

## 2020-06-12 NOTE — PROGRESS NOTES
Verbal order from Dr. Uri Velazquez to anesthetize the patient's nasal cavity. After checking the patient's allergy list to confirm no listed medication allergy and verbally asking the patient, the patient's nasal cavity was anesthetized with topical 4% Xylocaine 4 sprays each nostril and Lobo-Synephrine 4 sprays each nostril .

## 2020-06-22 ENCOUNTER — TELEPHONE (OUTPATIENT)
Dept: PULMONOLOGY | Age: 60
End: 2020-06-22

## 2020-06-23 RX ORDER — ACETYLCYSTEINE 100 MG/ML
4 SOLUTION ORAL; RESPIRATORY (INHALATION) 3 TIMES DAILY
Qty: 360 ML | Refills: 0 | Status: SHIPPED | OUTPATIENT
Start: 2020-06-23 | End: 2020-09-25 | Stop reason: SDUPTHER

## 2020-08-03 ENCOUNTER — HOSPITAL ENCOUNTER (OUTPATIENT)
Dept: CT IMAGING | Age: 60
Discharge: HOME | End: 2020-08-03
Payer: MEDICARE

## 2020-08-03 PROCEDURE — G0297 LDCT FOR LUNG CA SCREEN: HCPCS

## 2020-08-18 ENCOUNTER — OFFICE VISIT (OUTPATIENT)
Dept: ENT CLINIC | Age: 60
End: 2020-08-18
Payer: MEDICARE

## 2020-08-18 VITALS
TEMPERATURE: 97.1 F | WEIGHT: 225.2 LBS | SYSTOLIC BLOOD PRESSURE: 128 MMHG | DIASTOLIC BLOOD PRESSURE: 84 MMHG | RESPIRATION RATE: 16 BRPM | HEART RATE: 76 BPM | BODY MASS INDEX: 28.91 KG/M2

## 2020-08-18 PROBLEM — J38.6 GLOTTIC STENOSIS: Status: ACTIVE | Noted: 2019-12-29

## 2020-08-18 PROBLEM — R49.0 HOARSENESS: Status: ACTIVE | Noted: 2020-08-18

## 2020-08-18 PROBLEM — R06.1 CHRONIC STRIDOR: Status: ACTIVE | Noted: 2020-08-18

## 2020-08-18 PROBLEM — J39.8 TRACHEAL STENOSIS: Status: ACTIVE | Noted: 2020-08-18

## 2020-08-18 PROCEDURE — 3017F COLORECTAL CA SCREEN DOC REV: CPT | Performed by: OTOLARYNGOLOGY

## 2020-08-18 PROCEDURE — G8427 DOCREV CUR MEDS BY ELIG CLIN: HCPCS | Performed by: OTOLARYNGOLOGY

## 2020-08-18 PROCEDURE — G8417 CALC BMI ABV UP PARAM F/U: HCPCS | Performed by: OTOLARYNGOLOGY

## 2020-08-18 PROCEDURE — 3023F SPIROM DOC REV: CPT | Performed by: OTOLARYNGOLOGY

## 2020-08-18 PROCEDURE — 1036F TOBACCO NON-USER: CPT | Performed by: OTOLARYNGOLOGY

## 2020-08-18 PROCEDURE — 99215 OFFICE O/P EST HI 40 MIN: CPT | Performed by: OTOLARYNGOLOGY

## 2020-08-18 PROCEDURE — 31575 DIAGNOSTIC LARYNGOSCOPY: CPT | Performed by: OTOLARYNGOLOGY

## 2020-08-18 PROCEDURE — G8926 SPIRO NO PERF OR DOC: HCPCS | Performed by: OTOLARYNGOLOGY

## 2020-08-18 RX ORDER — SODIUM CHLORIDE FOR INHALATION 3 %
4 VIAL, NEBULIZER (ML) INHALATION 3 TIMES DAILY
Qty: 1500 ML | Refills: 5 | Status: SHIPPED | OUTPATIENT
Start: 2020-08-18 | End: 2020-08-19

## 2020-08-18 RX ORDER — SODIUM CHLORIDE FOR INHALATION 0.9 %
3 VIAL, NEBULIZER (ML) INHALATION PRN
Qty: 500 ML | Refills: 5 | Status: SHIPPED | OUTPATIENT
Start: 2020-08-18 | End: 2020-08-19

## 2020-08-18 RX ORDER — PRAVASTATIN SODIUM 40 MG
40 TABLET ORAL NIGHTLY
COMMUNITY
Start: 2020-07-23

## 2020-08-18 NOTE — PROGRESS NOTES
[facility] PROFESSIONAL SERVS  [facility] EAR, NOSE AND THROAT  [address] [place], [place] [address]  Dept: [phone]  Dept Fax: [phone]  Loc: [phone]    Darryn Cornelius is a 60 y.o. male who was referred by No ref. provider found for:  Chief Complaint   Patient presents with    Follow-up     Patient here for 2 month follow up and scope. HPI:     Darryn Cornelius is a 60 y.o. male patient of Dr. Radha Nelson here for follow-up of fungal laryngotracheitis. He has been on approximately 6 months of fluconazole with infectious disease guidance. In the first few weeks of his treatment, his voice and his breathing were markedly improved. For whatever reason this therapeutic benefit fell off and the patient has not been back to breathing comfortably or having his previously \"normal\" voice since that time. When asked about his breathing he states \"I have got COPD\" but acknowledges that his breathing dramatically worsened since his development of fungal airway disease. He is status post full course radiation therapy in 2017 for some location of laryngeal cancer that he recalls as possibly being his false cords. He did not have any chemotherapy. He states that he sleeps very poorly and awakens frequently coughing and choking sometimes bringing up some size quantities of thick sticky mucus. He has had no sleep study today. He was an avid smoker and heavy drinker for decades but quit both  In 2006 on the death of his father for diseases related to both excessive uses. He also has a history of rectal cancer for which she has a permanent colostomy. History: Allergies   Allergen Reactions    Povidone Iodine Rash     Surgery prep     Current Outpatient Medications   Medication Sig Dispense Refill    pravastatin (PRAVACHOL) 40 MG tablet Take 40 mg by mouth daily      Respiratory Therapy Supplies (NEBULIZER AIR TUBE/PLUGS) MISC Please provide nebulizer kit and supplies.  1 each 0    Acetylcysteine (NAC PO) Take by mouth      SPIRIVA RESPIMAT 2.5 MCG/ACT AERS inhaler INHALE 2 PUFFS BY MOUTH ONCE DAILY 3 Inhaler 3    SYMBICORT 80-4.5 MCG/ACT AERO Inhale 2 puffs into the lungs 2 times daily Rinse mouth after doses and spit 3 Inhaler 3    acetaminophen (TYLENOL) 650 MG extended release tablet Take 1 tablet by mouth as needed for Pain Do not take with hydrocodone/acetominophen 60 tablet 3    albuterol (PROVENTIL) (2.5 MG/3ML) 0.083% nebulizer solution Take 3 mLs by nebulization every 6 hours as needed for Wheezing or Shortness of Breath 360 vial 3    albuterol sulfate HFA (VENTOLIN HFA) 108 (90 Base) MCG/ACT inhaler Inhale 2 puffs into the lungs every 6 hours as needed for Wheezing or Shortness of Breath 3 Inhaler 3    guaiFENesin (MUCINEX) 600 MG extended release tablet Take 1,200 mg by mouth 2 times daily      Dextromethorphan Polistirex (ROBITUSSIN 12 HOUR COUGH PO) Take by mouth 2 times daily as needed       loperamide (IMODIUM) 2 MG capsule Take 2 mg by mouth daily       acetylcysteine (MUCOMYST) 10 % nebulizer solution Inhale 4 mLs into the lungs 3 times daily 360 mL 0     No current facility-administered medications for this visit.       Past Medical History:   Diagnosis Date    Arthritis     COPD (chronic obstructive pulmonary disease) ([address]     Pneumonia 01/2017 1/2017 and 2/2017    Rect[facility]     Squamous cell carcinoma of vocal cord ([address]     Thyroid disease       Past Surgical History:   Procedure Laterality Date    COLONOSCOPY  2016    EYE SURGERY      CROSS EYED    HAND SURGERY Bilateral 1995    KNEE ARTHROSCOPY Right 1996    LARYNGOSCOPY  07/12/2017    Biopsy    LARYNGOSCOPY N/A 7/12/2019    MICRO LARYNGOSCOPY W/ BIOPSY performed by Mitchell Hill MD at [address] [place] [address] 12/30/2019    DIRECT LARYNGOSCOPY WITH BIOPSY performed by Mitchell Hill MD at [address] RECTAL SURGERY  08/29/2016    colostemy [place]    ROTATOR CUFF REPAIR Left 80'S     Family History   Problem Relation Age of Onset    Prostate Cancer Father 48    Cancer Father     Heart Disease Father     Diabetes Mother     Heart Disease Mother     Stroke Mother     Heart Disease Brother     High Blood Pressure Other     Cancer Other         LUNG,PROSTATE    Hearing Loss Other      Social History     Tobacco Use    Smoking status: Former Smoker     Packs/day: 2.25     Years: 31.00     Pack years: 69.75     Start date:      Last attempt to quit: 2006     Years since quittin.5    Smokeless tobacco: Never Used   Substance Use Topics    Alcohol use: No     Alcohol/week: 0.0 standard drinks        Subjective:      Review of Systems  Rest of review of systems are negative, except as noted in HPI. Objective:     /84 (Site: Left Upper Arm, Position: Sitting)   Pulse 76   Temp 97.1 °F (36.2 °C) (Infrared)   Resp 16   Wt 225 lb 3.2 oz (102.2 kg)   BMI 28.91 kg/m²     Physical Exam       On general physical exam the patient is pleasant alert well oriented and cooperative adult male who looks somewhat ill but approximately his stated age. His voice is very abnormal and that it is markedly depressed, markedly raspy, and very low in pitch for his age and gender. His breathing is somewhat labored with a long inspiratory phase. Regular episodes of throat clearing and occasional coughing were heard. Some sputum production was heard. His oral cavity was abnormal for a edentulous mandible and maxilla. His mucosa was surprisingly moist.  He had a class I Mallampati oropharyngeal aperture. He had no leukoplakia or erythroplasia within his oral cavity. His neck was abnormal for the presence of marked to severe woody induration of his laryngeal prominence. It was very wide and minimally mobile. I detected no adenopathy or thyromegaly but would not expect to detect either easily.     Procedure: Flexible laryngoscopy: Following informed consent and the spraying of topical decongestant and lidocaine 4%, I found the patient's nasal passage to be very irregular and difficult to traverse. I did enter and successfully traversed through the right side. There was thick purulent mucus within the inferior and middle meatus areas. Ultimately I was able to see into the nasopharynx and found the mucosa to be surprisingly within normal limits. His tongue base was attenuated and anteriorly displaced. His epiglottis was vertical.  His laryngeal inlet was markedly abnormal for the presence of posteriorly biased edema and induration. Anteriorly on the false cords began a carpet of nodular swellings that appeared to be consistent with invasive fungal laryngitis. This extended down into the glottis where he had approximately a 30% aperture between the 2 sides. He had equal mobility/hypomobility. His immediate subglottis was indurated and narrowed. There was additional cobblestoning of nodular mucosa into the subglottis and proximal trachea. I could not see past this point without additional anesthesia. He had a short interval of laryngospasm but it cleared spontaneously within approximately 1 minute. His arytenoid towers were markedly edematous with bulbous edema bilaterally. These tissues and the false cords appeared to be the vibratory source for his voice. His post cricoid mucosa was also edematous. The patient tolerated the procedure acceptably well. Vitals reviewed. Ct Lung Screen (annual)    Result Date: 8/3/2020  1. There are no suspicious masses or nodules within the lung fields. 2. There are no pathologically enlarged lymph nodes. 3. Severe emphysematous changes of the lungs bilaterally.  4. LUNGRADS ASSESSMENT VALUE: 1 The LUNG RADS RECOMMENDATIONS for monitoring lung nodules listed below (ACR- Lung-RADS Version 1.0 Assessment Categories Release date\" April 28, 2014) LUNG RADS RECOMMENDATIONS; 1. Normal, continue annual screening 2. Benign appearance or behavior, continue annual screening 3.  6 month CT recommended 4A.  3 month CT recommended; may consider PET/CT 4B. Additional diagnostics and/or tissue sampling recommended 4X. Additional diagnostics and/or tissue sampling recommended  **This report has been created using voice recognition software. It may contain minor errors which are inherent in voice recognition technology. ** Final report electronically signed by Dr Juana Bhardwaj on 8/3/2020 10:23 AM     Lab Results   Component Value Date     06/12/2020     02/06/2020     11/11/2019    K 5.2 06/12/2020    K 4.4 02/06/2020    K 4.1 11/11/2019     06/12/2020     02/06/2020    CL 99 11/11/2019    CO2 29 06/12/2020    CO2 29 02/06/2020    CO2 25 11/11/2019    BUN 19 06/12/2020    BUN 18 02/06/2020    BUN 13 11/11/2019    CREATININE 0.71 06/12/2020    CREATININE 0.78 02/06/2020    CREATININE 0.8 01/21/2020    CALCIUM 9.10 06/12/2020    CALCIUM 8.70 02/06/2020    CALCIUM 9.1 11/11/2019    PROT 6.5 06/12/2020    PROT 6.6 02/06/2020    PROT 7.5 11/11/2019    LABALBU 4.3 06/12/2020    LABALBU 4.5 02/06/2020    LABALBU 4.8 11/11/2019    LABALBU 4.6 12/06/2011    BILITOT 0.3 06/12/2020    BILITOT 0.5 02/06/2020    BILITOT 0.3 11/11/2019    ALKPHOS 124 06/12/2020    ALKPHOS 119 02/06/2020    ALKPHOS 145 11/11/2019    AST 22 06/12/2020    AST 24 02/06/2020    AST 30 11/11/2019    ALT 31 06/12/2020    ALT 26 02/06/2020    ALT 23 11/11/2019       All of the past medical history, past surgical history, family history,social history, allergies and current medications were reviewed with the patient. Assessment & Plan   Diagnoses and all orders for this visit:     Diagnosis Orders   1. Glottic stenosis     2. Invasive fungal infection     3. Hoarseness     4. 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 Chronic stridor     6. Tracheal stenosis     7.  Dysplasia of true vocal cord 8. Radiation adverse effect, subsequent encounter     9. Pharyngoesophageal dysphagia     10. Gastroesophageal reflux disease without esophagitis     11. Chronic bronchitis, unspecified chronic bronchitis type ([address]     12. Dysphonia         Mr. Nicolas Carcamo is currently Dr. Basim Encarnacion patient but I will recommend to him that he consider allowing me to join his care team with the potential that the new laser system that we are acquiring at [facility] in the form of the UltraPulse scanning CO2 laser would be ideal for being able to shave down these protruding elements of his airway and increase the laminar flow potential of his breathing in the relatively near future. This would include taking the widening process into the subglottis and trachea depending on what those tissues look like with a close review likely at the time of a general anesthetic with jet ventilation for respiration. I gave the patient some idea of what this would involve with the small potential that he would need a tracheostomy at some point but that we would make sure to avoid that insofar as is possible. This is of course assuming that Dr. Charly De Paz did not already have plans for his airway widening. As far as the patient recalls that is not the case. If Dr. Charly De Paz approves my involvement I would like to see the patient back in approximately 4 weeks to get him on my schedule for jet ventilation laser laryngoplasty procedure and therapeutic bronchoscopy with laser to ablate obstructive soft tissues. I will get in touch with the patient's infectious disease doctor and make the recommendation that hyperbaric oxygen therapy might be of some use in this context and that he may benefit from antifungal therapy in conjunction with antibiotic therapy and nebulized form.   I have seen a combination of microorganisms produce this scenario in the context of radiation therapy in the past.    In addition and simply as a result of his visit today, I have recommended and will prescribe a combination of 3% saline which she will use 1 cc twice daily and gradually work his way up to a 4 times daily dosing of 3 cc as he tolerates the hypertonic saline. He will also use 0.9% saline as needed thick secretions as much as he believes he needs including in the middle of the night if he wakes up with thick secretions. He is to get back on a twice daily regimen of the Mucomyst and use that after the hypertonic saline so that he does not wash out the Mucomyst with the saline prematurely. I cautioned him about the potential for mucosal edema from irritation of the hypertonic saline. He assured me he would be attentive to that possibility and go slowly on the ramping up of the total volume. I answered his questions and addressed his concerns. He reported being pleased with the outcome of the visit and being willing to proceed as such. Return in about 4 weeks (around 9/15/2020). **This report has been created using voice recognition software. It may contain minor errors which are inherent in voice recognition technology. **

## 2020-08-19 RX ORDER — SODIUM CHLORIDE FOR INHALATION 0.9 %
3 VIAL, NEBULIZER (ML) INHALATION PRN
Qty: 500 ML | Refills: 4 | Status: SHIPPED | OUTPATIENT
Start: 2020-08-19 | End: 2020-08-24

## 2020-08-21 NOTE — TELEPHONE ENCOUNTER
I reentered the prescriptions with the ICD 10 number requested. Has the pharmacy returned additional requests?

## 2020-08-24 RX ORDER — SODIUM CHLORIDE FOR INHALATION 0.9 %
3 VIAL, NEBULIZER (ML) INHALATION PRN
Qty: 500 ML | Refills: 2 | Status: SHIPPED
Start: 2020-08-24 | End: 2020-08-25

## 2020-08-24 NOTE — TELEPHONE ENCOUNTER
Medication was not covered by insurance. Patient has Medicare. Tried prescription through Medicare part D and was denied and insurance stated it needs to go through Medicare part B. Pharmacy recommended [facility]. Please send medication to [facility].

## 2020-08-24 NOTE — TELEPHONE ENCOUNTER
Rx sent to [facility]. Make sure the patient is aware that the pharmacy will likely call him to let him know what the cost of the medication is and discuss shipping. He should let us know if he hasn't heard anything from the pharmacy by early next week.

## 2020-08-25 RX ORDER — SODIUM CHLORIDE FOR INHALATION 0.9 %
3 VIAL, NEBULIZER (ML) INHALATION PRN
Qty: 500 ML | Refills: 3 | Status: SHIPPED | OUTPATIENT
Start: 2020-08-25 | End: 2020-09-24

## 2020-08-25 RX ORDER — SODIUM CHLORIDE FOR INHALATION 3 %
3 VIAL, NEBULIZER (ML) INHALATION PRN
Qty: 500 ML | Refills: 3 | Status: ON HOLD | OUTPATIENT
Start: 2020-08-25 | End: 2020-10-19 | Stop reason: SDUPTHER

## 2020-08-25 NOTE — TELEPHONE ENCOUNTER
Called patient. Informed him of the out of pocket cost  For 3 month supply for the 0.9% solution. Asked if that is acceptable to him. Patient said yes, that is fine with him. Patient then asked about the other 3% solution. Informed patient I would check with Dr Lyle Espinoza. And let him know. Patient verbalized understanding and thanked me.

## 2020-08-25 NOTE — TELEPHONE ENCOUNTER
Spoke to [facility]. The 3% is 34.06 with the good Rx card. Called patient. Informed him. He said that is fine. Patient verbalized understanding and thanked me.

## 2020-08-25 NOTE — TELEPHONE ENCOUNTER
[address] to see how much out of pocket the Rx would be. They said $24.20 cash price for 3 months. I gave her the Good Rx Member #. The cost goes down to $9.39 with the card. Thanked them for checking.

## 2020-08-28 ENCOUNTER — OFFICE VISIT (OUTPATIENT)
Dept: PULMONOLOGY | Age: 60
End: 2020-08-28
Payer: MEDICARE

## 2020-08-28 VITALS
TEMPERATURE: 97.8 F | SYSTOLIC BLOOD PRESSURE: 134 MMHG | BODY MASS INDEX: 28.75 KG/M2 | DIASTOLIC BLOOD PRESSURE: 86 MMHG | HEART RATE: 83 BPM | OXYGEN SATURATION: 95 % | HEIGHT: 74 IN | WEIGHT: 224 LBS

## 2020-08-28 PROCEDURE — G8427 DOCREV CUR MEDS BY ELIG CLIN: HCPCS | Performed by: NURSE PRACTITIONER

## 2020-08-28 PROCEDURE — G8926 SPIRO NO PERF OR DOC: HCPCS | Performed by: NURSE PRACTITIONER

## 2020-08-28 PROCEDURE — 99214 OFFICE O/P EST MOD 30 MIN: CPT | Performed by: NURSE PRACTITIONER

## 2020-08-28 PROCEDURE — G8417 CALC BMI ABV UP PARAM F/U: HCPCS | Performed by: NURSE PRACTITIONER

## 2020-08-28 PROCEDURE — 1036F TOBACCO NON-USER: CPT | Performed by: NURSE PRACTITIONER

## 2020-08-28 PROCEDURE — 3017F COLORECTAL CA SCREEN DOC REV: CPT | Performed by: NURSE PRACTITIONER

## 2020-08-28 PROCEDURE — 3023F SPIROM DOC REV: CPT | Performed by: NURSE PRACTITIONER

## 2020-08-28 RX ORDER — BUDESONIDE AND FORMOTEROL FUMARATE DIHYDRATE 160; 4.5 UG/1; UG/1
2 AEROSOL RESPIRATORY (INHALATION) 2 TIMES DAILY
Qty: 3 INHALER | Refills: 3 | Status: SHIPPED | OUTPATIENT
Start: 2020-08-28 | End: 2021-01-01 | Stop reason: SDUPTHER

## 2020-08-28 RX ORDER — ACETYLCYSTEINE 100 MG/ML
4 SOLUTION ORAL; RESPIRATORY (INHALATION) 2 TIMES DAILY
Qty: 240 ML | Refills: 3 | Status: CANCELLED | OUTPATIENT
Start: 2020-08-28

## 2020-08-28 ASSESSMENT — COPD QUESTIONNAIRES: COPD: 1

## 2020-08-28 ASSESSMENT — ENCOUNTER SYMPTOMS
HEMOPTYSIS: 0
STRIDOR: 0
SHORTNESS OF BREATH: 1
COUGH: 1
CHEST TIGHTNESS: 0
NAUSEA: 0
WHEEZING: 0
SPUTUM PRODUCTION: 1
DIARRHEA: 0
VOMITING: 0
VOICE CHANGE: 1

## 2020-08-28 NOTE — PROGRESS NOTES
[facility]    Patient: Martell Clemens, 60 y.o.   : 1960  2020    Pt of Dr. Claritza Valerio   Patient presents with    Follow-up     8 mo f/u, CT LUNG 8/3        COPD   He complains of cough, shortness of breath and sputum production. There is no hemoptysis or wheezing. This is a chronic problem. The current episode started more than 1 year ago. The problem occurs daily. The problem has been unchanged. The cough is productive of sputum. Pertinent negatives include no chest pain, dyspnea on exertion, fever, nasal congestion or postnasal drip. His symptoms are aggravated by change in weather and exposure to fumes. Ashley Hoover is here for follow up for COPD with mixed chronic bronchitis and pulmonary emphysema. 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 Tiffany Warner and radiation oncology. Previously treated by ID Dr. Ebenezer Hurtado combined bacterial and fungal infection. MMRC Dyspnea Scale:   [ ] 0: Dyspneic on strenuous exercise  [ ] 1: Dyspneic on walking a slight hill  [ ] 2: Dyspneic on walking level ground; must stop occasionally due to breathlessness  [ ] 3: Must stop for breathlessness after walking 100 yards or after a few minutes  [ ] 4: Cannot leave house; breathless on dressing/undressing    0      Progress History:   Since last visit any new medical issues? No, met with ENT Dr. Louis Obrien surgery to evaluate/resect scar tissue   New ER or hospital visits? No  Any new or changes in medicines? Yes saline nebs started by ENT  Using inhalers? Yes Spiriva and symbicort, mucomyst PRN albuterol, 1 time per week on avg. Are they helpful? Yes   Pneumonia vaccine?   Past Medical hx   PMH:  Past Medical History:   Diagnosis Date    Arthritis     [facility] (chronic obstructive [facility] disease) [facility]     Pneumonia 2017 and 2017    Rectal cancer OTHER (LUNGS/MEDIASTINUM/MUSCULOSKELETAL/ABDOMEN): The heart is normal in size. There are coronary artery calcifications. There is no pericardial effusion. There are severe upper lobe predominant emphysematous changes of the lungs. No pleural effusion or pneumothorax. No abnormality seen in the visualized upper abdomen. Impression:  1. There are no suspicious masses or nodules within the lung fields. 2. There are no pathologically enlarged lymph nodes. 3. Severe emphysematous changes of the lungs bilaterally. 4. LUNGRADS ASSESSMENT VALUE: 1       The LUNG RADS RECOMMENDATIONS for monitoring lung nodules listed below (ACR- Lung-RADS Version 1.0 Assessment Categories Release date\" April 28, 2014)    LUNG RADS RECOMMENDATIONS;     1.  Normal, continue annual screening   2. Benign appearance or behavior, continue annual screening   3.  6 month CT recommended   4A.  3 month CT recommended; may consider PET/CT   4B. Additional diagnostics and/or tissue sampling recommended   4X. Additional diagnostics and/or tissue sampling recommended        Assessment      Diagnosis Orders   1. Mucopurulent chronic bronchitis (HCC)  budesonide-formoterol (SYMBICORT) 160-4.5 MCG/ACT AERO   2. Pulmonary emphysema, unspecified emphysema type (HCC)  budesonide-formoterol (SYMBICORT) 160-4.5 MCG/ACT AERO   3. Personal history of tobacco use           Plan   -Continue on current therapy spiriva and  Change symbicort to 160  -Continue on saline nebs working well for patient  -Discussed albuterol inhaler and nebulizer use. Reviewed signs and symptoms indicating need for use including Shortness of breath and wheezing. Discussed with patient the importance of using inhaler or nebulizer within the prescribed time frames. Patient verbalized understanding to use one or the other not both within prescribed time frame.  Patient also verbalized understanding that if they experience no relief after using albuterol and resting for 15 minutes they need to go to nearest ER or call 911.  -Reviewed LDCT will continue annual screening order testing at next appointment  -Update spirometry next year  -Advised to maintain pneumonia vaccine with PCP and to take flu vaccine this coming season.  -Advised patient to call office with any changes, questions, or concerns regarding respiratory status    Will see Samir Dey back in: 3 months will update LDCT and spirometry 2021    Anabel Salter CNP  8/28/2020

## 2020-09-15 ENCOUNTER — OFFICE VISIT (OUTPATIENT)
Dept: ENT CLINIC | Age: 60
End: 2020-09-15
Payer: MEDICARE

## 2020-09-15 VITALS
BODY MASS INDEX: 28.72 KG/M2 | WEIGHT: 223.8 LBS | HEIGHT: 74 IN | TEMPERATURE: 97.4 F | SYSTOLIC BLOOD PRESSURE: 128 MMHG | DIASTOLIC BLOOD PRESSURE: 78 MMHG | RESPIRATION RATE: 16 BRPM | HEART RATE: 78 BPM

## 2020-09-15 PROCEDURE — G8427 DOCREV CUR MEDS BY ELIG CLIN: HCPCS | Performed by: OTOLARYNGOLOGY

## 2020-09-15 PROCEDURE — 1036F TOBACCO NON-USER: CPT | Performed by: OTOLARYNGOLOGY

## 2020-09-15 PROCEDURE — 3017F COLORECTAL CA SCREEN DOC REV: CPT | Performed by: OTOLARYNGOLOGY

## 2020-09-15 PROCEDURE — G8417 CALC BMI ABV UP PARAM F/U: HCPCS | Performed by: OTOLARYNGOLOGY

## 2020-09-15 PROCEDURE — 99212 OFFICE O/P EST SF 10 MIN: CPT | Performed by: OTOLARYNGOLOGY

## 2020-09-15 RX ORDER — TRIAMCINOLONE ACETONIDE 1 MG/G
CREAM TOPICAL
COMMUNITY
Start: 2020-08-17 | End: 2020-10-09

## 2020-09-15 NOTE — PROGRESS NOTES
[facility] PROFESSIONAL SERVS  [facility] EAR, NOSE AND THROAT  [address] [place] [address] [place] [address]  Dept: [phone]  Dept Fax: [phone]  Loc: [phone]    Contreras Murphy is a 60 y.o. male who was referred by No ref. provider found for:  Chief Complaint   Patient presents with    Follow-up     Patient is here for a 4 week follow up. HPI:     Contreras Murphy is a 60 y.o. male has a history of laryngeal cancer status post radiation therapy. 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 He has been on a long course of antifungal therapy based on the input of his infectious disease doctor. He is also been on hypertonic and isotonic saline nebulizers to break up the thick secretions overlying his abnormal airway contours along with an acetylcysteine or Mucomyst for the additional benefit of breaking up tenacious mucus. With these treatment modalities the patient has been able to breathe somewhat more comfortably but he still describes himself as being constantly air hungry and desires improvement in his breathing ability insofar as it is possible to have this. History: Allergies   Allergen Reactions    Povidone Iodine Rash     Surgery prep     Current Outpatient Medications   Medication Sig Dispense Refill    triamcinolone (KENALOG) 0.1 % cream APPLY CREAM EXTERNALLY ONCE DAILY      budesonide-formoterol (SYMBICORT) 160-4.5 MCG/ACT AERO Inhale 2 puffs into the lungs 2 times daily Rinse mouth after its use.  3 Inhaler 3    sodium chloride nebulizer 0.9 % solution Take 3 mLs by nebulization as needed for Wheezing (throat dryness) 500 mL 3    sodium chloride, Inhalant, 3 % nebulizer solution Take 3 mLs by nebulization as needed (Throat dryness, cough, wheezing) 500 mL 3    pravastatin (PRAVACHOL) 40 MG tablet Take 40 mg by mouth daily      Respiratory Therapy Supplies (NEBULIZER AIR TUBE/PLUGS) MISC Please provide nebulizer kit and supplies. 1 each 0    Acetylcysteine (NAC PO) Take by mouth      SPIRIVA RESPIMAT 2.5 MCG/ACT AERS inhaler INHALE 2 PUFFS BY MOUTH ONCE DAILY 3 Inhaler 3    acetaminophen (TYLENOL) 650 MG extended release tablet Take 1 tablet by mouth as needed for Pain Do not take with hydrocodone/acetominophen 60 tablet 3    albuterol (PROVENTIL) (2.5 MG/3ML) 0.083% nebulizer solution Take 3 mLs by nebulization every 6 hours as needed for Wheezing or Shortness of Breath 360 vial 3    albuterol sulfate HFA (VENTOLIN HFA) 108 (90 Base) MCG/ACT inhaler Inhale 2 puffs into the lungs every 6 hours as needed for Wheezing or Shortness of Breath 3 Inhaler 3    guaiFENesin (MUCINEX) 600 MG extended release tablet Take 1,200 mg by mouth 2 times daily      Dextromethorphan Polistirex (ROBITUSSIN 12 HOUR COUGH PO) Take by mouth 2 times daily as needed       loperamide (IMODIUM) 2 MG capsule Take 2 mg by mouth daily       acetylcysteine (MUCOMYST) 10 % nebulizer solution Inhale 4 mLs into the lungs 3 times daily 360 mL 0     No current facility-administered medications for this visit.       Past Medical History:   Diagnosis Date    Arthritis     COPD (chronic obstructive pulmonary disease) ([address]     Pneumonia 01/2017 1/2017 and 2/2017    Rectal cancer ([address]     Squamous cell carcinoma of vocal cord ([address]     Thyroid disease       Past Surgical History:   Procedure Laterality Date    COLONOSCOPY  2016    EYE SURGERY      CROSS EYED    HAND SURGERY Bilateral 1995    KNEE ARTHROSCOPY Right 1996    LARYNGOSCOPY  07/12/2017    Biopsy    LARYNGOSCOPY N/A 7/12/2019    MICRO LARYNGOSCOPY W/ BIOPSY performed by Nilsa Ballesteros MD at [address] [place] [address] 12/30/2019    DIRECT LARYNGOSCOPY WITH BIOPSY performed by Nilsa Ballesteros MD at [address] [facility] [address]50 RECTAL SURGERY  08/29/2016    [place]    ROTATOR CUFF REPAIR Left 80'S     Family History   Problem Relation Age of Onset    Prostate Cancer Father 48    Cancer Father     Heart Disease Father     Diabetes Mother     Heart Disease Mother     Stroke Mother     Heart Disease Brother     High Blood Pressure Other     Cancer Other         LUNG,PROSTATE    Hearing Loss Other      Social History     Tobacco Use    Smoking status: Former Smoker     Packs/day: 2.25     Years: 31.00     Pack years: 69.75     Start date:      Last attempt to quit: 2006     Years since quittin.6    Smokeless tobacco: Never Used   Substance Use Topics    Alcohol use: No     Alcohol/week: 0.0 standard drinks        Subjective:      Review of Systems  Rest of review of systems are negative, except as noted in HPI. Objective:     /78 (Site: Left Upper Arm, Position: Sitting)   Pulse 78   Temp 97.4 °F (36.3 °C) (Infrared)   Resp 16   Ht 6' 2\" (1.88 m)   Wt 223 lb 12.8 oz (101.5 kg)   BMI 28.73 kg/m²     Physical Exam       On general physical exam the patient is a pleasant alert oriented and cooperative. His voice is abnormal and it is markedly low in pitch mildly pressed in quality and mildly raspy for his age and gender. It has the timber of a supraglottic voice. His oral and cervical breath sounds are mildly turbulent and is rapid respiration is mildly slowed on inhalation. On auscultation his lung fields are entirely vesicular bilaterally. No rhonchi or rails were heard. He is not cyanotic. Vitals reviewed. No results found.    Lab Results   Component Value Date     2020     2020     2019    K 5.2 2020    K 4.4 2020    K 4.1 2019     2020     2020    CL 99 2019    CO2 29 2020    CO2 29 2020    CO2 25 2019    BUN 19 2020    BUN 18 2020    BUN 13 2019    CREATININE 0.71 2020    CREATININE 0.78 02/06/2020    CREATININE 0.8 01/21/2020    CALCIUM 9.10 06/12/2020    CALCIUM 8.70 02/06/2020    CALCIUM 9.1 11/11/2019    PROT 6.5 06/12/2020    PROT 6.6 02/06/2020    PROT 7.5 11/11/2019    LABALBU 4.3 06/12/2020    LABALBU 4.5 02/06/2020    LABALBU 4.8 11/11/2019    LABALBU 4.6 12/06/2011    BILITOT 0.3 06/12/2020    BILITOT 0.5 02/06/2020    BILITOT 0.3 11/11/2019    ALKPHOS 124 06/12/2020    ALKPHOS 119 02/06/2020    ALKPHOS 145 11/11/2019    AST 22 06/12/2020    AST 24 02/06/2020    AST 30 11/11/2019    ALT 31 06/12/2020    ALT 26 02/06/2020    ALT 23 11/11/2019       All of the past medical history, past surgical history, family history,social history, allergies and current medications were reviewed with the patient. Assessment & Plan   Diagnoses and all orders for this visit:     Diagnosis Orders   1. Airway obstruction  [place] LARYNGOSCOPY,DIR,OP,EXC TUMR,LCL FLAP    BRONCHOSCOPY   2. Adverse effect of radiation therapy, sequela  [place] LARYNGOSCOPY,DIR,OP,EXC TUMR,LCL FLAP    BRONCHOSCOPY   3. Laryngitis, chronic  [place] LARYNGOSCOPY,DIR,OP,EXC TUMR,LCL FLAP   4. Tracheal stenosis  BRONCHOSCOPY   5. Laryngeal stenosis  [place] LARYNGOSCOPY,DIR,OP,EXC TUMR,LCL FLAP         Based on his history and these physical findings, the patient is still in need of transoral laser ablation of the protruding mucous membrane structures that likely harbor his invasive fungal process. Based on what we had planned, we will likely be able to proceed with jet ventilation as I had hoped for. If not, we can use LMA based ventilation with endoscopic and fiberoptic laser energy delivery. He has no teeth so it is possible I will be able to get a direct line of sight view of his airway from above at least to the level of his supraglottis and glottis. And then reserve fiberoptic treatment for the subglottis and proximal trachea.   I will likely treat one side relatively superficially on the other side more deeply in order to give him less of a circumferential process that would weep thick secretions and/or produce significant edema. This planned surgery will be a stage I process followed by a second similar procedure with an emphasis of deep ablation on the other side and a touchup on the first side that was deeply treated. I explained the indications benefits limitations and risks of proceeding in this manner to the patient to his satisfaction. The risks I described include but are not limited to bleeding, recurrent or persistent infection, airway edema, need for revision surgery beyond the first and second stages, tongue pain, dental soreness, and need for restorative surgery of any or all of these problems among others. The patient reported understanding these other risks and wished to proceed. Informed consent was based on this conversation. I will see him back approximately 2 weeks following his operation for a fiberoptic laryngoscopy and to schedule his stage II procedure. Return in about 6 weeks (around 10/27/2020) for 2 weeks status post his planned procedure for follow-up fiberoptic laryngoscopy. I spent approximately 20 minutes face-to-face time with the patient. **This report has been created using voice recognition software. It may contain minor errors which are inherent in voice recognition technology. **

## 2020-09-25 RX ORDER — ACETYLCYSTEINE 100 MG/ML
4 SOLUTION ORAL; RESPIRATORY (INHALATION) 3 TIMES DAILY
Qty: 360 ML | Refills: 0 | Status: SHIPPED | OUTPATIENT
Start: 2020-09-25 | End: 2020-10-09 | Stop reason: DRUGHIGH

## 2020-09-25 NOTE — TELEPHONE ENCOUNTER
Hosea Worley called requesting a refill on the following medications:  Requested Prescriptions     Pending Prescriptions Disp Refills    acetylcysteine (MUCOMYST) 10 % nebulizer solution 360 mL 0     Sig: Inhale 4 mLs into the lungs 3 times daily     Pharmacy verified:  yes      Date of last visit: 8-  Date of next visit (if applicable): 22/7/0190

## 2020-10-01 ENCOUNTER — OFFICE VISIT (OUTPATIENT)
Dept: PULMONOLOGY | Age: 60
End: 2020-10-01
Payer: MEDICARE

## 2020-10-01 VITALS
BODY MASS INDEX: 29.05 KG/M2 | OXYGEN SATURATION: 94 % | SYSTOLIC BLOOD PRESSURE: 118 MMHG | HEIGHT: 74 IN | WEIGHT: 226.4 LBS | TEMPERATURE: 98.2 F | HEART RATE: 76 BPM | DIASTOLIC BLOOD PRESSURE: 64 MMHG

## 2020-10-01 PROCEDURE — G8484 FLU IMMUNIZE NO ADMIN: HCPCS | Performed by: NURSE PRACTITIONER

## 2020-10-01 PROCEDURE — 99213 OFFICE O/P EST LOW 20 MIN: CPT | Performed by: NURSE PRACTITIONER

## 2020-10-01 PROCEDURE — G8417 CALC BMI ABV UP PARAM F/U: HCPCS | Performed by: NURSE PRACTITIONER

## 2020-10-01 PROCEDURE — G8926 SPIRO NO PERF OR DOC: HCPCS | Performed by: NURSE PRACTITIONER

## 2020-10-01 PROCEDURE — 3023F SPIROM DOC REV: CPT | Performed by: NURSE PRACTITIONER

## 2020-10-01 PROCEDURE — G8427 DOCREV CUR MEDS BY ELIG CLIN: HCPCS | Performed by: NURSE PRACTITIONER

## 2020-10-01 PROCEDURE — 3017F COLORECTAL CA SCREEN DOC REV: CPT | Performed by: NURSE PRACTITIONER

## 2020-10-01 PROCEDURE — 1036F TOBACCO NON-USER: CPT | Performed by: NURSE PRACTITIONER

## 2020-10-01 ASSESSMENT — ENCOUNTER SYMPTOMS
STRIDOR: 0
WHEEZING: 1
DIARRHEA: 0
EYES NEGATIVE: 1
VOMITING: 0
GASTROINTESTINAL NEGATIVE: 1
VOICE CHANGE: 1
SHORTNESS OF BREATH: 1
CHEST TIGHTNESS: 0
ALLERGIC/IMMUNOLOGIC NEGATIVE: 1
NAUSEA: 0

## 2020-10-01 NOTE — PROGRESS NOTES
HISTORY:  Social History     Tobacco Use    Smoking status: Former Smoker     Packs/day: 2.25     Years: 31.00     Pack years: 69.75     Start date:      Last attempt to quit: 2006     Years since quittin.6    Smokeless tobacco: Never Used   Substance Use Topics    Alcohol use: No     Alcohol/week: 0.0 standard drinks    Drug use: No     ALLERGIES:  Allergies   Allergen Reactions    Povidone Iodine Rash     Surgery prep     FAMILY HISTORY:  Family History   Problem Relation Age of Onset    Prostate Cancer Father 48    Cancer Father     Heart Disease Father     Diabetes Mother     Heart Disease Mother     Stroke Mother     Heart Disease Brother     High Blood Pressure Other     Cancer Other         LUNG,PROSTATE    Hearing Loss Other      CURRENT MEDICATIONS:  Current Outpatient Medications   Medication Sig Dispense Refill    acetylcysteine (MUCOMYST) 10 % nebulizer solution Inhale 4 mLs into the lungs 3 times daily 360 mL 0    triamcinolone (KENALOG) 0.1 % cream APPLY CREAM EXTERNALLY ONCE DAILY      budesonide-formoterol (SYMBICORT) 160-4.5 MCG/ACT AERO Inhale 2 puffs into the lungs 2 times daily Rinse mouth after its use. 3 Inhaler 3    sodium chloride, Inhalant, 3 % nebulizer solution Take 3 mLs by nebulization as needed (Throat dryness, cough, wheezing) 500 mL 3    pravastatin (PRAVACHOL) 40 MG tablet Take 40 mg by mouth daily      Respiratory Therapy Supplies (NEBULIZER AIR TUBE/PLUGS) MISC Please provide nebulizer kit and supplies.  1 each 0    Acetylcysteine (NAC PO) Take by mouth      SPIRIVA RESPIMAT 2.5 MCG/ACT AERS inhaler INHALE 2 PUFFS BY MOUTH ONCE DAILY 3 Inhaler 3    acetaminophen (TYLENOL) 650 MG extended release tablet Take 1 tablet by mouth as needed for Pain Do not take with hydrocodone/acetominophen 60 tablet 3    albuterol (PROVENTIL) (2.5 MG/3ML) 0.083% nebulizer solution Take 3 mLs by nebulization every 6 hours as needed for Wheezing or Shortness of Breath Normal heart sounds. No murmur. Pulmonary:      Effort: Pulmonary effort is normal. No respiratory distress. Breath sounds: Normal breath sounds. No stridor. No wheezing or rales. Chest:      Chest wall: No tenderness. Abdominal:      General: Bowel sounds are normal. There is no distension. Palpations: Abdomen is soft. Skin:     General: Skin is warm and dry. Capillary Refill: Capillary refill takes less than 2 seconds. Neurological:      Mental Status: He is alert and oriented to person, place, and time. Psychiatric:         Behavior: Behavior normal.         Thought Content: Thought content normal.         Judgment: Judgment normal.          results   Lung Nodule Screening     [] Qualifies    [] Does not qualify   [] Declined    [x] Completed  The USPSTF recommends annual screening for lung cancer with low-dose computed tomography (LDCT) in adults aged 54 to [de-identified] years who have a 30 pack-year smoking history and currently smoke or have quit within the past 15 years. Screening should be discontinued once a person has not smoked for 15 years or develops a health problem that substantially limits life expectancy or the ability or willingness to have curative lung surgery. 8/3/2020   NONCONTRAST SCREENING CT CHEST:   FINDINGS: LUNGS NODULES: There are no suspicious masses or nodules. LYMPHADENOPATHY: There are no pathologically enlarged lymph nodes. OTHER (LUNGS/MEDIASTINUM/MUSCULOSKELETAL/ABDOMEN): The heart is normal in size. There are coronary artery calcifications. There is no pericardial effusion. There are severe upper lobe predominant emphysematous changes of the lungs. No pleural effusion or pneumothorax. No abnormality seen in the visualized upper abdomen. 1. There are no suspicious masses or nodules within the lung fields. 2. There are no pathologically enlarged lymph nodes. 3. Severe emphysematous changes of the lungs bilaterally.    4. LUNGRADS ASSESSMENT VALUE: 1        Assessment      Diagnosis Orders   1. Pre-operative clearance     2. Mucopurulent chronic bronchitis ([facility]     3. Throat cancer ([facility]      history of   4. Personal history of tobacco use     5. Hoarseness of voice           Plan   -Patient can go for planned surgery from pulmonary point of view. -From a Pulmonary standpoint patient would be at moderate risk for pulmonary complications going for a procedure involving general anesthesia and mechanical ventilation,due to history of COPD, risks include but are not limited to prolonged mechanical ventilation, inability to wean from mechanical ventilation, postoperative pneumonia, and reintubation. To minimize complications recommend breathing treatments pre and post operatively, monitoring SpO2 and aggressive pulmonary hygiene after procedure.    -Advised to maintain pneumonia vaccine with PCP and to take flu vaccine this coming season.  -Advised patient to call office with any changes, questions, or concerns regarding respiratory status  -Encouraged IS/Acapella use after surgery     Will see Chano Kwon back in:  Patient see Pedro Olivarez CNP as previously scheduled  [employer] CNP  10/1/2020

## 2020-10-02 NOTE — PROGRESS NOTES
Crystal,  Is their way to preorder pre-and postop nebulizer therapy? See note regarding patient.     Rosemary Carvalho

## 2020-10-09 ENCOUNTER — HOSPITAL ENCOUNTER (OUTPATIENT)
Dept: GENERAL RADIOLOGY | Age: 60
Discharge: HOME | End: 2020-10-09
Payer: MEDICARE

## 2020-10-09 ENCOUNTER — HOSPITAL ENCOUNTER (OUTPATIENT)
Age: 60
Discharge: HOME | End: 2020-10-09
Payer: MEDICARE

## 2020-10-09 LAB
ERYTHROCYTE [DISTWIDTH] IN BLOOD BY AUTOMATED COUNT: 12.8 % (ref 11.5–14.5)
ERYTHROCYTE [DISTWIDTH] IN BLOOD BY AUTOMATED COUNT: 45.7 FL (ref 35–45)
HCT VFR BLD CALC: 49.4 % (ref 42–52)
HEMOGLOBIN: 15.9 GM/DL (ref 14–18)
MCH RBC QN AUTO: 31.1 PG (ref 26–33)
MCHC RBC AUTO-ENTMCNC: 32.2 GM/DL (ref 32.2–35.5)
MCV RBC AUTO: 96.5 FL (ref 80–94)
PLATELET # BLD: 216 THOU/MM3 (ref 130–400)
PMV BLD AUTO: 10.4 FL (ref 9.4–12.4)
RBC # BLD: 5.12 MILL/MM3 (ref 4.7–6.1)
WBC # BLD: 7.7 THOU/MM3 (ref 4.8–10.8)

## 2020-10-09 PROCEDURE — 36415 COLL VENOUS BLD VENIPUNCTURE: CPT

## 2020-10-09 PROCEDURE — 85027 COMPLETE CBC AUTOMATED: CPT

## 2020-10-09 PROCEDURE — 71046 X-RAY EXAM CHEST 2 VIEWS: CPT

## 2020-10-09 PROCEDURE — U0003 INFECTIOUS AGENT DETECTION BY NUCLEIC ACID (DNA OR RNA); SEVERE ACUTE RESPIRATORY SYNDROME CORONAVIRUS 2 (SARS-COV-2) (CORONAVIRUS DISEASE [COVID-19]), AMPLIFIED PROBE TECHNIQUE, MAKING USE OF HIGH THROUGHPUT TECHNOLOGIES AS DESCRIBED BY CMS-2020-01-R: HCPCS

## 2020-10-09 RX ORDER — BIOTIN 10 MG
TABLET ORAL DAILY
COMMUNITY
End: 2021-01-01 | Stop reason: ALTCHOICE

## 2020-10-09 RX ORDER — ACETYLCYSTEINE 100 MG/ML
4 SOLUTION ORAL; RESPIRATORY (INHALATION) 3 TIMES DAILY PRN
COMMUNITY
End: 2020-10-30 | Stop reason: SDUPTHER

## 2020-10-09 NOTE — PROGRESS NOTES

## 2020-10-09 NOTE — PROGRESS NOTES
In preparation for their surgical procedure above patient was screened for Obstructive Sleep Apnea (EMMANUEL) using the STOP-Bang Questionnaire by the Pre-Admission Testing department. This is a pre-surgical screening tool for patient safety and serves as a recommendation, this WILL NOT cause cancellation of surgery. STOP-Bang Questionnaire  * Do you currently see a pulmonologist?  No     If yes STOP, do not complete. Patient follows with Dr.     1.  Do you snore loudly (able to be heard in the next room)? No    2. Do you often feel tired or sleepy during the daytime? No       3. Has anyone ever told you that you stop breathing during your sleep? No    4. Do you have or are you being treated for high blood pressure? No      5. BMI more than 35? BMI (Calculated): 28.9        No    6. Age over 48 years? 60 y.o. Yes    7. Neck Circumference greater than 17 inches for male or 16 inches for female? Measured           (visits only)            Not Applicable    8. Gender Male? Yes      TOTAL SCORE: 2    EMMANUEL - Low Risk : Yes to 0 - 2 questions  EMMANUEL - Intermediate Risk : Yes to 3 - 4 questions  EMMANUEL - High Risk : Yes to 5 - 8 questions    Adapted from:   STOP Questionnaire: A Tool to Screen Patients for Obstructive Sleep Apnea   NILO Mcbride.C.P.C., eNdra Galindo M.B.B.S., Sherrie Nielsen M.D., Karina Galeas. Naomi Michele, Ph.D., Jorge Luis Davis M.B.B.S., NEO Evans.Sc., Lucina Gabriel M.D., Ana Griffin. ELSA BaR.C.P.C.    Anesthesiology 2008; [phone] Copyright 2008, the [employer]esthesiologists, [facility].   ----------------------------------------------------------------------------------------------------------------

## 2020-10-10 LAB — SARS-COV-2: NOT DETECTED

## 2020-10-15 ENCOUNTER — ANESTHESIA EVENT (OUTPATIENT)
Dept: OPERATING ROOM | Age: 60
DRG: 143 | End: 2020-10-15
Payer: MEDICARE

## 2020-10-16 ENCOUNTER — ANESTHESIA (OUTPATIENT)
Dept: OPERATING ROOM | Age: 60
DRG: 143 | End: 2020-10-16
Payer: MEDICARE

## 2020-10-16 ENCOUNTER — HOSPITAL ENCOUNTER (INPATIENT)
Age: 60
LOS: 3 days | Discharge: HOME | DRG: 143 | End: 2020-10-19
Attending: OTOLARYNGOLOGY | Admitting: OTOLARYNGOLOGY
Payer: MEDICARE

## 2020-10-16 ENCOUNTER — APPOINTMENT (OUTPATIENT)
Dept: GENERAL RADIOLOGY | Age: 60
DRG: 143 | End: 2020-10-16
Attending: OTOLARYNGOLOGY
Payer: MEDICARE

## 2020-10-16 VITALS
RESPIRATION RATE: 12 BRPM | OXYGEN SATURATION: 96 % | DIASTOLIC BLOOD PRESSURE: 67 MMHG | SYSTOLIC BLOOD PRESSURE: 137 MMHG

## 2020-10-16 PROBLEM — J98.8 AIRWAY OBSTRUCTION: Status: ACTIVE | Noted: 2020-10-16

## 2020-10-16 PROBLEM — J38.6 LARYNGEAL STENOSIS: Status: ACTIVE | Noted: 2020-10-16

## 2020-10-16 PROBLEM — J98.8: Status: ACTIVE | Noted: 2020-10-16

## 2020-10-16 PROCEDURE — 6370000000 HC RX 637 (ALT 250 FOR IP): Performed by: OTOLARYNGOLOGY

## 2020-10-16 PROCEDURE — 6360000002 HC RX W HCPCS: Performed by: OTOLARYNGOLOGY

## 2020-10-16 PROCEDURE — 31572 LARGSC W/LASER DSTRJ LES: CPT | Performed by: OTOLARYNGOLOGY

## 2020-10-16 PROCEDURE — 2580000003 HC RX 258: Performed by: OTOLARYNGOLOGY

## 2020-10-16 PROCEDURE — 7100000000 HC PACU RECOVERY - FIRST 15 MIN: Performed by: OTOLARYNGOLOGY

## 2020-10-16 PROCEDURE — 3700000001 HC ADD 15 MINUTES (ANESTHESIA): Performed by: OTOLARYNGOLOGY

## 2020-10-16 PROCEDURE — 7100000001 HC PACU RECOVERY - ADDTL 15 MIN: Performed by: OTOLARYNGOLOGY

## 2020-10-16 PROCEDURE — 2700000000 HC OXYGEN THERAPY PER DAY

## 2020-10-16 PROCEDURE — 87147 CULTURE TYPE IMMUNOLOGIC: CPT

## 2020-10-16 PROCEDURE — 31641 BRONCHOSCOPY TREAT BLOCKAGE: CPT | Performed by: OTOLARYNGOLOGY

## 2020-10-16 PROCEDURE — 87070 CULTURE OTHR SPECIMN AEROBIC: CPT

## 2020-10-16 PROCEDURE — 0CBS8ZZ EXCISION OF LARYNX, VIA NATURAL OR ARTIFICIAL OPENING ENDOSCOPIC: ICD-10-PCS | Performed by: OTOLARYNGOLOGY

## 2020-10-16 PROCEDURE — 0CBS8ZX EXCISION OF LARYNX, VIA NATURAL OR ARTIFICIAL OPENING ENDOSCOPIC, DIAGNOSTIC: ICD-10-PCS | Performed by: OTOLARYNGOLOGY

## 2020-10-16 PROCEDURE — 0CBT8ZZ EXCISION OF RIGHT VOCAL CORD, VIA NATURAL OR ARTIFICIAL OPENING ENDOSCOPIC: ICD-10-PCS | Performed by: OTOLARYNGOLOGY

## 2020-10-16 PROCEDURE — 2060000000 HC ICU INTERMEDIATE R&B

## 2020-10-16 PROCEDURE — 87205 SMEAR GRAM STAIN: CPT

## 2020-10-16 PROCEDURE — 94640 AIRWAY INHALATION TREATMENT: CPT

## 2020-10-16 PROCEDURE — 2709999900 HC NON-CHARGEABLE SUPPLY: Performed by: OTOLARYNGOLOGY

## 2020-10-16 PROCEDURE — 87116 MYCOBACTERIA CULTURE: CPT

## 2020-10-16 PROCEDURE — 2500000003 HC RX 250 WO HCPCS: Performed by: NURSE ANESTHETIST, CERTIFIED REGISTERED

## 2020-10-16 PROCEDURE — 3600000004 HC SURGERY LEVEL 4 BASE: Performed by: OTOLARYNGOLOGY

## 2020-10-16 PROCEDURE — 2580000003 HC RX 258: Performed by: INTERNAL MEDICINE

## 2020-10-16 PROCEDURE — 87186 SC STD MICRODIL/AGAR DIL: CPT

## 2020-10-16 PROCEDURE — 3600000014 HC SURGERY LEVEL 4 ADDTL 15MIN: Performed by: OTOLARYNGOLOGY

## 2020-10-16 PROCEDURE — 6360000002 HC RX W HCPCS: Performed by: INTERNAL MEDICINE

## 2020-10-16 PROCEDURE — 87075 CULTR BACTERIA EXCEPT BLOOD: CPT

## 2020-10-16 PROCEDURE — 2720000010 HC SURG SUPPLY STERILE: Performed by: OTOLARYNGOLOGY

## 2020-10-16 PROCEDURE — 6370000000 HC RX 637 (ALT 250 FOR IP)

## 2020-10-16 PROCEDURE — 87077 CULTURE AEROBIC IDENTIFY: CPT

## 2020-10-16 PROCEDURE — 87206 SMEAR FLUORESCENT/ACID STAI: CPT

## 2020-10-16 PROCEDURE — 3700000000 HC ANESTHESIA ATTENDED CARE: Performed by: OTOLARYNGOLOGY

## 2020-10-16 PROCEDURE — 6360000002 HC RX W HCPCS: Performed by: NURSE ANESTHETIST, CERTIFIED REGISTERED

## 2020-10-16 PROCEDURE — 0CBV8ZZ EXCISION OF LEFT VOCAL CORD, VIA NATURAL OR ARTIFICIAL OPENING ENDOSCOPIC: ICD-10-PCS | Performed by: OTOLARYNGOLOGY

## 2020-10-16 PROCEDURE — 71045 X-RAY EXAM CHEST 1 VIEW: CPT

## 2020-10-16 RX ORDER — DEXAMETHASONE SODIUM PHOSPHATE 4 MG/ML
4 INJECTION, SOLUTION INTRA-ARTICULAR; INTRALESIONAL; INTRAMUSCULAR; INTRAVENOUS; SOFT TISSUE EVERY 8 HOURS
Status: COMPLETED | OUTPATIENT
Start: 2020-10-16 | End: 2020-10-18

## 2020-10-16 RX ORDER — BUDESONIDE AND FORMOTEROL FUMARATE DIHYDRATE 160; 4.5 UG/1; UG/1
2 AEROSOL RESPIRATORY (INHALATION) 2 TIMES DAILY
Status: DISCONTINUED | OUTPATIENT
Start: 2020-10-16 | End: 2020-10-17

## 2020-10-16 RX ORDER — ACETYLCYSTEINE 200 MG/ML
2 SOLUTION ORAL; RESPIRATORY (INHALATION) 3 TIMES DAILY
Status: DISCONTINUED | OUTPATIENT
Start: 2020-10-16 | End: 2020-10-19 | Stop reason: HOSPADM

## 2020-10-16 RX ORDER — POLYETHYLENE GLYCOL 3350 17 G/17G
17 POWDER, FOR SOLUTION ORAL DAILY
Status: DISCONTINUED | OUTPATIENT
Start: 2020-10-16 | End: 2020-10-19 | Stop reason: HOSPADM

## 2020-10-16 RX ORDER — LOPERAMIDE HYDROCHLORIDE 2 MG/1
2 CAPSULE ORAL DAILY
Status: DISCONTINUED | OUTPATIENT
Start: 2020-10-16 | End: 2020-10-19 | Stop reason: HOSPADM

## 2020-10-16 RX ORDER — ONDANSETRON 2 MG/ML
4 INJECTION INTRAMUSCULAR; INTRAVENOUS EVERY 6 HOURS PRN
Status: DISCONTINUED | OUTPATIENT
Start: 2020-10-16 | End: 2020-10-19 | Stop reason: HOSPADM

## 2020-10-16 RX ORDER — PROPOFOL 10 MG/ML
INJECTION, EMULSION INTRAVENOUS CONTINUOUS PRN
Status: DISCONTINUED | OUTPATIENT
Start: 2020-10-16 | End: 2020-10-16 | Stop reason: SDUPTHER

## 2020-10-16 RX ORDER — PRAVASTATIN SODIUM 40 MG
40 TABLET ORAL DAILY
Status: DISCONTINUED | OUTPATIENT
Start: 2020-10-16 | End: 2020-10-19 | Stop reason: HOSPADM

## 2020-10-16 RX ORDER — SODIUM CHLORIDE 0.9 % (FLUSH) 0.9 %
10 SYRINGE (ML) INJECTION PRN
Status: DISCONTINUED | OUTPATIENT
Start: 2020-10-16 | End: 2020-10-19 | Stop reason: HOSPADM

## 2020-10-16 RX ORDER — DEXTROSE, SODIUM CHLORIDE, SODIUM LACTATE, POTASSIUM CHLORIDE, AND CALCIUM CHLORIDE 5; .6; .31; .03; .02 G/100ML; G/100ML; G/100ML; G/100ML; G/100ML
INJECTION, SOLUTION INTRAVENOUS CONTINUOUS
Status: DISCONTINUED | OUTPATIENT
Start: 2020-10-16 | End: 2020-10-17

## 2020-10-16 RX ORDER — PROPOFOL 10 MG/ML
INJECTION, EMULSION INTRAVENOUS PRN
Status: DISCONTINUED | OUTPATIENT
Start: 2020-10-16 | End: 2020-10-16 | Stop reason: SDUPTHER

## 2020-10-16 RX ORDER — TRAMADOL HYDROCHLORIDE 50 MG/1
50 TABLET ORAL EVERY 6 HOURS PRN
Status: DISCONTINUED | OUTPATIENT
Start: 2020-10-16 | End: 2020-10-19 | Stop reason: HOSPADM

## 2020-10-16 RX ORDER — MIDAZOLAM HYDROCHLORIDE 1 MG/ML
INJECTION INTRAMUSCULAR; INTRAVENOUS PRN
Status: DISCONTINUED | OUTPATIENT
Start: 2020-10-16 | End: 2020-10-16 | Stop reason: SDUPTHER

## 2020-10-16 RX ORDER — SODIUM CHLORIDE FOR INHALATION 3 %
3 VIAL, NEBULIZER (ML) INHALATION PRN
Status: DISCONTINUED | OUTPATIENT
Start: 2020-10-16 | End: 2020-10-17

## 2020-10-16 RX ORDER — FENTANYL CITRATE 50 UG/ML
50 INJECTION, SOLUTION INTRAMUSCULAR; INTRAVENOUS EVERY 5 MIN PRN
Status: DISCONTINUED | OUTPATIENT
Start: 2020-10-16 | End: 2020-10-16 | Stop reason: HOSPADM

## 2020-10-16 RX ORDER — SODIUM CHLORIDE 0.9 % (FLUSH) 0.9 %
10 SYRINGE (ML) INJECTION EVERY 12 HOURS SCHEDULED
Status: DISCONTINUED | OUTPATIENT
Start: 2020-10-16 | End: 2020-10-19 | Stop reason: HOSPADM

## 2020-10-16 RX ORDER — CEFAZOLIN SODIUM 1 G/3ML
INJECTION, POWDER, FOR SOLUTION INTRAMUSCULAR; INTRAVENOUS PRN
Status: DISCONTINUED | OUTPATIENT
Start: 2020-10-16 | End: 2020-10-16 | Stop reason: SDUPTHER

## 2020-10-16 RX ORDER — IPRATROPIUM BROMIDE AND ALBUTEROL SULFATE 2.5; .5 MG/3ML; MG/3ML
1 SOLUTION RESPIRATORY (INHALATION) ONCE
Status: CANCELLED | OUTPATIENT
Start: 2020-10-16

## 2020-10-16 RX ORDER — LIDOCAINE HCL/PF 100 MG/5ML
SYRINGE (ML) INJECTION PRN
Status: DISCONTINUED | OUTPATIENT
Start: 2020-10-16 | End: 2020-10-16 | Stop reason: SDUPTHER

## 2020-10-16 RX ORDER — DEXAMETHASONE SODIUM PHOSPHATE 4 MG/ML
INJECTION, SOLUTION INTRA-ARTICULAR; INTRALESIONAL; INTRAMUSCULAR; INTRAVENOUS; SOFT TISSUE PRN
Status: DISCONTINUED | OUTPATIENT
Start: 2020-10-16 | End: 2020-10-16 | Stop reason: SDUPTHER

## 2020-10-16 RX ORDER — LABETALOL 20 MG/4 ML (5 MG/ML) INTRAVENOUS SYRINGE
10 EVERY 10 MIN PRN
Status: DISCONTINUED | OUTPATIENT
Start: 2020-10-16 | End: 2020-10-16 | Stop reason: HOSPADM

## 2020-10-16 RX ORDER — SENNA AND DOCUSATE SODIUM 50; 8.6 MG/1; MG/1
1 TABLET, FILM COATED ORAL 2 TIMES DAILY
Status: DISCONTINUED | OUTPATIENT
Start: 2020-10-16 | End: 2020-10-19 | Stop reason: HOSPADM

## 2020-10-16 RX ORDER — ONDANSETRON 4 MG/1
4 TABLET, ORALLY DISINTEGRATING ORAL EVERY 8 HOURS PRN
Status: DISCONTINUED | OUTPATIENT
Start: 2020-10-16 | End: 2020-10-19 | Stop reason: HOSPADM

## 2020-10-16 RX ORDER — SENNOSIDES 8.8 MG/5ML
5 LIQUID ORAL 2 TIMES DAILY PRN
Status: DISCONTINUED | OUTPATIENT
Start: 2020-10-16 | End: 2020-10-19 | Stop reason: HOSPADM

## 2020-10-16 RX ORDER — PROMETHAZINE HYDROCHLORIDE 25 MG/ML
12.5 INJECTION, SOLUTION INTRAMUSCULAR; INTRAVENOUS
Status: DISCONTINUED | OUTPATIENT
Start: 2020-10-16 | End: 2020-10-16 | Stop reason: HOSPADM

## 2020-10-16 RX ORDER — SODIUM CHLORIDE 9 MG/ML
INJECTION, SOLUTION INTRAVENOUS CONTINUOUS
Status: DISCONTINUED | OUTPATIENT
Start: 2020-10-16 | End: 2020-10-17

## 2020-10-16 RX ORDER — TRAMADOL HYDROCHLORIDE 50 MG/1
100 TABLET ORAL EVERY 6 HOURS PRN
Status: DISCONTINUED | OUTPATIENT
Start: 2020-10-16 | End: 2020-10-19 | Stop reason: HOSPADM

## 2020-10-16 RX ORDER — IPRATROPIUM BROMIDE AND ALBUTEROL SULFATE 2.5; .5 MG/3ML; MG/3ML
SOLUTION RESPIRATORY (INHALATION)
Status: COMPLETED
Start: 2020-10-16 | End: 2020-10-16

## 2020-10-16 RX ORDER — ROCURONIUM BROMIDE 10 MG/ML
INJECTION, SOLUTION INTRAVENOUS PRN
Status: DISCONTINUED | OUTPATIENT
Start: 2020-10-16 | End: 2020-10-16 | Stop reason: SDUPTHER

## 2020-10-16 RX ORDER — FENTANYL CITRATE 50 UG/ML
25 INJECTION, SOLUTION INTRAMUSCULAR; INTRAVENOUS EVERY 5 MIN PRN
Status: DISCONTINUED | OUTPATIENT
Start: 2020-10-16 | End: 2020-10-16 | Stop reason: HOSPADM

## 2020-10-16 RX ORDER — FENTANYL CITRATE 50 UG/ML
INJECTION, SOLUTION INTRAMUSCULAR; INTRAVENOUS PRN
Status: DISCONTINUED | OUTPATIENT
Start: 2020-10-16 | End: 2020-10-16 | Stop reason: SDUPTHER

## 2020-10-16 RX ORDER — EPINEPHRINE 0.1 MG/ML
SYRINGE (ML) INJECTION PRN
Status: DISCONTINUED | OUTPATIENT
Start: 2020-10-16 | End: 2020-10-16 | Stop reason: ALTCHOICE

## 2020-10-16 RX ORDER — ALBUTEROL SULFATE 2.5 MG/3ML
2.5 SOLUTION RESPIRATORY (INHALATION) EVERY 6 HOURS PRN
Status: DISCONTINUED | OUTPATIENT
Start: 2020-10-16 | End: 2020-10-17

## 2020-10-16 RX ADMIN — FENTANYL CITRATE 100 MCG: 50 INJECTION, SOLUTION INTRAMUSCULAR; INTRAVENOUS at 15:09

## 2020-10-16 RX ADMIN — IPRATROPIUM BROMIDE AND ALBUTEROL SULFATE 3 ML: .5; 3 SOLUTION RESPIRATORY (INHALATION) at 16:25

## 2020-10-16 RX ADMIN — PROPOFOL 150 MG: 10 INJECTION, EMULSION INTRAVENOUS at 14:26

## 2020-10-16 RX ADMIN — SODIUM CHLORIDE: 9 INJECTION, SOLUTION INTRAVENOUS at 15:11

## 2020-10-16 RX ADMIN — ROCURONIUM BROMIDE 20 MG: 10 INJECTION INTRAVENOUS at 15:31

## 2020-10-16 RX ADMIN — HYDROMORPHONE HYDROCHLORIDE 0.5 MG: 1 INJECTION, SOLUTION INTRAMUSCULAR; INTRAVENOUS; SUBCUTANEOUS at 18:04

## 2020-10-16 RX ADMIN — Medication 100 MG: at 14:26

## 2020-10-16 RX ADMIN — ALBUTEROL SULFATE 2.5 MG: 2.5 SOLUTION RESPIRATORY (INHALATION) at 22:18

## 2020-10-16 RX ADMIN — ROCURONIUM BROMIDE 30 MG: 10 INJECTION INTRAVENOUS at 14:41

## 2020-10-16 RX ADMIN — ROCURONIUM BROMIDE 10 MG: 10 INJECTION INTRAVENOUS at 15:51

## 2020-10-16 RX ADMIN — ROCURONIUM BROMIDE 20 MG: 10 INJECTION INTRAVENOUS at 15:07

## 2020-10-16 RX ADMIN — CEFAZOLIN 2000 MG: 1 INJECTION, POWDER, FOR SOLUTION INTRAMUSCULAR; INTRAVENOUS; PARENTERAL at 14:42

## 2020-10-16 RX ADMIN — FENTANYL CITRATE 100 MCG: 50 INJECTION, SOLUTION INTRAMUSCULAR; INTRAVENOUS at 14:26

## 2020-10-16 RX ADMIN — DEXAMETHASONE SODIUM PHOSPHATE 4 MG: 4 INJECTION, SOLUTION INTRAMUSCULAR; INTRAVENOUS at 20:57

## 2020-10-16 RX ADMIN — SODIUM CHLORIDE: 9 INJECTION, SOLUTION INTRAVENOUS at 12:45

## 2020-10-16 RX ADMIN — ROCURONIUM BROMIDE 20 MG: 10 INJECTION INTRAVENOUS at 14:31

## 2020-10-16 RX ADMIN — PRAVASTATIN SODIUM 40 MG: 40 TABLET ORAL at 20:59

## 2020-10-16 RX ADMIN — CEFAZOLIN 2 G: 10 INJECTION, POWDER, FOR SOLUTION INTRAVENOUS at 23:59

## 2020-10-16 RX ADMIN — SUGAMMADEX 200 MG: 100 INJECTION, SOLUTION INTRAVENOUS at 16:08

## 2020-10-16 RX ADMIN — DEXTROSE MONOHYDRATE 505 MG: 50 INJECTION, SOLUTION INTRAVENOUS at 20:53

## 2020-10-16 RX ADMIN — HYDROMORPHONE HYDROCHLORIDE 0.5 MG: 1 INJECTION, SOLUTION INTRAMUSCULAR; INTRAVENOUS; SUBCUTANEOUS at 22:23

## 2020-10-16 RX ADMIN — DEXAMETHASONE SODIUM PHOSPHATE 10 MG: 4 INJECTION, SOLUTION INTRAMUSCULAR; INTRAVENOUS at 14:58

## 2020-10-16 RX ADMIN — ACETYLCYSTEINE 400 MG: 200 SOLUTION ORAL; RESPIRATORY (INHALATION) at 22:19

## 2020-10-16 RX ADMIN — BUDESONIDE AND FORMOTEROL FUMARATE DIHYDRATE 2 PUFF: 160; 4.5 AEROSOL RESPIRATORY (INHALATION) at 22:19

## 2020-10-16 RX ADMIN — PROPOFOL 150 MCG/KG/MIN: 10 INJECTION, EMULSION INTRAVENOUS at 14:42

## 2020-10-16 RX ADMIN — MIDAZOLAM HYDROCHLORIDE 2 MG: 1 INJECTION, SOLUTION INTRAMUSCULAR; INTRAVENOUS at 14:23

## 2020-10-16 ASSESSMENT — PULMONARY FUNCTION TESTS
PIF_VALUE: 31
PIF_VALUE: 35
PIF_VALUE: 35
PIF_VALUE: 0
PIF_VALUE: 0
PIF_VALUE: 17
PIF_VALUE: 10
PIF_VALUE: 7
PIF_VALUE: 0
PIF_VALUE: 35
PIF_VALUE: 35
PIF_VALUE: 1
PIF_VALUE: 0
PIF_VALUE: 35
PIF_VALUE: 0
PIF_VALUE: 35
PIF_VALUE: 0
PIF_VALUE: 3
PIF_VALUE: 42
PIF_VALUE: 0
PIF_VALUE: 35
PIF_VALUE: 27
PIF_VALUE: 0
PIF_VALUE: 1
PIF_VALUE: 0
PIF_VALUE: 46
PIF_VALUE: 19
PIF_VALUE: 0
PIF_VALUE: 18
PIF_VALUE: 0
PIF_VALUE: 35
PIF_VALUE: 8
PIF_VALUE: 0
PIF_VALUE: 35
PIF_VALUE: 0
PIF_VALUE: 35
PIF_VALUE: 1
PIF_VALUE: 0
PIF_VALUE: 18
PIF_VALUE: 0
PIF_VALUE: 3
PIF_VALUE: 1
PIF_VALUE: 0
PIF_VALUE: 21
PIF_VALUE: 0
PIF_VALUE: 0
PIF_VALUE: 1
PIF_VALUE: 0
PIF_VALUE: 31
PIF_VALUE: 0
PIF_VALUE: 38
PIF_VALUE: 0
PIF_VALUE: 32
PIF_VALUE: 1
PIF_VALUE: 0

## 2020-10-16 ASSESSMENT — PAIN - FUNCTIONAL ASSESSMENT
PAIN_FUNCTIONAL_ASSESSMENT: PREVENTS OR INTERFERES SOME ACTIVE ACTIVITIES AND ADLS
PAIN_FUNCTIONAL_ASSESSMENT: PREVENTS OR INTERFERES SOME ACTIVE ACTIVITIES AND ADLS
PAIN_FUNCTIONAL_ASSESSMENT: 0-10

## 2020-10-16 ASSESSMENT — PAIN SCALES - GENERAL
PAINLEVEL_OUTOF10: 7
PAINLEVEL_OUTOF10: 8
PAINLEVEL_OUTOF10: 5
PAINLEVEL_OUTOF10: 8
PAINLEVEL_OUTOF10: 7
PAINLEVEL_OUTOF10: 8

## 2020-10-16 ASSESSMENT — PAIN DESCRIPTION - DESCRIPTORS
DESCRIPTORS: DISCOMFORT
DESCRIPTORS: DISCOMFORT

## 2020-10-16 ASSESSMENT — PAIN DESCRIPTION - LOCATION
LOCATION: THROAT
LOCATION: THROAT

## 2020-10-16 ASSESSMENT — COPD QUESTIONNAIRES: CAT_SEVERITY: SEVERE

## 2020-10-16 ASSESSMENT — PAIN DESCRIPTION - PAIN TYPE
TYPE: SURGICAL PAIN
TYPE: SURGICAL PAIN

## 2020-10-16 ASSESSMENT — PAIN DESCRIPTION - FREQUENCY
FREQUENCY: CONTINUOUS
FREQUENCY: CONTINUOUS

## 2020-10-16 ASSESSMENT — PAIN DESCRIPTION - ORIENTATION
ORIENTATION: INNER
ORIENTATION: INNER

## 2020-10-16 ASSESSMENT — PAIN DESCRIPTION - ONSET
ONSET: ON-GOING
ONSET: ON-GOING

## 2020-10-16 ASSESSMENT — PAIN DESCRIPTION - PROGRESSION
CLINICAL_PROGRESSION: NOT CHANGED
CLINICAL_PROGRESSION: NOT CHANGED

## 2020-10-16 NOTE — CONSULTS
CONSULTATION NOTE :ID       Patient - Ralf Christian,  Age - 60 y.o.    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 male who was admitted to the hospital with a chief complaints of progressive shortness of breath with laryngeal stenosis. This patient is well-known to me he had history of head and neck cancer treated with surgery and radiation unfortunately developed late effect of radiation with laryngeal stenosis and recurrent fungal infection. He was placed on long-term voriconazole. He had multiple operations in the past.  He was taken to surgery by Dr. Frey had microlaryngoscopy with removal of submucosal nonneoplastic lesion with removal of inflammatory mass from the supraglottic and subglottic and trachea. I was asked at to see this patient to assist with treatment. 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 He has late effect of radiation. Unfortunately he was a not a candidate for hyperbaric oxygen treatment due to his advanced COPD. He denies any fever or chills.   His surgery was uneventful    PAST MEDICAL  HISTORY       Past Medical History:   Diagnosis Date    Arthritis     COPD (chronic obstructive pulmonary disease) ([address]     PAD (peripheral artery disease) ([address]     bilateral lower legs    Pneumonia 2017 and 2017    Rectal cancer ([address]     Squamous cell carcinoma of vocal cord ([address]        PAST SURGICAL HISTORY     Past Surgical History:   Procedure Laterality Date    COLONOSCOPY  2016    EYE SURGERY      CROSS EYED    HAND SURGERY Bilateral 1995    KNEE ARTHROSCOPY Right 1996    LARYNGOSCOPY  07/12/2017    Biopsy    LARYNGOSCOPY N/A 7/12/2019    MICRO LARYNGOSCOPY W/ BIOPSY performed by Halford Closs, MD at [address] 12/30/2019    DIRECT LARYNGOSCOPY WITH BIOPSY performed by Halford Closs, MD at [address] [facility] 2050 RECTAL SURGERY  08/29/2016    [place]    ROTATOR CUFF REPAIR Left 1990'S         MEDICATIONS:       Scheduled Meds:   acetylcysteine  2 mL Inhalation TID    budesonide-formoterol  2 puff Inhalation BID    loperamide  2 mg Oral Daily    pravastatin  40 mg Oral Daily    tiotropium  2 puff Inhalation Daily    sodium chloride flush  10 mL Intravenous 2 times per day    ceFAZolin (ANCEF) IVPB  2 g Intravenous Q8H    polyethylene glycol  17 g Oral Daily    sennosides-docusate sodium  1 tablet Oral BID    dexamethasone  4 mg Intravenous Q8H     Continuous Infusions:   sodium chloride 100 mL/hr at 10/16/20 1245    dextrose 5% in lactated ringers       PRN Meds:albuterol, sodium chloride (Inhalant), sodium chloride flush, traMADol **OR** traMADol, HYDROmorphone **OR** HYDROmorphone, senna, ondansetron **OR** ondansetron  Allergies:   ALLERGIES:    Povidone iodine        SOCIAL HISTORY:     TOBACCO:   reports that he quit smoking about 14 years ago. He started smoking about 45 years ago. He has a 69.75 pack-year smoking history. He has never used smokeless tobacco.     ETOH:   reports no history of alcohol use.   Patient currently lives with family       FAMILY HISTORY:         Problem Relation Age of Onset    Prostate Cancer Father 48    Cancer Father     Heart Disease Father     Diabetes Mother     Heart Disease Mother     Stroke Mother     Heart Disease Brother     High Blood Pressure Other     Cancer Other LUNG,PROSTATE    Hearing Loss Other        REVIEW OF SYSTEMS:     Constitutional: no fever, no night sweats, no fatigue, no weight loss. Head: no head ache , no head injury, no migranes. Eye: no eye discharge, blurring of vision, no double vision,no eye pain. Ears: no hearing difficulty, no tinnitus  Mouth/throat: Dry mouth hoarseness   respiratory: + Cough no chest pain, + shortness of breath,no wheezing  CVS: no palpitation, no chest pain,   GI: no abdominal pain, no nausea , no vomiting, no constipation,no diarrhea. ALDEN: no dysuria, frequency and urgency, no hematuria, no kidney stones  Musculoskeletal: no joint pain, swelling , stiffness,  Endocrine: no polyuria, polydipsia, no cold or heat intolerance  Hematology: no anemia, no easy brusing or bleeding, no hx of clotting disorder  Dermatology: no skin rash, no skin lesions, no pruritis,  Neurological:no headaches,no dizziness, no seizure, no numbness. Psychiatry: no depression, no anxiety,no panic attacks, no suicide ideation    PHYSICAL EXAM:     BP (!) 147/94   Pulse 86   Temp 98.4 °F (36.9 °C) (Oral)   Resp 16   Ht 6' 2\" (1.88 m)   Wt 222 lb (100.7 kg)   SpO2 93%   BMI 28.50 kg/m²   General apperance:  Awake, alert, in mild distress  HEENT: pink conjunctiva, unicteric sclera, moist oral mucosa. Chest: Diminished breath sounds bilaterally  Cardiovascular:  RRR ,S1S2, no murmur or gallop. Abdomen:  Soft, non tender to palpation. Extremities: No edema. Skin:  Warm and dry. CNS: Oriented to person place and time.         LABS:     Problem list of patient      Patient Active Problem List   Diagnosis Code    Dysphonia R49.0    Alcohol abuse F10.10    FHx: smoking Z81.2    Deviated septum J34.2    Hypertrophy of nasal turbinates J34.3    Rhinitis J31.0    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 lymph node ([facility]) C20, C77.5    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 We will continue to follow patient. Infection Control:     Discharge Planning (Coordination of out patient care: Thank you Elizabeth Randle MD for allowing me to participate in this patient's care.     Josy Liu MD,FACP 10/16/2020 6:07 PM

## 2020-10-16 NOTE — BRIEF OP NOTE
Brief Postoperative Note      Patient: Brent Calvillo  YOB: 1960  MRN: [id number]    Date of Procedure: 10/16/2020    Pre-Op Diagnosis: AIRWAY OBSTRUCTION, ADVERSE EFFECT OF RADIATION THERAPY, SWQUELA, TRACHEAL STENOSIS, CHRONIS LARYNGITIS, LARYNGEAL STENOSIS; fungal pachydermia of the supraglottis glottis subglottis and trachea with transglottic endotracheal airway stenosis    Post-Op Diagnosis: Same       Procedure(s):  BRONCHOSCOPY, MICRO LARYNGOSCOPY WITH REMOVAL OF SUBMUCCOSAL NON NOEPLASTIC LESION JET VENTILATION removal of obstructing inflammatory masses of the supraglottis glottis subglottis and trachea    Surgeon(s):  Willy Pryor MD    Assistant:  * No surgical staff found *    Anesthesia: General    Estimated Blood Loss (mL): less than 50    Complications: None    Specimens:   ID Type Source Tests Collected by Time Destination   1 : sublotic mucosa Tissue Mouth GRAM STAIN (Canceled), AFB STAIN, CULTURE, ANAEROBIC AND AEROBIC Willy Pryor MD 10/16/2020 1606        Implants:  * No implants in log *      Drains:   Colostomy (Active)       Findings: 1. Near complete obstruction of the patient's glottic and subglottic apertures  2. 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   Extensive fungal pachydermia of the subglottis and first 4 rings of trachea    Electronically signed by Willy Pryor MD on 10/16/2020 at 4:38 PM

## 2020-10-16 NOTE — FLOWSHEET NOTE
10/16/20 1954   Provider Notification   Reason for Communication Evaluate   Provider Name [id number]    Provider Notification Advance Practice Clinician (CNS, NP, CNM, CRNA, PA)   Method of Communication Secure Message   Response Waiting for response   Notification Time 1954   Notified provider of patient chest xray findings.

## 2020-10-16 NOTE — PROGRESS NOTES
Oriented to sds  12       Refuses flu and pneumonia vaccine. Family updated to stay in room. Informed family to take belonging with them if they leave. Keep nothing of value in room unattended. Medication given back to family. Family is taking them with them. Ipt was asked and agreed to first name and last initial being put on white boards. Allergy and fall risks applied. SCD Applied to patient. Warming blanket applied to patient. Pt denies any abuse or thoughts of suicide.

## 2020-10-16 NOTE — ANESTHESIA PRE PROCEDURE
Department of Anesthesiology  Preprocedure Note       Name:  Kelvin Keller   Age:  60 y.o.  :  1960                                          MRN:  [id number]         Date:  10/16/2020      Surgeon: Nadine Foreman):  Radha Nolan MD    Procedure: Procedure(s):  BRONCHOSCOPY, MICRO LARYNGOSCOPY WITH REMOVAL OF SUBMUCCOSAL NON NOEPLASTIC LESION JET VENTILATION    Medications prior to admission:   Prior to Admission medications    Medication Sig Start Date End Date Taking?  Authorizing Provider   Multiple Vitamins-Minerals (MULTIVITAMIN ADULT EXTRA C) CHEW Take by mouth daily   Yes Historical Provider, MD   acetylcysteine (MUCOMYST) 10 % nebulizer solution Inhale 4 mLs into the lungs 3 times daily as needed   Yes Historical Provider, MD   budesonide-formoterol (SYMBICORT) 160-4.5 MCG/ACT AERO Inhale 2 puffs into the lungs 2 times daily Rinse mouth after its use. 20 Yes JOSH Rae CNP   sodium chloride, Inhalant, 3 % nebulizer solution Take 3 mLs by nebulization as needed (Throat dryness, cough, wheezing) 20  Yes Rosalva RAEGAN DeniseonJOSH CNP   pravastatin (PRAVACHOL) 40 MG tablet Take 40 mg by mouth daily 20  Yes Historical Provider, MD   Acetylcysteine (NAC PO) Take by mouth 2 times daily 10%   Yes Historical Provider, MD   SPIRIVA RESPIMAT 2.5 MCG/ACT AERS inhaler INHALE 2 PUFFS BY MOUTH ONCE DAILY 20  Yes JOSH Rae CNP   acetaminophen (TYLENOL) 650 MG extended release tablet Take 1 tablet by mouth as needed for Pain Do not take with hydrocodone/acetominophen 19  Yes Gerardo Colunga MD   albuterol (PROVENTIL) (2.5 MG/3ML) 0.083% nebulizer solution Take 3 mLs by nebulization every 6 hours as needed for Wheezing or Shortness of Breath 19 Yes JOSH Rae CNP   albuterol sulfate HFA (VENTOLIN HFA) 108 (90 Base) MCG/ACT inhaler Inhale 2 puffs into the lungs every 6 hours as needed for Wheezing or Shortness of Breath 19 Yes Kareem Reid, APRN - CNP   guaiFENesin (MUCINEX) 600 MG extended release tablet Take 1,200 mg by mouth 2 times daily   Yes Historical Provider, MD   Dextromethorphan Polistirex (ROBITUSSIN 12 HOUR COUGH PO) Take by mouth 2 times daily as needed    Yes Historical Provider, MD   loperamide (IMODIUM) 2 MG capsule Take 2 mg by mouth daily    Yes Historical Provider, MD       Current medications:    Current Facility-Administered Medications   Medication Dose Route Frequency Provider Last Rate Last Dose    0.9 % sodium chloride infusion   Intravenous Continuous Dilan Borden  mL/hr at 10/16/20 1245         Allergies: Allergies   Allergen Reactions    Povidone Iodine Rash     Surgery prep       Problem List:    Patient Active Problem List   Diagnosis Code    Dysphonia R49.0    Alcohol abuse F10.10    FHx: smoking Z81.2    Deviated septum J34.2    Hypertrophy of nasal turbinates J34.3    Rhinitis J31.0    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    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 D38.0    Infection due to Candida glabrata B37.9    Subglottic stenosis not due to surgery J38.6    Invasive fungal infection B49    COPD (chronic obstructive pulmonary disease) ([facility]) J44.9    Hoarseness R49.0    Chronic stridor R06.1    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    Laryngeal stenosis J38.6       Past Medical History:        Diagnosis Date    Arthritis     [facility]     PAD (peripheral artery disease) (HCC)     bilateral lower legs    Pneumonia 2017 and 2017    Rectal cancer ([facility]     Squamous cell carcinoma of vocal cord [facility])        Past Surgical History:        Procedure Laterality Date    COLONOSCOPY      EYE SURGERY      CROSS EYED    HAND SURGERY Bilateral 1995    KNEE ARTHROSCOPY Right 1996    LARYNGOSCOPY  2017    Biopsy    LARYNGOSCOPY N/A 2019    MICRO LARYNGOSCOPY W/ BIOPSY performed by Shazia Plaza MD at [address] N/A 2019    DIRECT LARYNGOSCOPY WITH BIOPSY performed by Shazia Plaza MD at [address] [facility] [address] RECTAL SURGERY  2016    [place]ostemy bag[place]    ROTATOR CUFF REPAIR Left        Social History:    Social History     Tobacco Use    Smoking status: Former Smoker     Packs/day: 2.25     Years: 31.00     Pack years: 69.75     Start date:      Last attempt to quit: 2006     Years since quittin.7    Smokeless tobacco: Never Used   Substance Use Topics    Alcohol use: No     Alcohol/week: 0.0 standard drinks                                Counseling given: Not Answered      Vital Signs (Current):   Vitals:    10/09/20 1433 10/16/20 1220   BP:  (!) 164/97   Pulse:  83   Resp:  16   Temp:  97.3 °F (36.3 °C)   TempSrc:  Temporal   SpO2:  92%   Weight: 225 lb (102.1 kg) 222 lb (100.7 kg)   Height: 6' 2\" (1.88 m) 6' 2\" (1.88 m)                                              BP Readings from Last 3 Encounters:   10/16/20 (!) 164/97   10/01/20 118/64   09/15/20 128/78       NPO Status: Time of last liquid consumption: 0700                        Time of last solid consumption: 2100                        Date of last liquid consumption: 10/16/20                        Date of last solid food consumption: 10/15/20    BMI:   Wt Readings from Last 3 Encounters:   10/16/20 222 lb (100.7 kg)   10/01/20 226 lb 6.4 oz (102.7 kg)   09/15/20 223 lb 12.8 oz (101.5 kg)     Body mass index is 28.5 kg/m². CBC:   Lab Results   Component Value Date    WBC 7.7 10/09/2020    RBC 5.12 10/09/2020    RBC 4.28 12/06/2011    HGB 15.9 10/09/2020    HCT 49.4 10/09/2020    MCV 96.5 10/09/2020    RDW 13.8 06/12/2020     10/09/2020       CMP:   Lab Results   Component Value Date     06/12/2020    K 5.2 06/12/2020     06/12/2020    CO2 29 06/12/2020    BUN 19 06/12/2020    CREATININE 0.71 06/12/2020    AGRATIO 1.7 06/11/2019    LABGLOM >90 11/11/2019    GLUCOSE 94 06/12/2020    PROT 6.5 06/12/2020    CALCIUM 9.10 06/12/2020    BILITOT 0.3 06/12/2020    ALKPHOS 124 06/12/2020    AST 22 06/12/2020    ALT 31 06/12/2020       POC Tests: No results for input(s): POCGLU, POCNA, POCK, POCCL, POCBUN, POCHEMO, POCHCT in the last 72 hours. Coags: No results found for: PROTIME, INR, APTT    HCG (If Applicable): No results found for: PREGTESTUR, PREGSERUM, HCG, HCGQUANT     ABGs: No results found for: PHART, PO2ART, THV1GBU, AAP0BDU, BEART, C0BKOSQZ     Type & Screen (If Applicable):  No results found for: LABABO, LABRH    Drug/Infectious Status (If Applicable):  No results found for: HIV, HEPCAB    COVID-19 Screening (If Applicable):   Lab Results   Component Value Date    COVID19 Not Detected 10/09/2020         Anesthesia Evaluation  Patient summary reviewed  Airway: Mallampati: II  TM distance: >3 FB   Neck ROM: full  Mouth opening: > = 3 FB Dental:    (+) upper dentures and lower dentures      Pulmonary:   (+) COPD: severe,                             Cardiovascular:          ECG reviewed                        Neuro/Psych:   (+) psychiatric history:            GI/Hepatic/Renal:   (+) GERD:,           Endo/Other:                     Abdominal:           Vascular:                                        Anesthesia Plan      general     ASA 3       Induction: intravenous.     MIPS: Postoperative opioids intended and Prophylactic antiemetics administered. Anesthetic plan and risks discussed with patient. Plan discussed with CRNA. Jolan Barthel.  [facility],    10/16/2020

## 2020-10-17 ENCOUNTER — APPOINTMENT (OUTPATIENT)
Dept: CT IMAGING | Age: 60
DRG: 143 | End: 2020-10-17
Attending: OTOLARYNGOLOGY
Payer: MEDICARE

## 2020-10-17 ENCOUNTER — APPOINTMENT (OUTPATIENT)
Dept: GENERAL RADIOLOGY | Age: 60
DRG: 143 | End: 2020-10-17
Attending: OTOLARYNGOLOGY
Payer: MEDICARE

## 2020-10-17 LAB
ALBUMIN SERPL-MCNC: 4 G/DL (ref 3.5–5.1)
ALP BLD-CCNC: 159 U/L (ref 38–126)
ALT SERPL-CCNC: 29 U/L (ref 11–66)
ANION GAP SERPL CALCULATED.3IONS-SCNC: 12 MEQ/L (ref 8–16)
AST SERPL-CCNC: 23 U/L (ref 5–40)
BASOPHILS # BLD: 0 %
BASOPHILS ABSOLUTE: 0 THOU/MM3 (ref 0–0.1)
BILIRUB SERPL-MCNC: 0.2 MG/DL (ref 0.3–1.2)
BUN BLDV-MCNC: 15 MG/DL (ref 7–22)
CALCIUM SERPL-MCNC: 8.6 MG/DL (ref 8.5–10.5)
CHLORIDE BLD-SCNC: 96 MEQ/L (ref 98–111)
CO2: 25 MEQ/L (ref 23–33)
CREAT SERPL-MCNC: 0.6 MG/DL (ref 0.4–1.2)
EOSINOPHIL # BLD: 0 %
EOSINOPHILS ABSOLUTE: 0 THOU/MM3 (ref 0–0.4)
ERYTHROCYTE [DISTWIDTH] IN BLOOD BY AUTOMATED COUNT: 12.9 % (ref 11.5–14.5)
ERYTHROCYTE [DISTWIDTH] IN BLOOD BY AUTOMATED COUNT: 45.7 FL (ref 35–45)
GFR SERPL CREATININE-BSD FRML MDRD: > 90 ML/MIN/1.73M2
GLUCOSE BLD-MCNC: 143 MG/DL (ref 70–108)
HCT VFR BLD CALC: 46.2 % (ref 42–52)
HEMOGLOBIN: 14.8 GM/DL (ref 14–18)
IMMATURE GRANS (ABS): 0.05 THOU/MM3 (ref 0–0.07)
IMMATURE GRANULOCYTES: 0.6 %
LYMPHOCYTES # BLD: 5.9 %
LYMPHOCYTES ABSOLUTE: 0.5 THOU/MM3 (ref 1–4.8)
MCH RBC QN AUTO: 30.9 PG (ref 26–33)
MCHC RBC AUTO-ENTMCNC: 32 GM/DL (ref 32.2–35.5)
MCV RBC AUTO: 96.5 FL (ref 80–94)
MONOCYTES # BLD: 1.9 %
MONOCYTES ABSOLUTE: 0.2 THOU/MM3 (ref 0.4–1.3)
NUCLEATED RED BLOOD CELLS: 0 /100 WBC
PLATELET # BLD: 214 THOU/MM3 (ref 130–400)
PMV BLD AUTO: 10.9 FL (ref 9.4–12.4)
POTASSIUM SERPL-SCNC: 4.9 MEQ/L (ref 3.5–5.2)
PRO-BNP: 91.7 PG/ML (ref 0–900)
RBC # BLD: 4.79 MILL/MM3 (ref 4.7–6.1)
SEG NEUTROPHILS: 91.6 %
SEGMENTED NEUTROPHILS ABSOLUTE COUNT: 7.6 THOU/MM3 (ref 1.8–7.7)
SODIUM BLD-SCNC: 133 MEQ/L (ref 135–145)
TOTAL PROTEIN: 6.6 G/DL (ref 6.1–8)
WBC # BLD: 8.3 THOU/MM3 (ref 4.8–10.8)

## 2020-10-17 PROCEDURE — 6360000002 HC RX W HCPCS: Performed by: OTOLARYNGOLOGY

## 2020-10-17 PROCEDURE — 85025 COMPLETE CBC W/AUTO DIFF WBC: CPT

## 2020-10-17 PROCEDURE — 2580000003 HC RX 258

## 2020-10-17 PROCEDURE — 6360000002 HC RX W HCPCS: Performed by: INTERNAL MEDICINE

## 2020-10-17 PROCEDURE — 6370000000 HC RX 637 (ALT 250 FOR IP): Performed by: INTERNAL MEDICINE

## 2020-10-17 PROCEDURE — 6370000000 HC RX 637 (ALT 250 FOR IP): Performed by: OTOLARYNGOLOGY

## 2020-10-17 PROCEDURE — 94761 N-INVAS EAR/PLS OXIMETRY MLT: CPT

## 2020-10-17 PROCEDURE — 36415 COLL VENOUS BLD VENIPUNCTURE: CPT

## 2020-10-17 PROCEDURE — 99223 1ST HOSP IP/OBS HIGH 75: CPT | Performed by: INTERNAL MEDICINE

## 2020-10-17 PROCEDURE — 71250 CT THORAX DX C-: CPT

## 2020-10-17 PROCEDURE — 6360000002 HC RX W HCPCS

## 2020-10-17 PROCEDURE — 99232 SBSQ HOSP IP/OBS MODERATE 35: CPT | Performed by: NURSE PRACTITIONER

## 2020-10-17 PROCEDURE — 71046 X-RAY EXAM CHEST 2 VIEWS: CPT

## 2020-10-17 PROCEDURE — 2580000003 HC RX 258: Performed by: OTOLARYNGOLOGY

## 2020-10-17 PROCEDURE — 2060000000 HC ICU INTERMEDIATE R&B

## 2020-10-17 PROCEDURE — 80053 COMPREHEN METABOLIC PANEL: CPT

## 2020-10-17 PROCEDURE — 94640 AIRWAY INHALATION TREATMENT: CPT

## 2020-10-17 PROCEDURE — 83880 ASSAY OF NATRIURETIC PEPTIDE: CPT

## 2020-10-17 PROCEDURE — 2580000003 HC RX 258: Performed by: INTERNAL MEDICINE

## 2020-10-17 RX ORDER — ARFORMOTEROL TARTRATE 15 UG/2ML
15 SOLUTION RESPIRATORY (INHALATION) 2 TIMES DAILY
Status: DISCONTINUED | OUTPATIENT
Start: 2020-10-17 | End: 2020-10-19 | Stop reason: HOSPADM

## 2020-10-17 RX ORDER — FUROSEMIDE 10 MG/ML
20 INJECTION INTRAMUSCULAR; INTRAVENOUS ONCE
Status: COMPLETED | OUTPATIENT
Start: 2020-10-17 | End: 2020-10-17

## 2020-10-17 RX ORDER — SODIUM CHLORIDE FOR INHALATION 3 %
3 VIAL, NEBULIZER (ML) INHALATION 3 TIMES DAILY
Status: DISCONTINUED | OUTPATIENT
Start: 2020-10-17 | End: 2020-10-19 | Stop reason: HOSPADM

## 2020-10-17 RX ORDER — IPRATROPIUM BROMIDE AND ALBUTEROL SULFATE 2.5; .5 MG/3ML; MG/3ML
1 SOLUTION RESPIRATORY (INHALATION)
Status: DISCONTINUED | OUTPATIENT
Start: 2020-10-17 | End: 2020-10-19 | Stop reason: HOSPADM

## 2020-10-17 RX ADMIN — ACETYLCYSTEINE 400 MG: 200 SOLUTION ORAL; RESPIRATORY (INHALATION) at 18:33

## 2020-10-17 RX ADMIN — Medication 10 ML: at 19:53

## 2020-10-17 RX ADMIN — FUROSEMIDE 20 MG: 10 INJECTION, SOLUTION INTRAMUSCULAR; INTRAVENOUS at 17:50

## 2020-10-17 RX ADMIN — CEFAZOLIN 2 G: 10 INJECTION, POWDER, FOR SOLUTION INTRAVENOUS at 15:36

## 2020-10-17 RX ADMIN — PRAVASTATIN SODIUM 40 MG: 40 TABLET ORAL at 19:53

## 2020-10-17 RX ADMIN — ARFORMOTEROL TARTRATE 15 MCG: 15 SOLUTION RESPIRATORY (INHALATION) at 18:33

## 2020-10-17 RX ADMIN — LOPERAMIDE HYDROCHLORIDE 2 MG: 2 CAPSULE ORAL at 09:22

## 2020-10-17 RX ADMIN — TRAMADOL HYDROCHLORIDE 100 MG: 50 TABLET ORAL at 22:20

## 2020-10-17 RX ADMIN — IPRATROPIUM BROMIDE AND ALBUTEROL SULFATE 1 AMPULE: .5; 3 SOLUTION RESPIRATORY (INHALATION) at 18:34

## 2020-10-17 RX ADMIN — TRAMADOL HYDROCHLORIDE 50 MG: 50 TABLET, FILM COATED ORAL at 15:35

## 2020-10-17 RX ADMIN — BUDESONIDE AND FORMOTEROL FUMARATE DIHYDRATE 2 PUFF: 160; 4.5 AEROSOL RESPIRATORY (INHALATION) at 08:15

## 2020-10-17 RX ADMIN — SODIUM CHLORIDE, SODIUM LACTATE, POTASSIUM CHLORIDE, CALCIUM CHLORIDE AND DEXTROSE MONOHYDRATE: 5; 600; 310; 30; 20 INJECTION, SOLUTION INTRAVENOUS at 00:34

## 2020-10-17 RX ADMIN — TRAMADOL HYDROCHLORIDE 100 MG: 50 TABLET ORAL at 09:23

## 2020-10-17 RX ADMIN — ALBUTEROL SULFATE 2.5 MG: 2.5 SOLUTION RESPIRATORY (INHALATION) at 08:12

## 2020-10-17 RX ADMIN — TIOTROPIUM BROMIDE INHALATION SPRAY 2 PUFF: 3.12 SPRAY, METERED RESPIRATORY (INHALATION) at 08:15

## 2020-10-17 RX ADMIN — CEFAZOLIN 2 G: 10 INJECTION, POWDER, FOR SOLUTION INTRAVENOUS at 23:42

## 2020-10-17 RX ADMIN — CEFAZOLIN 2 G: 10 INJECTION, POWDER, FOR SOLUTION INTRAVENOUS at 08:25

## 2020-10-17 RX ADMIN — DEXTROSE MONOHYDRATE 400 MG: 50 INJECTION, SOLUTION INTRAVENOUS at 20:35

## 2020-10-17 RX ADMIN — DEXTROSE MONOHYDRATE 505 MG: 50 INJECTION, SOLUTION INTRAVENOUS at 09:23

## 2020-10-17 RX ADMIN — DEXAMETHASONE SODIUM PHOSPHATE 4 MG: 4 INJECTION, SOLUTION INTRAMUSCULAR; INTRAVENOUS at 18:30

## 2020-10-17 RX ADMIN — DEXAMETHASONE SODIUM PHOSPHATE 4 MG: 4 INJECTION, SOLUTION INTRAMUSCULAR; INTRAVENOUS at 11:47

## 2020-10-17 RX ADMIN — DEXAMETHASONE SODIUM PHOSPHATE 4 MG: 4 INJECTION, SOLUTION INTRAMUSCULAR; INTRAVENOUS at 03:23

## 2020-10-17 RX ADMIN — SODIUM CHLORIDE, SODIUM LACTATE, POTASSIUM CHLORIDE, CALCIUM CHLORIDE AND DEXTROSE MONOHYDRATE: 5; 600; 310; 30; 20 INJECTION, SOLUTION INTRAVENOUS at 08:25

## 2020-10-17 RX ADMIN — HYDROMORPHONE HYDROCHLORIDE 0.25 MG: 1 INJECTION, SOLUTION INTRAMUSCULAR; INTRAVENOUS; SUBCUTANEOUS at 11:48

## 2020-10-17 RX ADMIN — ACETYLCYSTEINE 400 MG: 200 SOLUTION ORAL; RESPIRATORY (INHALATION) at 08:12

## 2020-10-17 RX ADMIN — HYDROMORPHONE HYDROCHLORIDE 0.5 MG: 1 INJECTION, SOLUTION INTRAMUSCULAR; INTRAVENOUS; SUBCUTANEOUS at 03:26

## 2020-10-17 ASSESSMENT — PAIN DESCRIPTION - PROGRESSION
CLINICAL_PROGRESSION_2: GRADUALLY WORSENING
CLINICAL_PROGRESSION: GRADUALLY WORSENING
CLINICAL_PROGRESSION: GRADUALLY IMPROVING
CLINICAL_PROGRESSION: GRADUALLY IMPROVING
CLINICAL_PROGRESSION: NOT CHANGED
CLINICAL_PROGRESSION: GRADUALLY IMPROVING
CLINICAL_PROGRESSION: NOT CHANGED
CLINICAL_PROGRESSION: GRADUALLY WORSENING
CLINICAL_PROGRESSION: GRADUALLY WORSENING
CLINICAL_PROGRESSION: NOT CHANGED
CLINICAL_PROGRESSION: NOT CHANGED

## 2020-10-17 ASSESSMENT — PAIN DESCRIPTION - FREQUENCY
FREQUENCY: INTERMITTENT
FREQUENCY: INTERMITTENT
FREQUENCY: CONTINUOUS
FREQUENCY: INTERMITTENT
FREQUENCY: CONTINUOUS

## 2020-10-17 ASSESSMENT — PAIN DESCRIPTION - LOCATION
LOCATION: THROAT
LOCATION_2: BACK
LOCATION: THROAT
LOCATION: BACK
LOCATION: THROAT

## 2020-10-17 ASSESSMENT — PAIN DESCRIPTION - DESCRIPTORS
DESCRIPTORS: DISCOMFORT
DESCRIPTORS: ACHING
DESCRIPTORS: DISCOMFORT;BURNING
DESCRIPTORS: BURNING
DESCRIPTORS: DISCOMFORT
DESCRIPTORS_2: SORE;ACHING
DESCRIPTORS: DISCOMFORT
DESCRIPTORS: DISCOMFORT

## 2020-10-17 ASSESSMENT — PAIN SCALES - GENERAL
PAINLEVEL_OUTOF10: 7
PAINLEVEL_OUTOF10: 5
PAINLEVEL_OUTOF10: 3
PAINLEVEL_OUTOF10: 2
PAINLEVEL_OUTOF10: 2
PAINLEVEL_OUTOF10: 5
PAINLEVEL_OUTOF10: 7
PAINLEVEL_OUTOF10: 4
PAINLEVEL_OUTOF10: 0
PAINLEVEL_OUTOF10: 6
PAINLEVEL_OUTOF10: 4

## 2020-10-17 ASSESSMENT — PAIN DESCRIPTION - PAIN TYPE
TYPE: SURGICAL PAIN
TYPE: SURGICAL PAIN
TYPE_2: CHRONIC PAIN
TYPE: SURGICAL PAIN
TYPE: ACUTE PAIN
TYPE: SURGICAL PAIN

## 2020-10-17 ASSESSMENT — PAIN DESCRIPTION - ONSET
ONSET: ON-GOING
ONSET_2: ON-GOING
ONSET: ON-GOING

## 2020-10-17 ASSESSMENT — PAIN - FUNCTIONAL ASSESSMENT
PAIN_FUNCTIONAL_ASSESSMENT: PREVENTS OR INTERFERES SOME ACTIVE ACTIVITIES AND ADLS

## 2020-10-17 ASSESSMENT — PAIN DESCRIPTION - ORIENTATION
ORIENTATION: POSTERIOR
ORIENTATION: INNER
ORIENTATION: POSTERIOR
ORIENTATION: INNER
ORIENTATION: LOWER
ORIENTATION: INNER
ORIENTATION: POSTERIOR
ORIENTATION_2: POSTERIOR

## 2020-10-17 ASSESSMENT — PAIN DESCRIPTION - INTENSITY: RATING_2: 8

## 2020-10-17 ASSESSMENT — PAIN DESCRIPTION - DURATION: DURATION_2: CONTINUOUS

## 2020-10-17 NOTE — CONSULTS
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 male pulmonary consult requested regarding low oxygen saturation. Admitted 10/16/20 for elective surgery by Dr Manjinder Hernandez undergoing successful  BRONCHOSCOPY, MICRO LARYNGOSCOPY WITH REMOVAL OF SUBMUCCOSAL NON NOEPLASTIC LESION JET VENTILATION; suspension microlaryngoscopy with laser and debrider resection of obstructing nonneoplastic tumor of the supraglottis, glottis, and subglottis; therapeutic bronchoscopy with resection of obstructing soft tissue tumor with laser and debrider. Liz Chao is progressing well and wishes to go home however staff has been unable to remove from supplemental oxygen, O2 sat 85% on room air. He is comfortable in no distress whatsoever, multiple visitors in the room, speaks in full senteces with a  loud voice. CXR revealed questionable L apical PTX but non contrast CT chest revealed severe bullous emphysema but no PTX. Liz Chao is known to our service for severe COPD, has been on supplemental  Home oxygen before after colon resection.     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)       Past Surgical History           Procedure Laterality Date    COLONOSCOPY  2016    EYE SURGERY      CROSS EYED    HAND SURGERY Bilateral 1995    KNEE ARTHROSCOPY Right 1996    LARYNGOSCOPY  2017    Biopsy    LARYNGOSCOPY N/A 2019    MICRO LARYNGOSCOPY W/ BIOPSY performed by Juan Pablo Jones MD at [address] N/A 2019    DIRECT LARYNGOSCOPY WITH BIOPSY performed by Juan Pablo Jones MD at [address] RECTAL SURGERY  2016    colostemy bag[place]    ROTATOR CUFF REPAIR Left      Diet    DIET GENERAL;  Allergies    Povidone iodine  Social History     Social History     Socioeconomic History    Marital status:      Spouse name: Not on file    Number of children: Not on file    Years of education: Not on file    Highest education level: Not on file   Occupational History    Not on file   Social Needs    Financial resource strain: Not on file    Food insecurity     Worry: Not on file     Inability: Not on file    Transportation needs     Medical: Not on file     Non-medical: Not on file   Tobacco Use    Smoking status: Former Smoker     Packs/day: 2.25     Years: 31.00     Pack years: 69.75     Start date:      Last attempt to quit: 2006     Years since quittin.7    Smokeless tobacco: Never Used   Substance and Sexual Activity    Alcohol use: No     Alcohol/week: 0.0 standard drinks    Drug use: No    Sexual activity: Not Currently   Lifestyle    Physical activity     Days per week: Not on file     Minutes per session: Not on file    Stress: Not on file   Relationships    Social connections     Talks on phone: Not on file     Gets together: Not on file     Attends Sikh service: Not on file     Active member of club or organization: Not on file     Attends meetings of clubs or organizations: Not on file     Relationship status: Not on file    Intimate partner violence     Fear of current or ex partner: Not on file     Emotionally abused: Not on file     Physically abused: Not on file     Forced sexual activity: Not on file   Other Topics Concern    Not on file   Social History Narrative    Not on file     Family History          Problem Relation Age of Onset    Prostate Cancer Father 48    Cancer Father     Heart Disease Father     Diabetes Mother     Heart Disease Mother     Stroke Mother     Heart Disease Brother     High Blood Pressure Other     Cancer Other         LUNG,PROSTATE    Hearing Loss Other        ROS    General/Constitutional: pain well controlled   HENT: Negative. Eyes: Negative. Upper respiratory tract: No nasal stuffiness or post nasal drip. Lower respiratory tract/ lungs: IRELAND, cough, difficulty expectorating, suctioning some old blood from the surgical procedure  Cardiovascular: No palpitations, chest pain or edema. Gastrointestinal: No nausea or vomiting. Neurological: No focal neurological weakness. Extremities: No tenderness. Musculoskeletal: no complaints  Genitourinary: No complaints. Hematological: Negative. Denies easy buising  Skin: No itching. Meds    Current Medications    sodium chloride (Inhalant)  3 mL Nebulization TID    ceFAZolin  2 g Intravenous Q8H    acetylcysteine  2 mL Inhalation TID    budesonide-formoterol  2 puff Inhalation BID    loperamide  2 mg Oral Daily    pravastatin  40 mg Oral Daily    tiotropium  2 puff Inhalation Daily    sodium chloride flush  10 mL Intravenous 2 times per day    polyethylene glycol  17 g Oral Daily    sennosides-docusate sodium  1 tablet Oral BID    dexamethasone  4 mg Intravenous Q8H    voriconazole  4 mg/kg Intravenous Q12H     albuterol, sodium chloride flush, traMADol **OR** traMADol, HYDROmorphone **OR** HYDROmorphone, senna, ondansetron **OR** ondansetron  IV Drips/Infusions    Vitals    Vitals    height is 6' 2\" (1.88 m) and weight is 222 lb (100.7 kg).  His oral temperature is 98 °F (36.7 °C). His blood pressure is 143/82 (abnormal) and his pulse is 92. His respiration is 16 and oxygen saturation is 90%. O2 Flow Rate (L/min): 6 L/min  I/O    Intake/Output Summary (Last 24 hours) at 10/17/2020 1642  Last data filed at 10/17/2020 1534  Gross per 24 hour   Intake 3578 ml   Output 1800 ml   Net 1778 ml     Patient Vitals for the past 96 hrs (Last 3 readings):   Weight   10/16/20 1220 222 lb (100.7 kg)     Exam   Constitutional: Patient appears moderately built and moderately nourished. On face tent 40%  Head: Normocephalic and atraumatic. Mouth/Throat: Oropharynx is clear and moist.    Eyes: Conjunctivae are normal. Pupils are equal, round, and reactive to light. No scleral icterus. Neck: Neck supple. No JVD or tracheal deviation present. Cardiovascular: Regular rate, regular rhythm, S1 and S2 with no murmur. No peripheral edema  Pulmonary/Chest: diminished breath sounds, bilateral rhonchi with cough, expiratory delay  Abdominal: Soft. Bowel sounds audible. No distension or tenderness to palp  Musculoskeletal: Moves all extremities  Lymphadenopathy:  No cervical adenopathy. Neurological: Patient is alert and oriented to person, place, and time. Skin: Skin is warm and dry. Labs   ABG  No results found for: PH, PO2, PCO2, HCO3, O2SAT  No results found for: IFIO2, MODE, SETTIDVOL, SETPEEP  CBC  Recent Labs     10/17/20  0355   WBC 8.3   RBC 4.79   HGB 14.8   HCT 46.2   MCV 96.5*   MCH 30.9   MCHC 32.0*      MPV 10.9      BMP  Recent Labs     10/17/20  0355   *   K 4.9   CL 96*   CO2 25   BUN 15   CREATININE 0.6   GLUCOSE 143*   CALCIUM 8.6     LFT  Recent Labs     10/17/20  0355   AST 23   ALT 29   BILITOT 0.2*   ALKPHOS 159*     TROP  Lab Results   Component Value Date    TROPONINT < 0.010 11/11/2019     BNP  Lab Results   Component Value Date    PROBNP 98.6 11/11/2019       .   PFTs           Echo    N/A  Cultures    Procalcitonin  No results

## 2020-10-17 NOTE — ANESTHESIA POSTPROCEDURE EVALUATION
Department of Anesthesiology  Postprocedure Note    Patient: Moo Kelsey  MRN: [id number]  YOB: 1960  Date of evaluation: 10/16/2020  Time:  8:14 PM     Procedure Summary     Date:  10/16/20 Room / Location:  [place] [facility] / [place] [facility]    Anesthesia Start:  9806 Anesthesia Stop:  5532    Procedure:  BRONCHOSCOPY, MICRO LARYNGOSCOPY WITH REMOVAL OF SUBMUCCOSAL NON NOEPLASTIC LESION JET VENTILATION (N/A Mouth) Diagnosis:  (AIRWAY OBSTRUCTION, ADVERSE EFFECT OF RADIATION THERAPY, SWQUELA, TRACHEAL STENOSIS, CHRONIS LARYNGITIS, LARYNGEAL STENOSIS)    Surgeon:  Citlalli Lyons MD Responsible Provider:  Jaime Woods DO    Anesthesia Type:  general ASA Status:  3          Anesthesia Type: general    David Phase I: David Score: 9    David Phase II:      Last vitals: Reviewed and per EMR flowsheets.        Anesthesia Post Evaluation    Patient location during evaluation: PACU  Patient participation: complete - patient participated  Level of consciousness: awake  Airway patency: patent  Nausea & Vomiting: no nausea and no vomiting  Complications: no  Cardiovascular status: hemodynamically stable  Respiratory status: acceptable  Hydration status: stable

## 2020-10-17 NOTE — PROGRESS NOTES
Physician Progress Note      Wellington Dietz  CSN #:                  [id number]  :                       1960  ADMIT DATE:       10/16/2020 12:04 PM  [address] [place] DATE:  RESPONDING  PROVIDER #:        Ruben Castañeda MD          QUERY TEXT:    Pt admitted with airway obstruction due to laryngeal and tracheal stenosis and   underwent surgery. Postop, SpO2 noted to be 86%. Pt was then placed on 10   L/40% FiO2 via face tent. Respirations 10-30/min. Please respond below and   document in the progress notes and discharge summary the cause and correlating   clinical indicators to support such: The medical record reflects the following:  Risk Factors: laryngeal and tracheal stenosis, chronic laryngitis,   pachydermia, airway obstruction, s/p surgery, hx radiation, COPD, age 60, CXR   showed pneumothorax  Clinical Indicators: Postop, SpO2 noted to be 86%. Pt was then placed on 10   L/40% FiO2 via face tent. Respirations 10-30/min; IRELAND  Treatment: O2 @ 10 L/40% FiO2 via face tent, respiratory therapist, Symbicort,   Decadron, Duoneb, Spiriva, Albuterol    Thank you! Jennifer Pollard, RN, BSN, RHIT, CCDS, [facility] [place]  RN Clinical   [phone]  Options provided:  -- Acute Postoperative Pulmonary Insufficiency  -- Acute Postoperative Respiratory Failure due to chronic underlying   condition, please specify. -- Acute Postoperative Respiratory Failure which was not routinely expected   and was a complication of the surgical procedure  -- Acute Respiratory Failure following surgery was due to events unrelated to   the surgical operation, Please specify.   -- Other - I will add my own diagnosis  -- Disagree - Not applicable / Not valid  -- Disagree - Clinically unable to determine / Unknown  -- Refer to Clinical Documentation Reviewer    PROVIDER RESPONSE TEXT:    Patient with acute postoperative respiratory failure due to chronic underlying   condition of Sevre chronic upper airway stenosis and emphasema along with   post obstructive pulmonary edema. Apprears to be abating.     Query created by: Stephane Bunch on 10/17/2020 1:03 PM      Electronically signed by:  Denis Vera MD 10/17/2020 1:41 PM

## 2020-10-17 NOTE — OP NOTE
Operative Note      Patient: Alf Baumann  YOB: 1960  MRN: [id number]    Date of Procedure: 10/16/2020    Pre-Op Diagnosis: AIRWAY OBSTRUCTION, ADVERSE EFFECT OF RADIATION THERAPY, SWQUELA, TRACHEAL STENOSIS, CHRONIS LARYNGITIS, LARYNGEAL STENOSIS; fungal laryngotracheal pachydermia with obstruction    Post-Op Diagnosis: Same       Procedure(s):  BRONCHOSCOPY, MICRO LARYNGOSCOPY WITH REMOVAL OF SUBMUCCOSAL NON NOEPLASTIC LESION JET VENTILATION; suspension microlaryngoscopy with laser and debrider resection of obstructing nonneoplastic tumor of the supraglottis, glottis, and subglottis; therapeutic bronchoscopy with resection of obstructing soft tissue tumor with laser and debrider    Surgeon(s):  Marcin Hill MD    Assistant:   * No surgical staff found *    Anesthesia: General    Estimated Blood Loss (mL): Less than 50 cc    Complications: None    Specimens:   ID Type Source Tests Collected by Time Destination   1 : sublotic mucosa Tissue Mouth GRAM STAIN (Canceled), AFB STAIN, CULTURE, ANAEROBIC AND AEROBIC Marcin Hill MD 10/16/2020 1606        Implants:  * No implants in log *      Drains:   Colostomy (Active)       Findings: 1. Near complete obstruction of the patient's glottic and subglottic apertures  2. 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 Extensive fungal pachydermia of the subglottis and first 4 rings of trachea       Detailed Description of Procedure: The patient was taken to the operating room awake and placed in the supine position. General anesthesia was induced and the patient was intubated with an LMA without difficulty or vital sign instability. His ventilation was noted to be difficult but effective. I then performed a timeout verifying the patient's identity and planned procedure. I turned the table 90 degrees for the procedure.     Protecting the patient's eyes and maxillary mucosa I removed the LMA and replaced it with a Mj transoral system laryngoscope in the inlet of the larynx to extend the larynx anteriorly and provided direct line of sight view of the airway. I then quickly instituted supraglottic jet ventilation as per our original plans in coordination with anesthesia. This was found to be effective verified both by the subglottic flow of air and the reasonable control of hypercapnia. Throughout the case there were multiple intervals where pressure gradients and CO2 levels were tested to assure the patient safety. Attentiveness to risks of airway fire were also utilized. Evaluating the patient's airway with a 30 degree optical magnifying telescope I found it to be very abnormal for the presence of innumerable raised areas of fungal mucositis throughout the supraglottis glottis subglottis and proximal trachea. There was tenacious soft tissue debris and inspissated mucus as well. The patient's vocal folds were virtually touching with a small posterior glottic gap for which the patient was being ventilated and, very likely, through which the patient was breathing. Microlaryngoscopy with laser and debrider resection of obstructing supraglottic, glottic, and subglottic benign tumor: Using a combination of technologies I began the process of widening the patient's proximal airway. I began with a CO2 UltraPulse fiber laser set at 50 mJ per pulse and 10 W average power density. I passed it into the airway using a hand held device in combination with the 30 degree magnifying telescope. I strafed the soft tissues of the false folds and true folds in longitudinal and transverse lines to incise the surface of the normal tissue and micro cauterize the surface in preparation for its debridement. I took this down to the true vocal folds themselves.   I purposefully left posterior commissure and the anterior commissure as well as the supraglottic petiole area of the epiglottis, untouched by the laser energy to reduce the risk of cicatricial stenosis. I then used a Tricut laryngeal debrider blade to remove the surface of these tissues down to relatively healthy submucosa. I used monopolar cautery judiciously to establish hemostasis. I resected more of the left true vocal fold than the right. At this point jet ventilation was exceedingly more effective because the channel had gone from a 2 to 3 mm posterior gap to a nearly normal glottic aperture. Therapeutic bronchoscopy with resection of obstructing benign tumor using laser and debrider: Turning my attention to the more distal disease I used a combination of CO2 UltraPulse fiber laser with the same settings as described above as well as the microdebrider system with the latter being used primarily. Like the supraglottis I avoided producing a confluent circumferential wound and concentrated primarily anteriorly and posterolaterally for the deepest resections. There was superficial debris and tissue matter that I removed and other areas that were particularly protruding into the lumen of the airway. I took multiple biopsies from this region sending them for histopathology as well as fungal and aerobic and acid-fast cultures. I used a Bugbee electrode through a bronchoscope to achieve hemostasis only in the anterior subglottis and anterior tracheal areas. At no point did I expose any obvious cartilage. I continued to widen the airway until airflow was laminar throughout. At intervals in clinic the end of the also described above, I entered the distal airway and irrigated out the bloody effluent that had migrated distally. No evidence of distal tracheal or bronchiolar disease of this type was encountered at all. No mucopurulence was seen distally. After irrigating the surface of these tissues with sterile saline, I verified that hemostasis had been achieved and prepared to end the procedure.   I reintubated the patient with a #7 endotracheal tube without difficulty and then removed the transoral hardware turning the patient back to anesthesia for reversal and extubation in the operating room. This was carried out without incident and the patient was taken to recovery in satisfactory condition. Estimated blood loss was approximately 50 cc and the patient received a liter and half of intravenous lactated Ringer's intraoperatively. No blood products were transfused. No intraoperative complications were detected. Counts were considered accurate x3. I was present for and performed the patient's entire operation. Disposition: Given the severity of the patient's disease and the extensive work necessary to widen his airway, the patient is at risk for secondary airway obstruction from bleeding and or edema. I will admit him to a telemetry monitored bed for continuous pulse oximetry and cardiac monitoring as well as round-the-clock nebulizer therapy inclusive of topical steroids, hypertonic saline, and Mucomyst.  He will also be on systemic steroids for the next 48 hours. I will have him on cartilage protection IV antibiotics and will request infectious disease evaluation to start antifungal medications as they deem appropriate. I will consult the hyperbaric oxygen conditions to see if there is any way hyperbaric oxygen can be used to assist in his healing perhaps at a lower than maximal pressure given his history of emphysema.       Electronically signed by Noe Bingham MD on 10/17/2020 at 10:30 AM

## 2020-10-17 NOTE — PLAN OF CARE
Problem: Pain:  Goal: Pain level will decrease  Description: Pain level will decrease  Outcome: Ongoing  Note: Patient voices pain 7/10. Patients pain goal is 2/10. PRN pain medications given as ordered. Patients pain goal is a 2. Non-pharmacological interventions include: Patient stated resting voice improves pain. Problem: Discharge Planning:  Goal: Discharged to appropriate level of care  Description: Discharged to appropriate level of care  Outcome: Ongoing  Note: Patient plans to discharge home once medically stable. No orders placed at this time. Problem: Skin Integrity:  Goal: Will show no infection signs and symptoms  Description: Will show no infection signs and symptoms  Outcome: Ongoing  Note: Patient shows no new signs of infection at this time. Problem: Skin Integrity:  Goal: Absence of new skin breakdown  Description: Absence of new skin breakdown  Outcome: Ongoing  Note: Patient shows no new evidence of skin breakdown at this time. Problem: Airway Clearance - Ineffective:  Goal: Ability to maintain a clear airway will improve  Description: Ability to maintain a clear airway will improve  Outcome: Ongoing  Note: Patient airway remains open at this time will continue to monitor closely. Care plan reviewed with patient. Patient verbalizes understanding of the care plan and contributed to goal setting.

## 2020-10-17 NOTE — PROGRESS NOTES
Department of Otolaryngology  Progress Note    Chief Complaint:  Post-op day 1    SUBJECTIVE:  Post-operative day 1 bronchoscopy with microlaryngoscopy, removal of submucosal non-neoplastic lesion and removal of obstructing inflammatory masses of the supraglottis, glottis, and subglottic trachea using Jet ventilation. 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 Patient currently receiving hypertonic saline nebulizers TID and Mucomyst.  Dr Kayy Avendano consulted - patient started on IV voriconazole. Portable chest xray last evening suspicious for small left anterior apical pneumothorax. Patient is on face tent 40% FiO2. He has not been up and moving around yet. A complete multi-organ review of systems was performed using a new patient questionnaire, and reviewed by me.   ENT:  negative except as noted in HPI  CONSTITUTIONAL:  negative except as noted in HPI  EYES:  negative except as noted in HPI  RESPIRATORY:  negative except as noted in HPI  CARDIOVASCULAR:  negative except as noted in HPI  GASTROINTESTINAL:  negative except as noted in HPI  GENITOURINARY:  negative except as noted in HPI  MUSCULOSKELETAL:  negative except as noted in HPI  SKIN:  negative except as noted in HPI  ENDOCRINE/METABOLIC: negative except as noted in HPI  HEMATOLOGIC/LYMPHATIC:  negative except as noted in HPI  ALLERGY/IMMUN: negative except as noted in HPI  NEUROLOGICAL:  negative except as noted in HPI  BEHAVIOR/PSYCH:  negative except as noted in HPI    OBJECTIVE      Physical  VITALS:  /76   Pulse 100   Temp 98.3 °F (36.8 °C) (Oral)   Resp 18   Ht 6' 2\" (1.88 m)   Wt 222 lb (100.7 kg)   SpO2 94%   BMI 28.50 kg/m²     CONSTITUTIONAL:  awake, alert, cooperative, no apparent distress, and appears stated age  ENT: unremarkable  NECK:  Supple, symmetrical, trachea midline, no adenopathy, thyroid symmetric, not enlarged and no tenderness, skin normal  LUNGS:  no increased work of breathing, no stridor or wheezing  CARDIOVASCULAR:  regular rate and rhythm  NEUROLOGIC:  Awake, alert, oriented to name, place and time. Cranial nerves II-XII are grossly intact. Data  CBC with Differential:    Lab Results   Component Value Date    WBC 8.3 10/17/2020    RBC 4.79 10/17/2020    RBC 4.28 12/06/2011    HGB 14.8 10/17/2020    HCT 46.2 10/17/2020     10/17/2020    MCV 96.5 10/17/2020    MCH 30.9 10/17/2020    MCHC 32.0 10/17/2020    RDW 13.8 06/12/2020    NRBC 0 10/17/2020    NRBC 0 12/06/2011    SEGSPCT 91.6 10/17/2020    LYMPHOPCT 13.6 02/06/2020    MONOPCT 1.9 10/17/2020    MONOPCT 15.8 02/06/2020    EOSPCT 1.7 02/06/2020    BASOPCT 0.5 02/06/2020    MONOSABS 0.2 10/17/2020    LYMPHSABS 0.5 10/17/2020    EOSABS 0.0 10/17/2020    BASOSABS 0.0 10/17/2020     Radiology Review:  Portable CXR 10/16/2020 1645  Impression    Findings suspicious for left anterior apical pneumothorax. Consider repeat PA chest when the patient's condition permits.                   **This report has been created using voice recognition software.  It may contain minor errors which are inherent in voice recognition technology. **         Final report electronically signed by Dr. Michael Paul on 10/16/2020 4:58 PM          Inpatient Medications  Current Facility-Administered Medications: sodium chloride (Inhalant) 3 % nebulizer solution 3 mL, 3 mL, Nebulization, TID  acetylcysteine (MUCOMYST) 20 % solution 400 mg, 2 mL, Inhalation, TID  albuterol (PROVENTIL) nebulizer solution 2.5 mg, 2.5 mg, Nebulization, Q6H PRN  budesonide-formoterol (SYMBICORT) 160-4.5 MCG/ACT inhaler 2 puff, 2 puff, Inhalation, BID  loperamide (IMODIUM) capsule 2 mg, 2 mg, Oral, Daily  pravastatin (PRAVACHOL) tablet 40 mg, 40 mg, Oral, Daily  tiotropium (SPIRIVA RESPIMAT) 2.5 MCG/ACT inhaler 2 puff, 2 puff, Inhalation, Daily  sodium chloride flush 0.9 % injection 10 mL, 10 mL, Intravenous, 2 times per day  sodium chloride flush 0.9 % injection 10 mL, 10 mL, Intravenous, PRN  dextrose 5 % in lactated ringers infusion, , Intravenous, Continuous  traMADol (ULTRAM) tablet 50 mg, 50 mg, Oral, Q6H PRN **OR** traMADol (ULTRAM) tablet 100 mg, 100 mg, Oral, Q6H PRN  HYDROmorphone (DILAUDID) injection 0.25 mg, 0.25 mg, Intravenous, Q3H PRN **OR** HYDROmorphone (DILAUDID) injection 0.5 mg, 0.5 mg, Intravenous, Q3H PRN  polyethylene glycol (GLYCOLAX) packet 17 g, 17 g, Oral, Daily  sennosides-docusate sodium (SENOKOT-S) 8.6-50 MG tablet 1 tablet, 1 tablet, Oral, BID  senna (SENOKOT) 8.8 MG/5ML syrup 8.8 mg, 5 mL, Oral, BID PRN  ondansetron (ZOFRAN-ODT) disintegrating tablet 4 mg, 4 mg, Oral, Q8H PRN **OR** ondansetron (ZOFRAN) injection 4 mg, 4 mg, Intravenous, Q6H PRN  Dexamethasone Sodium Phosphate injection 4 mg, 4 mg, Intravenous, Q8H  voriconazole (VFEND) 505 mg in dextrose 5 % 250 mL IVPB, 5 mg/kg, Intravenous, Q12H  voriconazole (VFEND) 405 mg in dextrose 5 % 250 mL IVPB, 4 mg/kg, Intravenous, Q12H    ASSESSMENT AND PLAN    History of airway obstruction, tracheal stenosis, chronic laryngitis and laryngeal stenosis s/p bronchoscopy with microlaryngoscopy, removal of submucosal non-neoplastic lesion and removal of obstructing inflammatory masses of the supraglottis, glottis, and subglottic trachea using Jet ventilation  - Intra operative findings:  1.  Near complete obstruction of the patient's glottic and subglottic apertures  2.  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 Started on IV Voriconazole pending surgical culture results.      Post-operative hypoxemia  - Increased oxygenation needs - patient 81% on room air. Discussion with Dr Patti Martinez, concern for developing post-operative pulmonary edema. Requests consult to Pulmonary medicine for further evaluation and management.        Electronically signed by JOSH Camacho CNP on 10/17/2020 at 9:11 AM

## 2020-10-17 NOTE — PROGRESS NOTES
Progress note: Infectious diseases    Patient - Ana Lilia Barbosa,  Age - 60 y.o.    - 1960      Room Number - 4Q-61/735-F   MRN -  [id number]   Acct # - [de-identified]  Date of Admission -  10/16/2020 12:04 PM    SUBJECTIVE:   He has no new complaints. No fever or chest pain  OBJECTIVE   VITALS    height is 6' 2\" (1.88 m) and weight is 222 lb (100.7 kg). His oral temperature is 98.3 °F (36.8 °C). His blood pressure is 132/76 and his pulse is 100. His respiration is 18 and oxygen saturation is 94%. Wt Readings from Last 3 Encounters:   10/16/20 222 lb (100.7 kg)   10/01/20 226 lb 6.4 oz (102.7 kg)   09/15/20 223 lb 12.8 oz (101.5 kg)       I/O (24 Hours)    Intake/Output Summary (Last 24 hours) at 10/17/2020 0958  Last data filed at 10/17/2020 9158  Gross per 24 hour   Intake 1923 ml   Output 1225 ml   Net 698 ml       General Appearance  Awake, alert, oriented,    HEENT - normocephalic, atraumatic, pink conjunctiva,  anicteric sclera  Neck - Supple, no mass   Lungs -  Bilateral  air entry,diminished breath sound  Cardiovascular - Heart sounds are normal.  Regular rate and rhythm without murmur, gallop or rub.   Abdomen - soft, not distended, nontender,   Neurologic -oriented  Skin - No bruising or bleeding  Extremities - No edema, no cyanosis, clubbing     MEDICATIONS:      sodium chloride (Inhalant)  3 mL Nebulization TID    ceFAZolin  2 g Intravenous Q8H    acetylcysteine  2 mL Inhalation TID    budesonide-formoterol  2 puff Inhalation BID    loperamide  2 mg Oral Daily    pravastatin  40 mg Oral Daily    tiotropium  2 puff Inhalation Daily    sodium chloride flush  10 mL Intravenous 2 times per day    polyethylene glycol  17 g Oral Daily    sennosides-docusate sodium  1 tablet Oral BID    dexamethasone  4 mg Intravenous Q8H    voriconazole  5 mg/kg Intravenous Q12H    voriconazole  4 mg/kg Intravenous infection: gram stain growing yeast and gram positive cocci. .  Will continue ancef and voriconazole.   Will arrange oral voriconazole as out patient      Huan Benitez MD, FACP 10/17/2020 9:58 AM

## 2020-10-17 NOTE — PLAN OF CARE
Problem: Impaired respiratory status  Goal: Clear lung sounds  Description: Clear lung sounds  Outcome: Ongoing  Note: Cont aerosol to improve aeration and mucus production

## 2020-10-17 NOTE — H&P
Loli Silva MD    Physician    Specialty:  Otolaryngology    Progress Notes        Signed    Encounter Date:  9/15/2020                Signed         Expand All Collapse All      Show:Clear all  [x]Manual[x]Template[]Copied    Added by:  [x]Avinash Rebolledo MD    []Noe for details    [facility] PROFESSIONAL SERVS  [facility] EAR, NOSE AND THROAT  Mukund Lamb [address] [place] [address]  Dept: [phone]  Dept Fax: [phone]  Loc: [phone]     The following note still constitutes the patient's current condition and his surgical plans that have been developed in conjunction with close collaboration with anesthesia and the acquisition of fibrillation technology along with jet ventilation technology. Given the length of time ago when this note was written, I reviewed the patient's history, performed a current physical exam, and reviewed the surgical plan with him and with his sister at the bedside in the holding area. General physical exam the patient is a pleasant somewhat ill-appearing younger than stated age appearing adult male in mild respiratory distress. His inhalation process is prolonged and his voice quality is extremely abnormal and raspy. Auscultation of his airway demonstrates inspiratory stridor and exhalational phonatory noises. He is barrel chested. His heart tones are distant but no murmur or gallop is audible. He had no lower extremity edema. Impressions and plan:    The patient is a 43-year-old male with a long history of airway obstruction associated with a postradiation vulnerability to mucous membrane fungal invasion and secondary pachydermia. This has encroached upon his airway from supraglottis to proximal trachea and is now in danger of asphyxiating the patient. His larynx resides alone in his neck as to make a tracheostomy surgically impossible.   While he may be a candidate for laryngectomy, even that would be exceedingly difficult and would likely require the use of a free transfer flap to prevent him from having postoperative pharyngocutaneous fistula develop. As such he goes to the operating room today for an effort at widening his airway in conjunction with antimicrobial therapy and if possible some form of hyperbaric oxygen therapy given his comorbidity of emphysema. Severino Valencia MD    Jose Boogie is a 60 y.o. male who was referred by No ref. provider found for:       Chief Complaint   Patient presents with    Follow-up       Patient is here for a 4 week follow up.         HPI:      Jose Boogie is a 60 y.o. male has a history of laryngeal cancer status post radiation therapy. 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 He has been on a long course of antifungal therapy based on the input of his infectious disease doctor. He is also been on hypertonic and isotonic saline nebulizers to break up the thick secretions overlying his abnormal airway contours along with an acetylcysteine or Mucomyst for the additional benefit of breaking up tenacious mucus. With these treatment modalities the patient has been able to breathe somewhat more comfortably but he still describes himself as being constantly air hungry and desires improvement in his breathing ability insofar as it is possible to have this. History:            Allergies   Allergen Reactions    Povidone Iodine Rash       Surgery prep      Current Facility-Administered Medications          Current Outpatient Medications   Medication Sig Dispense Refill    triamcinolone (KENALOG) 0.1 % cream APPLY CREAM EXTERNALLY ONCE DAILY        budesonide-formoterol (SYMBICORT) 160-4.5 MCG/ACT AERO Inhale 2 puffs into the lungs 2 times daily Rinse mouth after its use.  3 Inhaler 3    sodium chloride nebulizer 0.9 % solution Take 3 mLs by nebulization as needed for Wheezing (throat dryness) 500 mL 3    sodium chloride, Inhalant, 3 % nebulizer solution Take 3 mLs by nebulization as needed (Throat dryness, cough, wheezing) 500 mL 3    pravastatin (PRAVACHOL) 40 MG tablet Take 40 mg by mouth daily        Respiratory Therapy Supplies (NEBULIZER AIR TUBE/PLUGS) MISC Please provide nebulizer kit and supplies. 1 each 0    Acetylcysteine (NAC PO) Take by mouth        SPIRIVA RESPIMAT 2.5 MCG/ACT AERS inhaler INHALE 2 PUFFS BY MOUTH ONCE DAILY 3 Inhaler 3    acetaminophen (TYLENOL) 650 MG extended release tablet Take 1 tablet by mouth as needed for Pain Do not take with hydrocodone/acetominophen 60 tablet 3    albuterol (PROVENTIL) (2.5 MG/3ML) 0.083% nebulizer solution Take 3 mLs by nebulization every 6 hours as needed for Wheezing or Shortness of Breath 360 vial 3    albuterol sulfate HFA (VENTOLIN HFA) 108 (90 Base) MCG/ACT inhaler Inhale 2 puffs into the lungs every 6 hours as needed for Wheezing or Shortness of Breath 3 Inhaler 3    guaiFENesin (MUCINEX) 600 MG extended release tablet Take 1,200 mg by mouth 2 times daily        Dextromethorphan Polistirex (ROBITUSSIN 12 HOUR COUGH PO) Take by mouth 2 times daily as needed         loperamide (IMODIUM) 2 MG capsule Take 2 mg by mouth daily         acetylcysteine (MUCOMYST) 10 % nebulizer solution Inhale 4 mLs into the lungs 3 times daily 360 mL 0      No current facility-administered medications for this visit.          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)      Thyroid disease           Past Surgical History         Past Surgical History:   Procedure Laterality Date    COLONOSCOPY   2016    EYE SURGERY         CROSS EYED    HAND SURGERY Bilateral 1995    KNEE ARTHROSCOPY Right 1996    LARYNGOSCOPY   2017     Biopsy    LARYNGOSCOPY N/A 2019     MICRO LARYNGOSCOPY W/ BIOPSY performed by Carlton Gilbert MD at [address] N/A 2019     DIRECT LARYNGOSCOPY WITH BIOPSY performed by Carlton Gilbert MD at [address] [place]        RECTAL SURGERY   2016     [place]    ROTATOR CUFF REPAIR Left 80'S        Family History         Family History   Problem Relation Age of Onset    Prostate Cancer Father 48    Cancer Father      Heart Disease Father      Diabetes Mother      Heart Disease Mother      Stroke Mother      Heart Disease Brother      High Blood Pressure Other      Cancer Other           LUNG,PROSTATE    Hearing Loss Other          Social History            Tobacco Use    Smoking status: Former Smoker       Packs/day: 2.25       Years: 31.00       Pack years: 69.75       Start date:        Last attempt to quit: 2006       Years since quittin.6    Smokeless tobacco: Never Used   Substance Use Topics    Alcohol use: No       Alcohol/week: 0.0 standard drinks                    Subjective:      Review of Systems  Rest of review of systems are negative, except as noted in HPI.      Objective:      /78 (Site: Left Upper Arm, Position: Sitting)   Pulse 78   Temp 97.4 °F (36.3 °C) (Infrared)   Resp 16   Ht 6' 2\" (1.88 m)   Wt 223 lb 12.8 oz (101.5 kg)   BMI 28.73 kg/m²      Physical Exam         On general physical exam the patient is a pleasant alert oriented and cooperative. His voice is abnormal and it is markedly low in pitch mildly pressed in quality and mildly raspy for his age and gender. It has the timber of a supraglottic voice. His oral and cervical breath sounds are mildly turbulent and is rapid respiration is mildly slowed on inhalation. On auscultation his lung fields are entirely vesicular bilaterally. No rhonchi or rails were heard.   He is not cyanotic.     Vitals reviewed.     No results found. Lab Results   Component Value Date      06/12/2020      02/06/2020      11/11/2019     K 5.2 06/12/2020     K 4.4 02/06/2020     K 4.1 11/11/2019      06/12/2020      02/06/2020     CL 99 11/11/2019     CO2 29 06/12/2020     CO2 29 02/06/2020     CO2 25 11/11/2019     BUN 19 06/12/2020     BUN 18 02/06/2020     BUN 13 11/11/2019     CREATININE 0.71 06/12/2020     CREATININE 0.78 02/06/2020     CREATININE 0.8 01/21/2020     CALCIUM 9.10 06/12/2020     CALCIUM 8.70 02/06/2020     CALCIUM 9.1 11/11/2019     PROT 6.5 06/12/2020     PROT 6.6 02/06/2020     PROT 7.5 11/11/2019     LABALBU 4.3 06/12/2020     LABALBU 4.5 02/06/2020     LABALBU 4.8 11/11/2019     LABALBU 4.6 12/06/2011     BILITOT 0.3 06/12/2020     BILITOT 0.5 02/06/2020     BILITOT 0.3 11/11/2019     ALKPHOS 124 06/12/2020     ALKPHOS 119 02/06/2020     ALKPHOS 145 11/11/2019     AST 22 06/12/2020     AST 24 02/06/2020     AST 30 11/11/2019     ALT 31 06/12/2020     ALT 26 02/06/2020     ALT 23 11/11/2019         All of the past medical history, past surgical history, family history,social history, allergies and current medications were reviewed with the patient. Assessment & Plan   Diagnoses and all orders for this visit:       Diagnosis Orders   1. Airway obstruction  NH LARYNGOSCOPY,DIR,OP,EXC TUMR,LCL FLAP     BRONCHOSCOPY   2. Adverse effect of radiation therapy, sequela  NH LARYNGOSCOPY,DIR,OP,EXC TUMR,LCL FLAP     BRONCHOSCOPY   3. Laryngitis, chronic  NH LARYNGOSCOPY,DIR,OP,EXC TUMR,LCL FLAP   4. Tracheal stenosis  BRONCHOSCOPY   5. Laryngeal stenosis  NH LARYNGOSCOPY,DIR,OP,EXC TUMR,LCL FLAP            Based on his history and these physical findings, the patient is still in need of transoral laser ablation of the protruding mucous membrane structures that likely harbor his invasive fungal process.   Based on what we had planned, we will likely be able to proceed with jet ventilation as I had hoped for. If not, we can use LMA based ventilation with endoscopic and fiberoptic laser energy delivery. He has no teeth so it is possible I will be able to get a direct line of sight view of his airway from above at least to the level of his supraglottis and glottis. And then reserve fiberoptic treatment for the subglottis and proximal trachea. I will likely treat one side relatively superficially on the other side more deeply in order to give him less of a circumferential process that would weep thick secretions and/or produce significant edema. This planned surgery will be a stage I process followed by a second similar procedure with an emphasis of deep ablation on the other side and a touchup on the first side that was deeply treated. I explained the indications benefits limitations and risks of proceeding in this manner to the patient to his satisfaction. The risks I described include but are not limited to bleeding, recurrent or persistent infection, airway edema, need for revision surgery beyond the first and second stages, tongue pain, dental soreness, and need for restorative surgery of any or all of these problems among others. The patient reported understanding these other risks and wished to proceed. Informed consent was based on this conversation.     I will see him back approximately 2 weeks following his operation for a fiberoptic laryngoscopy and to schedule his stage II procedure.        Return in about 6 weeks (around 10/27/2020) for 2 weeks status post his planned procedure for follow-up fiberoptic laryngoscopy.     I spent approximately 20 minutes face-to-face time with the patient.     **This report has been created using voice recognition software. It may contain minor errors which are inherent in voice recognition technology. **                                        Office Visit on 9/15/2020          Detailed Report         Note shared with patient   Progress Notes Info     Author  Note Status  Last Update User    Osman Garcia MD  Signed  Osman Garcia MD    Last Update Date/Time: 9/15/2020 11:22 AM    Chart Review Routing History     No routing history on file.

## 2020-10-18 LAB
AFB SMEAR: NORMAL
ALLEN TEST: POSITIVE
BASE EXCESS (CALCULATED): 2.1 MMOL/L (ref -2.5–2.5)
COLLECTED BY:: ABNORMAL
DEVICE: ABNORMAL
HCO3: 28 MMOL/L (ref 23–28)
IFIO2: 21
O2 SATURATION: 82 %
PCO2: 47 MMHG (ref 35–45)
PH BLOOD GAS: 7.38 (ref 7.35–7.45)
PO2: 47 MMHG (ref 71–104)
SOURCE, BLOOD GAS: ABNORMAL

## 2020-10-18 PROCEDURE — 6370000000 HC RX 637 (ALT 250 FOR IP): Performed by: INTERNAL MEDICINE

## 2020-10-18 PROCEDURE — 6360000002 HC RX W HCPCS: Performed by: OTOLARYNGOLOGY

## 2020-10-18 PROCEDURE — 94760 N-INVAS EAR/PLS OXIMETRY 1: CPT

## 2020-10-18 PROCEDURE — 36600 WITHDRAWAL OF ARTERIAL BLOOD: CPT

## 2020-10-18 PROCEDURE — 6370000000 HC RX 637 (ALT 250 FOR IP): Performed by: OTOLARYNGOLOGY

## 2020-10-18 PROCEDURE — 99232 SBSQ HOSP IP/OBS MODERATE 35: CPT | Performed by: NURSE PRACTITIONER

## 2020-10-18 PROCEDURE — 82803 BLOOD GASES ANY COMBINATION: CPT

## 2020-10-18 PROCEDURE — 2580000003 HC RX 258

## 2020-10-18 PROCEDURE — 94640 AIRWAY INHALATION TREATMENT: CPT

## 2020-10-18 PROCEDURE — 2580000003 HC RX 258: Performed by: NURSE PRACTITIONER

## 2020-10-18 PROCEDURE — 6360000002 HC RX W HCPCS

## 2020-10-18 PROCEDURE — 6360000002 HC RX W HCPCS: Performed by: INTERNAL MEDICINE

## 2020-10-18 PROCEDURE — 2060000000 HC ICU INTERMEDIATE R&B

## 2020-10-18 PROCEDURE — 99232 SBSQ HOSP IP/OBS MODERATE 35: CPT | Performed by: INTERNAL MEDICINE

## 2020-10-18 PROCEDURE — 2580000003 HC RX 258: Performed by: OTOLARYNGOLOGY

## 2020-10-18 RX ORDER — AMOXICILLIN AND CLAVULANATE POTASSIUM 875; 125 MG/1; MG/1
1 TABLET, FILM COATED ORAL EVERY 12 HOURS SCHEDULED
Status: DISCONTINUED | OUTPATIENT
Start: 2020-10-18 | End: 2020-10-19 | Stop reason: HOSPADM

## 2020-10-18 RX ORDER — FUROSEMIDE 10 MG/ML
20 INJECTION INTRAMUSCULAR; INTRAVENOUS ONCE
Status: COMPLETED | OUTPATIENT
Start: 2020-10-18 | End: 2020-10-18

## 2020-10-18 RX ORDER — SULFAMETHOXAZOLE AND TRIMETHOPRIM 800; 160 MG/1; MG/1
1 TABLET ORAL EVERY 12 HOURS SCHEDULED
Status: CANCELLED | OUTPATIENT
Start: 2020-10-18

## 2020-10-18 RX ADMIN — ACETYLCYSTEINE 400 MG: 200 SOLUTION ORAL; RESPIRATORY (INHALATION) at 07:49

## 2020-10-18 RX ADMIN — DEXTROSE MONOHYDRATE 400 MG: 50 INJECTION, SOLUTION INTRAVENOUS at 09:40

## 2020-10-18 RX ADMIN — AMOXICILLIN AND CLAVULANATE POTASSIUM 1 TABLET: 875; 125 TABLET, FILM COATED ORAL at 20:38

## 2020-10-18 RX ADMIN — ACETYLCYSTEINE 400 MG: 200 SOLUTION ORAL; RESPIRATORY (INHALATION) at 19:41

## 2020-10-18 RX ADMIN — Medication 10 ML: at 09:04

## 2020-10-18 RX ADMIN — SODIUM CHLORIDE SOLN NEBU 3% 3 ML: 3 NEBU SOLN at 12:16

## 2020-10-18 RX ADMIN — IPRATROPIUM BROMIDE AND ALBUTEROL SULFATE 1 AMPULE: .5; 3 SOLUTION RESPIRATORY (INHALATION) at 07:49

## 2020-10-18 RX ADMIN — Medication 10 ML: at 20:42

## 2020-10-18 RX ADMIN — IPRATROPIUM BROMIDE AND ALBUTEROL SULFATE 1 AMPULE: .5; 3 SOLUTION RESPIRATORY (INHALATION) at 19:41

## 2020-10-18 RX ADMIN — SODIUM CHLORIDE SOLN NEBU 3% 3 ML: 3 NEBU SOLN at 19:42

## 2020-10-18 RX ADMIN — TRAMADOL HYDROCHLORIDE 100 MG: 50 TABLET ORAL at 20:39

## 2020-10-18 RX ADMIN — DEXTROSE MONOHYDRATE 400 MG: 50 INJECTION, SOLUTION INTRAVENOUS at 20:38

## 2020-10-18 RX ADMIN — IPRATROPIUM BROMIDE AND ALBUTEROL SULFATE 1 AMPULE: .5; 3 SOLUTION RESPIRATORY (INHALATION) at 12:14

## 2020-10-18 RX ADMIN — ARFORMOTEROL TARTRATE 15 MCG: 15 SOLUTION RESPIRATORY (INHALATION) at 19:41

## 2020-10-18 RX ADMIN — DEXAMETHASONE SODIUM PHOSPHATE 4 MG: 4 INJECTION, SOLUTION INTRAMUSCULAR; INTRAVENOUS at 12:32

## 2020-10-18 RX ADMIN — FUROSEMIDE 20 MG: 10 INJECTION, SOLUTION INTRAMUSCULAR; INTRAVENOUS at 17:48

## 2020-10-18 RX ADMIN — CEFAZOLIN 2 G: 10 INJECTION, POWDER, FOR SOLUTION INTRAVENOUS at 09:01

## 2020-10-18 RX ADMIN — TRAMADOL HYDROCHLORIDE 100 MG: 50 TABLET ORAL at 11:27

## 2020-10-18 RX ADMIN — TRAMADOL HYDROCHLORIDE 100 MG: 50 TABLET ORAL at 05:31

## 2020-10-18 RX ADMIN — ARFORMOTEROL TARTRATE 15 MCG: 15 SOLUTION RESPIRATORY (INHALATION) at 07:57

## 2020-10-18 RX ADMIN — ACETYLCYSTEINE 400 MG: 200 SOLUTION ORAL; RESPIRATORY (INHALATION) at 12:15

## 2020-10-18 RX ADMIN — DEXAMETHASONE SODIUM PHOSPHATE 4 MG: 4 INJECTION, SOLUTION INTRAMUSCULAR; INTRAVENOUS at 03:20

## 2020-10-18 RX ADMIN — SODIUM CHLORIDE SOLN NEBU 3% 3 ML: 3 NEBU SOLN at 07:56

## 2020-10-18 RX ADMIN — IPRATROPIUM BROMIDE AND ALBUTEROL SULFATE 1 AMPULE: .5; 3 SOLUTION RESPIRATORY (INHALATION) at 16:12

## 2020-10-18 RX ADMIN — PRAVASTATIN SODIUM 40 MG: 40 TABLET ORAL at 20:39

## 2020-10-18 ASSESSMENT — PAIN SCALES - GENERAL
PAINLEVEL_OUTOF10: 5
PAINLEVEL_OUTOF10: 7

## 2020-10-18 ASSESSMENT — PAIN DESCRIPTION - ORIENTATION
ORIENTATION: POSTERIOR
ORIENTATION: POSTERIOR

## 2020-10-18 ASSESSMENT — PAIN DESCRIPTION - PROGRESSION
CLINICAL_PROGRESSION: NOT CHANGED
CLINICAL_PROGRESSION: NOT CHANGED

## 2020-10-18 ASSESSMENT — PAIN - FUNCTIONAL ASSESSMENT
PAIN_FUNCTIONAL_ASSESSMENT: ACTIVITIES ARE NOT PREVENTED
PAIN_FUNCTIONAL_ASSESSMENT: PREVENTS OR INTERFERES SOME ACTIVE ACTIVITIES AND ADLS

## 2020-10-18 ASSESSMENT — PAIN DESCRIPTION - DESCRIPTORS
DESCRIPTORS: SORE
DESCRIPTORS: SORE

## 2020-10-18 ASSESSMENT — PAIN DESCRIPTION - PAIN TYPE
TYPE: SURGICAL PAIN
TYPE: SURGICAL PAIN

## 2020-10-18 ASSESSMENT — PAIN DESCRIPTION - LOCATION
LOCATION: THROAT
LOCATION: THROAT

## 2020-10-18 ASSESSMENT — PAIN DESCRIPTION - ONSET
ONSET: ON-GOING
ONSET: ON-GOING

## 2020-10-18 ASSESSMENT — PAIN DESCRIPTION - FREQUENCY
FREQUENCY: CONTINUOUS
FREQUENCY: CONTINUOUS

## 2020-10-18 NOTE — PLAN OF CARE
Problem: Impaired respiratory status  Goal: Clear lung sounds  Description: Clear lung sounds  10/18/2020 1622 by Lianet Flores  Outcome: Ongoing   Continued therapy as ordered for improved lung sounds and aeration

## 2020-10-18 NOTE — PROGRESS NOTES
Department of Otolaryngology  Progress Note    Chief Complaint:  Post-op Day 2    SUBJECTIVE:  Post-operative day 1 bronchoscopy with microlaryngoscopy, removal of submucosal non-neoplastic lesion and removal of obstructing inflammatory masses of the supraglottis, glottis, and subglottic trachea using Jet ventilation. 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 Patient currently receiving hypertonic saline nebulizers TID and Mucomyst.  Dr Senthil Pearl consulted - patient started on IV voriconazole, started on IV Ancef this morning. Portable chest xray last evening suspicious for small left anterior apical pneumothorax - CXR PA/Lat poorly visualized small left apical pneumothorax and bibasilar atelectasis/infiltrate. Patient is on face tent 40% FiO2. Upon entering room, he is eating his lunch with face tent off for the last 15 minutes. Pulse oximetry on room air is 83%. Patient reports that his breathing and his voice have improved since surgery. He states that he is breathing comfortably. Few expectorated secretions. Mild burning irritation in the throat, otherwise no pain. A complete multi-organ review of systems was performed using a new patient questionnaire, and reviewed by me.   ENT:  negative except as noted in HPI  CONSTITUTIONAL:  negative except as noted in HPI  EYES:  negative except as noted in HPI  RESPIRATORY:  negative except as noted in HPI  CARDIOVASCULAR:  negative except as noted in HPI  GASTROINTESTINAL:  negative except as noted in HPI  GENITOURINARY:  negative except as noted in HPI  MUSCULOSKELETAL:  negative except as noted in HPI  SKIN:  negative except as noted in HPI  ENDOCRINE/METABOLIC: negative except as noted in HPI  HEMATOLOGIC/LYMPHATIC:  negative except as noted in HPI  ALLERGY/IMMUN: negative except as noted in HPI  NEUROLOGICAL: negative except as noted in HPI  BEHAVIOR/PSYCH:  negative except as noted in HPI    OBJECTIVE      Physical  VITALS:  /78   Pulse 89   Temp 98.6 °F (37 °C) (Oral)   Resp 20   Ht 6' 2\" (1.88 m)   Wt 225 lb 3.2 oz (102.2 kg)   SpO2 90%   BMI 28.91 kg/m²     CONSTITUTIONAL:  awake, alert, cooperative, no apparent distress, and appears stated age  ENT: unremarkable  NECK:  Supple, symmetrical, trachea midline, no adenopathy, thyroid symmetric, not enlarged and no tenderness, skin normal  LUNGS:  no increased work of breathing, no stridor or wheezing  CARDIOVASCULAR:  regular rate and rhythm  NEUROLOGIC:  Awake, alert, oriented to name, place and time. Cranial nerves II-XII are grossly intact. Data  CBC with Differential:    Lab Results   Component Value Date    WBC 8.3 10/17/2020    RBC 4.79 10/17/2020    RBC 4.28 12/06/2011    HGB 14.8 10/17/2020    HCT 46.2 10/17/2020     10/17/2020    MCV 96.5 10/17/2020    MCH 30.9 10/17/2020    MCHC 32.0 10/17/2020    RDW 13.8 06/12/2020    NRBC 0 10/17/2020    NRBC 0 12/06/2011    SEGSPCT 91.6 10/17/2020    LYMPHOPCT 13.6 02/06/2020    MONOPCT 1.9 10/17/2020    MONOPCT 15.8 02/06/2020    EOSPCT 1.7 02/06/2020    BASOPCT 0.5 02/06/2020    MONOSABS 0.2 10/17/2020    LYMPHSABS 0.5 10/17/2020    EOSABS 0.0 10/17/2020    BASOSABS 0.0 10/17/2020     Surgical cultures from larynx 10/16/2020:  10/18/2020 11:35 AM - Phan, [facility] Incoming Lab Results From Soft     Specimen Information:  Mouth; Tissue          Component  Collected  Lab    Aerobic Culture Abnormal    10/16/2020  4:06 PM  [address] [facility]    Culture also yielded moderate growth of gram positive bacilli most consistent with Corynebacterium species and Staphylococcus (coagulase negative). Direct gram stain also indicated rare budding yeast which did not grow on aerobic culture. This could be inhibited growth due to antibiotic therapy or a fastidious organism.     Gram Stain Result  10/16/2020  4:06 PM  [email or web address] [facility]    Rare segmented neutrophils observed. Rare epithelial cells observed. Many gram positive cocci in pairs and clusters. Moderate gram positive bacilli. Few gram negative bacilli. Rare budding yeast.    Organism Abnormal    10/16/2020  4:06 PM  [email or web address] [facility]    Staphylococcus aureus    Aerobic Culture  10/16/2020  4:06 PM  [email or web address] [facility]    moderate growth In the treatment of gram positive infections, GENTAMICIN should be CONSIDERED a SYNERGYSTIC agent ONLY. Ciprofloxacin and Levofloxacin, regardless of in vitro sensitivity, should not be used for staphylococcal infections other than uncomplicated lower UTIs. Moxifloxacin, regardless of in vitro sensitivity, shouldnot be used for staphylococcal infections.     Organism Abnormal    10/16/2020  4:06 PM  [email or web address] [facility]    Klebsiella pneumoniae    Aerobic Culture  10/16/2020  4:06 PM  [email or web address] [facility]    moderate growth                Inpatient Medications  Current Facility-Administered Medications: sodium chloride (Inhalant) 3 % nebulizer solution 3 mL, 3 mL, Nebulization, TID  ceFAZolin (ANCEF) 2 g in dextrose 5 % 50 mL IVPB, 2 g, Intravenous, Q8H  ipratropium-albuterol (DUONEB) nebulizer solution 1 ampule, 1 ampule, Inhalation, Q4H WA  Arformoterol Tartrate (BROVANA) nebulizer solution 15 mcg, 15 mcg, Nebulization, BID  voriconazole (VFEND) 400 mg in dextrose 5 % 250 mL IVPB, 400 mg, Intravenous, Q12H  acetylcysteine (MUCOMYST) 20 % solution 400 mg, 2 mL, Inhalation, TID  loperamide (IMODIUM) capsule 2 mg, 2 mg, Oral, Daily  pravastatin (PRAVACHOL) tablet 40 mg, 40 mg, Oral, Daily  sodium chloride flush 0.9 % injection 10 mL, 10 mL, Intravenous, 2 times per day  sodium chloride flush 0.9 % injection 10 mL, 10 mL, Intravenous, PRN  traMADol (ULTRAM) tablet 50 mg, 50 mg, Oral, Q6H PRN **OR** traMADol (ULTRAM) tablet 100 mg, 100 mg, Oral, Q6H PRN  HYDROmorphone (DILAUDID) injection 0.25 mg, 0.25 mg, Intravenous, Q3H PRN **OR** HYDROmorphone (DILAUDID) injection 0.5 mg, 0.5 mg, Intravenous, Q3H PRN  polyethylene glycol (GLYCOLAX) packet 17 g, 17 g, Oral, Daily  sennosides-docusate sodium (SENOKOT-S) 8.6-50 MG tablet 1 tablet, 1 tablet, Oral, BID  senna (SENOKOT) 8.8 MG/5ML syrup 8.8 mg, 5 mL, Oral, BID PRN  ondansetron (ZOFRAN-ODT) disintegrating tablet 4 mg, 4 mg, Oral, Q8H PRN **OR** ondansetron (ZOFRAN) injection 4 mg, 4 mg, Intravenous, Q6H PRN    ASSESSMENT AND PLAN    History of airway obstruction, tracheal stenosis, chronic laryngitis and laryngeal stenosis s/p bronchoscopy with microlaryngoscopy, removal of submucosal non-neoplastic lesion and removal of obstructing inflammatory masses of the supraglottis, glottis, and subglottic trachea using Jet ventilation  - Intra operative findings:  1.  Near complete obstruction of the patient's glottic and subglottic apertures  2.  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 Started on IV Voriconazole pending surgical culture results. IV Ancef added this morning. Post-operative hypoxemia  - Increased oxygenation needs post operatively - patient 83% on room air at this time. - Pulmonary consultation- no evidence of pulmonary edema, post operative respiratory failure secondary to severe COPD/bullous emphysema/upper respiratory distress, no pneumothorax seen on CT chest.  Was given low dose lasix. Recommend bronchodilators around the clock. Discharge on supplemental oxygen (patient has had in the past and does not feel he needs this).   - ABG on room air per Dr Tala Leone request    Discussed with Dr Tala Leone - concerned about the post operative hypoxia and increased oxygen needs. If patient develops significant mucous plug, he does not have the oxygen reserve to withstand and clear secretions. Patient is unrealistic about his pulmonary status - he does not feel that he needs supplemental oxygen and previously did not use it when he had at home. Dr Manjinder Hernandez will be in to discuss further with patient.        Electronically signed by JOSH Biggs CNP on 10/18/2020 at 12:17 PM

## 2020-10-18 NOTE — PLAN OF CARE
Problem: Pain:  Goal: Pain level will decrease  Description: Pain level will decrease  Outcome: Ongoing  Note: Pain Assessment: 0-10  Pain Level: 7   Patient's Stated Pain Goal: 3   Is pain goal met at this time? No, patient is currently resting with eyes closed. Will offer PRN pain medication when patient awakens next. Will monitor. Non-Pharmaceutical Pain Intervention(s): Rest   Goal: Control of acute pain  Description: Control of acute pain  Outcome: Ongoing  Note: Acute surgical pain to neck. Patient rated that pain at a 7/10 during last assessment. Patient was not due for PRN medication at this time. Patient is now asleep with respirations above 10. Will offer PRN pain meds when patient awakens next. Patient, also, states 8/10 acute on chronic back pain. Again, patient is resting with eyes closed at this time. Rest and repositioning in place as nonpharmacologic pain relief methods. Will monitor. Goal: Control of chronic pain  Description: Control of chronic pain  Outcome: Ongoing  Note: Patient states 8/10 acute on chronic back pain. Patient is resting with eyes closed at this time. Will offer PRN pain medication when patient awakens. Rest and repositioning in place as nonpharmacologic pain relief methods. Will monitor. Problem: Discharge Planning:  Goal: Discharged to appropriate level of care  Description: Discharged to appropriate level of care  Outcome: Ongoing  Note: Patient plans home alone at discharge. Will monitor. Problem: Skin Integrity:  Goal: Will show no infection signs and symptoms  Description: Will show no infection signs and symptoms  Outcome: Ongoing  Note: No fevers, tachycardia, hypotension noted. Pt denies any complaints other than pain. Will monitor. Goal: Absence of new skin breakdown  Description: Absence of new skin breakdown  Outcome: Ongoing  Note: Redness noted to buttocks and bilateral heels at this time. Patient able to reposition self, assisting as needed. Education on the importance of turning and repositioning was given. Patient verbalizes understanding. Will monitor. Problem: Airway Clearance - Ineffective:  Goal: Ability to maintain a clear airway will improve  Description: Ability to maintain a clear airway will improve  Outcome: Ongoing  Note: Patient has patent airway. Patient still requiring face tent at FiO2 of 30 on 6L. Home O2 eval for discharge. Will monitor. Problem: Impaired respiratory status  Goal: Clear lung sounds  Description: Clear lung sounds  10/17/2020 1845 by Elle Brunner  Outcome: Ongoing  10/17/2020 0817 by Eneida Pressley RCP  Outcome: Ongoing  Note: Cont aerosol to improve aeration and mucus production     Problem: Falls - Risk of:  Goal: Will remain free from falls  Description: Will remain free from falls  Outcome: Ongoing  Note: Patient remained free of falls this shift. Fall precautions in place. Items within reach, call light within reach and side rails up x2. Bed alarm is on. Hourly rounding in place. Patient verbalizes understanding of using call light to ask for assistance as needed. Will monitor. Goal: Absence of physical injury  Description: Absence of physical injury  Outcome: Ongoing  Note: No injury has occurred this shift. Fall and safety precautions in place. Assisting patient to turn and reposition frequently to prevent skin breakdown. Will monitor. Care plan reviewed with patient. Patient verbalize understanding of the plan of care and contribute to goal setting.

## 2020-10-18 NOTE — PROGRESS NOTES
[facility]    Patient - Fam Marie   MRN -  [id number]   [facility]   - 1960      Date of Admission -  10/16/2020 12:04 PM  Date of evaluation -  10/18/2020  Room - Neymar Caldwell MD Primary Care Physician - Yeni Newton, APRN - CNP   Chief Complaint   Hypoxemia  Active Hospital Problem List      Active Hospital Problems    Diagnosis Date Noted    Laryngeal stenosis [J38.6] 10/16/2020    Airway obstruction [J98.8] 10/16/2020    Airway stricture [J98.8] 10/16/2020     HPI   Fam Marie is a 60 y.o. male pulmonary consult requested regarding low oxygen saturation. Admitted 10/16/20 for elective surgery by Dr Otis Will undergoing successful  BRONCHOSCOPY, MICRO LARYNGOSCOPY WITH REMOVAL OF SUBMUCCOSAL NON NOEPLASTIC LESION JET VENTILATION; suspension microlaryngoscopy with laser and debrider resection of obstructing nonneoplastic tumor of the supraglottis, glottis, and subglottis; therapeutic bronchoscopy with resection of obstructing soft tissue tumor with laser and debrider. Rivera Horne is progressing well and wishes to go home however staff has been unable to remove from supplemental oxygen, O2 sat 85% on room air. He is comfortable in no distress whatsoever, multiple visitors in the room, speaks in full senteces with a  loud voice. CXR revealed questionable L apical PTX but non contrast CT chest revealed severe bullous emphysema but no PTX. Rivera Horne is known to our service for severe COPD, has been on supplemental  Home oxygen before after colon resection. 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 Still reports occasional blood with respiratory secretions after laser debrider resection of obstructing soft tissue of the upper airway.   This morning patient's high flow oxygen was decreased to 20% and he rapidly desaturated to SPO2 of 86%, he was then placed back to 40% 6 L high flow oxygen. Patient breathing unlabored no accessory muscle use, no respiratory distress, air entry bilaterally, no wheezing. Patient has no new complaints.       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[facility])       Past Surgical History           Procedure Laterality Date    COLONOSCOPY      EYE SURGERY      CROSS EYED    HAND SURGERY Bilateral     KNEE ARTHROSCOPY Right     LARYNGOSCOPY  2017    Biopsy    LARYNGOSCOPY N/A 2019    MICRO LARYNGOSCOPY W/ BIOPSY performed by Milad Lindsay MD at [address] N/A 2019    DIRECT LARYNGOSCOPY WITH BIOPSY performed by Milad Lindsay MD at [address] [facility]  RECTAL SURGERY  2016    colostemy bag[place]    ROTATOR CUFF REPAIR Left      Diet    DIET GENERAL;  Allergies    Povidone iodine  Social History     Social History     Socioeconomic History    Marital status:      Spouse name: Not on file    Number of children: Not on file    Years of education: Not on file    Highest education level: Not on file   Occupational History    Not on file   Social Needs    Financial resource strain: Not on file    Food insecurity     Worry: Not on file     Inability: Not on file    Transportation needs     Medical: Not on file     Non-medical: Not on file   Tobacco Use    Smoking status: Former Smoker     Packs/day: 2.25     Years: 31.00     Pack years: 69.75     Start date:      Last attempt to quit: 2006     Years since quittin.7    Smokeless tobacco: Never Used   Substance and Sexual Activity    Alcohol use: No     Alcohol/week: 0.0 standard drinks    Drug use: No    Sexual activity: Not Currently   Lifestyle    Physical activity     Days per week: Not on file     Minutes per session: Not on file    Stress: Not on file   Relationships    Social connections     Talks on phone: Not on file     Gets together: Not on file     Attends [employer] service: Not on file     Active member of club or organization: Not on file     Attends meetings of clubs or organizations: Not on file     Relationship status: Not on file    Intimate partner violence     Fear of current or ex partner: Not on file     Emotionally abused: Not on file     Physically abused: Not on file     Forced sexual activity: Not on file   Other Topics Concern    Not on file   Social History Narrative    Not on file     Family History          Problem Relation Age of Onset    Prostate Cancer Father 48    Cancer Father     Heart Disease Father     Diabetes Mother     Heart Disease Mother     Stroke Mother     Heart Disease Brother     High Blood Pressure Other     Cancer Other         LUNG,PROSTATE    Hearing Loss Other        Meds    Current Medications    sodium chloride (Inhalant)  3 mL Nebulization TID    ceFAZolin  2 g Intravenous Q8H    ipratropium-albuterol  1 ampule Inhalation Q4H WA    Arformoterol Tartrate  15 mcg Nebulization BID    voriconazole  400 mg Intravenous Q12H    acetylcysteine  2 mL Inhalation TID    loperamide  2 mg Oral Daily    pravastatin  40 mg Oral Daily    sodium chloride flush  10 mL Intravenous 2 times per day    polyethylene glycol  17 g Oral Daily    sennosides-docusate sodium  1 tablet Oral BID    dexamethasone  4 mg Intravenous Q8H     sodium chloride flush, traMADol **OR** traMADol, HYDROmorphone **OR** HYDROmorphone, senna, ondansetron **OR** ondansetron  IV Drips/Infusions    Vitals    Vitals    height is 6' 2\" (1.88 m) and weight is 225 lb 3.2 oz (102.2 kg).  His oral temperature is 97.7 °F (36.5 °C). His blood pressure is 141/77 (abnormal) and his pulse is 82. His respiration is 20 and oxygen saturation is 91%. O2 Flow Rate (L/min): 6 L/min  I/O    Intake/Output Summary (Last 24 hours) at 10/18/2020 0833  Last data filed at 10/18/2020 0321  Gross per 24 hour   Intake 2764.08 ml   Output 1025 ml   Net 1739.08 ml     Patient Vitals for the past 96 hrs (Last 3 readings):   Weight   10/18/20 0600 225 lb 3.2 oz (102.2 kg)   10/16/20 1220 222 lb (100.7 kg)     Exam   Constitutional: Patient appears moderately built and moderately nourished. On face tent 40%  Head: Normocephalic and atraumatic. Mouth/Throat: Oropharynx is clear and moist.    Eyes: Conjunctivae are normal. Pupils are equal, round, and reactive to light. No scleral icterus. Neck: Neck supple. No JVD or tracheal deviation present. Cardiovascular: Regular rate, regular rhythm, S1 and S2 with no murmur. No peripheral edema  Pulmonary/Chest: diminished breath sounds, bilateral rhonchi with cough, expiratory delay  Abdominal: Soft. Bowel sounds audible. No distension or tenderness to palp  Musculoskeletal: Moves all extremities  Lymphadenopathy:  No cervical adenopathy. Neurological: Patient is alert and oriented to person, place, and time. Skin: Skin is warm and dry. Labs   ABG  No results found for: PH, PO2, PCO2, HCO3, O2SAT  No results found for: IFIO2, MODE, SETTIDVOL, SETPEEP  CBC  Recent Labs     10/17/20  0355   WBC 8.3   RBC 4.79   HGB 14.8   HCT 46.2   MCV 96.5*   MCH 30.9   MCHC 32.0*      MPV 10.9      BMP  Recent Labs     10/17/20  0355   *   K 4.9   CL 96*   CO2 25   BUN 15   CREATININE 0.6   GLUCOSE 143*   CALCIUM 8.6     LFT  Recent Labs     10/17/20  0355   AST 23   ALT 29   BILITOT 0.2*   ALKPHOS 159*     TROP  Lab Results   Component Value Date    TROPONINT < 0.010 11/11/2019     BNP  Lab Results   Component Value Date    PROBNP 91.7 10/17/2020    PROBNP 98.6 11/11/2019       .   PFTs Echo    N/A  Cultures    Procalcitonin  No results found for: PROCAL     Radiology    CXR  FINDINGS: Cardiomediastinal silhouette is within normal limits. There are reticular opacities at the bilateral lung bases. A small left apical pneumothorax remains poorly visualized. Degenerative changes in the thoracic spine are poorly visualized. There     are compression deformities of multiple mid and lower thoracic vertebral bodies of indeterminant age. There are chronic fracture deformities in multiple right ribs. Surgical changes at the left shoulder are incompletely visualized.              Impression    1. Poorly visualized small left apical pneumothorax. 2. Bibasilar atelectasis/infiltrate.                        **This report has been created using voice recognition software. It may contain minor errors which are inherent in voice recognition technology. **         Final report electronically signed by Dr. Roshni Schuler on 10/17/2020 2:31 PM                     CT Scans 7/2019    FINDINGS: Cardiac size is normal. Atherosclerotic calcifications are present in the thoracic aorta and coronary arteries without evidence of aneurysm. There is no pleural or pericardial effusion. Emphysematous changes are noted in the bilateral lungs. There are extensive bullous changes in the bilateral upper lungs, limiting evaluation for a potential small pneumothorax. No large pneumothorax is identified. Alveolar and reticular opacities are seen at the bilateral lung bases. There are calcified    mediastinal and hilar lymph nodes. There is no axillary lymphadenopathy. Degenerative changes are present in the thoracic spine. There are mild anterior compression deformities of multiple mid thoracic vertebral bodies resulting in kyphosis.         Bilateral perinephric stranding may be chronic. There is fatty replacement of the pancreas.              Impression    1. Bibasilar atelectasis/infiltrate. 2. Chronic lung disease.      Final report electronically signed by Dr. Get Kaye on 10/17/2020 6:06 PM            (See actual reports for details)    Assessment   Post-op respiratory failure due to severe COPD, bullous emphysema, upper airway obstruction, currently no ditress but requiring supplemental oxygen, has been on home oxygen before. Severe COPD with FEV1 45%  Sever bullous emphysema  S/P Laryngo-tracheal stenosis from fungal infection/radiation  No PTX seen in stat CT chest  No evidence of pulmonary edema seen in CT chest, given low dose of Lasix. Per ENT/Dr. Louann Shine -patient was loaded with high-dose steroids leigh ann and postoperatively -last dose to be completed today. Assessment for hyperbaric oxygen therapy is being entertained at the moment - from pulmonary standpoint it is relatively contraindicated in the setting of severe bullous emphysema  Cultures - enteric G(-) bacilli as well growth gram + bacilli corynebacterium vs staph (coag -) - specimen: tissue, source - mouth    Recommendations     Would do a home oxygen eval and DC on supplemental oxygen,  f/u in office to see if we can stop supplemental oxygen in a few weeks  Inhaled bronchodilators around the clock  Decadron started up post surgical edema should help with COPD    Thank you for the consult and allowing us to participate in the care of your patient. Case discussed with nurse and patient/family. Questions and concerns addressed. Meds and Orders reviewed.     Electronically signed by     Deb Limon MD on 10/18/2020 at 8:33 AM

## 2020-10-18 NOTE — PROGRESS NOTES
Progress note: Infectious diseases    Patient - Raudel Waggoner,  Age - 60 y.o.    - 1960      Room Number - 7-12/113-C   MRN -  [id number]   Acct # - [de-identified]  Date of Admission -  10/16/2020 12:04 PM    SUBJECTIVE:   He has no new issues  Discussed with pharmacy. OBJECTIVE   VITALS    height is 6' 2\" (1.88 m) and weight is 225 lb 3.2 oz (102.2 kg). His oral temperature is 97.7 °F (36.5 °C). His blood pressure is 139/67 and his pulse is 86. His respiration is 18 and oxygen saturation is 90%. Wt Readings from Last 3 Encounters:   10/18/20 225 lb 3.2 oz (102.2 kg)   10/01/20 226 lb 6.4 oz (102.7 kg)   09/15/20 223 lb 12.8 oz (101.5 kg)       I/O (24 Hours)    Intake/Output Summary (Last 24 hours) at 10/18/2020 1731  Last data filed at 10/18/2020 1334  Gross per 24 hour   Intake 1385.08 ml   Output 0 ml   Net 1385.08 ml       General Appearance  Awake, alert, oriented. HEENT - normocephalic, atraumatic, pink conjunctiva,  anicteric sclera  Neck - Supple, no mass   Lungs -  Bilateral  air entry,diminished breath sound  Cardiovascular - Heart sounds are normal.  Regular rate and rhythm without murmur, gallop or rub.   Abdomen - soft, not distended, nontender,   Neurologic -oriented  Skin - No bruising or bleeding  Extremities - No edema, no cyanosis, clubbing     MEDICATIONS:      amoxicillin-clavulanate  1 tablet Oral 2 times per day    furosemide  20 mg Intravenous Once    sodium chloride (Inhalant)  3 mL Nebulization TID    ipratropium-albuterol  1 ampule Inhalation Q4H WA    Arformoterol Tartrate  15 mcg Nebulization BID    voriconazole  400 mg Intravenous Q12H    acetylcysteine  2 mL Inhalation TID    loperamide  2 mg Oral Daily    pravastatin  40 mg Oral Daily    sodium chloride flush  10 mL Intravenous 2 times per day    polyethylene glycol  17 g Oral Daily    sennosides-docusate sodium  1 tablet Oral BID       sodium chloride flush, traMADol **OR** traMADol, HYDROmorphone **OR** HYDROmorphone, senna, ondansetron **OR** ondansetron      LABS:     CBC:   Recent Labs     10/17/20  0355   WBC 8.3   HGB 14.8        BMP:    Recent Labs     10/17/20  0355   *   K 4.9   CL 96*   CO2 25   BUN 15   CREATININE 0.6   GLUCOSE 143*     Calcium:  Recent Labs     10/17/20  0355   CALCIUM 8.6      Recent Labs     10/17/20  0355   ALKPHOS 159*   ALT 29   AST 23   PROT 6.6   BILITOT 0.2*   LABALBU 4.0           Problem list of patient:     Patient Active Problem List   Diagnosis Code    Dysphonia R49.0    Alcohol abuse F10.10    FHx: smoking Z81.2    Deviated septum J34.2    Hypertrophy of nasal turbinates J34.3    Rhinitis J31.0    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 lymph node ([facility]) C20, C77.5    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 Not a candidate for HBOT.   Suzi Altamirano MD, FACP 10/18/2020 5:31 PM

## 2020-10-18 NOTE — PLAN OF CARE
Problem: Impaired respiratory status  Goal: Clear lung sounds  Description: Clear lung sounds  10/18/2020 0758 by Latonia Dixon RCP  Outcome: Met This Shift   Patient lung sounds are considered normal for their current lung condition. No signs of distress noted.  Current treatment regimen appropriate

## 2020-10-18 NOTE — PROGRESS NOTES
Raudel Waggoner is a 60 y.o. male patient. Mr. Erwin Birmingham is postop day 2 status post laser and debrider resection of obstructing soft tissues of the upper airway from supraglottis to proximal thoracic trachea. He continues to move air in the laminar manner and his neck but has delayed exhalation in the manner possibly consistent with post obstruction pulmonary edema. I reviewed my surgical findings and procedure with Dr. Tiffani Truong from [facility] and discussed the possibility of combining hyperbaric oxygen therapy in his recovery despite the context of bullous emphysema which she is known to have. I will request a hyperbaric oxygen therapy consult for tomorrow with the hopes that there may be some degree of HBO that we could employ to improve the healing of his upper airway wounds which are just shy of circumferential.  I intentionally left to struts of undisturbed tissues, one posteriorly and one anteriorly, in order to reduce the risk of severe cicatricial scarring. There is still risk that this may occur which is partially why I placed him on high-dose systemic steroids perioperatively and postoperatively. His last dose is today. Dr. Tiffani Truong is given him 1 more dose of Lasix to see if drying him out a little further might improve his oxygenation which has been very low, in the low 80s on room air. His chest CT ruled out any pneumothorax but illustrated the chronic state of his bullous emphysema. In terms of near future plans, the patient is likely to have his second stage operation in approximately 4 weeks to further widen his airway and remove the tissue laden with microinvasive fungal disease, assuming this is supported and the final pathology and cultures of the tissue sent from the operating room.   It certainly looks like that kind of process based on my experience with several patients like Mr. Erwin Birmingham, so I am grateful that the infectious disease doctor was willing to start him on antifungals largely on empirical basis. I discussed these issues at length with the patient at his bedside so that he would buy into the bases of his continued inpatient care and of a cautious approach to his surgical and clinical management which will take many months to see the durable fruits of. While he can breathe substantially better, if we do not do all of the other things that he needs, the hypertrophic \"pachydermia\" will simply return and may even be worse. I hope that is not the case because the patient is effectively and \"impossible tracheostomy\" because his thyroid lamina is within his thoracic inlet. Even if I could pull up his airway surgically in order to fit a tracheostomy into his his trachea, the upward traction from his sternum against the cannula would destroy his airway and make an impossible task of restoring his airway patency. I explained all of this in detail to the patient so that he now fully understands the gravity of his condition but not to the exception of hope, which I have plenty of, to see him to full recovery if our concerted effort of specialties can restore his airway to a significantly higher degree of normalcy. He reported understanding of these issues now and being quite willing to cooperate. Prachi Juarez. Frandy Michael MD, [facility]  Otolaryngology Head and Neck Surgery  Laryngology Bronchoesophagology  Cell: [phone]      No diagnosis found. Past Medical History:   Diagnosis Date    Arthritis     [facility]     PAD (peripheral artery disease) ([facility] [facility]     bilateral lower legs    Pneumonia 01/2017 1/2017 and 2/2017    Rectal cancer ([facility]     Squamous cell carcinoma of vocal cord ([facility])      No past surgical history pertinent negatives on file.   Scheduled Meds:   sodium chloride (Inhalant)  3 mL Nebulization TID    ceFAZolin  2 g Intravenous Q8H    ipratropium-albuterol  1 ampule Inhalation Q4H WA    Arformoterol Tartrate  15 mcg Nebulization BID    voriconazole  400 mg Intravenous Q12H    acetylcysteine  2 mL Inhalation TID    loperamide  2 mg Oral Daily    pravastatin  40 mg Oral Daily    sodium chloride flush  10 mL Intravenous 2 times per day    polyethylene glycol  17 g Oral Daily    sennosides-docusate sodium  1 tablet Oral BID     Continuous Infusions:  PRN Meds:sodium chloride flush, traMADol **OR** traMADol, HYDROmorphone **OR** HYDROmorphone, senna, ondansetron **OR** ondansetron    Allergies   Allergen Reactions    Povidone Iodine Rash     Surgery prep     Active Problems:    Laryngeal stenosis    Airway obstruction    Airway stricture  Resolved Problems:    * No resolved hospital problems. *    Blood pressure 137/78, pulse 89, temperature 98.6 °F (37 °C), temperature source Oral, resp. rate 20, height 6' 2\" (1.88 m), weight 225 lb 3.2 oz (102.2 kg), SpO2 90 %.     Subjective  Objective  Assessment & Plan    Marisol Knox MD  10/18/2020

## 2020-10-18 NOTE — PLAN OF CARE
Problem: Pain:  Goal: Pain level will decrease  Description: Pain level will decrease  Outcome: Ongoing  Note: Patient rates pain 5/10 with a goal of 2/10 in his throat. Patient states prn ultram and repositioning helps with pain control. Problem: Discharge Planning:  Goal: Discharged to appropriate level of care  Description: Discharged to appropriate level of care  Outcome: Ongoing  Note: Patient plans to return home,  following for discharge needs. Problem: Skin Integrity:  Goal: Will show no infection signs and symptoms  Description: Will show no infection signs and symptoms  Outcome: Ongoing  Note: Patient being turned every 2 hours and prn. Pt aware importance of turning to prevent skin breakdown. Problem: Airway Clearance - Ineffective:  Goal: Ability to maintain a clear airway will improve  Description: Ability to maintain a clear airway will improve  Outcome: Ongoing  Note: Lung sounds diminished. Pt on face tent. Short of breath with exertion. Pt has nonproductive cough. Problem: Falls - Risk of:  Goal: Will remain free from falls  Description: Will remain free from falls  Outcome: Ongoing  Note: Call light within reach, bed in lowest position, non skid footwear on, room door open, bed alarm on. Pt using call light appropriately. Care plan reviewed with patient. Patient verbalizes understanding of the plan of care and contributes to goal setting.

## 2020-10-19 VITALS
RESPIRATION RATE: 18 BRPM | DIASTOLIC BLOOD PRESSURE: 83 MMHG | OXYGEN SATURATION: 92 % | TEMPERATURE: 97.6 F | HEIGHT: 74 IN | WEIGHT: 225.2 LBS | BODY MASS INDEX: 28.9 KG/M2 | HEART RATE: 85 BPM | SYSTOLIC BLOOD PRESSURE: 147 MMHG

## 2020-10-19 PROCEDURE — 6360000002 HC RX W HCPCS

## 2020-10-19 PROCEDURE — 6370000000 HC RX 637 (ALT 250 FOR IP): Performed by: INTERNAL MEDICINE

## 2020-10-19 PROCEDURE — 2580000003 HC RX 258: Performed by: OTOLARYNGOLOGY

## 2020-10-19 PROCEDURE — 94640 AIRWAY INHALATION TREATMENT: CPT

## 2020-10-19 PROCEDURE — 2580000003 HC RX 258: Performed by: NURSE PRACTITIONER

## 2020-10-19 PROCEDURE — 99232 SBSQ HOSP IP/OBS MODERATE 35: CPT | Performed by: INTERNAL MEDICINE

## 2020-10-19 PROCEDURE — 99239 HOSP IP/OBS DSCHRG MGMT >30: CPT | Performed by: PHYSICIAN ASSISTANT

## 2020-10-19 PROCEDURE — 94760 N-INVAS EAR/PLS OXIMETRY 1: CPT

## 2020-10-19 PROCEDURE — 6360000002 HC RX W HCPCS: Performed by: OTOLARYNGOLOGY

## 2020-10-19 PROCEDURE — 2580000003 HC RX 258

## 2020-10-19 RX ORDER — SODIUM CHLORIDE FOR INHALATION 3 %
3 VIAL, NEBULIZER (ML) INHALATION PRN
Qty: 500 ML | Refills: 3 | Status: SHIPPED | OUTPATIENT
Start: 2020-10-19

## 2020-10-19 RX ORDER — AMOXICILLIN AND CLAVULANATE POTASSIUM 875; 125 MG/1; MG/1
1 TABLET, FILM COATED ORAL 2 TIMES DAILY
Qty: 20 TABLET | Refills: 0 | Status: SHIPPED | OUTPATIENT
Start: 2020-10-19 | End: 2020-10-29

## 2020-10-19 RX ORDER — SODIUM CHLORIDE FOR INHALATION 0.9 %
3 VIAL, NEBULIZER (ML) INHALATION 3 TIMES DAILY
Qty: 270 ML | Refills: 1 | Status: SHIPPED | OUTPATIENT
Start: 2020-10-19 | End: 2020-11-18

## 2020-10-19 RX ORDER — VORICONAZOLE 200 MG/1
200 TABLET, FILM COATED ORAL 2 TIMES DAILY
Qty: 28 TABLET | Refills: 0 | Status: SHIPPED | OUTPATIENT
Start: 2020-10-19 | End: 2020-11-02

## 2020-10-19 RX ORDER — VORICONAZOLE 200 MG/1
200 TABLET, FILM COATED ORAL EVERY 12 HOURS SCHEDULED
Status: DISCONTINUED | OUTPATIENT
Start: 2020-10-19 | End: 2020-10-19 | Stop reason: HOSPADM

## 2020-10-19 RX ADMIN — AMOXICILLIN AND CLAVULANATE POTASSIUM 1 TABLET: 875; 125 TABLET, FILM COATED ORAL at 09:01

## 2020-10-19 RX ADMIN — Medication 10 ML: at 09:01

## 2020-10-19 RX ADMIN — ACETYLCYSTEINE 400 MG: 200 SOLUTION ORAL; RESPIRATORY (INHALATION) at 08:06

## 2020-10-19 RX ADMIN — ARFORMOTEROL TARTRATE 15 MCG: 15 SOLUTION RESPIRATORY (INHALATION) at 08:24

## 2020-10-19 RX ADMIN — IPRATROPIUM BROMIDE AND ALBUTEROL SULFATE 1 AMPULE: .5; 3 SOLUTION RESPIRATORY (INHALATION) at 08:06

## 2020-10-19 RX ADMIN — SODIUM CHLORIDE SOLN NEBU 3% 3 ML: 3 NEBU SOLN at 12:12

## 2020-10-19 RX ADMIN — ACETYLCYSTEINE 400 MG: 200 SOLUTION ORAL; RESPIRATORY (INHALATION) at 12:12

## 2020-10-19 RX ADMIN — SODIUM CHLORIDE SOLN NEBU 3% 3 ML: 3 NEBU SOLN at 08:21

## 2020-10-19 RX ADMIN — DEXTROSE MONOHYDRATE 400 MG: 50 INJECTION, SOLUTION INTRAVENOUS at 09:01

## 2020-10-19 RX ADMIN — IPRATROPIUM BROMIDE AND ALBUTEROL SULFATE 1 AMPULE: .5; 3 SOLUTION RESPIRATORY (INHALATION) at 12:12

## 2020-10-19 ASSESSMENT — PAIN SCALES - GENERAL: PAINLEVEL_OUTOF10: 0

## 2020-10-19 NOTE — DISCHARGE SUMMARY
DISCHARGE SUMMARY    Pt Name: Heather Collins  MRN: [id number]  YOB: 1960  Primary Care Physician: Estephania Warner, APRN - CNP    Admit date:  10/16/2020 12:04 PM  Discharge date:  10/19/2020 at 1511  Disposition: Home  Admitting Diagnosis: No diagnosis found. 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    Infection due to Candida glabrata B37.9    Subglottic stenosis not due to surgery J38.6    Invasive fungal infection B49    Stage 3 severe COPD by GOLD classification ([facility]) J44.9    Hoarseness R49.0    Chronic stridor R06.1    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    Laryngeal stenosis J38.6    Airway obstruction J98.8    Airway stricture J98.8    Bullous emphysema ([facility]) J43.9     Consultants:  pulmonary/intensive care and ID  Procedures/Diagnostic Test:  bronchoscopy with microlaryngoscopy, removal of submucosal non-neoplastic lesion and removal of obstructing inflammatory masses of the supraglottis, glottis, and subglottic trachea using Jet ventilation    Hospital Course: Blessing originally presented to the hospital on 10/16/2020 12:04 PM for bronchoscopy with microlaryngoscopy, removal of submucosal non-neoplastic lesion and removal of obstructing inflammatory masses of the supraglottis, glottis, and subglottic trachea using Jet ventilation. He was stable at time of discharge, Edmonia Leisure was tolerating a regular diet, having bowel movements, ambulating on his own accord and had adequate analgesia. On exam, the patient denies any significant pain and states he has not required medication since early this AM. States he feels significantly improved. He continues to have mild dyspnea with activity, but reports it is improved overall. The patient will undergo home O2 eval prior to discharge. Pulmonology recommended discharging on oxygen if deemed necessary and following up with them. Patient had no signs or symptoms of complications. The patient will be discharged on Augmentin and voriconazole per infectious disease recommendation. He will also be given prescription for Mucomyst, 3% saline, 0.9% saline nebulizers. Patient is not a candidate for hyperbaric oxygen treatment due to severe bullous emphysema. Patient home O2 evaluation recommended 3L/min at rest and 6L/min with activity. PHYSICAL EXAMINATION     Discharge Vitals:  height is 6' 2\" (1.88 m) and weight is 225 lb 3.2 oz (102.2 kg). His oral temperature is 97.6 °F (36.4 °C). His blood pressure is 147/83 (abnormal) and his pulse is 85. His respiration is 18 and oxygen saturation is 92%. General appearance - alert, well appearing, and in no distress  Chest - no tachypnea, retractions or cyanosis  Heart - normal rate and regular rhythm  Incision - No significant edema of the neck. Nontender to palpation. Nose:   Septum:  normal  Mucosa:  clear  Turbinates: normal and pink            Discharge:  clear    Dentition: Edenutlous  Oral mucosa: moist  Oropharynx: normal-appearing mucosa  Hard and soft palates symmetrical and intact. Uvula midline. Gag reflex present    Neck: Trachea midline. No tenderness with palpation of the neck.   No palpable crepitus  Lymphatic: No cervical lymphadenopathy noted. Eyes: JAMSHID, EOM intact. Conjunctiva moist without discharge. Lungs: Normal effort of breathing, not obviously distressed. LABS     Recent Labs     10/17/20  0355   WBC 8.3   HGB 14.8   HCT 46.2      *   K 4.9   CL 96*   CO2 25   BUN 15   CREATININE 0.6       DISCHARGE INSTRUCTIONS     Discharge Medications:      Medication List      START taking these medications    amoxicillin-clavulanate 875-125 MG per tablet  Commonly known as:  AUGMENTIN  Take 1 tablet by mouth 2 times daily for 10 days     voriconazole 200 MG tablet  Commonly known as:  VFEND  Take 1 tablet by mouth 2 times daily for 14 days        CHANGE how you take these medications    * sodium chloride nebulizer 0.9 % solution  Take 3 mLs by nebulization 3 times daily  What changed: You were already taking a medication with the same name, and this prescription was added. Make sure you understand how and when to take each. * sodium chloride (Inhalant) 3 % nebulizer solution  Take 3 mLs by nebulization as needed (Throat dryness, cough, wheezing)  What changed:  Another medication with the same name was added. Make sure you understand how and when to take each. * This list has 2 medication(s) that are the same as other medications prescribed for you. Read the directions carefully, and ask your doctor or other care provider to review them with you.             CONTINUE taking these medications    acetaminophen 650 MG extended release tablet  Commonly known as:  TYLENOL  Take 1 tablet by mouth as needed for Pain Do not take with hydrocodone/acetominophen     acetylcysteine 10 % nebulizer solution  Commonly known as:  MUCOMYST     * albuterol sulfate  (90 Base) MCG/ACT inhaler  Commonly known as:  Ventolin HFA  Inhale 2 puffs into the lungs every 6 hours as needed for Wheezing or Shortness of Breath     * albuterol (2.5 MG/3ML) 0.083% nebulizer solution  Commonly known as:  PROVENTIL  Take 3 mLs by nebulization every 6 hours as needed for Wheezing or Shortness of Breath     budesonide-formoterol 160-4.5 MCG/ACT Aero  Commonly known as:  Symbicort  Inhale 2 puffs into the lungs 2 times daily Rinse mouth after its use. loperamide 2 MG capsule  Commonly known as:  IMODIUM     Multivitamin Adult Extra C Chew     NAC PO     pravastatin 40 MG tablet  Commonly known as:  PRAVACHOL     Spiriva Respimat 2.5 MCG/ACT Aers inhaler  Generic drug:  tiotropium  INHALE 2 PUFFS BY MOUTH ONCE DAILY         * This list has 2 medication(s) that are the same as other medications prescribed for you. Read the directions carefully, and ask your doctor or other care provider to review them with you. STOP taking these medications    guaiFENesin 600 MG extended release tablet  Commonly known as:  MUCINEX     ROBITUSSIN 12 HOUR COUGH PO           Where to Get Your Medications      These medications were sent to [facility] -  [phone]  [address] [place] [address]    Phone:  [phone]   · amoxicillin-clavulanate 875-125 MG per tablet  · sodium chloride (Inhalant) 3 % nebulizer solution  · sodium chloride nebulizer 0.9 % solution  · voriconazole 200 MG tablet       Diet: diet as tolerated  Activity: activity as tolerated  Wound Care: N/A  Follow-up:  Follow up with Dr Marisel Robison on 11/2/20 at 10:45am. Follow up with pulmonology and infectious disease at their recommendation. Time Spent for discharge: 30 minutes    Patient was evaluated today for the diagnosis of COPD, bullous emphysema. I entered a DME order for home oxygen because the diagnosis and testing requires the patient to have supplemental oxygen. Condition will improve or be benefited by oxygen use. The patient is  able to perform good mobility in a home setting and therefore does require the use of a portable oxygen system.   The need for this equipment was discussed with the patient and he understands and is in agreement.     Electronically signed by ASHWIN Silvestre on 10/19/2020 at 3:11 PM

## 2020-10-19 NOTE — PLAN OF CARE
Problem: Pain:  Goal: Pain level will decrease  Description: Pain level will decrease  Outcome: Ongoing  Note: Patient able to use 0-10 pain scale. Denies pain at this time. Pain complaints as documented. Agreeable to take PRN pain medications. Pain goal of no pain. Goal: Control of acute pain  Description: Control of acute pain  Outcome: Ongoing  Goal: Control of chronic pain  Description: Control of chronic pain  Outcome: Ongoing     Problem: Discharge Planning:  Goal: Discharged to appropriate level of care  Description: Discharged to appropriate level of care  Outcome: Ongoing  Note: Patient is from home alone, plans to return home at discharge. Patient need a home oxygen evaluation prior to discharge. Problem: Skin Integrity:  Goal: Will show no infection signs and symptoms  Description: Will show no infection signs and symptoms  Outcome: Ongoing  Note: No signs of new skin breakdown with each assessment. Skin remains warm, dry, intact. Mucous membranes pink & moist. Patient is able to turn self. Goal: Absence of new skin breakdown  Description: Absence of new skin breakdown  Outcome: Ongoing     Problem: Airway Clearance - Ineffective:  Goal: Ability to maintain a clear airway will improve  Description: Ability to maintain a clear airway will improve  Outcome: Ongoing  Note: Patient has adequate cough, suctions secretions from oral airway by self. Problem: Impaired respiratory status  Goal: Clear lung sounds  Description: Clear lung sounds  10/19/2020 0933 by Gail Nielson RCP  Outcome: Ongoing     Problem: Falls - Risk of:  Goal: Will remain free from falls  Description: Will remain free from falls  Outcome: Ongoing  Note: Bed in lowest position and locked. Bed alarm activated. Educated on use of call light when in need of assistance- expressed understanding. Visual checks performed and charted. No falls during shift at this time. Armband and falling star in place.   Call light

## 2020-10-19 NOTE — PROGRESS NOTES
AVS reviewed with patient, educated on new medications, where to  prescriptions, when to call 911, when to take next doses of medications, and future follow up appointments. Patient verbalized understanding, all questions answered at this time. IV and telemetry removed. Patient discharged at this time on 3L NC with home oxygen tank. Patient discharged with all personal belongings including clothing, glasses, and cell phone.

## 2020-10-19 NOTE — PROGRESS NOTES
[facility]    Patient - Diann Thompson   MRN -  [id number]   [facility] # - [de-identified]   - 1960      Date of Admission -  10/16/2020 12:04 PM  Date of evaluation -  10/19/2020  Room - [address] [facility] Neda Arenas MD Primary Care Physician - Mary Vasquez, APRN - CNP   Chief Complaint   Hypoxemia  Active Hospital Problem List      Active Hospital Problems    Diagnosis Date Noted    Bullous emphysema [facility]) [J43.9]     Laryngeal stenosis [J38.6] 10/16/2020    Airway obstruction [J98.8] 10/16/2020    Airway stricture [J98.8] 10/16/2020    Stage 3 severe COPD by GOLD classification ([address] [J44.9] 2019     HPI   Diann Thompson is a 60 y.o. male pulmonary consult requested regarding low oxygen saturation. Admitted 10/16/20 for elective surgery by Dr Frederic Barrera undergoing successful  BRONCHOSCOPY, MICRO LARYNGOSCOPY WITH REMOVAL OF SUBMUCCOSAL NON NOEPLASTIC LESION JET VENTILATION; suspension microlaryngoscopy with laser and debrider resection of obstructing nonneoplastic tumor of the supraglottis, glottis, and subglottis; therapeutic bronchoscopy with resection of obstructing soft tissue tumor with laser and debrider. Devorah Alvarez is progressing well and wishes to go home however staff has been unable to remove from supplemental oxygen, O2 sat 85% on room air. He is comfortable in no distress whatsoever, multiple visitors in the room, speaks in full senteces with a  loud voice. CXR revealed questionable L apical PTX but non contrast CT chest revealed severe bullous emphysema but no PTX. Devorah Alvarez is known to our service for severe COPD, has been on supplemental  Home oxygen before after colon resection.     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 Is resting comfortably in bed, NAD, air entry bilaterally. Patient says that his voice is improving, he is hoping to continue treatment as per ENT, although does not feel comfortable with the idea of hyperbaric oxygen therapy. Yesterday patient quickly desaturated when off supplemental oxygen, room air PaO2 of 47 mmHg 82% saturation. He may need temporary supplemental oxygen at discharge, we will continue to monitor his progress.       Past Medical History         Diagnosis Date    Arthritis     [facility]ch[facility]     PAD (peripheral artery disease) ([facility]     bilateral lower legs    Pneumonia 01/2017 1/2017 and 2/2017    Rectal cancer ([facility]     Squamous cell carcinoma of vocal cord [facility])       Past Surgical History           Procedure Laterality Date    COLONOSCOPY  2016    EYE SURGERY      CROSS EYED    HAND SURGERY Bilateral 1995    KNEE ARTHROSCOPY Right 1996    LARYNGOSCOPY  07/12/2017    Biopsy    LARYNGOSCOPY N/A 7/12/2019    MICRO LARYNGOSCOPY W/ BIOPSY performed by Deyanira Santos MD at [address] [facility] N/A 12/30/2019    DIRECT LARYNGOSCOPY WITH BIOPSY performed by Deyanira Santos MD at [address] [employer] N/A 10/16/2020    BRONCHOSCOPY, MICRO LARYNGOSCOPY WITH REMOVAL OF SUBMUCCOSAL NON NOEPLASTIC LESION JET VENTILATION performed by Noe Bingham MD at [address] [facility] [address] RECTAL SURGERY  08/29/2016    colostemy bag[place]    ROTATOR CUFF REPAIR Left 1990'S     Diet    DIET GENERAL;  Allergies    Povidone iodine  Social History     Social History     Socioeconomic History    Marital status:      Spouse name: Not on file    Number of children: Not on file    Years of education: Not on file    Highest education level: Not on file   Occupational History    Not on file   Social Needs    Financial resource strain: Not on file   Jennifer Eisenberg Food insecurity     Worry: Not on file     Inability: Not on file    Transportation needs     Medical: Not on file     Non-medical: Not on file   Tobacco Use    Smoking status: Former Smoker     Packs/day: 2.25     Years: 31.00     Pack years: 69.75     Start date:      Last attempt to quit: 2006     Years since quittin.7    Smokeless tobacco: Never Used   Substance and Sexual Activity    Alcohol use: No     Alcohol/week: 0.0 standard drinks    Drug use: No    Sexual activity: Not Currently   Lifestyle    Physical activity     Days per week: Not on file     Minutes per session: Not on file    Stress: Not on file   Relationships    Social connections     Talks on phone: Not on file     Gets together: Not on file     Attends Bahai service: Not on file     Active member of club or organization: Not on file     Attends meetings of clubs or organizations: Not on file     Relationship status: Not on file    Intimate partner violence     Fear of current or ex partner: Not on file     Emotionally abused: Not on file     Physically abused: Not on file     Forced sexual activity: Not on file   Other Topics Concern    Not on file   Social History Narrative    Not on file     Family History          Problem Relation Age of Onset    Prostate Cancer Father 48    Cancer Father     Heart Disease Father     Diabetes Mother     Heart Disease Mother     Stroke Mother     Heart Disease Brother     High Blood Pressure Other     Cancer Other         LUNG,PROSTATE    Hearing Loss Other        Meds    Current Medications    amoxicillin-clavulanate  1 tablet Oral 2 times per day    sodium chloride (Inhalant)  3 mL Nebulization TID    ipratropium-albuterol  1 ampule Inhalation Q4H WA    Arformoterol Tartrate  15 mcg Nebulization BID    voriconazole  400 mg Intravenous Q12H    acetylcysteine  2 mL Inhalation TID    loperamide  2 mg Oral Daily    pravastatin  40 mg Oral Daily    sodium chloride 14.8   HCT 46.2   MCV 96.5*   MCH 30.9   MCHC 32.0*      MPV 10.9      BMP  Recent Labs     10/17/20  0355   *   K 4.9   CL 96*   CO2 25   BUN 15   CREATININE 0.6   GLUCOSE 143*   CALCIUM 8.6     LFT  Recent Labs     10/17/20  0355   AST 23   ALT 29   BILITOT 0.2*   ALKPHOS 159*     TROP  Lab Results   Component Value Date    TROPONINT < 0.010 11/11/2019     BNP  Lab Results   Component Value Date    PROBNP 91.7 10/17/2020    PROBNP 98.6 11/11/2019       . PFTs           Echo    N/A  Cultures    Procalcitonin  No results found for: PROCAL     Radiology    CXR  FINDINGS: Cardiomediastinal silhouette is within normal limits. There are reticular opacities at the bilateral lung bases. A small left apical pneumothorax remains poorly visualized. Degenerative changes in the thoracic spine are poorly visualized. There     are compression deformities of multiple mid and lower thoracic vertebral bodies of indeterminant age. There are chronic fracture deformities in multiple right ribs. Surgical changes at the left shoulder are incompletely visualized.              Impression    1. Poorly visualized small left apical pneumothorax. 2. Bibasilar atelectasis/infiltrate.                        **This report has been created using voice recognition software. It may contain minor errors which are inherent in voice recognition technology. **         Final report electronically signed by Dr. Adelina Rivas on 10/17/2020 2:31 PM                     CT Scans 7/2019    FINDINGS: Cardiac size is normal. Atherosclerotic calcifications are present in the thoracic aorta and coronary arteries without evidence of aneurysm. There is no pleural or pericardial effusion. Emphysematous changes are noted in the bilateral lungs. There are extensive bullous changes in the bilateral upper lungs, limiting evaluation for a potential small pneumothorax. No large pneumothorax is identified.  Alveolar and reticular opacities are seen at the bilateral lung bases. There are calcified    mediastinal and hilar lymph nodes. There is no axillary lymphadenopathy. Degenerative changes are present in the thoracic spine. There are mild anterior compression deformities of multiple mid thoracic vertebral bodies resulting in kyphosis.         Bilateral perinephric stranding may be chronic. There is fatty replacement of the pancreas.              Impression    1. Bibasilar atelectasis/infiltrate. 2. Chronic lung disease.         Final report electronically signed by Dr. Kena Sharp on 10/17/2020 6:06 PM            (See actual reports for details)    Assessment   Post-op respiratory failure due to severe COPD, bullous emphysema, upper airway obstruction, currently no ditress but requiring supplemental oxygen, has been on home oxygen before. 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 Patti Martinez -patient was loaded with high-dose steroids leigh ann and postoperatively -last dose 10/18/20  Assessment for hyperbaric oxygen therapy is being entertained at the moment - from pulmonary standpoint it is relatively contraindicated in the setting of severe bullous emphysema  AFB (-)    Cultures -Klebsiella pneumonia as well growth gram + bacilli corynebacterium and staph (coag -) - specimen: tissue, source - mouth. Ampicillin and pen G resistant. While on Augmentin    Recommendations     Would do a home oxygen eval and DC on supplemental oxygen,  f/u in office to see if we can stop supplemental oxygen in a few weeks  Inhaled bronchodilators around the clock  Decadron started up post surgical edema should help with COPD -last dose completed 10/18/2020  We will continue to monitor respiratory function will consider inhaled steroids    Thank you for the consult and allowing us to participate in the care of your patient.      Case discussed with nurse and patient/family. Questions and concerns addressed. Meds and Orders reviewed.     Electronically signed by     Igor Medrnao MD on 10/19/2020 at 9:27 AM

## 2020-10-19 NOTE — CARE COORDINATION
years ago. He has a 69.75 pack-year smoking history. He has never used smokeless tobacco.   PCP: JOSH Graham CNP  Readmission 30 days or less: no  Readmission Risk Score: 10%    Discharge Planning Evaluation  Current Residence:     Living Arrangements:  Alone   Support Systems:  Family Members, Children  Current Services PTA:     Potential Assistance Needed:     Potential Assistance Purchasing Medications:     Does patient want to participate in local refill/ meds to beds program?     Type of Home Care Services:     Patient expects to be discharged to:     Expected Discharge date: Follow Up Appointment: Best Day/ Time:      Patient Goals/Plan/Treatment Preferences: Met with [facility]. He currently lives at home alone. Plan is to return at discharge. He has family support. He declines HH. If home oxygen needed his preference is [facility]. Will follow. Transportation/Food Security/Housekeeping Addressed:  No issues identified.     Evaluation: no

## 2020-10-19 NOTE — PLAN OF CARE
Problem: Impaired respiratory status  Goal: Clear lung sounds  Description: Clear lung sounds  Outcome: Ongoing   Continue breathing treatments to improve breathsounds

## 2020-10-19 NOTE — PROGRESS NOTES
A home oxygen evaluation has been completed. [x]Patient is an inpatient. It is expected that the patient will be discharged within the next 48 hours. Qualified provider to write order for home prescription if patient qualifies. Social service/care managers will arrange for home oxygen. If patient is active, arrange for Home Medical supplier to assess for Oxygen Conserving Device per pulse oximetry. []Patient is an outpatient. Results will be faxed to the ordering provider. Qualified provider to write order for home prescription if patient qualifies and arranges for home oxygen. Patient was placed on room air for 10 minutes. SpO2 was 85 % on room air at rest. 1 liter is 85% , 2liters is 88% and 3 liters is 92%  Patient was walked for 6 minutes. SpO2 was 83 % during walking. Patients SpO2 was below 89% and qualified for home oxygen. Oxygen was applied at 6 lpm via nasal cannula to maintain a SpO2 between 90-92% while walking. Actual SpO2 was 90-91 %.

## 2020-10-27 ENCOUNTER — TELEPHONE (OUTPATIENT)
Dept: ENT CLINIC | Age: 60
End: 2020-10-27

## 2020-10-27 NOTE — TELEPHONE ENCOUNTER
Discussed with Dr Rafa moise to continue these medications. More important for patient to use the Mucomyst in the nebulizer.

## 2020-10-27 NOTE — TELEPHONE ENCOUNTER
Patient called in asking why Dr Kathrine Gómez took him off the Mucinex and Robitussin after surgery. Patient feels it was helping and he would like to start using it again if possible. Please advise.

## 2020-10-30 NOTE — TELEPHONE ENCOUNTER
Heidi Albarran called requesting a refill on the following medications:  Requested Prescriptions     Pending Prescriptions Disp Refills    acetylcysteine (MUCOMYST) 10 % nebulizer solution       Sig: Inhale 4 mLs into the lungs 3 times daily as needed     Pharmacy verified: [id number] on [address] [place] [address]      Date of last visit: 8/28/2020  Date of next visit (if applicable): 86/86/0198

## 2020-10-31 NOTE — PROGRESS NOTES
This patient needs to be scheduled for staged to care for his condition within 4 weeks of this first procedure. Please bring him in for preop visit and look for surgical date for him for the identical process. I will take probably half the time that it took on the first round. We can assume he will have to come in for overnight observation but since he already has oxygen set up for home we can discharge him likely that next morning.     PFC

## 2020-11-02 ENCOUNTER — OFFICE VISIT (OUTPATIENT)
Dept: ENT CLINIC | Age: 60
End: 2020-11-02
Payer: MEDICARE

## 2020-11-02 VITALS
BODY MASS INDEX: 29.59 KG/M2 | HEIGHT: 74 IN | SYSTOLIC BLOOD PRESSURE: 146 MMHG | RESPIRATION RATE: 16 BRPM | TEMPERATURE: 97.7 F | WEIGHT: 230.6 LBS | HEART RATE: 88 BPM | DIASTOLIC BLOOD PRESSURE: 80 MMHG

## 2020-11-02 PROCEDURE — G8484 FLU IMMUNIZE NO ADMIN: HCPCS | Performed by: OTOLARYNGOLOGY

## 2020-11-02 PROCEDURE — 99215 OFFICE O/P EST HI 40 MIN: CPT | Performed by: OTOLARYNGOLOGY

## 2020-11-02 PROCEDURE — G8427 DOCREV CUR MEDS BY ELIG CLIN: HCPCS | Performed by: OTOLARYNGOLOGY

## 2020-11-02 PROCEDURE — 1036F TOBACCO NON-USER: CPT | Performed by: OTOLARYNGOLOGY

## 2020-11-02 PROCEDURE — G8417 CALC BMI ABV UP PARAM F/U: HCPCS | Performed by: OTOLARYNGOLOGY

## 2020-11-02 PROCEDURE — 3017F COLORECTAL CA SCREEN DOC REV: CPT | Performed by: OTOLARYNGOLOGY

## 2020-11-02 PROCEDURE — 1111F DSCHRG MED/CURRENT MED MERGE: CPT | Performed by: OTOLARYNGOLOGY

## 2020-11-02 PROCEDURE — 31575 DIAGNOSTIC LARYNGOSCOPY: CPT | Performed by: OTOLARYNGOLOGY

## 2020-11-02 RX ORDER — ACETYLCYSTEINE 100 MG/ML
4 SOLUTION ORAL; RESPIRATORY (INHALATION) 3 TIMES DAILY PRN
Qty: 360 ML | Refills: 0 | Status: SHIPPED | OUTPATIENT
Start: 2020-11-02 | End: 2020-01-01

## 2020-11-02 NOTE — PROGRESS NOTES
[facility] PROFESSIONAL SERVS  [facility] EAR, NOSE AND THROAT  [address] [place] [address] [place] [address]  Dept: [phone]  Dept Fax: [phone]  Loc: [phone]    Dequan Chaudhry is a 60 y.o. male who was referred by No ref. provider found for:  Chief Complaint   Patient presents with    Post-Op Check     Patient is here for post-op bronchoscopy/microlaryngoscopy 10/16/2020       HPI:     Dequan Chaudhry is a 60 y.o. male who is status post an extensive transoral resection of obstructing soft tissues of the larynx specifically of the supraglottis glottis and subglottis as well as of the proximal trachea. The patient had several days in the hospital recovering his ability to oxygenate himself and ultimately went home with supplemental oxygen. He states that after several days home he no longer needed the supplemental oxygen is to stay in the 90s with respect to his oxygen saturation. Since that time he is breathing breathing with much greater ease and also states that his voice quality has markedly improved. Of note is the fact that he has finished his antifungal medication several days ago and states that his production of green mucopurulent secretions has decidedly increased over this interval.  His breathing comfort has not abated however. He is taking fluids well. He is using his 3% hypertonic saline nebulizer on average 3 times per day. He does not use the 0.9% because he does not feel it does nearly as much as a 3%. The 3% dusting his mucous membranes he says. History:      Allergies   Allergen Reactions    Povidone Iodine Rash     Surgery prep     Current Outpatient Medications   Medication Sig Dispense Refill    acetylcysteine (MUCOMYST) 10 % nebulizer solution Inhale 4 mLs into the lungs 3 times daily as needed (for thick secretions) 360 mL 0    voriconazole (VFEND) 200 MG tablet Take 1 tablet by mouth 2 times daily for 14 days 28 tablet 0    sodium chloride nebulizer 0.9 % solution Take 3 mLs by nebulization 3 times daily 270 mL 1    sodium chloride, Inhalant, 3 % nebulizer solution Take 3 mLs by nebulization as needed (Throat dryness, cough, wheezing) 500 mL 3    Multiple Vitamins-Minerals (MULTIVITAMIN ADULT EXTRA C) CHEW Take by mouth daily      budesonide-formoterol (SYMBICORT) 160-4.5 MCG/ACT AERO Inhale 2 puffs into the lungs 2 times daily Rinse mouth after its use. 3 Inhaler 3    pravastatin (PRAVACHOL) 40 MG tablet Take 40 mg by mouth daily      Acetylcysteine (NAC PO) Take by mouth 2 times daily 10%      SPIRIVA RESPIMAT 2.5 MCG/ACT AERS inhaler INHALE 2 PUFFS BY MOUTH ONCE DAILY 3 Inhaler 3    acetaminophen (TYLENOL) 650 MG extended release tablet Take 1 tablet by mouth as needed for Pain Do not take with hydrocodone/acetominophen 60 tablet 3    albuterol (PROVENTIL) (2.5 MG/3ML) 0.083% nebulizer solution Take 3 mLs by nebulization every 6 hours as needed for Wheezing or Shortness of Breath 360 vial 3    albuterol sulfate HFA (VENTOLIN HFA) 108 (90 Base) MCG/ACT inhaler Inhale 2 puffs into the lungs every 6 hours as needed for Wheezing or Shortness of Breath 3 Inhaler 3    loperamide (IMODIUM) 2 MG capsule Take 2 mg by mouth daily        No current facility-administered medications for this visit.       Past Medical History:   Diagnosis Date    Arthritis     [facility] obstructive [facility] [address] [facility]     PAD (peripheral artery disease) ([address]     bilateral lower legs    Pneumonia 01/2017 1/2017 and 2/2017    Rectal cancer ([facility] [address] [facility]     Squamous cell carcinoma of vocal cord [facility])       Past Surgical History:   Procedure Laterality Date    COLONOSCOPY  2016    EYE SURGERY      CROSS EYED    HAND SURGERY Bilateral 1995    KNEE ARTHROSCOPY Right 1996    LARYNGOSCOPY  07/12/2017    Biopsy    LARYNGOSCOPY N/A 7/12/2019    MICRO LARYNGOSCOPY W/ BIOPSY performed by En Muñoz MD at [address] N/A 12/30/2019    DIRECT LARYNGOSCOPY WITH BIOPSY performed by Guillermo Harp MD at [email or web address] N/A 10/16/2020    BRONCHOSCOPY, MICRO LARYNGOSCOPY WITH REMOVAL OF SUBMUCCOSAL NON NOEPLASTIC LESION JET VENTILATION performed by Rakel Leach MD at [address] 0 RECTAL SURGERY  2016    colostemy bag[place]    ROTATOR CUFF REPAIR Left 80'S     Family History   Problem Relation Age of Onset    Prostate Cancer Father 48    Cancer Father     Heart Disease Father     Diabetes Mother     Heart Disease Mother     Stroke Mother     Heart Disease Brother     High Blood Pressure Other     Cancer Other         LUNG,PROSTATE    Hearing Loss Other      Social History     Tobacco Use    Smoking status: Former Smoker     Packs/day: 2.25     Years: 31.00     Pack years: 69.75     Start date:      Last attempt to quit: 2006     Years since quittin.7    Smokeless tobacco: Never Used   Substance Use Topics    Alcohol use: No     Alcohol/week: 0.0 standard drinks        Subjective:      Review of Systems  Rest of review of systems are negative, except as noted in HPI. Objective:     BP (!) 146/80 (Site: Right Upper Arm, Position: Sitting)   Pulse 88   Temp 97.7 °F (36.5 °C) (Infrared)   Resp 16   Ht 6' 2\" (1.88 m)   Wt 230 lb 9.6 oz (104.6 kg)   BMI 29.61 kg/m²     Physical Exam     On general physical exam the patient is pleasant alert oriented and cooperative adult male with a distorted barrel chest shape. On auscultation his respiratory sounds were distant but were free of wheezing rales or rhonchi. Over his larynx I heard a large volume of air movement that was laminar in nature. There was a rapid on rapid off sound quality consistent with a large bore upper airway.     Procedure: Flexible laryngoscopy  Indication: Status post laser surgery of larynx and proximal trachea  Findings: Since larynx was abnormal for the presence of overhanging posterior supraglottic soft tissues of the AE fold and arytenoid towers. He was anesthetized enough that I could traverse beyond this to see the patient's glottis and subglottis. He had generous amounts of mucopurulence coating the soft tissues but the channels were wide. The disease relative to the coating of the secretions extended into the subglottis and proximal trachea mostly anteriorly. The posterior mucosa appeared relatively normal.  The patient tolerated the procedure very well. Vitals reviewed. Xr Chest (2 Vw)    Result Date: 10/17/2020  1. Poorly visualized small left apical pneumothorax. 2. Bibasilar atelectasis/infiltrate. **This report has been created using voice recognition software. It may contain minor errors which are inherent in voice recognition technology. ** Final report electronically signed by Dr. Adelina Rivas on 10/17/2020 2:31 PM    Xr Chest (2 Vw)    Result Date: 10/9/2020  1. The lung fields are emphysematous. There is no focal consolidation or focal infiltrate. There is no pleural effusion. 2. There is a nodular radiodensity at the left lung base which is seen at the cardiophrenic angle on the CT lung screen dated 8/3/2020. On the CT lung screen examination, there is also an irregular masslike opacity within the anterior aspect of the right  lower chest. Please refer to the CT lung screen examination dated 8/3/2024 for more detailed evaluation of the lung fields. A follow-up CT chest with intravenous contrast in 3 months is recommended to confirm stability. **This report has been created using voice recognition software. It may contain minor errors which are inherent in voice recognition technology. ** Final report electronically signed by Dr. Jeanine Winter on 10/9/2020 12:48 PM    Ct Chest Wo Contrast    Result Date: 10/17/2020  1. Bibasilar atelectasis/infiltrate. 2. Chronic lung disease.  Final report electronically signed by Dr. Adelina Rivas on 10/17/2020 6:06 PM    Xr Chest Portable    Result Date: 10/16/2020  Findings suspicious for left anterior apical pneumothorax. Consider repeat PA chest when the patient's condition permits. **This report has been created using voice recognition software. It may contain minor errors which are inherent in voice recognition technology. ** Final report electronically signed by Dr. Telma Abreu on 10/16/2020 4:58 PM     Lab Results   Component Value Date     10/17/2020     06/12/2020     02/06/2020    K 4.9 10/17/2020    K 5.2 06/12/2020    K 4.4 02/06/2020    CL 96 10/17/2020     06/12/2020     02/06/2020    CO2 25 10/17/2020    CO2 29 06/12/2020    CO2 29 02/06/2020    BUN 15 10/17/2020    BUN 19 06/12/2020    BUN 18 02/06/2020    CREATININE 0.6 10/17/2020    CREATININE 0.71 06/12/2020    CREATININE 0.78 02/06/2020    CALCIUM 8.6 10/17/2020    CALCIUM 9.10 06/12/2020    CALCIUM 8.70 02/06/2020    PROT 6.6 10/17/2020    PROT 6.5 06/12/2020    PROT 6.6 02/06/2020    LABALBU 4.0 10/17/2020    LABALBU 4.3 06/12/2020    LABALBU 4.5 02/06/2020    LABALBU 4.6 12/06/2011    BILITOT 0.2 10/17/2020    BILITOT 0.3 06/12/2020    BILITOT 0.5 02/06/2020    ALKPHOS 159 10/17/2020    ALKPHOS 124 06/12/2020    ALKPHOS 119 02/06/2020    AST 23 10/17/2020    AST 22 06/12/2020    AST 24 02/06/2020    ALT 29 10/17/2020    ALT 31 06/12/2020    ALT 26 02/06/2020       All of the past medical history, past surgical history, family history,social history, allergies and current medications were reviewed with the patient. Assessment & Plan   Diagnoses and all orders for this visit:     Diagnosis Orders   1. Airway obstruction  LA LARYNGOSCOPY,DIRCT,OP SCOPE,BIOPSY    BRONCHOSCOPY   2. Laryngeal stenosis  LA LARYNGOSCOPY,DIRCT,OP SCOPE,BIOPSY   3. Invasive fungal infection  LA LARYNGOSCOPY,DIRCT,OP SCOPE,BIOPSY    BRONCHOSCOPY   4. Tracheal stenosis  BRONCHOSCOPY   5. Radiation adverse effect, subsequent encounter     6.  Gastroesophageal reflux disease without

## 2020-11-02 NOTE — Clinical Note
Dr. Adama Hurtado  Mr. Chandler Aguilar is doing relatively well but just days after stopping his antifungal, his mucopurulent discharge has resumed. I wonder if nebulized antifungal medication or even intraoperative intralesional antifungal medication might have a role. I would appreciate your opinion on this. Do not hesitate to contact me directly on my cellular phone. Mayra Aguilar.  Buzz Lester MD, [facility] Otolaryngology Head and Neck Surgery Laryngology Bronchoesophagology Cell: [phone]

## 2020-11-04 ENCOUNTER — TELEPHONE (OUTPATIENT)
Dept: WOUND CARE | Age: 60
End: 2020-11-04

## 2020-11-04 ENCOUNTER — HOSPITAL ENCOUNTER (OUTPATIENT)
Dept: WOUND CARE | Age: 60
Discharge: HOME | End: 2020-11-04
Payer: MEDICARE

## 2020-11-04 VITALS
RESPIRATION RATE: 20 BRPM | DIASTOLIC BLOOD PRESSURE: 79 MMHG | TEMPERATURE: 96.3 F | OXYGEN SATURATION: 90 % | SYSTOLIC BLOOD PRESSURE: 138 MMHG | HEART RATE: 79 BPM | WEIGHT: 230 LBS | BODY MASS INDEX: 29.52 KG/M2 | HEIGHT: 74 IN

## 2020-11-04 PROCEDURE — 99211 OFF/OP EST MAY X REQ PHY/QHP: CPT

## 2020-11-04 RX ORDER — VORICONAZOLE 200 MG/1
200 TABLET, FILM COATED ORAL 2 TIMES DAILY
Refills: 1 | Status: CANCELLED | OUTPATIENT
Start: 2020-11-04 | End: 2020-11-11

## 2020-11-04 RX ORDER — VORICONAZOLE 200 MG/1
200 TABLET, FILM COATED ORAL 2 TIMES DAILY
Qty: 180 TABLET | Refills: 2 | Status: SHIPPED | OUTPATIENT
Start: 2020-11-04 | End: 2021-01-01

## 2020-11-04 ASSESSMENT — PAIN SCALES - GENERAL: PAINLEVEL_OUTOF10: 0

## 2020-11-04 NOTE — PLAN OF CARE
Problem: Physical Regulation:  Goal: Will remain free from infection  Description: Will remain free from infection  Outcome: Ongoing   Patient presents to wound clinic for ID hospital follow up appt. No s/s of infection. Patient afebrile. Dr. Mara Granda instructed patient of need to restart Voriconzole, new prescription sent into pharmacy. Patient will need to take 6 months to 1 year. Follow up visit:  8 Weeks on Wednesday January 6th at 9:45 am.    Care plan reviewed with patient. Patient verbalize understanding of the plan of care and contribute to goal setting.

## 2020-11-04 NOTE — PROGRESS NOTES
10/16/2020    BRONCHOSCOPY, MICRO LARYNGOSCOPY WITH REMOVAL OF SUBMUCCOSAL NON NOEPLASTIC LESION JET VENTILATION performed by Erica Madrid MD at [address]  RECTAL SURGERY  2016    colostemy bag-[place]    ROTATOR CUFF REPAIR Left      FAMILY HISTORY         Family History   Problem Relation Age of Onset    Prostate Cancer Father 48    Cancer Father     Heart Disease Father     Diabetes Mother     Heart Disease Mother     Stroke Mother     Heart Disease Brother     High Blood Pressure Other     Cancer Other         LUNG,PROSTATE    Hearing Loss Other      SOCIAL HISTORY       Social History     Tobacco Use    Smoking status: Former Smoker     Packs/day: 2.25     Years: 31.00     Pack years: 69.75     Start date:      Last attempt to quit: 2006     Years since quittin.7    Smokeless tobacco: Never Used   Substance Use Topics    Alcohol use: No     Alcohol/week: 0.0 standard drinks    Drug use: No     ALLERGIES         Allergies   Allergen Reactions    Povidone Iodine Rash     Surgery prep     MEDICATIONS         Current Outpatient Medications on File Prior to Encounter   Medication Sig Dispense Refill    acetylcysteine (MUCOMYST) 10 % nebulizer solution Inhale 4 mLs into the lungs 3 times daily as needed (for thick secretions) 360 mL 0    sodium chloride nebulizer 0.9 % solution Take 3 mLs by nebulization 3 times daily 270 mL 1    sodium chloride, Inhalant, 3 % nebulizer solution Take 3 mLs by nebulization as needed (Throat dryness, cough, wheezing) 500 mL 3    Multiple Vitamins-Minerals (MULTIVITAMIN ADULT EXTRA C) CHEW Take by mouth daily      budesonide-formoterol (SYMBICORT) 160-4.5 MCG/ACT AERO Inhale 2 puffs into the lungs 2 times daily Rinse mouth after its use.  3 Inhaler 3    pravastatin (PRAVACHOL) 40 MG tablet Take 40 mg by mouth daily      Acetylcysteine (NAC PO) Take by mouth 2 times daily 10%      SPIRIVA RESPIMAT 2. 5 MCG/ACT AERS inhaler INHALE 2 PUFFS BY MOUTH ONCE DAILY 3 Inhaler 3    acetaminophen (TYLENOL) 650 MG extended release tablet Take 1 tablet by mouth as needed for Pain Do not take with hydrocodone/acetominophen 60 tablet 3    albuterol (PROVENTIL) (2.5 MG/3ML) 0.083% nebulizer solution Take 3 mLs by nebulization every 6 hours as needed for Wheezing or Shortness of Breath 360 vial 3    albuterol sulfate HFA (VENTOLIN HFA) 108 (90 Base) MCG/ACT inhaler Inhale 2 puffs into the lungs every 6 hours as needed for Wheezing or Shortness of Breath 3 Inhaler 3    loperamide (IMODIUM) 2 MG capsule Take 2 mg by mouth daily        No current facility-administered medications on file prior to encounter. REVIEW OF SYSTEMS:     Constitutional: no fever, no night sweats, no fatigue. Head: no head ache , no head injury, no migranes. Eye: no blurring of vision, no double vision. Ears: no hearing difficulty, no tinnitus  Mouth/throat: no ulceration, dental caries , dysphagia  Lungs: + Cough, + shortness of breath, no wheeze  CVS: no palpitation, no chest pain, no shortness of breath  GI: no abdominal pain, no nausea , no vomiting, no constipation  ALDEN: no dysuria, frequency and urgency, no hematuria, no kidney stones  Musculoskeletal: no joint pain, swelling , stiffness,  Endocrine: no polyuria, polydipsia, no cold or heat intolerance  Hematology: no anemia, no easy brusing or bleeding, no hx of clotting disorder  Dermatology: no skin rash, no eczema, no pruritis,  Psychiatry: no depression, no anxiety,no panic attacks, no suicide ideation        PHYSICAL EXAM      height is 6' 2\" (1.88 m) and weight is 230 lb (104.3 kg). His tympanic temperature is 96.3 °F (35.7 °C). His blood pressure is 138/79 and his pulse is 79. His respiration is 20 and oxygen saturation is 90%.        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 Discharge Instructions       Visit Discharge/Physician Orders:  -Need to restart Voriconzole, new prescription sent into pharmacy. Will need to take 6 month to 1 year. Take as prescribed. Follow up visit:  8 Weeks on    Keep next scheduled appointment. Please give 24 hour notice if unable to keep appointment. [phone]    If you experience any of the following, please call the Wound Care Service during business hours: Monday through Friday 8:00 am - 4:30 pm  ([phone]). *Increase in pain   *Temperature over 101   *Increase in drainage from your wound or a foul odor   *Uncontrolled swelling   *Need for compression bandage changes due to slippage, breakthrough drainage    If you need medical attention outside of business hours, please contact your Primary Care Doctor or go to the nearest emergency room.                    Electronically signed by Tong Hester MD on 11/4/2020 at 10:12 AM

## 2020-11-12 NOTE — PROGRESS NOTES
Following instructions given to patient, who states understanding:     NPO after midnight  [employer] and 's license  Wear comfortable clean clothing  Do not bring jewelry   Shower night before and morning of surgery with a liquid antibacterial soap  Bring medications in original bottles and inhalers  Follow all instructions given by your physician   needed at discharge  Call [phone] for any questions  Report to SDS on 2nd floor  If you would become ill prior to surgery, please call the surgeon  May have only 1 visitor accompany you for surgery  Please bring and wear mask  You will be receiving a phone call one or two days before surgery to review covid screening exposure    Patient states he will get covid tested at [facility] and ekg 11/13

## 2020-11-13 ENCOUNTER — HOSPITAL ENCOUNTER (OUTPATIENT)
Age: 60
Discharge: HOME | End: 2020-11-13
Payer: MEDICARE

## 2020-11-13 ENCOUNTER — ANESTHESIA EVENT (OUTPATIENT)
Dept: OPERATING ROOM | Age: 60
End: 2020-11-13
Payer: MEDICARE

## 2020-11-13 LAB
EKG ATRIAL RATE: 74 BPM
EKG P AXIS: -3 DEGREES
EKG P-R INTERVAL: 144 MS
EKG Q-T INTERVAL: 392 MS
EKG QRS DURATION: 84 MS
EKG QTC CALCULATION (BAZETT): 435 MS
EKG R AXIS: -20 DEGREES
EKG T AXIS: -13 DEGREES
EKG VENTRICULAR RATE: 74 BPM

## 2020-11-13 PROCEDURE — 93010 ELECTROCARDIOGRAM REPORT: CPT | Performed by: NUCLEAR MEDICINE

## 2020-11-13 PROCEDURE — 93005 ELECTROCARDIOGRAM TRACING: CPT

## 2020-11-13 PROCEDURE — U0003 INFECTIOUS AGENT DETECTION BY NUCLEIC ACID (DNA OR RNA); SEVERE ACUTE RESPIRATORY SYNDROME CORONAVIRUS 2 (SARS-COV-2) (CORONAVIRUS DISEASE [COVID-19]), AMPLIFIED PROBE TECHNIQUE, MAKING USE OF HIGH THROUGHPUT TECHNOLOGIES AS DESCRIBED BY CMS-2020-01-R: HCPCS

## 2020-11-15 LAB — SARS-COV-2: NOT DETECTED

## 2020-11-17 NOTE — PROGRESS NOTES
Patient contacted regarding COVID-19 screen. Test was done on 11-13-20      Following questions asked: In the last month, have you been in contact with someone who was confirmed or suspected to have Coronavirus/COVID-19:  Patient stated NO      Pt was informed can be a visitor allowed. Please bring masks. Do you or family members have any of the following symptoms:  Cough-no   Muscle pain-no   Shortness of breath-no   Fever-no   Weakness-no  Severe headache-no   Sore throat-no   Respiratory symptoms-no    Have you traveled internationally in the last month?  No     Have you been to the emergency room recently-no

## 2020-11-18 ENCOUNTER — ANESTHESIA (OUTPATIENT)
Dept: OPERATING ROOM | Age: 60
End: 2020-11-18
Payer: MEDICARE

## 2020-11-18 ENCOUNTER — APPOINTMENT (OUTPATIENT)
Dept: GENERAL RADIOLOGY | Age: 60
End: 2020-11-18
Attending: OTOLARYNGOLOGY
Payer: MEDICARE

## 2020-11-18 ENCOUNTER — HOSPITAL ENCOUNTER (OUTPATIENT)
Age: 60
Setting detail: OUTPATIENT SURGERY
Discharge: HOME | End: 2020-11-18
Attending: OTOLARYNGOLOGY | Admitting: OTOLARYNGOLOGY
Payer: MEDICARE

## 2020-11-18 VITALS
TEMPERATURE: 98.2 F | HEIGHT: 74 IN | SYSTOLIC BLOOD PRESSURE: 144 MMHG | RESPIRATION RATE: 20 BRPM | BODY MASS INDEX: 28.88 KG/M2 | WEIGHT: 225 LBS | HEART RATE: 95 BPM | OXYGEN SATURATION: 100 % | DIASTOLIC BLOOD PRESSURE: 79 MMHG

## 2020-11-18 VITALS
DIASTOLIC BLOOD PRESSURE: 73 MMHG | RESPIRATION RATE: 2 BRPM | SYSTOLIC BLOOD PRESSURE: 129 MMHG | OXYGEN SATURATION: 97 %

## 2020-11-18 PROCEDURE — 3600000005 HC SURGERY LEVEL 5 BASE: Performed by: OTOLARYNGOLOGY

## 2020-11-18 PROCEDURE — 3700000001 HC ADD 15 MINUTES (ANESTHESIA): Performed by: OTOLARYNGOLOGY

## 2020-11-18 PROCEDURE — 71045 X-RAY EXAM CHEST 1 VIEW: CPT

## 2020-11-18 PROCEDURE — 2709999900 HC NON-CHARGEABLE SUPPLY: Performed by: OTOLARYNGOLOGY

## 2020-11-18 PROCEDURE — 2580000003 HC RX 258: Performed by: OTOLARYNGOLOGY

## 2020-11-18 PROCEDURE — 7100000001 HC PACU RECOVERY - ADDTL 15 MIN: Performed by: OTOLARYNGOLOGY

## 2020-11-18 PROCEDURE — 94640 AIRWAY INHALATION TREATMENT: CPT

## 2020-11-18 PROCEDURE — 31624 DX BRONCHOSCOPE/LAVAGE: CPT | Performed by: OTOLARYNGOLOGY

## 2020-11-18 PROCEDURE — 6360000002 HC RX W HCPCS

## 2020-11-18 PROCEDURE — 3600000015 HC SURGERY LEVEL 5 ADDTL 15MIN: Performed by: OTOLARYNGOLOGY

## 2020-11-18 PROCEDURE — 7100000011 HC PHASE II RECOVERY - ADDTL 15 MIN: Performed by: OTOLARYNGOLOGY

## 2020-11-18 PROCEDURE — 6370000000 HC RX 637 (ALT 250 FOR IP): Performed by: ANESTHESIOLOGY

## 2020-11-18 PROCEDURE — 3700000000 HC ANESTHESIA ATTENDED CARE: Performed by: OTOLARYNGOLOGY

## 2020-11-18 PROCEDURE — 31641 BRONCHOSCOPY TREAT BLOCKAGE: CPT | Performed by: OTOLARYNGOLOGY

## 2020-11-18 PROCEDURE — 7100000010 HC PHASE II RECOVERY - FIRST 15 MIN: Performed by: OTOLARYNGOLOGY

## 2020-11-18 PROCEDURE — 7100000000 HC PACU RECOVERY - FIRST 15 MIN: Performed by: OTOLARYNGOLOGY

## 2020-11-18 PROCEDURE — 88305 TISSUE EXAM BY PATHOLOGIST: CPT

## 2020-11-18 PROCEDURE — 31572 LARGSC W/LASER DSTRJ LES: CPT | Performed by: OTOLARYNGOLOGY

## 2020-11-18 PROCEDURE — 6370000000 HC RX 637 (ALT 250 FOR IP): Performed by: OTOLARYNGOLOGY

## 2020-11-18 PROCEDURE — 2500000003 HC RX 250 WO HCPCS

## 2020-11-18 RX ORDER — FENTANYL CITRATE 50 UG/ML
25 INJECTION, SOLUTION INTRAMUSCULAR; INTRAVENOUS EVERY 5 MIN PRN
Status: DISCONTINUED | OUTPATIENT
Start: 2020-11-18 | End: 2020-11-18 | Stop reason: HOSPADM

## 2020-11-18 RX ORDER — PROMETHAZINE HYDROCHLORIDE 25 MG/ML
12.5 INJECTION, SOLUTION INTRAMUSCULAR; INTRAVENOUS
Status: DISCONTINUED | OUTPATIENT
Start: 2020-11-18 | End: 2020-11-18 | Stop reason: HOSPADM

## 2020-11-18 RX ORDER — LIDOCAINE HCL/PF 100 MG/5ML
SYRINGE (ML) INJECTION PRN
Status: DISCONTINUED | OUTPATIENT
Start: 2020-11-18 | End: 2020-11-18 | Stop reason: SDUPTHER

## 2020-11-18 RX ORDER — MEPERIDINE HYDROCHLORIDE 25 MG/ML
12.5 INJECTION INTRAMUSCULAR; INTRAVENOUS; SUBCUTANEOUS EVERY 5 MIN PRN
Status: DISCONTINUED | OUTPATIENT
Start: 2020-11-18 | End: 2020-11-18 | Stop reason: HOSPADM

## 2020-11-18 RX ORDER — FENTANYL CITRATE 50 UG/ML
50 INJECTION, SOLUTION INTRAMUSCULAR; INTRAVENOUS EVERY 5 MIN PRN
Status: DISCONTINUED | OUTPATIENT
Start: 2020-11-18 | End: 2020-11-18 | Stop reason: HOSPADM

## 2020-11-18 RX ORDER — LABETALOL 20 MG/4 ML (5 MG/ML) INTRAVENOUS SYRINGE
5 EVERY 10 MIN PRN
Status: DISCONTINUED | OUTPATIENT
Start: 2020-11-18 | End: 2020-11-18 | Stop reason: HOSPADM

## 2020-11-18 RX ORDER — HYDROCODONE BITARTRATE AND ACETAMINOPHEN 7.5; 325 MG/1; MG/1
1 TABLET ORAL ONCE
Status: COMPLETED | OUTPATIENT
Start: 2020-11-18 | End: 2020-11-18

## 2020-11-18 RX ORDER — TRAMADOL HYDROCHLORIDE 50 MG/1
50 TABLET ORAL EVERY 6 HOURS PRN
Qty: 12 TABLET | Refills: 0 | Status: SHIPPED | OUTPATIENT
Start: 2020-11-18 | End: 2020-11-21

## 2020-11-18 RX ORDER — ROCURONIUM BROMIDE 10 MG/ML
INJECTION, SOLUTION INTRAVENOUS PRN
Status: DISCONTINUED | OUTPATIENT
Start: 2020-11-18 | End: 2020-11-18 | Stop reason: SDUPTHER

## 2020-11-18 RX ORDER — PROPOFOL 10 MG/ML
INJECTION, EMULSION INTRAVENOUS PRN
Status: DISCONTINUED | OUTPATIENT
Start: 2020-11-18 | End: 2020-11-18 | Stop reason: SDUPTHER

## 2020-11-18 RX ORDER — MIDAZOLAM HYDROCHLORIDE 1 MG/ML
INJECTION INTRAMUSCULAR; INTRAVENOUS PRN
Status: DISCONTINUED | OUTPATIENT
Start: 2020-11-18 | End: 2020-11-18 | Stop reason: SDUPTHER

## 2020-11-18 RX ORDER — FENTANYL CITRATE 50 UG/ML
INJECTION, SOLUTION INTRAMUSCULAR; INTRAVENOUS PRN
Status: DISCONTINUED | OUTPATIENT
Start: 2020-11-18 | End: 2020-11-18 | Stop reason: SDUPTHER

## 2020-11-18 RX ORDER — IPRATROPIUM BROMIDE AND ALBUTEROL SULFATE 2.5; .5 MG/3ML; MG/3ML
1 SOLUTION RESPIRATORY (INHALATION) ONCE
Status: DISCONTINUED | OUTPATIENT
Start: 2020-11-18 | End: 2020-11-18 | Stop reason: HOSPADM

## 2020-11-18 RX ORDER — PROPOFOL 10 MG/ML
INJECTION, EMULSION INTRAVENOUS CONTINUOUS PRN
Status: DISCONTINUED | OUTPATIENT
Start: 2020-11-18 | End: 2020-11-18 | Stop reason: SDUPTHER

## 2020-11-18 RX ORDER — AMPICILLIN AND SULBACTAM 1; .5 G/1; G/1
INJECTION, POWDER, FOR SOLUTION INTRAMUSCULAR; INTRAVENOUS PRN
Status: DISCONTINUED | OUTPATIENT
Start: 2020-11-18 | End: 2020-11-18 | Stop reason: SDUPTHER

## 2020-11-18 RX ORDER — SODIUM CHLORIDE 9 MG/ML
INJECTION, SOLUTION INTRAVENOUS CONTINUOUS
Status: DISCONTINUED | OUTPATIENT
Start: 2020-11-18 | End: 2020-11-18 | Stop reason: HOSPADM

## 2020-11-18 RX ORDER — HYDROCODONE BITARTRATE AND ACETAMINOPHEN 7.5; 325 MG/1; MG/1
1 TABLET ORAL EVERY 6 HOURS PRN
Qty: 20 TABLET | Refills: 0 | Status: SHIPPED | OUTPATIENT
Start: 2020-11-18 | End: 2020-11-23

## 2020-11-18 RX ORDER — IPRATROPIUM BROMIDE AND ALBUTEROL SULFATE 2.5; .5 MG/3ML; MG/3ML
1 SOLUTION RESPIRATORY (INHALATION)
Status: DISCONTINUED | OUTPATIENT
Start: 2020-11-18 | End: 2020-11-18 | Stop reason: HOSPADM

## 2020-11-18 RX ADMIN — AMPICILLIN SODIUM AND SULBACTAM SODIUM 3 G: 1; .5 INJECTION, POWDER, FOR SOLUTION INTRAMUSCULAR; INTRAVENOUS at 15:32

## 2020-11-18 RX ADMIN — ROCURONIUM BROMIDE 10 MG: 10 INJECTION INTRAVENOUS at 15:59

## 2020-11-18 RX ADMIN — IPRATROPIUM BROMIDE AND ALBUTEROL SULFATE 1 AMPULE: .5; 3 SOLUTION RESPIRATORY (INHALATION) at 18:18

## 2020-11-18 RX ADMIN — PHENYLEPHRINE HYDROCHLORIDE 100 MCG: 10 INJECTION INTRAVENOUS at 16:19

## 2020-11-18 RX ADMIN — SODIUM CHLORIDE: 9 INJECTION, SOLUTION INTRAVENOUS at 12:50

## 2020-11-18 RX ADMIN — PHENYLEPHRINE HYDROCHLORIDE 100 MCG: 10 INJECTION INTRAVENOUS at 15:22

## 2020-11-18 RX ADMIN — MIDAZOLAM HYDROCHLORIDE 2 MG: 1 INJECTION, SOLUTION INTRAMUSCULAR; INTRAVENOUS at 15:15

## 2020-11-18 RX ADMIN — PHENYLEPHRINE HYDROCHLORIDE 200 MCG: 10 INJECTION INTRAVENOUS at 15:24

## 2020-11-18 RX ADMIN — ROCURONIUM BROMIDE 10 MG: 10 INJECTION INTRAVENOUS at 16:25

## 2020-11-18 RX ADMIN — FENTANYL CITRATE 50 MCG: 50 INJECTION, SOLUTION INTRAMUSCULAR; INTRAVENOUS at 15:51

## 2020-11-18 RX ADMIN — PROPOFOL 50 MG: 10 INJECTION, EMULSION INTRAVENOUS at 15:51

## 2020-11-18 RX ADMIN — PROPOFOL 100 MCG/KG/MIN: 10 INJECTION, EMULSION INTRAVENOUS at 15:22

## 2020-11-18 RX ADMIN — SUGAMMADEX 200 MG: 100 INJECTION, SOLUTION INTRAVENOUS at 16:58

## 2020-11-18 RX ADMIN — FENTANYL CITRATE 50 MCG: 50 INJECTION, SOLUTION INTRAMUSCULAR; INTRAVENOUS at 15:15

## 2020-11-18 RX ADMIN — Medication 60 MG: at 15:15

## 2020-11-18 RX ADMIN — PROPOFOL 130 MG: 10 INJECTION, EMULSION INTRAVENOUS at 15:18

## 2020-11-18 RX ADMIN — ROCURONIUM BROMIDE 10 MG: 10 INJECTION INTRAVENOUS at 16:12

## 2020-11-18 RX ADMIN — PHENYLEPHRINE HYDROCHLORIDE 100 MCG: 10 INJECTION INTRAVENOUS at 16:16

## 2020-11-18 RX ADMIN — HYDROCORTISONE SODIUM SUCCINATE 250 MG: 100 INJECTION, POWDER, FOR SOLUTION INTRAMUSCULAR; INTRAVENOUS at 16:28

## 2020-11-18 RX ADMIN — ROCURONIUM BROMIDE 30 MG: 10 INJECTION INTRAVENOUS at 15:51

## 2020-11-18 RX ADMIN — ROCURONIUM BROMIDE 50 MG: 10 INJECTION INTRAVENOUS at 15:15

## 2020-11-18 RX ADMIN — HYDROCODONE BITARTRATE AND ACETAMINOPHEN 1 TABLET: 7.5; 325 TABLET ORAL at 19:20

## 2020-11-18 RX ADMIN — FENTANYL CITRATE 50 MCG: 50 INJECTION, SOLUTION INTRAMUSCULAR; INTRAVENOUS at 16:02

## 2020-11-18 RX ADMIN — IPRATROPIUM BROMIDE AND ALBUTEROL SULFATE 1 AMPULE: .5; 3 SOLUTION RESPIRATORY (INHALATION) at 12:28

## 2020-11-18 ASSESSMENT — PULMONARY FUNCTION TESTS
PIF_VALUE: 20
PIF_VALUE: 0
PIF_VALUE: 0
PIF_VALUE: 23
PIF_VALUE: 13
PIF_VALUE: 0
PIF_VALUE: 8
PIF_VALUE: 27
PIF_VALUE: 0
PIF_VALUE: 20
PIF_VALUE: 0
PIF_VALUE: 13
PIF_VALUE: 0
PIF_VALUE: 0
PIF_VALUE: 20
PIF_VALUE: 7
PIF_VALUE: 13
PIF_VALUE: 0
PIF_VALUE: 28
PIF_VALUE: 0
PIF_VALUE: 27
PIF_VALUE: 0
PIF_VALUE: 37
PIF_VALUE: 0
PIF_VALUE: 0
PIF_VALUE: 20
PIF_VALUE: 0
PIF_VALUE: 24
PIF_VALUE: 27
PIF_VALUE: 0
PIF_VALUE: 0
PIF_VALUE: 27
PIF_VALUE: 13
PIF_VALUE: 20
PIF_VALUE: 28
PIF_VALUE: 0
PIF_VALUE: 12
PIF_VALUE: 0
PIF_VALUE: 20
PIF_VALUE: 24
PIF_VALUE: 0
PIF_VALUE: 0
PIF_VALUE: 29
PIF_VALUE: 0
PIF_VALUE: 16
PIF_VALUE: 20
PIF_VALUE: 0
PIF_VALUE: 31
PIF_VALUE: 0
PIF_VALUE: 0
PIF_VALUE: 26
PIF_VALUE: 12
PIF_VALUE: 13
PIF_VALUE: 8
PIF_VALUE: 0
PIF_VALUE: 3
PIF_VALUE: 0
PIF_VALUE: 0
PIF_VALUE: 34
PIF_VALUE: 15
PIF_VALUE: 0
PIF_VALUE: 21
PIF_VALUE: 3
PIF_VALUE: 0
PIF_VALUE: 27
PIF_VALUE: 0
PIF_VALUE: 0
PIF_VALUE: 22
PIF_VALUE: 0
PIF_VALUE: 0
PIF_VALUE: 4
PIF_VALUE: 0
PIF_VALUE: 21
PIF_VALUE: 1
PIF_VALUE: 31
PIF_VALUE: 1
PIF_VALUE: 27
PIF_VALUE: 0
PIF_VALUE: 2
PIF_VALUE: 1
PIF_VALUE: 13
PIF_VALUE: 0
PIF_VALUE: 2
PIF_VALUE: 0
PIF_VALUE: 27
PIF_VALUE: 0
PIF_VALUE: 0
PIF_VALUE: 23
PIF_VALUE: 0
PIF_VALUE: 1

## 2020-11-18 ASSESSMENT — PAIN DESCRIPTION - PAIN TYPE: TYPE: SURGICAL PAIN

## 2020-11-18 ASSESSMENT — ENCOUNTER SYMPTOMS: SHORTNESS OF BREATH: 1

## 2020-11-18 ASSESSMENT — PAIN DESCRIPTION - LOCATION: LOCATION: THROAT

## 2020-11-18 ASSESSMENT — PAIN SCALES - GENERAL
PAINLEVEL_OUTOF10: 4
PAINLEVEL_OUTOF10: 8

## 2020-11-18 ASSESSMENT — COPD QUESTIONNAIRES: CAT_SEVERITY: SEVERE

## 2020-11-18 ASSESSMENT — PAIN - FUNCTIONAL ASSESSMENT: PAIN_FUNCTIONAL_ASSESSMENT: 0-10

## 2020-11-18 NOTE — BRIEF OP NOTE
Brief Postoperative Note      Patient: Wagner Zhang  YOB: 1960  MRN: [id number]    Date of Procedure: 11/18/2020    Pre-Op Diagnosis: AIRWAY OBSTRUCTION, LARYNGEAL STENOSIS, INVASIVE FUNGAL INFECTION    Post-Op Diagnosis: Same       Procedure(s): MICROLARYNGOSCOPY WITH BX & RESECTION OF OBSTRUCTING SOFT TISSUES WITH LASER & AIRWAY DEBRIDER, MICROLARYNGOPLASTY WITH RESECTION OF OBSTRUCTING SOFT TISSUE  BRONCHOSCOPY    Surgeon(s):  Bhumika Madden MD    Assistant:  * No surgical staff found *    Anesthesia: General    Estimated Blood Loss (mL): Minimal    Complications: None    Specimens:   ID Type Source Tests Collected by Time Destination   A : Tracheal Biopsy Tissue Mouth SURGICAL PATHOLOGY Bhumika Madden MD 11/18/2020 1639    B : Glottic Biopsy Tissue Mouth SURGICAL PATHOLOGY Bhumika Madden MD 11/18/2020 1639    C : Subglottic Biopsy Tissue Mouth SURGICAL PATHOLOGY Bhumika Madden MD 11/18/2020 1640        Implants:  * No implants in log *      Drains:   Colostomy (Active)       Findings: 1. Extensive surface secretions; semisolidified; suctioned and grasped away  2. Posterior lateral left side and do lateral left side with normal subglottic mucosa; remainder of circumference with fungal involvement. 3.  Glottic and supraglottic aperture near normal  4. Subglottic aperture near normal  5.   Proximal tracheal aperture near normal    Electronically signed by Bhumika Madden MD on 11/18/2020 at 5:11 PM

## 2020-11-18 NOTE — ANESTHESIA PRE PROCEDURE
Department of Anesthesiology  Preprocedure Note       Name:  Scooby Alejandro   Age:  63 y.o.  :  1960                                          MRN:  [id number]         Date:  2020      Surgeon: Shaheed Ivan):  Aditya Cam MD    Procedure: Procedure(s): MICROLARYNGOSCOPY WITH BX & RESECTION OF OBSTRUCTING SOFT TISSUES WITH LASER & AIRWAY DEBRIDER, MICROLARYNGOPLASTY WITH RESECTION OF OBSTRUCTING SOFT TISSUE  BRONCHOSCOPY    Medications prior to admission:   Prior to Admission medications    Medication Sig Start Date End Date Taking?  Authorizing Provider   voriconazole (VFEND) 200 MG tablet Take 1 tablet by mouth 2 times daily 20 Yes Valentine Coleman MD   acetylcysteine (MUCOMYST) 10 % nebulizer solution Inhale 4 mLs into the lungs 3 times daily as needed (for thick secretions) 20 Yes JOSH Rae CNP   sodium chloride nebulizer 0.9 % solution Take 3 mLs by nebulization 3 times daily 10/19/20 11/18/20 Yes ASHWIN Summers   sodium chloride, Inhalant, 3 % nebulizer solution Take 3 mLs by nebulization as needed (Throat dryness, cough, wheezing) 10/19/20  Yes ASHWIN Summers   budesonide-formoterol [facility]) 160-4.5 MCG/ACT AERO Inhale 2 puffs into the lungs 2 times daily Rinse mouth after its use. 20 Yes JOSH Rae CNP   pravastatin (PRAVACHOL) 40 MG tablet Take 40 mg by mouth daily 20  Yes Historical Provider, MD   Acetylcysteine (NAC PO) Take by mouth 2 times daily 10%   Yes Historical Provider, MD   SPIRIVA RESPIMAT 2.5 MCG/ACT AERS inhaler INHALE 2 PUFFS BY MOUTH ONCE DAILY 20  Yes JOSH Rae CNP   acetaminophen (TYLENOL) 650 MG extended release tablet Take 1 tablet by mouth as needed for Pain Do not take with hydrocodone/acetominophen 19  Yes Shelton Gusman MD   albuterol (PROVENTIL) (2.5 MG/3ML) 0.083% nebulizer solution Take 3 mLs by nebulization every 6 hours as needed for Wheezing or Shortness of Breath 11/20/19 11/19/20 Yes JOSH Rae CNP   albuterol sulfate HFA (VENTOLIN HFA) 108 (90 Base) MCG/ACT inhaler Inhale 2 puffs into the lungs every 6 hours as needed for Wheezing or Shortness of Breath 9/26/19  Yes JOSH Rae CNP   loperamide (IMODIUM) 2 MG capsule Take 2 mg by mouth daily    Yes Historical Provider, MD   Multiple Vitamins-Minerals (MULTIVITAMIN ADULT EXTRA C) CHEW Take by mouth daily    Historical Provider, MD       Current medications:    Current Facility-Administered Medications   Medication Dose Route Frequency Provider Last Rate Last Dose    ipratropium-albuterol (DUONEB) nebulizer solution 1 ampule  1 ampule Inhalation Q4H DAMION Richey DO   1 ampule at 11/18/20 1228    0.9 % sodium chloride infusion   Intravenous Continuous Loi Vincent  mL/hr at 11/18/20 1250         Allergies: Allergies   Allergen Reactions    Povidone Iodine Rash     Surgery prep       Problem List:    Patient Active Problem List   Diagnosis Code    Dysphonia R49.0    Alcohol abuse F10.10    FHx: smoking Z81.2    Deviated septum J34.2    Hypertrophy of nasal turbinates J34.3    Rhinitis J31.0    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    Laryngeal stenosis J38.6    Airway obstruction J98.8    Airway stricture J98.8    Bullous emphysema ([facility]) J43.9       Past Medical History:        Diagnosis Date    Arthritis     COPD (chronic obstructive pulmonary disease) (HCC)     PAD (peripheral artery disease) ([facility])     bilateral lower legs    Pneumonia 2017 and 2017    Rectal cancer [facility])     Squamous cell carcinoma of vocal cord ([facility])        Past Surgical History:        Procedure Laterality Date    COLONOSCOPY  2016    EYE SURGERY      CROSS EYED    HAND SURGERY Bilateral     KNEE ARTHROSCOPY Right 1996    LARYNGOSCOPY  2017    Biopsy    LARYNGOSCOPY N/A 2019    MICRO LARYNGOSCOPY W/ BIOPSY performed by Felicita Townsend MD at [facility] N/A 2019    DIRECT LARYNGOSCOPY WITH BIOPSY performed by Felicita Townsend MD at [facility] N/A 10/16/2020    BRONCHOSCOPY, MICRO LARYNGOSCOPY WITH REMOVAL OF SUBMUCCOSAL NON NOEPLASTIC LESION JET VENTILATION performed by Marisol Knox MD at [address] [place] [address]  RECTAL SURGERY  2016    colostemy bag[place], [place]    ROTATOR CUFF REPAIR Left        Social History:    Social History     Tobacco Use    Smoking status: Former Smoker     Packs/day: 2.25     Years: 31.00     Pack years: 69.75     Start date:      Last attempt to quit: 2006     Years since quittin.7    Smokeless tobacco: Never Used   Substance Use Topics    Alcohol use: No     Alcohol/week: 0.0 standard drinks                                Counseling given: Not Answered      Vital Signs (Current):   Vitals:    20 1428 20 1214 20 1215 20 1217   BP:  (!) 151/96     Pulse:  85     Resp:  20     Temp:  97.9 °F (36.6 °C)     SpO2:  100% 91%    Weight: 230 lb (104.3 kg)   225 lb (102.1 kg)   Height: 6' 2\" (1.88 m)   6' 2\" (1.88 m)                                              BP Readings from Last 3 Encounters:   11/18/20 (!) 151/96   11/04/20 138/79   11/02/20 (!) 146/80       NPO Status: Time of last liquid consumption: 2000                        Time of last solid consumption: 2000                        Date of last liquid consumption: 11/17/20                        Date of last solid food consumption: 11/17/20    BMI:   Wt Readings from Last 3 Encounters:   11/18/20 225 lb (102.1 kg)   11/04/20 230 lb (104.3 kg)   11/02/20 230 lb 9.6 oz (104.6 kg)     Body mass index is 28.89 kg/m². CBC:   Lab Results   Component Value Date    WBC 8.3 10/17/2020    RBC 4.79 10/17/2020    RBC 4.28 12/06/2011    HGB 14.8 10/17/2020    HCT 46.2 10/17/2020    MCV 96.5 10/17/2020    RDW 13.8 06/12/2020     10/17/2020       CMP:   Lab Results   Component Value Date     10/17/2020    K 4.9 10/17/2020    CL 96 10/17/2020    CO2 25 10/17/2020    BUN 15 10/17/2020    CREATININE 0.6 10/17/2020    AGRATIO 1.7 06/11/2019    LABGLOM >90 10/17/2020    GLUCOSE 143 10/17/2020    GLUCOSE 94 06/12/2020    PROT 6.6 10/17/2020    CALCIUM 8.6 10/17/2020    BILITOT 0.2 10/17/2020    ALKPHOS 159 10/17/2020    AST 23 10/17/2020    ALT 29 10/17/2020       POC Tests: No results for input(s): POCGLU, POCNA, POCK, POCCL, POCBUN, POCHEMO, POCHCT in the last 72 hours.     Coags: No results found for: PROTIME, INR, APTT    HCG (If Applicable): No results found for: PREGTESTUR, PREGSERUM, HCG, HCGQUANT     ABGs: No results found for: PHART, PO2ART, LCK4SWQ, DGW9AMU, BEART, G8WCZDQB     Type & Screen (If Applicable):  No results found for: LABABO, LABRH    Drug/Infectious Status (If Applicable):  No results found for: HIV, HEPCAB    COVID-19 Screening (If Applicable):   Lab Results   Component Value Date    COVID19 Not Detected 11/13/2020         Anesthesia Evaluation  Patient summary reviewed and Nursing notes reviewed  Airway: Mallampati: III        Dental:          Pulmonary:   (+) pneumonia:  COPD: severe,  shortness of breath: chronic,  recent URI:  rhonchi,  decreased breath sounds,                             Cardiovascular:  Exercise tolerance: poor (<4 METS),           Rhythm: regular  Rate: normal                    Neuro/Psych:               GI/Hepatic/Renal:   (+) GERD:,           Endo/Other:    (+) malignancy/cancer. Abdominal:       Abdomen: soft. Vascular:                                        Anesthesia Plan      general     ASA 4     (Jet Vent)  Induction: intravenous. MIPS: Postoperative opioids intended and Prophylactic antiemetics administered. Anesthetic plan and risks discussed with patient and spouse. Plan discussed with CRNA.                   [address] [place] [address],    11/18/2020

## 2020-11-18 NOTE — PROGRESS NOTES
ADMITTED FROM PACU. FAMILY AT BEDSIDE AND CALL LIGHT WITHIN PT REACH. IV INFUSING WITH NO REDNESS OR SWELLING. SUCTION AVAILABLE.

## 2020-11-18 NOTE — OP NOTE
Operative Note      Patient: David Kaba  YOB: 1960  MRN: [id number]    Date of Procedure: 11/18/2020    Pre-Op Diagnosis: AIRWAY OBSTRUCTION, LARYNGEAL STENOSIS, INVASIVE FUNGAL INFECTION    Post-Op Diagnosis: Same       Procedure(s): MICROLARYNGOSCOPY WITH BX & RESECTION OF OBSTRUCTING SOFT TISSUES WITH LASER & AIRWAY DEBRIDER, MICROLARYNGOPLASTY WITH RESECTION OF OBSTRUCTING SOFT TISSUE  BRONCHOSCOPY    Surgeon(s):  Marisol Knox MD    Assistant:   * No surgical staff found *    Anesthesia: General    Estimated Blood Loss (mL): Minimal    Complications: None    Specimens:   ID Type Source Tests Collected by Time Destination   A : Tracheal Biopsy Tissue Mouth SURGICAL PATHOLOGY Marisol Knox MD 11/18/2020 1639    B : Glottic Biopsy Tissue Mouth SURGICAL PATHOLOGY Marisol Knox MD 11/18/2020 1639    C : Subglottic Biopsy Tissue Mouth SURGICAL PATHOLOGY Marisol Knox MD 11/18/2020 1640        Implants:  * No implants in log *      Drains:   Colostomy (Active)       Findings:   1. Extensive surface secretions; semisolidified; suctioned and grasped away  2. Posterior lateral left side and do lateral left side with normal subglottic mucosa; remainder of circumference with fungal involvement. 3.  Glottic and supraglottic aperture near normal  4. Subglottic aperture near normal  5. Proximal tracheal aperture near normal    Detailed Description of Procedure: The patient was taken to the operating room awake and placed in the supine position. General anesthesia was induced and the patient was intubated with an LMA to avoid instrumenting his airway prior to being able to debride and suction away the obstructing matter. He was kept on a high FiO2 during this portion of his transition to jet ventilation. I performed a timeout verifying the patient's identity and planned procedure. I turned the table 90 degrees for the procedure.   Protecting his maxilla with 2 saline soaked 4 x 4's I passed a Mj into his vallecula to extend the airway anteriorly. I then placed an epiglottic traction stitch of 2-0 Prolene on FS needle to control the patient's airway and be able to protect his airway postoperatively. The Jek catheter was in position at this point having been transferred over after the LMA was removed. Using a 30 degree optical telescope I carefully examine the larynx including the subglottis and the proximal trachea. The left posterior lateral and do lateral aspects of his subglottis and proximal trachea were virtually normal in appearance in terms of the contour of the airway in the absence of tenacious secretions indicative of the invasive fungal disease he is being treated for. His cords had the contour of vocal folds but most of the soft tissue on the right side and two thirds of the left side still had the appearance of invasive fungal disease. Likewise the false cords were abnormal across the majority of the surface area with the exception of a portion of the left side. The patient's trachea was particularly abnormal anteriorly and right posteriorly and posterior laterally    Therapeutic bronchoscopy with bronchoalveolar lavage for the removal of obstructing secretions: I cleaned out the patient's distal airway as well as removed the obstructing secretions of the proximal airway using bronchoscopic guidance. I used copious amounts of sterile saline to clean every lobar and segmental bronchus on both lungs. Suspension microlaryngoscopy with laser ablation of obstructing soft tissues: Using a scanning CO2 laser set between 1.4 and 1.8 mm [place] with a 0.1-second delay I began the process of bleeding the soft tissue abnormalities of the supraglottis false vocal folds and glottis with distinct attention to avoiding confluent wounds to reduce the risk of cicatricial scarring.   This was laborious and a slow process but effective in removing the surface of the involved soft tissues of the structures. I intentionally left gaps in the lacing for the reasons stated above. I was able to get to the anterior and anterolateral subglottis using this technique as well. Using a microdebrider laryngeal Tricut blade and a 30 degree optical telescope I was able to get the rest of the subglottic disease particularly the anterior most aspect and the posterior most aspect behind the jet ventilation catheter. I used topical epinephrine for hemostasis. I had suction cautery but did not need it as there was no brisk bleeding at any point during this operation. Therapeutic bronchoscopy with resection of obstructing soft tissues: Using the microdebrider system I also worked on the proximal trachea particularly in the membranous tracheal area posteriorly and anterolaterally in the cartilaginous trachea. At no point did I expose any the cartilage. Topical epinephrine was necessary here for hemostasis as well. I cleaned out the distal airway again and then reintubated the patient as I had various times during the case for more effective close circuit ventilation. Except at this point I packed the subglottis and proximal trachea with epinephrine soaked pledgets to get a more durable hemostasis. I then changed over the 5.0 MLT endotracheal tube for a 7.0 endotracheal tube that I also packed with epinephrine pledgets prior to removing the transoral system consider the operation concluded. I removed the cottonoids before rotating the bed back to anesthesia for reversal and extubation in the operating room. This was carried out without incident and the patient was taken to recovery in satisfactory condition. Estimated blood loss the case was less than 20 cc and the patient received approximately a liter of intravenous lactated Ringer's intraoperatively. No blood products were transfused. No intraoperative complications were detected. Counts were considered accurate x3.     I was present for and performed the patient's entire operation. Disposition: The patient will be observed carefully in the first and second phases of recovery with the possibility that he can go home since he has oxygen at home. He also brought in his car for transport. If he can be weaned sufficiently low and he will get to go home. If not he will stay overnight for oxygen weaning. Electronically signed by Erica Madrid MD on 11/18/2020 at 5:40 PM           Addendum: 11/18/2020 19:16    Contacted by [facility]PS to inform me that pharmacy closed and that patient had a pain level of 8 that I did not expect . I find the patient to be reliable regarding pain reports. I will cancel his Ultram 20 mg Rx at [facility], which is closed and change him to [facility] 7.5's #20 to [facility] which is still open.      pfc

## 2020-11-18 NOTE — H&P
Genevieve Perkins is an 63 y.o.  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 He warrants updating because his respiratory comfort has diminished significantly since his office visit 2 weeks ago. See the attached office note below for additional details. 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 *  Resolved Problems:    * No resolved hospital problems. *    Blood pressure (!) 151/96, pulse 85, temperature 97.9 °F (36.6 °C), resp. rate 20, height 6' 2\" (1.88 m), weight 225 lb (102.1 kg), SpO2 91 %. Review of Systems   Noncontributory except as stated in the HPI above and in his office note attached below      Physical Exam  The patient is more dyspneic with a greater use of accessory muscles for breathing when he talks. His voice is also somewhat more raspy consistent with extra secretions coating his airway and likely compromising the diameter of the channel. Assessment:  Laryngotracheal stenosis from an invasive fungal process post radiation therapy. Plan: To the operating room for a suspension laryngoscopy with laser ablation of obstructing soft tissues and debridement of obstructing tracheal tissues.     Debra Platt MD  11/18/2020      Debra Platt MD    Physician    Specialty:  Otolaryngology    Progress Notes    Signed    Encounter Date:  11/2/2020                Signed         Expand All Collapse All      Show:Clear all  [x]Manual[x]Template[]Copied    Added by:  Ivis Law MD    []Noe for details    [facility] PROFESSIONAL [address], NOSE AND THROAT  [facility]  Dept: [phone]  Dept Fax: [phone]  Loc: [phone]     Santa Kelsey is a 60 y.o. male who was referred by No ref. provider found for:       Chief Complaint   Patient presents with    Post-Op Check       Patient is here for post-op bronchoscopy/microlaryngoscopy 10/16/2020         HPI:      Santa Kelsey is a 60 y.o. male who is status post an extensive transoral resection of obstructing soft tissues of the larynx specifically of the supraglottis glottis and subglottis as well as of the proximal trachea. The patient had several days in the hospital recovering his ability to oxygenate himself and ultimately went home with supplemental oxygen. He states that after several days home he no longer needed the supplemental oxygen is to stay in the 90s with respect to his oxygen saturation. Since that time he is breathing breathing with much greater ease and also states that his voice quality has markedly improved.     Of note is the fact that he has finished his antifungal medication several days ago and states that his production of green mucopurulent secretions has decidedly increased over this interval.  His breathing comfort has not abated however. He is taking fluids well. He is using his 3% hypertonic saline nebulizer on average 3 times per day. He does not use the 0.9% because he does not feel it does nearly as much as a 3%. The 3% dusting his mucous membranes he says.                 History:            Allergies   Allergen Reactions    Povidone Iodine Rash       Surgery prep      Current Facility-Administered Medications          Current Outpatient Medications   Medication Sig Dispense Refill    acetylcysteine (MUCOMYST) 10 % nebulizer solution Inhale 4 mLs into the lungs 3 times daily as needed (for thick secretions) 360 mL 0    voriconazole (VFEND) 200 MG tablet Take 1 tablet by mouth 2 times daily for 14 days 28 tablet 0    sodium chloride nebulizer 0.9 % solution Take 3 mLs by nebulization 3 times daily 270 mL 1    sodium chloride, Inhalant, 3 % nebulizer solution Take 3 mLs by nebulization as needed (Throat dryness, cough, wheezing) 500 mL 3    Multiple Vitamins-Minerals (MULTIVITAMIN ADULT EXTRA C) CHEW Take by mouth daily        budesonide-formoterol (SYMBICORT) 160-4.5 MCG/ACT AERO Inhale 2 puffs into the lungs 2 times daily Rinse mouth after its use. 3 Inhaler 3    pravastatin (PRAVACHOL) 40 MG tablet Take 40 mg by mouth daily        Acetylcysteine (NAC PO) Take by mouth 2 times daily 10%        SPIRIVA RESPIMAT 2.5 MCG/ACT AERS inhaler INHALE 2 PUFFS BY MOUTH ONCE DAILY 3 Inhaler 3    acetaminophen (TYLENOL) 650 MG extended release tablet Take 1 tablet by mouth as needed for Pain Do not take with hydrocodone/acetominophen 60 tablet 3    albuterol (PROVENTIL) (2.5 MG/3ML) 0.083% nebulizer solution Take 3 mLs by nebulization every 6 hours as needed for Wheezing or Shortness of Breath 360 vial 3    albuterol sulfate HFA (VENTOLIN HFA) 108 (90 Base) MCG/ACT inhaler Inhale 2 puffs into the lungs every 6 hours as needed for Wheezing or Shortness of Breath 3 Inhaler 3    loperamide (IMODIUM) 2 MG capsule Take 2 mg by mouth daily           No current facility-administered medications for this visit.          Past Medical History        Past Medical History:   Diagnosis Date    Arthritis      COPD (chronic obstructive pulmonary disease) ([facility]      PAD (peripheral artery disease) (HCC)       bilateral lower legs    Pneumonia 01/2017 1/2017 and 2/2017    Rectal cancer ([facility]      Squamous cell carcinoma of vocal cord [facility])           Past Surgical History         Past Surgical History:   Procedure Laterality Date    COLONOSCOPY   2016    EYE SURGERY         CROSS EYED    HAND SURGERY Bilateral 1995    KNEE ARTHROSCOPY Right 1996    LARYNGOSCOPY   07/12/2017     Biopsy    LARYNGOSCOPY N/A 2019     MICRO LARYNGOSCOPY W/ BIOPSY performed by Milad Lindsay MD at [address] 2019     DIRECT LARYNGOSCOPY WITH BIOPSY performed by Milad Lindsay MD at [address] N/A 10/16/2020     BRONCHOSCOPY, MICRO LARYNGOSCOPY WITH REMOVAL OF SUBMUCCOSAL NON NOEPLASTIC LESION JET VENTILATION performed by Sreekanth Acevedo MD at [address]        RECTAL SURGERY   2016     [place]    ROTATOR CUFF REPAIR Left 80'S        Family History   Family History   Problem Relation Age of Onset    Prostate Cancer Father 48    Cancer Father      Heart Disease Father      Diabetes Mother      Heart Disease Mother      Stroke Mother      Heart Disease Brother      High Blood Pressure Other      Cancer Other           LUNG,PROSTATE    Hearing Loss Other          Social History            Tobacco Use    Smoking status: Former Smoker       Packs/day: 2.25       Years: 31.00       Pack years: 69.75       Start date:        Last attempt to quit: 2006       Years since quittin.7    Smokeless tobacco: Never Used   Substance Use Topics    Alcohol use: No       Alcohol/week: 0.0 standard drinks                    Subjective:      Review of Systems  Rest of review of systems are negative, except as noted in HPI.      Objective:      BP (!) 146/80 (Site: Right Upper Arm, Position: Sitting)   Pulse 88   Temp 97.7 °F (36.5 °C) (Infrared)   Resp 16   Ht 6' 2\" (1.88 m)   Wt 230 lb 9.6 oz (104.6 kg)   BMI 29.61 kg/m²      Physical Exam      On general physical exam the patient is pleasant alert oriented and cooperative adult male with a distorted barrel chest shape. On auscultation his respiratory sounds were distant but were free of wheezing rales or rhonchi. Over his larynx I heard a large volume of air movement that was laminar in nature.   There was a rapid on rapid off sound quality consistent with a large bore upper airway.     Procedure: Flexible laryngoscopy  Indication: Status post laser surgery of larynx and proximal trachea  Findings: Since larynx was abnormal for the presence of overhanging posterior supraglottic soft tissues of the AE fold and arytenoid towers. He was anesthetized enough that I could traverse beyond this to see the patient's glottis and subglottis. He had generous amounts of mucopurulence coating the soft tissues but the channels were wide. The disease relative to the coating of the secretions extended into the subglottis and proximal trachea mostly anteriorly. The posterior mucosa appeared relatively normal.  The patient tolerated the procedure very well.        Vitals reviewed.     Xr Chest (2 Vw)     Result Date: 10/17/2020  1. Poorly visualized small left apical pneumothorax. 2. Bibasilar atelectasis/infiltrate. **This report has been created using voice recognition software. It may contain minor errors which are inherent in voice recognition technology. ** Final report electronically signed by Dr. Preeti Quiroga on 10/17/2020 2:31 PM     Xr Chest (2 Vw)     Result Date: 10/9/2020  1. The lung fields are emphysematous. There is no focal consolidation or focal infiltrate. There is no pleural effusion. 2. There is a nodular radiodensity at the left lung base which is seen at the cardiophrenic angle on the CT lung screen dated 8/3/2020. On the CT lung screen examination, there is also an irregular masslike opacity within the anterior aspect of the right  lower chest. Please refer to the CT lung screen examination dated 8/3/2024 for more detailed evaluation of the lung fields. A follow-up CT chest with intravenous contrast in 3 months is recommended to confirm stability. **This report has been created using voice recognition software. It may contain minor errors which are inherent in voice recognition technology. ** Final report electronically signed by Dr. Surinder Leon on 10/9/2020 12:48 PM     Ct Chest Wo Contrast     Result Date: 10/17/2020  1. Bibasilar atelectasis/infiltrate. 2. Chronic lung disease. Final report electronically signed by Dr. Sony Krishnan on 10/17/2020 6:06 PM     Xr Chest Portable     Result Date: 10/16/2020  Findings suspicious for left anterior apical pneumothorax. Consider repeat PA chest when the patient's condition permits. **This report has been created using voice recognition software. It may contain minor errors which are inherent in voice recognition technology. ** Final report electronically signed by Dr. Benja Lewis on 10/16/2020 4:58 PM           Lab Results   Component Value Date      10/17/2020      06/12/2020      02/06/2020     K 4.9 10/17/2020     K 5.2 06/12/2020     K 4.4 02/06/2020     CL 96 10/17/2020      06/12/2020      02/06/2020     CO2 25 10/17/2020     CO2 29 06/12/2020     CO2 29 02/06/2020     BUN 15 10/17/2020     BUN 19 06/12/2020     BUN 18 02/06/2020     CREATININE 0.6 10/17/2020     CREATININE 0.71 06/12/2020     CREATININE 0.78 02/06/2020     CALCIUM 8.6 10/17/2020     CALCIUM 9.10 06/12/2020     CALCIUM 8.70 02/06/2020     PROT 6.6 10/17/2020     PROT 6.5 06/12/2020     PROT 6.6 02/06/2020     LABALBU 4.0 10/17/2020     LABALBU 4.3 06/12/2020     LABALBU 4.5 02/06/2020     LABALBU 4.6 12/06/2011     BILITOT 0.2 10/17/2020     BILITOT 0.3 06/12/2020     BILITOT 0.5 02/06/2020     ALKPHOS 159 10/17/2020     ALKPHOS 124 06/12/2020     ALKPHOS 119 02/06/2020     AST 23 10/17/2020     AST 22 06/12/2020     AST 24 02/06/2020     ALT 29 10/17/2020     ALT 31 06/12/2020     ALT 26 02/06/2020         All of the past medical history, past surgical history, family history,social history, allergies and current medications were reviewed with the patient. Assessment & Plan   Diagnoses and all orders for this visit:       Diagnosis Orders   1. Airway obstruction  SC LARYNGOSCOPY,DIRCT,OP SCOPE,BIOPSY     BRONCHOSCOPY   2. Laryngeal stenosis  RI LARYNGOSCOPY,DIRCT,OP SCOPE,BIOPSY   3. Invasive fungal infection  RI LARYNGOSCOPY,DIRCT,OP SCOPE,BIOPSY     BRONCHOSCOPY   4. Tracheal stenosis  BRONCHOSCOPY   5. Radiation adverse effect, subsequent encounter      6. Gastroesophageal reflux disease without esophagitis               Based on the patient's history and these physical findings, the patient has had a remarkable improvement to his overall airway aperture and therefore his airway safety. I recommended that we proceed as planned with the neck stage of his care which will include a resection of the overhanging AE folds and arytenoid tower tissues with advancement flap reconstruction of these tissues so as not to leave any raw surfaces that might promote infestation of more of the supraglottis with this invasive fungal disease. I will suture these flaps into place as per routine. In addition I will use the scanning CO2 laser to ablate the surface involved with a chronic fungal process leaving enough on lasered tissue to reduce the risk of cicatricial stenosis. That remains a risk nonetheless. I will extend this into the subglottis and proximal trachea using flexible bronchoscopic technology and a fiber CO2 laser system. He will have all of this performed using jet ventilation to have endotracheal tube outside the patient's airway.     I explained all this in detail to the patient to his satisfaction. I reviewed the indications benefits limitations and risks which are essentially the same as they were prior to his first surgery with significantly less risk relative to how small his airway was preoperatively.     I spent over 45 minutes of face-to-face time with the patient more than half of which was spent reviewing his findings and structuring this plan to care.     Return in about 4 weeks (around 11/30/2020) for 1 week postop endoscopy.           **This report has been created using voice recognition software.  It may contain

## 2020-11-18 NOTE — ANESTHESIA POSTPROCEDURE EVALUATION
Department of Anesthesiology  Postprocedure Note    Patient: Libra Diggs  MRN: [id number]  YOB: 1960  Date of evaluation: 11/18/2020  Time:  6:08 PM     Procedure Summary     Date:  11/18/20 Room / Location:  [place] [facility]  / [place] [facility]    Anesthesia Start:  6842 Anesthesia Stop:  3624    Procedures:       MICROLARYNGOSCOPY WITH BX & RESECTION OF OBSTRUCTING SOFT TISSUES WITH LASER & AIRWAY DEBRIDER, MICROLARYNGOPLASTY WITH RESECTION OF OBSTRUCTING SOFT TISSUE (N/A )      BRONCHOSCOPY (N/A ) Diagnosis:  (AIRWAY OBSTRUCTION, LARYNGEAL STENOSIS, INVASIVE FUNGAL INFECTION)    Surgeon:  Valerie Booth MD Responsible Provider:  Sarbjit Richards DO    Anesthesia Type:  general ASA Status:  4          Anesthesia Type: general    David Phase I: David Score: 9    David Phase II:      Last vitals: Reviewed and per EMR flowsheets.        Anesthesia Post Evaluation    Patient location during evaluation: PACU  Patient participation: complete - patient participated  Level of consciousness: awake and alert  Pain score: 3  Airway patency: patent  Nausea & Vomiting: no nausea and no vomiting  Complications: no  Cardiovascular status: hemodynamically stable and blood pressure returned to baseline  Respiratory status: spontaneous ventilation, acceptable and nasal cannula  Hydration status: stable

## 2020-11-19 NOTE — PROGRESS NOTES
HOME GOING INSTRUCTIONS REVIEWED AND GIVEN TO PT AND FAMILY. PT DISCHARGED TO VEHICLE VIA W/C.  MET DISCHARGE CRITERIA,  TOLERATING FLUIDS WELL.

## 2020-11-19 NOTE — PROGRESS NOTES
1713  Pt. Awake and oriented on adm. To pacu. Suture taped to face. Productive cough of thick mucus. Pt. Suctions per self. o2 sat dropping < 90%. 1725  o2 sat continues to drop < 90%. o2 per humidified o2 per face tent applied at 10 liters. 1735  Suture discontinued per Dr. Rosy Magallanes without difficulty. 1738  High flow o2 applied at 20 liters and FIO2 80%. 1748  FIO2 decreased to 50%. 1805  o2 sat continues to drop < 90%. Dr. Alejandro Covington updated. 1818  duoneb tx given. 1830  Duoneb tx done. o2 sat > 90%. Humidified o2 applied per face tent at 10 liters. 1840  pacu  Criteria met. Transfer to [facility].

## 2020-11-19 NOTE — PROGRESS NOTES
Pt states tht he feels better after pain medication. Respirations easy, tires with exertions as he says he does at home. Sister with pt.

## 2020-12-02 PROBLEM — R06.1 CHRONIC STRIDOR: Status: RESOLVED | Noted: 2020-08-18 | Resolved: 2020-01-01

## 2020-12-02 NOTE — PROGRESS NOTES
[facility] PROFESSIONAL SERVS  [facility] EAR, NOSE AND THROAT  [facility] [place]  Dept: [phone]  Dept Fax: [phone]  Loc: [phone]    Heather Collins is a 63 y.o. male who was referred by No ref. provider found for:  Chief Complaint   Patient presents with    Post-Op Check     Patient is here post-op for microlaryngoscopy with biopsy and microlaryngoplasty 11/19/2020. HPI:     Heather Collins is a 63 y.o. 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 He is now status post his second suspension laryngoscopy with laser ablation and debridement of abnormal mucosa that is both obstructing his airway and harboring his invasive fungal disease. He reports having tolerated his second round very well. Since he has home oxygen, he went on his oxygen during his drive home and used it for several days until his oxygen levels normalized. He states that he is tolerating exertion to a much higher degree than he was previously. He is also sleeping much better than he was previously. History: Allergies   Allergen Reactions    Povidone Iodine Rash     Surgery prep     Current Outpatient Medications   Medication Sig Dispense Refill    voriconazole (VFEND) 200 MG tablet Take 1 tablet by mouth 2 times daily 180 tablet 2    acetylcysteine (MUCOMYST) 10 % nebulizer solution Inhale 4 mLs into the lungs 3 times daily as needed (for thick secretions) 360 mL 0    sodium chloride, Inhalant, 3 % nebulizer solution Take 3 mLs by nebulization as needed (Throat dryness, cough, wheezing) 500 mL 3    Multiple Vitamins-Minerals (MULTIVITAMIN ADULT EXTRA C) CHEW Take by mouth daily      budesonide-formoterol (SYMBICORT) 160-4.5 MCG/ACT AERO Inhale 2 puffs into the lungs 2 times daily Rinse mouth after its use.  3 Inhaler 3    pravastatin (PRAVACHOL) 40 MG tablet Take 40 mg by mouth daily      Acetylcysteine (NAC PO) Take by mouth 2 times daily 10%      SPIRIVA RESPIMAT 2.5 MCG/ACT AERS inhaler INHALE 2 PUFFS BY MOUTH ONCE DAILY 3 Inhaler 3    acetaminophen (TYLENOL) 650 MG extended release tablet Take 1 tablet by mouth as needed for Pain Do not take with hydrocodone/acetominophen 60 tablet 3    albuterol sulfate HFA (VENTOLIN HFA) 108 (90 Base) MCG/ACT inhaler Inhale 2 puffs into the lungs every 6 hours as needed for Wheezing or Shortness of Breath 3 Inhaler 3    loperamide (IMODIUM) 2 MG capsule Take 2 mg by mouth daily       albuterol (PROVENTIL) (2.5 MG/3ML) 0.083% nebulizer solution Take 3 mLs by nebulization every 6 hours as needed for Wheezing or Shortness of Breath 360 vial 3     No current facility-administered medications for this visit.       Past Medical History:   Diagnosis Date    Arthritis     [facility] obstructive [facility] disease) [facility]     PAD (peripheral artery disease) ([facility]     bilateral lower legs    Pneumonia 01/2017 1/2017 and 2/2017    Rectal cancer ([facility]     Squamous cell carcinoma of vocal cord [facility])       Past Surgical History:   Procedure Laterality Date    BRONCHOSCOPY N/A 11/18/2020    BRONCHOSCOPY performed by Erin Mcdonnell MD at [address] [place] [address]  2016    EYE SURGERY      CROSS EYED    HAND SURGERY Bilateral 1995    KNEE ARTHROSCOPY Right 1996    LARYNGOSCOPY  07/12/2017    Biopsy    LARYNGOSCOPY N/A 7/12/2019    MICRO LARYNGOSCOPY W/ BIOPSY performed by Juan Pablo Jones MD at [facility] 12/30/2019    DIRECT LARYNGOSCOPY WITH BIOPSY performed by Juan Pablo Jones MD at [facility] 10/16/2020    BRONCHOSCOPY, MICRO LARYNGOSCOPY WITH REMOVAL OF SUBMUCCOSAL NON NOEPLASTIC LESION JET VENTILATION performed by Erin Mcdonnell MD at [facility] 11/18/2020    MICROLARYNGOSCOPY WITH BX & RESECTION OF OBSTRUCTING SOFT TISSUES WITH LASER & AIRWAY DEBRIDER, MICROLARYNGOPLASTY WITH RESECTION OF OBSTRUCTING SOFT TISSUE performed by Loi Vincent MD at [address] [facility] [address]  RECTAL SURGERY  2016    [facility] [place]    ROTATOR CUFF REPAIR Left 80'S     Family History   Problem Relation Age of Onset    Prostate Cancer Father 48    Cancer Father     Heart Disease Father     Diabetes Mother     Heart Disease Mother     Stroke Mother     Heart Disease Brother     High Blood Pressure Other     Cancer Other         LUNG,PROSTATE    Hearing Loss Other      Social History     Tobacco Use    Smoking status: Former Smoker     Packs/day: 2.25     Years: 31.00     Pack years: 69.75     Start date:      Last attempt to quit: 2006     Years since quittin.8    Smokeless tobacco: Never Used   Substance Use Topics    Alcohol use: No     Alcohol/week: 0.0 standard drinks        Subjective:      Review of Systems  Rest of review of systems are negative, except as noted in HPI. Objective:     /82 (Site: Left Upper Arm, Position: Sitting)   Pulse 82   Temp 98.1 °F (36.7 °C) (Infrared)   Resp 18   Ht 6' 2\" (1.88 m)   Wt 223 lb 9.6 oz (101.4 kg)   BMI 28.71 kg/m²     Physical Exam       On general physical exam the patient is a pleasant barrel chested somewhat ill-appearing adult male who appears approximately his stated age. His voice is abnormal and that it is markedly low in pitch but surprisingly clear in character for his age and gender. His speech pattern was normal.  I heard no throat clearing coughing or inspiratory stridor. On auscultation his breath sounds were distant but sounded vesicular throughout. Procedure: Flexible laryngoscopy  Indication: The patient is status post extensive laser and debridement surgery of the larynx and warrants an interval endoscopy to look for signs of normal versus abnormal mucosalization and to plan further care as indicated.   Findings: The patient's larynx was fully examined and abnormal for the presence of edematous posterior supraglottic soft tissues. Despite this I was able navigate the larynx and visualized the patient's supraglottis glottis subglottis and proximal trachea. He had patchy areas of eschar over the mucosa but none that were obstructive. His anterior left subglottic mucosa and proximal trachea were remarkably healthy appearing. Posteriorly he had some patchy areas of normal-appearing mucosa. The remainder had some degree of eschar formation consistent with this invasive fungal disease. Vitals reviewed. Xr Chest Portable    Result Date: 11/18/2020  Patchy opacities in the lung bases greatest in the right infrahilar region concerning for pneumonia. Atypical or viral-type pneumonia can be considered. Chest CT can be performed if clinically warranted. **This report has been created using voice recognition software. It may contain minor errors which are inherent in voice recognition technology. ** Final report electronically signed by Dr. Maddy Reeves on 11/18/2020 6:01 PM     Lab Results   Component Value Date     10/17/2020     06/12/2020     02/06/2020    K 4.9 10/17/2020    K 5.2 06/12/2020    K 4.4 02/06/2020    CL 96 10/17/2020     06/12/2020     02/06/2020    CO2 25 10/17/2020    CO2 29 06/12/2020    CO2 29 02/06/2020    BUN 15 10/17/2020    BUN 19 06/12/2020    BUN 18 02/06/2020    CREATININE 0.6 10/17/2020    CREATININE 0.71 06/12/2020    CREATININE 0.78 02/06/2020    CALCIUM 8.6 10/17/2020    CALCIUM 9.10 06/12/2020    CALCIUM 8.70 02/06/2020    PROT 6.6 10/17/2020    PROT 6.5 06/12/2020    PROT 6.6 02/06/2020    LABALBU 4.0 10/17/2020    LABALBU 4.3 06/12/2020    LABALBU 4.5 02/06/2020    LABALBU 4.6 12/06/2011    BILITOT 0.2 10/17/2020    BILITOT 0.3 06/12/2020    BILITOT 0.5 02/06/2020    ALKPHOS 159 10/17/2020    ALKPHOS 124 06/12/2020    ALKPHOS 119 02/06/2020    AST 23 10/17/2020    AST 22 06/12/2020 AST 24 02/06/2020    ALT 29 10/17/2020    ALT 31 06/12/2020    ALT 26 02/06/2020       All of the past medical history, past surgical history, family history,social history, allergies and current medications were reviewed with the patient. Assessment & Plan   Diagnoses and all orders for this visit:     Diagnosis Orders   1. Airway obstruction  WI LARYNGOSCOPY FLEXIBLE DIAGNOSTIC   2. Invasive fungal infection  WI LARYNGOSCOPY FLEXIBLE DIAGNOSTIC   3. Tracheal stenosis     4. Airway stricture     5. Chronic stridor      Resolved   6. Bullous emphysema ([address]     7. Hoarseness  WI LARYNGOSCOPY FLEXIBLE DIAGNOSTIC   8. Infection due to Candida glabrata         Based on the patient's history and these physical findings, he continues to make steady improvement in the appearance and function of his upper airway. My hope is that we can find a means for delivering higher FiO2 levels to his soft tissues for recovery perhaps at a different institution. In addition he may benefit from nebulized antifungal agents including amphotericin. I will leave that to his infectious disease doctor to  but I would certainly like him to have as much augmentation of the benefits he is getting from this relatively aggressive approach to his surface airway disease. In order to help him this surface eschar, I will increase his Mucomyst to 20% per dose. I will get him scheduled for his third stage of laser care and debridement sometime in the next several weeks that will include a reduction of his posterior supraglottis with advancement flap reconstruction. I will laser away the redundant tissues and sutured the wounds closed with the advancement of mucosa of the surrounding space to accomplish this. In addition I will do the laser ablation and debridement of the subglottis and proximal trachea with care to avoid circumferential wounds that may result in a cicatricial process.   I explained all this in detail to the patient to his satisfaction. He is well aware of the indications risks and benefits which I reviewed in part to make sure is level of comfort was high. It was. I spent approximately 45 minutes of face-to-face time with the patient more than half of which was relegated to a review of his physical findings, near future treatment, and his treatment program moving forward. Return in about 4 weeks (around 12/30/2020). **This report has been created using voice recognition software. It may contain minor errors which are inherent in voice recognition technology. **

## 2020-12-07 NOTE — TELEPHONE ENCOUNTER
Patient called stating that [address] needs diagnosis code for Mucomyst 20% nebulizer solution.

## 2020-12-07 NOTE — TELEPHONE ENCOUNTER
Spoke with pharmacist and they stated Medicare will not approve it by verbal codes. They stated it needs rewritten with the ICD codes. Please resend Rx.

## 2020-12-07 NOTE — TELEPHONE ENCOUNTER
Spoke with the pharmacy and they stated for it to be approved they need to have dx code J41.1 added to the previous ones. Please add diagnoses code.

## 2020-12-07 NOTE — PROGRESS NOTES
[facility]    Patient: Brannon Nino, 63 y.o.   : 1960  2020    Pt of Dr. Lucia Hernandez   Patient presents with    Follow-up     Last seen 20 with Romie,and 10/1/20 with Ruddy Pollard Other     CXR 20,CT 10/17/20        COPD   He complains of cough, hoarse voice, shortness of breath and sputum production. There is no hemoptysis or wheezing. This is a chronic problem. The current episode started more than 1 year ago. The problem occurs daily. The problem has been unchanged. The cough is productive of sputum (green mucous). Associated symptoms include dyspnea on exertion. Pertinent negatives include no chest pain or fever. His symptoms are aggravated by strenuous activity. He reports complete improvement on treatment. Risk factors for lung disease include smoking/tobacco exposure. His past medical history is significant for COPD and emphysema. Carlos aBrger is here for follow up for COPD with chronic bronchitis and pulmonary emphysema. 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 Annmarie Pathak recently underwent surgical resection of scar tissue and recurrent fungal infection following with Dr. Gurpreet Blair of ID service. Was sent home with supplemental O2 from most recent hospitalization 2 LPM at rest and 6 LPM with exertion has not been using at all since 1 week post discharge. Patient reports good compliance with inhaled medication using albuterol a couple times per week.      MMRC Dyspnea Scale:   0: Dyspneic on strenuous exercise  1: Dyspneic on walking a slight hill  2: Dyspneic on walking level ground; must stop occasionally due to breathlessness  3: Must stop for breathlessness after walking 100 yards or after a few minutes  4: Cannot leave house; breathless on dressing/undressing    MMRC dyspnea score: 2      Progress History:   Since last visit any new Start date: 64     Last attempt to quit: 2006     Years since quittin.8    Smokeless tobacco: Never Used   Substance Use Topics    Alcohol use: No     Alcohol/week: 0.0 standard drinks    Drug use: No     ALLERGIES:  Allergies   Allergen Reactions    Povidone Iodine Rash     Surgery prep     FAMILY HISTORY:  Family History   Problem Relation Age of Onset    Prostate Cancer Father 48    Cancer Father     Heart Disease Father     Diabetes Mother     Heart Disease Mother     Stroke Mother     Heart Disease Brother     High Blood Pressure Other     Cancer Other         LUNG,PROSTATE    Hearing Loss Other      CURRENT MEDICATIONS:  Current Outpatient Medications   Medication Sig Dispense Refill    albuterol sulfate HFA (VENTOLIN HFA) 108 (90 Base) MCG/ACT inhaler Inhale 2 puffs into the lungs every 6 hours as needed for Wheezing or Shortness of Breath 3 Inhaler 3    albuterol (PROVENTIL) (2.5 MG/3ML) 0.083% nebulizer solution Take 3 mLs by nebulization every 6 hours as needed for Wheezing or Shortness of Breath 360 vial 3    acetylcysteine (MUCOMYST) 20 % nebulizer solution Take 4 mLs by mouth 3 times daily 360 mL 5    voriconazole (VFEND) 200 MG tablet Take 1 tablet by mouth 2 times daily 180 tablet 2    sodium chloride, Inhalant, 3 % nebulizer solution Take 3 mLs by nebulization as needed (Throat dryness, cough, wheezing) 500 mL 3    Multiple Vitamins-Minerals (MULTIVITAMIN ADULT EXTRA C) CHEW Take by mouth daily      budesonide-formoterol (SYMBICORT) 160-4.5 MCG/ACT AERO Inhale 2 puffs into the lungs 2 times daily Rinse mouth after its use.  3 Inhaler 3    pravastatin (PRAVACHOL) 40 MG tablet Take 40 mg by mouth daily      Acetylcysteine (NAC PO) Take by mouth 2 times daily 10%      SPIRIVA RESPIMAT 2.5 MCG/ACT AERS inhaler INHALE 2 PUFFS BY MOUTH ONCE DAILY 3 Inhaler 3    acetaminophen (TYLENOL) 650 MG extended release tablet Take 1 tablet by mouth as needed for Pain Do not take with hydrocodone/acetominophen 60 tablet 3    loperamide (IMODIUM) 2 MG capsule Take 2 mg by mouth daily       acetylcysteine (MUCOMYST) 20 % nebulizer solution 4 mL via nebulizer 3 times daily 360 mL 5     No current facility-administered medications for this visit. Luz Maria MODI   Review of Systems   Constitutional: Negative for chills, fever and unexpected weight change. HENT: Positive for hoarse voice. Respiratory: Positive for cough, sputum production and shortness of breath. Negative for hemoptysis, chest tightness, wheezing and stridor. Cardiovascular: Positive for dyspnea on exertion. Negative for chest pain and leg swelling. Gastrointestinal: Negative for diarrhea, nausea and vomiting. Genitourinary: Negative for dysuria. Physical exam   /88 (Site: Left Upper Arm, Position: Sitting, Cuff Size: Large Adult)   Pulse 84   Temp 97.6 °F (36.4 °C) (Tympanic)   Ht 6' 2\" (1.88 m)   Wt 229 lb (103.9 kg)   SpO2 93% Comment: room air  BMI 29.40 kg/m²    Wt Readings from Last 3 Encounters:   12/07/20 229 lb (103.9 kg)   12/02/20 223 lb 9.6 oz (101.4 kg)   11/18/20 225 lb (102.1 kg)       Physical Exam  Vitals signs and nursing note reviewed. Constitutional:       General: He is not in acute distress. Appearance: He is well-developed. HENT:      Head: Normocephalic and atraumatic. Neck:      Musculoskeletal: Neck supple. Trachea: No tracheal deviation. Cardiovascular:      Rate and Rhythm: Normal rate and regular rhythm. Heart sounds: Normal heart sounds. No murmur. Pulmonary:      Effort: Pulmonary effort is normal. No respiratory distress. Breath sounds: Normal breath sounds. No stridor. No wheezing or rales. Comments: Increased A/P diameter, diminished aeration throughout  Chest:      Chest wall: No tenderness. Abdominal:      General: Bowel sounds are normal. There is no distension. Palpations: Abdomen is soft.    Skin:     General: Skin is warm and dry. Capillary Refill: Capillary refill takes less than 2 seconds. Neurological:      Mental Status: He is alert and oriented to person, place, and time. Psychiatric:         Behavior: Behavior normal.         Thought Content: Thought content normal.          Results   Lung Nodule Screening     [x] Qualifies    [] Does not qualify   [] Declined    [] Completed  Last LDCT August 2020   The USPSTF recommends annual screening for lung cancer with low-dose computed tomography (LDCT) in adults aged 54 to 80 years who have a 30 pack-year smoking history and currently smoke or have quit within the past 15 years. Screening should be discontinued once a person has not smoked for 15 years or develops a health problem that substantially limits life expectancy or the ability or willingness to have curative lung surgery. Six Minute Walk Test  Gael Ureña 1960    Six minute walk test done in my office today by my medical assistant. [place] oxygen saturation at rest on room air was 93%. His oxygen saturation dropped to 85% on room air with exertion. The six minute walk test was repeated with oxygen supplementation. Oxygen supplimentation was started with 2 LPM via nasal cannula and titrated to 2 LPM via nasal cannula. At the end of the test [facility] L Raven's oxygen saturation remained at 95% on 2 LPM with exertion. He is mobile in the home and requires oxygen as outlined above. Patient ambulated a total of 1080  feet and tolerated the walk well with mild SOB and no events. Resting heart rate was 80 and 98 upon completion of the walk. Nasal Oxygen order:  2 lpm to be used with:  Walking Yes Sleep No Continuous No    DME Medical Necessity Documentation    [facility] was seen in the office on 12/7/2020 for the diagnosis COPD. I am prescribing oxygen because the diagnosis and testing requires the patient to have oxygen in the home. his condition will improve or be benefited by oxygen use.  The patient is able to perform good mobility in a home setting and therefore does require the use of a portable oxygen system. Assessment      Diagnosis Orders   1. Pulmonary emphysema, unspecified emphysema type ([address]  Spirometry Without Bronchodilator    DME Order for Home Oxygen as OP   2. Mucopurulent chronic bronchitis (HCC)  albuterol sulfate HFA (VENTOLIN HFA) 108 (90 Base) MCG/ACT inhaler    albuterol (PROVENTIL) (2.5 MG/3ML) 0.083% nebulizer solution    6 Minute Walk Test    Spirometry Without Bronchodilator    DME Order for Home Oxygen as OP   3. Personal history of tobacco use  AR VISIT TO DISCUSS LUNG CA SCREEN W LDCT    CT Lung Screen (Annual)         Plan   -Continue on spiriva and symbicort  -Discussed albuterol inhaler and nebulizer use. Reviewed signs and symptoms indicating need for use including Shortness of breath and wheezing. Discussed with patient the importance of using inhaler or nebulizer within the prescribed time frames. Patient verbalized understanding to use one or the other not both within prescribed time frame.  Patient also verbalized understanding that if they experience no relief after using albuterol and resting for 15 minutes they need to go to nearest ER or call 911.  -6 MWT to re-evaluate O2 needs at discharge 2 LPM at rest and 6 LPM with exertion, today 2 LPM with exertion  -Update spirometry and LDCT due in August 2021  -Advised to maintain pneumonia vaccine with PCP and to take flu vaccine this coming season.  -Advised patient to call office with any changes, questions, or concerns regarding respiratory status    Will see Minerva Hidden back in: 8 months with spirometry and LDCT     Leona Shah CNP  12/7/2020       Low Dose CT (LDCT) Lung Screening criteria met   Age 55-77   Pack year smoking >30   Still smoking or less than 15 year since quit   No sign or symptoms of lung cancer   > 11 months since last LDCT     Risks and benefits of lung cancer screening with LDCT scans discussed:    Significance of positive screen - False-positive LDCT results often occur. 95% of all positive results do not lead to a diagnosis of cancer. Usually further imaging can resolve most false-positive results; however, some patients may require invasive procedures. Over diagnosis risk - 10% to 12% of screen-detected lung cancer cases are over diagnosed--that is, the cancer would not have been detected in the patient's lifetime without the screening. Need for follow up screens annually to continue lung cancer screening effectiveness     Risks associated with radiation from annual LDCT- Radiation exposure is about the same as for a mammogram, which is about 1/3 of the annual background radiation exposure from everyday life. Starting screening at age 54 is not likely to increase cancer risk from radiation exposure. Patients with comorbidities resulting in life expectancy of < 10 years, or that would preclude treatment of an abnormality identified on CT, should not be screened due to lack of benefit.     To obtain maximal benefit from this screening, smoking cessation and long-term abstinence from smoking is critical

## 2020-12-07 NOTE — TELEPHONE ENCOUNTER
I spoke with David pSrague in regards to setting up his next surgery. David Sprague was under the impression he was going to have this done at the end of January. Can you place surgery orders for David Sprague please? Thank you!

## 2020-12-08 NOTE — TELEPHONE ENCOUNTER
Spoke with the pharmacy, [facility], this morning to see if they got the updated dx code. They stated they could only see the first dx code. I asked if I could send the rx via fax and melissa said that is okay as long as the provider signs and dates it. Rx signed and faxed.

## 2020-12-08 NOTE — TELEPHONE ENCOUNTER
Called patient to inform him and he stated that the pharmacy called him last night and stated they did not have enough of the medication and would be getting more in today around 5. Patient stated he will give the office a call tomorrow morning, 12/09/2020, if the pharmacy needed anything else for him to receive the mediation.

## 2020-12-16 NOTE — TELEPHONE ENCOUNTER
Ryan Benitez is switching his oxygen companies. He is requesting that his oxygen records be faxed to [address], Fax= [phone]. He is switching from [facility] to [address]. [facility] needs records in order for Ryan Benitez to get approved.

## 2021-01-01 ENCOUNTER — TELEPHONE (OUTPATIENT)
Dept: ENT CLINIC | Age: 61
End: 2021-01-01

## 2021-01-01 ENCOUNTER — ANESTHESIA EVENT (OUTPATIENT)
Dept: OPERATING ROOM | Age: 61
End: 2021-01-01
Payer: MEDICARE

## 2021-01-01 ENCOUNTER — APPOINTMENT (OUTPATIENT)
Dept: GENERAL RADIOLOGY | Age: 61
End: 2021-01-01
Payer: MEDICARE

## 2021-01-01 ENCOUNTER — OFFICE VISIT (OUTPATIENT)
Dept: ENT CLINIC | Age: 61
End: 2021-01-01
Payer: MEDICARE

## 2021-01-01 ENCOUNTER — OFFICE VISIT (OUTPATIENT)
Dept: INFECTIOUS DISEASES | Age: 61
End: 2021-01-01
Payer: MEDICARE

## 2021-01-01 ENCOUNTER — TELEPHONE (OUTPATIENT)
Dept: PULMONOLOGY | Age: 61
End: 2021-01-01

## 2021-01-01 ENCOUNTER — APPOINTMENT (OUTPATIENT)
Dept: CT IMAGING | Age: 61
DRG: 207 | End: 2021-01-01
Payer: MEDICARE

## 2021-01-01 ENCOUNTER — HOSPITAL ENCOUNTER (OUTPATIENT)
Age: 61
Setting detail: OUTPATIENT SURGERY
Discharge: HOME | End: 2021-06-11
Attending: OTOLARYNGOLOGY | Admitting: OTOLARYNGOLOGY
Payer: MEDICARE

## 2021-01-01 ENCOUNTER — APPOINTMENT (OUTPATIENT)
Dept: GENERAL RADIOLOGY | Age: 61
DRG: 207 | End: 2021-01-01
Payer: MEDICARE

## 2021-01-01 ENCOUNTER — HOSPITAL ENCOUNTER (OUTPATIENT)
Age: 61
Setting detail: OUTPATIENT SURGERY
Discharge: HOME | End: 2021-10-20
Attending: OTOLARYNGOLOGY | Admitting: OTOLARYNGOLOGY
Payer: MEDICARE

## 2021-01-01 ENCOUNTER — ANESTHESIA (OUTPATIENT)
Dept: OPERATING ROOM | Age: 61
End: 2021-01-01
Payer: MEDICARE

## 2021-01-01 ENCOUNTER — APPOINTMENT (OUTPATIENT)
Dept: GENERAL RADIOLOGY | Age: 61
End: 2021-01-01
Attending: OTOLARYNGOLOGY
Payer: MEDICARE

## 2021-01-01 ENCOUNTER — HOSPITAL ENCOUNTER (OUTPATIENT)
Dept: SPEECH THERAPY | Age: 61
Discharge: HOME | End: 2021-09-29
Payer: MEDICARE

## 2021-01-01 ENCOUNTER — TELEPHONE (OUTPATIENT)
Dept: WOUND CARE | Age: 61
End: 2021-01-01

## 2021-01-01 ENCOUNTER — HOSPITAL ENCOUNTER (OUTPATIENT)
Age: 61
Setting detail: OUTPATIENT SURGERY
Discharge: HOME | End: 2021-09-17
Attending: OTOLARYNGOLOGY | Admitting: OTOLARYNGOLOGY
Payer: MEDICARE

## 2021-01-01 ENCOUNTER — HOSPITAL ENCOUNTER (INPATIENT)
Age: 61
LOS: 13 days | DRG: 207 | End: 2021-11-30
Attending: EMERGENCY MEDICINE | Admitting: INTERNAL MEDICINE
Payer: MEDICARE

## 2021-01-01 ENCOUNTER — TELEPHONE (OUTPATIENT)
Dept: INFECTIOUS DISEASES | Age: 61
End: 2021-01-01

## 2021-01-01 ENCOUNTER — APPOINTMENT (OUTPATIENT)
Dept: INTERVENTIONAL RADIOLOGY/VASCULAR | Age: 61
DRG: 207 | End: 2021-01-01
Payer: MEDICARE

## 2021-01-01 ENCOUNTER — HOSPITAL ENCOUNTER (OUTPATIENT)
Age: 61
Setting detail: OUTPATIENT SURGERY
Discharge: HOME | End: 2021-11-08
Attending: OTOLARYNGOLOGY | Admitting: OTOLARYNGOLOGY
Payer: MEDICARE

## 2021-01-01 ENCOUNTER — HOSPITAL ENCOUNTER (OUTPATIENT)
Age: 61
Discharge: HOME | End: 2021-09-17
Payer: MEDICARE

## 2021-01-01 ENCOUNTER — PROCEDURE VISIT (OUTPATIENT)
Dept: ENT CLINIC | Age: 61
End: 2021-01-01
Payer: MEDICARE

## 2021-01-01 ENCOUNTER — HOSPITAL ENCOUNTER (OUTPATIENT)
Dept: PULMONOLOGY | Age: 61
Discharge: HOME | End: 2021-08-09
Payer: MEDICARE

## 2021-01-01 ENCOUNTER — APPOINTMENT (OUTPATIENT)
Dept: CT IMAGING | Age: 61
End: 2021-01-01
Payer: MEDICARE

## 2021-01-01 ENCOUNTER — HOSPITAL ENCOUNTER (EMERGENCY)
Age: 61
Discharge: HOME | End: 2021-11-12
Attending: STUDENT IN AN ORGANIZED HEALTH CARE EDUCATION/TRAINING PROGRAM
Payer: MEDICARE

## 2021-01-01 ENCOUNTER — HOSPITAL ENCOUNTER (OUTPATIENT)
Age: 61
Discharge: HOME | End: 2021-01-15
Payer: MEDICARE

## 2021-01-01 ENCOUNTER — HOSPITAL ENCOUNTER (OUTPATIENT)
Dept: WOUND CARE | Age: 61
Discharge: HOME | End: 2021-01-06
Payer: MEDICARE

## 2021-01-01 ENCOUNTER — CARE COORDINATION (OUTPATIENT)
Dept: CARE COORDINATION | Age: 61
End: 2021-01-01

## 2021-01-01 ENCOUNTER — HOSPITAL ENCOUNTER (OUTPATIENT)
Dept: SPEECH THERAPY | Age: 61
Setting detail: THERAPIES SERIES
Discharge: HOME | End: 2021-08-03
Payer: MEDICARE

## 2021-01-01 ENCOUNTER — OFFICE VISIT (OUTPATIENT)
Dept: PULMONOLOGY | Age: 61
End: 2021-01-01
Payer: MEDICARE

## 2021-01-01 ENCOUNTER — HOSPITAL ENCOUNTER (OUTPATIENT)
Age: 61
Setting detail: OUTPATIENT SURGERY
Discharge: HOME | End: 2021-02-05
Attending: OTOLARYNGOLOGY | Admitting: OTOLARYNGOLOGY
Payer: MEDICARE

## 2021-01-01 ENCOUNTER — HOSPITAL ENCOUNTER (OUTPATIENT)
Dept: CT IMAGING | Age: 61
Discharge: HOME | End: 2021-08-09
Payer: MEDICARE

## 2021-01-01 ENCOUNTER — HOSPITAL ENCOUNTER (OUTPATIENT)
Dept: WOUND CARE | Age: 61
Discharge: HOME | End: 2021-04-14
Payer: MEDICARE

## 2021-01-01 ENCOUNTER — HOSPITAL ENCOUNTER (OUTPATIENT)
Dept: WOUND CARE | Age: 61
Discharge: HOME | End: 2021-02-17
Payer: MEDICARE

## 2021-01-01 ENCOUNTER — TELEPHONE (OUTPATIENT)
Dept: ALLERGY | Age: 61
End: 2021-01-01

## 2021-01-01 ENCOUNTER — HOSPITAL ENCOUNTER (OUTPATIENT)
Dept: GENERAL RADIOLOGY | Age: 61
Discharge: HOME | End: 2021-01-15
Payer: MEDICARE

## 2021-01-01 ENCOUNTER — HOSPITAL ENCOUNTER (OUTPATIENT)
Dept: SPEECH THERAPY | Age: 61
Setting detail: THERAPIES SERIES
Discharge: HOME | End: 2021-05-12
Payer: MEDICARE

## 2021-01-01 ENCOUNTER — PREP FOR PROCEDURE (OUTPATIENT)
Dept: ENT CLINIC | Age: 61
End: 2021-01-01

## 2021-01-01 ENCOUNTER — HOSPITAL ENCOUNTER (OUTPATIENT)
Age: 61
Discharge: HOME | End: 2021-01-29
Payer: MEDICARE

## 2021-01-01 VITALS
BODY MASS INDEX: 29.26 KG/M2 | RESPIRATION RATE: 18 BRPM | WEIGHT: 228 LBS | HEIGHT: 74 IN | SYSTOLIC BLOOD PRESSURE: 138 MMHG | DIASTOLIC BLOOD PRESSURE: 88 MMHG | TEMPERATURE: 98.4 F | HEART RATE: 74 BPM

## 2021-01-01 VITALS — OXYGEN SATURATION: 99 % | SYSTOLIC BLOOD PRESSURE: 113 MMHG | DIASTOLIC BLOOD PRESSURE: 59 MMHG | TEMPERATURE: 95 F

## 2021-01-01 VITALS
OXYGEN SATURATION: 94 % | DIASTOLIC BLOOD PRESSURE: 80 MMHG | BODY MASS INDEX: 27.98 KG/M2 | TEMPERATURE: 97.6 F | HEIGHT: 74 IN | SYSTOLIC BLOOD PRESSURE: 146 MMHG | RESPIRATION RATE: 14 BRPM | HEART RATE: 74 BPM | WEIGHT: 218 LBS

## 2021-01-01 VITALS
BODY MASS INDEX: 28.23 KG/M2 | HEIGHT: 74 IN | BODY MASS INDEX: 28.95 KG/M2 | DIASTOLIC BLOOD PRESSURE: 85 MMHG | WEIGHT: 220 LBS | TEMPERATURE: 96.7 F | RESPIRATION RATE: 16 BRPM | WEIGHT: 225.6 LBS | TEMPERATURE: 97.4 F | SYSTOLIC BLOOD PRESSURE: 148 MMHG | HEIGHT: 74 IN | HEART RATE: 77 BPM | OXYGEN SATURATION: 90 % | DIASTOLIC BLOOD PRESSURE: 82 MMHG | HEART RATE: 80 BPM | OXYGEN SATURATION: 93 % | RESPIRATION RATE: 13 BRPM | SYSTOLIC BLOOD PRESSURE: 145 MMHG

## 2021-01-01 VITALS
TEMPERATURE: 98 F | RESPIRATION RATE: 18 BRPM | HEART RATE: 83 BPM | BODY MASS INDEX: 28.49 KG/M2 | SYSTOLIC BLOOD PRESSURE: 121 MMHG | WEIGHT: 222 LBS | HEIGHT: 74 IN | DIASTOLIC BLOOD PRESSURE: 73 MMHG | OXYGEN SATURATION: 91 %

## 2021-01-01 VITALS
WEIGHT: 225 LBS | SYSTOLIC BLOOD PRESSURE: 134 MMHG | DIASTOLIC BLOOD PRESSURE: 76 MMHG | TEMPERATURE: 98.9 F | RESPIRATION RATE: 20 BRPM | HEART RATE: 80 BPM | HEIGHT: 74 IN | BODY MASS INDEX: 28.88 KG/M2

## 2021-01-01 VITALS
SYSTOLIC BLOOD PRESSURE: 144 MMHG | TEMPERATURE: 98.9 F | DIASTOLIC BLOOD PRESSURE: 91 MMHG | HEART RATE: 88 BPM | WEIGHT: 224.6 LBS | BODY MASS INDEX: 28.84 KG/M2

## 2021-01-01 VITALS — DIASTOLIC BLOOD PRESSURE: 64 MMHG | TEMPERATURE: 96.8 F | SYSTOLIC BLOOD PRESSURE: 113 MMHG | OXYGEN SATURATION: 88 %

## 2021-01-01 VITALS
WEIGHT: 230 LBS | OXYGEN SATURATION: 93 % | DIASTOLIC BLOOD PRESSURE: 82 MMHG | TEMPERATURE: 97.5 F | BODY MASS INDEX: 29.52 KG/M2 | HEIGHT: 74 IN | SYSTOLIC BLOOD PRESSURE: 136 MMHG | HEART RATE: 77 BPM

## 2021-01-01 VITALS
TEMPERATURE: 100.4 F | DIASTOLIC BLOOD PRESSURE: 53 MMHG | HEIGHT: 74 IN | RESPIRATION RATE: 6 BRPM | OXYGEN SATURATION: 60 % | BODY MASS INDEX: 31.26 KG/M2 | WEIGHT: 243.61 LBS | SYSTOLIC BLOOD PRESSURE: 98 MMHG

## 2021-01-01 VITALS
HEART RATE: 93 BPM | SYSTOLIC BLOOD PRESSURE: 152 MMHG | RESPIRATION RATE: 22 BRPM | TEMPERATURE: 98.7 F | BODY MASS INDEX: 27.46 KG/M2 | WEIGHT: 214 LBS | OXYGEN SATURATION: 91 % | HEIGHT: 74 IN | DIASTOLIC BLOOD PRESSURE: 76 MMHG

## 2021-01-01 VITALS
SYSTOLIC BLOOD PRESSURE: 128 MMHG | HEIGHT: 74 IN | TEMPERATURE: 97.9 F | WEIGHT: 214 LBS | RESPIRATION RATE: 20 BRPM | OXYGEN SATURATION: 91 % | DIASTOLIC BLOOD PRESSURE: 64 MMHG | BODY MASS INDEX: 27.46 KG/M2 | HEART RATE: 87 BPM

## 2021-01-01 VITALS
OXYGEN SATURATION: 92 % | HEIGHT: 74 IN | WEIGHT: 212 LBS | DIASTOLIC BLOOD PRESSURE: 69 MMHG | RESPIRATION RATE: 16 BRPM | SYSTOLIC BLOOD PRESSURE: 131 MMHG | BODY MASS INDEX: 27.21 KG/M2 | TEMPERATURE: 98.2 F | HEART RATE: 84 BPM

## 2021-01-01 VITALS
WEIGHT: 215 LBS | RESPIRATION RATE: 20 BRPM | OXYGEN SATURATION: 90 % | HEIGHT: 74 IN | HEART RATE: 82 BPM | SYSTOLIC BLOOD PRESSURE: 129 MMHG | BODY MASS INDEX: 27.59 KG/M2 | TEMPERATURE: 98 F | DIASTOLIC BLOOD PRESSURE: 81 MMHG

## 2021-01-01 VITALS
WEIGHT: 227 LBS | TEMPERATURE: 97.6 F | BODY MASS INDEX: 29.13 KG/M2 | SYSTOLIC BLOOD PRESSURE: 138 MMHG | HEART RATE: 92 BPM | HEIGHT: 74 IN | RESPIRATION RATE: 16 BRPM | DIASTOLIC BLOOD PRESSURE: 84 MMHG

## 2021-01-01 VITALS
HEART RATE: 71 BPM | TEMPERATURE: 98.1 F | WEIGHT: 230 LBS | DIASTOLIC BLOOD PRESSURE: 104 MMHG | WEIGHT: 222.6 LBS | BODY MASS INDEX: 28.58 KG/M2 | TEMPERATURE: 98 F | RESPIRATION RATE: 21 BRPM | DIASTOLIC BLOOD PRESSURE: 88 MMHG | SYSTOLIC BLOOD PRESSURE: 138 MMHG | OXYGEN SATURATION: 94 % | BODY MASS INDEX: 29.52 KG/M2 | SYSTOLIC BLOOD PRESSURE: 158 MMHG | HEIGHT: 74 IN | HEART RATE: 80 BPM | RESPIRATION RATE: 16 BRPM

## 2021-01-01 VITALS
SYSTOLIC BLOOD PRESSURE: 146 MMHG | TEMPERATURE: 97.9 F | BODY MASS INDEX: 28.34 KG/M2 | RESPIRATION RATE: 16 BRPM | DIASTOLIC BLOOD PRESSURE: 86 MMHG | OXYGEN SATURATION: 93 % | WEIGHT: 220.8 LBS | HEIGHT: 74 IN | HEART RATE: 75 BPM

## 2021-01-01 VITALS
OXYGEN SATURATION: 91 % | BODY MASS INDEX: 27.54 KG/M2 | DIASTOLIC BLOOD PRESSURE: 59 MMHG | SYSTOLIC BLOOD PRESSURE: 128 MMHG | WEIGHT: 214.6 LBS | RESPIRATION RATE: 20 BRPM | HEIGHT: 74 IN | TEMPERATURE: 98.4 F | HEART RATE: 84 BPM

## 2021-01-01 VITALS
SYSTOLIC BLOOD PRESSURE: 146 MMHG | HEART RATE: 80 BPM | WEIGHT: 223 LBS | TEMPERATURE: 98.6 F | BODY MASS INDEX: 28.62 KG/M2 | RESPIRATION RATE: 16 BRPM | DIASTOLIC BLOOD PRESSURE: 92 MMHG | HEIGHT: 74 IN | OXYGEN SATURATION: 93 %

## 2021-01-01 VITALS
SYSTOLIC BLOOD PRESSURE: 165 MMHG | WEIGHT: 221.9 LBS | TEMPERATURE: 98.2 F | BODY MASS INDEX: 28.49 KG/M2 | DIASTOLIC BLOOD PRESSURE: 95 MMHG | HEART RATE: 66 BPM

## 2021-01-01 VITALS — SYSTOLIC BLOOD PRESSURE: 130 MMHG | DIASTOLIC BLOOD PRESSURE: 63 MMHG | OXYGEN SATURATION: 97 %

## 2021-01-01 VITALS — OXYGEN SATURATION: 95 % | DIASTOLIC BLOOD PRESSURE: 73 MMHG | SYSTOLIC BLOOD PRESSURE: 131 MMHG

## 2021-01-01 VITALS
SYSTOLIC BLOOD PRESSURE: 118 MMHG | DIASTOLIC BLOOD PRESSURE: 65 MMHG | OXYGEN SATURATION: 99 % | RESPIRATION RATE: 10 BRPM

## 2021-01-01 VITALS
BODY MASS INDEX: 28.17 KG/M2 | WEIGHT: 219.4 LBS | SYSTOLIC BLOOD PRESSURE: 144 MMHG | RESPIRATION RATE: 96 BRPM | TEMPERATURE: 98.1 F | DIASTOLIC BLOOD PRESSURE: 98 MMHG | HEART RATE: 83 BPM

## 2021-01-01 VITALS
SYSTOLIC BLOOD PRESSURE: 135 MMHG | HEART RATE: 78 BPM | DIASTOLIC BLOOD PRESSURE: 79 MMHG | RESPIRATION RATE: 16 BRPM | TEMPERATURE: 98.2 F | OXYGEN SATURATION: 94 %

## 2021-01-01 VITALS
WEIGHT: 226.4 LBS | HEIGHT: 74 IN | BODY MASS INDEX: 29.05 KG/M2 | HEART RATE: 102 BPM | OXYGEN SATURATION: 91 % | DIASTOLIC BLOOD PRESSURE: 66 MMHG | SYSTOLIC BLOOD PRESSURE: 122 MMHG | RESPIRATION RATE: 20 BRPM | TEMPERATURE: 97.6 F

## 2021-01-01 VITALS
TEMPERATURE: 98.2 F | SYSTOLIC BLOOD PRESSURE: 145 MMHG | DIASTOLIC BLOOD PRESSURE: 85 MMHG | HEART RATE: 81 BPM | RESPIRATION RATE: 16 BRPM | WEIGHT: 213 LBS | BODY MASS INDEX: 27.35 KG/M2

## 2021-01-01 VITALS
OXYGEN SATURATION: 91 % | SYSTOLIC BLOOD PRESSURE: 137 MMHG | DIASTOLIC BLOOD PRESSURE: 84 MMHG | TEMPERATURE: 97.9 F | RESPIRATION RATE: 20 BRPM | HEART RATE: 83 BPM

## 2021-01-01 DIAGNOSIS — B37.9 INFECTION DUE TO CANDIDA GLABRATA: Primary | ICD-10-CM

## 2021-01-01 DIAGNOSIS — J43.9 BULLOUS EMPHYSEMA (HCC): ICD-10-CM

## 2021-01-01 DIAGNOSIS — T66.XXXD RADIATION ADVERSE EFFECT, SUBSEQUENT ENCOUNTER: ICD-10-CM

## 2021-01-01 DIAGNOSIS — R13.14 PHARYNGOESOPHAGEAL DYSPHAGIA: Primary | ICD-10-CM

## 2021-01-01 DIAGNOSIS — J34.2 DEVIATED SEPTUM: ICD-10-CM

## 2021-01-01 DIAGNOSIS — B49 INVASIVE FUNGAL INFECTION: Primary | ICD-10-CM

## 2021-01-01 DIAGNOSIS — B49 INVASIVE FUNGAL INFECTION: ICD-10-CM

## 2021-01-01 DIAGNOSIS — J39.8 TRACHEAL STENOSIS: Primary | ICD-10-CM

## 2021-01-01 DIAGNOSIS — Z01.818 PRE-OPERATIVE CLEARANCE: Primary | ICD-10-CM

## 2021-01-01 DIAGNOSIS — J39.8 TRACHEAL STENOSIS: ICD-10-CM

## 2021-01-01 DIAGNOSIS — U07.1 COVID: ICD-10-CM

## 2021-01-01 DIAGNOSIS — J37.0 CHRONIC LARYNGITIS: ICD-10-CM

## 2021-01-01 DIAGNOSIS — Z01.818 PRE-OP TESTING: ICD-10-CM

## 2021-01-01 DIAGNOSIS — J43.9 PULMONARY EMPHYSEMA, UNSPECIFIED EMPHYSEMA TYPE (HCC): ICD-10-CM

## 2021-01-01 DIAGNOSIS — J98.8 AIRWAY OBSTRUCTION: ICD-10-CM

## 2021-01-01 DIAGNOSIS — R49.0 DYSPHONIA: ICD-10-CM

## 2021-01-01 DIAGNOSIS — J98.8: ICD-10-CM

## 2021-01-01 DIAGNOSIS — R09.89 ABNORMAL FINDINGS ON LARYNGOSCOPY: ICD-10-CM

## 2021-01-01 DIAGNOSIS — J98.8 AIRWAY OBSTRUCTION: Primary | ICD-10-CM

## 2021-01-01 DIAGNOSIS — Z01.818 PRE-OP TESTING: Primary | ICD-10-CM

## 2021-01-01 DIAGNOSIS — B37.9 INFECTION DUE TO CANDIDA GLABRATA: ICD-10-CM
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
## 2021-01-01 DIAGNOSIS — J41.8 MIXED SIMPLE AND MUCOPURULENT CHRONIC BRONCHITIS (HCC): ICD-10-CM
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
## 2021-01-01 DIAGNOSIS — J34.89 REFRACTORY OBSTRUCTION OF NASAL AIRWAY: ICD-10-CM

## 2021-01-01 DIAGNOSIS — U07.1 COVID-19: Primary | ICD-10-CM

## 2021-01-01 DIAGNOSIS — R49.0 HOARSENESS: ICD-10-CM

## 2021-01-01 DIAGNOSIS — Z87.891 PERSONAL HISTORY OF TOBACCO USE: ICD-10-CM

## 2021-01-01 DIAGNOSIS — Z99.81 DEPENDENCE ON CONTINUOUS SUPPLEMENTAL OXYGEN: ICD-10-CM

## 2021-01-01 DIAGNOSIS — C32.0 CARCINOMA OF VOCAL CORD (HCC): ICD-10-CM

## 2021-01-01 DIAGNOSIS — C32.9 LARYNGEAL CARCINOMA (HCC): ICD-10-CM

## 2021-01-01 DIAGNOSIS — L59.8 RADIATION-INDUCED FIBROSIS OF SOFT TISSUE FROM THERAPEUTIC PROCEDURE: ICD-10-CM

## 2021-01-01 DIAGNOSIS — J38.6 LARYNGEAL STENOSIS: ICD-10-CM

## 2021-01-01 DIAGNOSIS — K22.4 CRICOPHARYNGEUS MUSCLE DYSFUNCTION: ICD-10-CM

## 2021-01-01 DIAGNOSIS — J41.1 CHRONIC BRONCHITIS, MUCOPURULENT (HCC): ICD-10-CM

## 2021-01-01 DIAGNOSIS — J38.6 SUBGLOTTIC STENOSIS NOT DUE TO SURGERY: Primary | ICD-10-CM

## 2021-01-01 DIAGNOSIS — J38.6 SUBGLOTTIC STENOSIS NOT DUE TO SURGERY: ICD-10-CM

## 2021-01-01 DIAGNOSIS — C32.9 SQUAMOUS CELL CARCINOMA OF LARYNX (HCC): Primary | ICD-10-CM

## 2021-01-01 DIAGNOSIS — L59.8 RADIATION FIBROSIS OF SOFT TISSUE FROM THERAPEUTIC PROCEDURE: ICD-10-CM

## 2021-01-01 DIAGNOSIS — Z01.818 PRE-OP EVALUATION: ICD-10-CM

## 2021-01-01 DIAGNOSIS — J41.1 MUCOPURULENT CHRONIC BRONCHITIS (HCC): ICD-10-CM

## 2021-01-01 DIAGNOSIS — C32.9 LARYNGEAL CARCINOMA (HCC): Primary | ICD-10-CM

## 2021-01-01 DIAGNOSIS — Z01.818 PRE-OP EVALUATION: Primary | ICD-10-CM

## 2021-01-01 DIAGNOSIS — R13.14 PHARYNGOESOPHAGEAL DYSPHAGIA: ICD-10-CM

## 2021-01-01 DIAGNOSIS — K21.9 GASTROESOPHAGEAL REFLUX DISEASE WITHOUT ESOPHAGITIS: ICD-10-CM

## 2021-01-01 DIAGNOSIS — T66.XXXD RADIATION ADVERSE EFFECT, SUBSEQUENT ENCOUNTER: Primary | ICD-10-CM

## 2021-01-01 DIAGNOSIS — J96.01 ACUTE HYPOXEMIC RESPIRATORY FAILURE DUE TO COVID-19 (HCC): Primary | ICD-10-CM

## 2021-01-01 DIAGNOSIS — J15.211 STAPHYLOCOCCUS AUREUS PNEUMONIA (HCC): ICD-10-CM

## 2021-01-01 DIAGNOSIS — U07.1 ACUTE HYPOXEMIC RESPIRATORY FAILURE DUE TO COVID-19 (HCC): Primary | ICD-10-CM

## 2021-01-01 DIAGNOSIS — R49.0 HOARSENESS OF VOICE: ICD-10-CM

## 2021-01-01 LAB
A/G RATIO: 1.8 (ref 1.5–2.5)
ABSOLUTE BASO #: 0 /CMM (ref 0–200)
ABSOLUTE EOS #: 200 /CMM (ref 0–500)
ABSOLUTE LYMPH #: 1000 /CMM (ref 1000–4800)
ABSOLUTE MONO #: 1400 /CMM (ref 0–800)
ABSOLUTE NEUT #: 6700 /CMM (ref 1800–7700)
ACINETOBACTER CALCOACETICUS-BAUMANNII BY PCR: NOT DETECTED
ADENOVIRUS BY PCR: NOT DETECTED
AEROBIC CULTURE: ABNORMAL
ALBUMIN SERPL-MCNC: 2.6 G/DL (ref 3.5–5.1)
ALBUMIN SERPL-MCNC: 3.6 G/DL (ref 3.5–5.1)
ALBUMIN SERPL-MCNC: 4.2 GM/DL (ref 3.5–5)
ALBUMIN SERPL-MCNC: 4.4 G/DL (ref 3.5–5.1)
ALBUMIN SERPL-MCNC: 4.7 G/DL (ref 3.5–5.1)
ALLEN TEST: ABNORMAL
ALLEN TEST: POSITIVE
ALP BLD-CCNC: 147 U/L (ref 38–126)
ALP BLD-CCNC: 162 U/L (ref 38–126)
ALP BLD-CCNC: 165 U/L (ref 38–126)
ALP BLD-CCNC: 170 IU/L (ref 41–137)
ALP BLD-CCNC: 67 U/L (ref 38–126)
ALT SERPL-CCNC: 23 U/L (ref 11–66)
ALT SERPL-CCNC: 28 U/L (ref 11–66)
ALT SERPL-CCNC: 31 U/L (ref 11–66)
ALT SERPL-CCNC: 32 IU/L (ref 10–40)
ALT SERPL-CCNC: 41 U/L (ref 11–66)
ALT SERPL-CCNC: 60 U/L (ref 11–66)
ANAEROBIC CULTURE: ABNORMAL
ANAEROBIC CULTURE: ABNORMAL
ANION GAP SERPL CALCULATED.3IONS-SCNC: 10 MEQ/L (ref 8–16)
ANION GAP SERPL CALCULATED.3IONS-SCNC: 12 MEQ/L (ref 8–16)
ANION GAP SERPL CALCULATED.3IONS-SCNC: 12 MEQ/L (ref 8–16)
ANION GAP SERPL CALCULATED.3IONS-SCNC: 13 MEQ/L (ref 8–16)
ANION GAP SERPL CALCULATED.3IONS-SCNC: 15 MEQ/L (ref 8–16)
ANION GAP SERPL CALCULATED.3IONS-SCNC: 16 MEQ/L (ref 8–16)
ANION GAP SERPL CALCULATED.3IONS-SCNC: 18 MEQ/L (ref 8–16)
ANION GAP SERPL CALCULATED.3IONS-SCNC: 7 MEQ/L (ref 8–16)
ANION GAP SERPL CALCULATED.3IONS-SCNC: 7 MMOL/L (ref 4–12)
ANION GAP SERPL CALCULATED.3IONS-SCNC: 8 MEQ/L (ref 8–16)
ANION GAP SERPL CALCULATED.3IONS-SCNC: 8 MEQ/L (ref 8–16)
ANION GAP SERPL CALCULATED.3IONS-SCNC: 9 MEQ/L (ref 8–16)
ANISOCYTOSIS: PRESENT
APTT: 28.1 SECONDS (ref 22–38)
APTT: 68 SECONDS (ref 22–38)
APTT: 72.5 SECONDS (ref 22–38)
APTT: 76.5 SECONDS (ref 22–38)
APTT: 77.9 SECONDS (ref 22–38)
APTT: 80.1 SECONDS (ref 22–38)
APTT: 84 SECONDS (ref 22–38)
APTT: 84.2 SECONDS (ref 22–38)
APTT: 86.6 SECONDS (ref 22–38)
APTT: 98.8 SECONDS (ref 22–38)
APTT: > 200 SECONDS (ref 22–38)
AST SERPL-CCNC: 20 U/L (ref 5–40)
AST SERPL-CCNC: 23 IU/L (ref 15–41)
AST SERPL-CCNC: 24 U/L (ref 5–40)
AST SERPL-CCNC: 26 U/L (ref 5–40)
AST SERPL-CCNC: 55 U/L (ref 5–40)
AST SERPL-CCNC: 59 U/L (ref 5–40)
BACTERIA: ABNORMAL /HPF
BASE EXCESS (CALCULATED): -3.6 MMOL/L (ref -2.5–2.5)
BASE EXCESS (CALCULATED): -3.7 MMOL/L (ref -2.5–2.5)
BASE EXCESS (CALCULATED): -4.4 MMOL/L (ref -2.5–2.5)
BASE EXCESS (CALCULATED): -7.8 MMOL/L (ref -2.5–2.5)
BASE EXCESS (CALCULATED): 5.1 MMOL/L (ref -2.5–2.5)
BASOPHILS # BLD: 0.1 %
BASOPHILS # BLD: 0.1 %
BASOPHILS # BLD: 0.2 %
BASOPHILS # BLD: 0.3 %
BASOPHILS # BLD: 0.4 %
BASOPHILS # BLD: 0.4 %
BASOPHILS # BLD: 0.5 %
BASOPHILS # BLD: 0.6 %
BASOPHILS # BLD: 0.6 %
BASOPHILS # BLD: 1 %
BASOPHILS ABSOLUTE: 0 THOU/MM3 (ref 0–0.1)
BASOPHILS ABSOLUTE: 0.1 THOU/MM3 (ref 0–0.1)
BASOPHILS RELATIVE PERCENT: 0.5 % (ref 0–2)
BILIRUB SERPL-MCNC: 0.2 MG/DL (ref 0.3–1.2)
BILIRUB SERPL-MCNC: 0.2 MG/DL (ref 0.3–1.2)
BILIRUB SERPL-MCNC: 0.4 MG/DL (ref 0.3–1.2)
BILIRUB SERPL-MCNC: 0.5 MG/DL (ref 0.2–1)
BILIRUB SERPL-MCNC: 0.5 MG/DL (ref 0.3–1.2)
BILIRUBIN DIRECT: 0.4 MG/DL (ref 0–0.3)
BILIRUBIN URINE: NEGATIVE
BLOOD CULTURE, ROUTINE: NORMAL
BLOOD CULTURE, ROUTINE: NORMAL
BLOOD, URINE: NEGATIVE
BUN BLDV-MCNC: 17 MG/DL (ref 7–20)
BUN BLDV-MCNC: 19 MG/DL (ref 7–22)
BUN BLDV-MCNC: 19 MG/DL (ref 7–22)
BUN BLDV-MCNC: 20 MG/DL (ref 7–22)
BUN BLDV-MCNC: 23 MG/DL (ref 7–22)
BUN BLDV-MCNC: 32 MG/DL (ref 7–22)
BUN BLDV-MCNC: 32 MG/DL (ref 7–22)
BUN BLDV-MCNC: 38 MG/DL (ref 7–22)
BUN BLDV-MCNC: 39 MG/DL (ref 7–22)
BUN BLDV-MCNC: 42 MG/DL (ref 7–22)
BUN BLDV-MCNC: 42 MG/DL (ref 7–22)
BUN BLDV-MCNC: 45 MG/DL (ref 7–22)
BUN BLDV-MCNC: 46 MG/DL (ref 7–22)
BUN BLDV-MCNC: 46 MG/DL (ref 7–22)
BUN BLDV-MCNC: 47 MG/DL (ref 7–22)
BUN BLDV-MCNC: 51 MG/DL (ref 7–22)
BUN BLDV-MCNC: 52 MG/DL (ref 7–22)
BUN BLDV-MCNC: 58 MG/DL (ref 7–22)
BUN BLDV-MCNC: 76 MG/DL (ref 7–22)
BUN BLDV-MCNC: 86 MG/DL (ref 7–22)
C-REACTIVE PROTEIN: 0.95 MG/DL (ref 0–1)
C-REACTIVE PROTEIN: 1.96 MG/DL (ref 0–1)
C-REACTIVE PROTEIN: 10.65 MG/DL (ref 0–1)
C-REACTIVE PROTEIN: 20.2 MG/DL (ref 0–1)
C-REACTIVE PROTEIN: 5.45 MG/DL (ref 0–1)
C-REACTIVE PROTEIN: 7.37 MG/DL (ref 0–1)
C-REACTIVE PROTEIN: < 0.3 MG/DL (ref 0–1)
CALCIUM IONIZED SERUM: 1.2 MMOL/L (ref 1.12–1.32)
CALCIUM IONIZED SERUM: 1.25 MMOL/L (ref 1.12–1.32)
CALCIUM SERPL-MCNC: 7.4 MG/DL (ref 8.5–10.5)
CALCIUM SERPL-MCNC: 7.5 MG/DL (ref 8.5–10.5)
CALCIUM SERPL-MCNC: 7.7 MG/DL (ref 8.5–10.5)
CALCIUM SERPL-MCNC: 7.8 MG/DL (ref 8.5–10.5)
CALCIUM SERPL-MCNC: 7.8 MG/DL (ref 8.5–10.5)
CALCIUM SERPL-MCNC: 7.9 MG/DL (ref 8.5–10.5)
CALCIUM SERPL-MCNC: 8 MG/DL (ref 8.5–10.5)
CALCIUM SERPL-MCNC: 8 MG/DL (ref 8.5–10.5)
CALCIUM SERPL-MCNC: 8.1 MG/DL (ref 8.5–10.5)
CALCIUM SERPL-MCNC: 8.1 MG/DL (ref 8.5–10.5)
CALCIUM SERPL-MCNC: 8.3 MG/DL (ref 8.5–10.5)
CALCIUM SERPL-MCNC: 8.3 MG/DL (ref 8.5–10.5)
CALCIUM SERPL-MCNC: 8.4 MG/DL (ref 8.5–10.5)
CALCIUM SERPL-MCNC: 8.4 MG/DL (ref 8.5–10.5)
CALCIUM SERPL-MCNC: 8.5 MG/DL (ref 8.5–10.5)
CALCIUM SERPL-MCNC: 8.6 MG/DL (ref 8.8–10.5)
CALCIUM SERPL-MCNC: 8.7 MG/DL (ref 8.5–10.5)
CALCIUM SERPL-MCNC: 9.1 MG/DL (ref 8.5–10.5)
CASTS 2: ABNORMAL /LPF
CASTS UA: ABNORMAL /LPF
CHARACTER, URINE: CLEAR
CHLAMYDIA PNEUMONIAE BY PCR: NOT DETECTED
CHLORIDE BLD-SCNC: 101 MEQ/L (ref 101–111)
CHLORIDE BLD-SCNC: 101 MEQ/L (ref 98–111)
CHLORIDE BLD-SCNC: 102 MEQ/L (ref 98–111)
CHLORIDE BLD-SCNC: 103 MEQ/L (ref 98–111)
CHLORIDE BLD-SCNC: 105 MEQ/L (ref 98–111)
CHLORIDE BLD-SCNC: 106 MEQ/L (ref 98–111)
CHLORIDE BLD-SCNC: 107 MEQ/L (ref 98–111)
CHLORIDE BLD-SCNC: 107 MEQ/L (ref 98–111)
CHLORIDE BLD-SCNC: 109 MEQ/L (ref 98–111)
CHLORIDE BLD-SCNC: 110 MEQ/L (ref 98–111)
CHLORIDE BLD-SCNC: 99 MEQ/L (ref 98–111)
CHLORIDE BLD-SCNC: 99 MEQ/L (ref 98–111)
CHLORIDE, WHOLE BLOOD: 105 MEQ/L (ref 98–109)
CHLORIDE, WHOLE BLOOD: 108 MEQ/L (ref 98–109)
CHOLESTEROL, TOTAL: 221 MG/DL (ref 100–199)
CO2: 20 MEQ/L (ref 23–33)
CO2: 20 MEQ/L (ref 23–33)
CO2: 21 MEQ/L (ref 23–33)
CO2: 22 MEQ/L (ref 23–33)
CO2: 23 MEQ/L (ref 23–33)
CO2: 23 MEQ/L (ref 23–33)
CO2: 24 MEQ/L (ref 23–33)
CO2: 25 MEQ/L (ref 23–33)
CO2: 28 MEQ/L (ref 23–33)
CO2: 29 MEQ/L (ref 21–32)
CO2: 30 MEQ/L (ref 23–33)
CO2: 30 MEQ/L (ref 23–33)
CO2: 31 MEQ/L (ref 23–33)
CO2: 31 MEQ/L (ref 23–33)
CO2: 32 MEQ/L (ref 23–33)
CO2: 32 MEQ/L (ref 23–33)
COLLECTED BY:: ABNORMAL
COLOR: YELLOW
COMMENT: ABNORMAL
CORTISOL COLLECTION INFO: NORMAL
CORTISOL: 0.89 UG/DL
CORTISOL: 0.97 UG/DL
CORTISOL: 1.05 UG/DL
CORTISOL: 1.06 UG/DL
CREAT SERPL-MCNC: 0.5 MG/DL (ref 0.4–1.2)
CREAT SERPL-MCNC: 0.6 MG/DL (ref 0.4–1.2)
CREAT SERPL-MCNC: 0.7 MG/DL (ref 0.4–1.2)
CREAT SERPL-MCNC: 0.78 MG/DL (ref 0.6–1.3)
CREAT SERPL-MCNC: 0.8 MG/DL (ref 0.4–1.2)
CREAT SERPL-MCNC: 0.8 MG/DL (ref 0.4–1.2)
CREAT SERPL-MCNC: 0.9 MG/DL (ref 0.4–1.2)
CREAT SERPL-MCNC: 0.9 MG/DL (ref 0.4–1.2)
CREAT SERPL-MCNC: 1 MG/DL (ref 0.4–1.2)
CREAT SERPL-MCNC: 1.1 MG/DL (ref 0.4–1.2)
CREAT SERPL-MCNC: 2 MG/DL (ref 0.4–1.2)
CREAT SERPL-MCNC: 3.2 MG/DL (ref 0.4–1.2)
CREATININE CLEARANCE: >60
CRYSTALS, UA: ABNORMAL
D-DIMER QUANTITATIVE: 543 NG/ML FEU (ref 0–500)
DEVICE: ABNORMAL
DIFFERENTIAL TYPE: ABNORMAL
EKG ATRIAL RATE: 71 BPM
EKG ATRIAL RATE: 91 BPM
EKG ATRIAL RATE: 96 BPM
EKG P AXIS: 34 DEGREES
EKG P AXIS: 43 DEGREES
EKG P AXIS: 86 DEGREES
EKG P-R INTERVAL: 126 MS
EKG P-R INTERVAL: 132 MS
EKG P-R INTERVAL: 80 MS
EKG Q-T INTERVAL: 324 MS
EKG Q-T INTERVAL: 348 MS
EKG Q-T INTERVAL: 390 MS
EKG QRS DURATION: 80 MS
EKG QRS DURATION: 86 MS
EKG QRS DURATION: 90 MS
EKG QTC CALCULATION (BAZETT): 409 MS
EKG QTC CALCULATION (BAZETT): 423 MS
EKG QTC CALCULATION (BAZETT): 428 MS
EKG R AXIS: 67 DEGREES
EKG R AXIS: 86 DEGREES
EKG R AXIS: 87 DEGREES
EKG T AXIS: 69 DEGREES
EKG T AXIS: 71 DEGREES
EKG T AXIS: 82 DEGREES
EKG VENTRICULAR RATE: 71 BPM
EKG VENTRICULAR RATE: 91 BPM
EKG VENTRICULAR RATE: 96 BPM
ENTEROBACTER CLOACAE COMPLEX BY PCR: NOT DETECTED
EOSINOPHIL # BLD: 0 %
EOSINOPHIL # BLD: 0.1 %
EOSINOPHIL # BLD: 0.2 %
EOSINOPHIL # BLD: 0.3 %
EOSINOPHIL # BLD: 1.6 %
EOSINOPHIL # BLD: 2.3 %
EOSINOPHILS ABSOLUTE: 0 THOU/MM3 (ref 0–0.4)
EOSINOPHILS ABSOLUTE: 0.1 THOU/MM3 (ref 0–0.4)
EOSINOPHILS RELATIVE PERCENT: 2.4 % (ref 0–6)
EPITHELIAL CELLS, UA: ABNORMAL /HPF
ERYTHROCYTE [DISTWIDTH] IN BLOOD BY AUTOMATED COUNT: 13.8 % (ref 11.5–14.5)
ERYTHROCYTE [DISTWIDTH] IN BLOOD BY AUTOMATED COUNT: 14.1 % (ref 11.5–14.5)
ERYTHROCYTE [DISTWIDTH] IN BLOOD BY AUTOMATED COUNT: 14.2 % (ref 11.5–14.5)
ERYTHROCYTE [DISTWIDTH] IN BLOOD BY AUTOMATED COUNT: 14.2 % (ref 11.5–14.5)
ERYTHROCYTE [DISTWIDTH] IN BLOOD BY AUTOMATED COUNT: 14.4 % (ref 11.5–14.5)
ERYTHROCYTE [DISTWIDTH] IN BLOOD BY AUTOMATED COUNT: 14.4 % (ref 11.5–14.5)
ERYTHROCYTE [DISTWIDTH] IN BLOOD BY AUTOMATED COUNT: 14.5 % (ref 11.5–14.5)
ERYTHROCYTE [DISTWIDTH] IN BLOOD BY AUTOMATED COUNT: 14.7 % (ref 11.5–14.5)
ERYTHROCYTE [DISTWIDTH] IN BLOOD BY AUTOMATED COUNT: 14.9 % (ref 11.5–14.5)
ERYTHROCYTE [DISTWIDTH] IN BLOOD BY AUTOMATED COUNT: 15 % (ref 11.5–14.5)
ERYTHROCYTE [DISTWIDTH] IN BLOOD BY AUTOMATED COUNT: 15.2 % (ref 11.5–14.5)
ERYTHROCYTE [DISTWIDTH] IN BLOOD BY AUTOMATED COUNT: 15.6 % (ref 11.5–14.5)
ERYTHROCYTE [DISTWIDTH] IN BLOOD BY AUTOMATED COUNT: 15.8 % (ref 11.5–14.5)
ERYTHROCYTE [DISTWIDTH] IN BLOOD BY AUTOMATED COUNT: 50.3 FL (ref 35–45)
ERYTHROCYTE [DISTWIDTH] IN BLOOD BY AUTOMATED COUNT: 50.4 FL (ref 35–45)
ERYTHROCYTE [DISTWIDTH] IN BLOOD BY AUTOMATED COUNT: 51.5 FL (ref 35–45)
ERYTHROCYTE [DISTWIDTH] IN BLOOD BY AUTOMATED COUNT: 51.7 FL (ref 35–45)
ERYTHROCYTE [DISTWIDTH] IN BLOOD BY AUTOMATED COUNT: 52.4 FL (ref 35–45)
ERYTHROCYTE [DISTWIDTH] IN BLOOD BY AUTOMATED COUNT: 52.8 FL (ref 35–45)
ERYTHROCYTE [DISTWIDTH] IN BLOOD BY AUTOMATED COUNT: 52.9 FL (ref 35–45)
ERYTHROCYTE [DISTWIDTH] IN BLOOD BY AUTOMATED COUNT: 52.9 FL (ref 35–45)
ERYTHROCYTE [DISTWIDTH] IN BLOOD BY AUTOMATED COUNT: 53.1 FL (ref 35–45)
ERYTHROCYTE [DISTWIDTH] IN BLOOD BY AUTOMATED COUNT: 53.3 FL (ref 35–45)
ERYTHROCYTE [DISTWIDTH] IN BLOOD BY AUTOMATED COUNT: 53.6 FL (ref 35–45)
ERYTHROCYTE [DISTWIDTH] IN BLOOD BY AUTOMATED COUNT: 54.7 FL (ref 35–45)
ERYTHROCYTE [DISTWIDTH] IN BLOOD BY AUTOMATED COUNT: 54.7 FL (ref 35–45)
ERYTHROCYTE [DISTWIDTH] IN BLOOD BY AUTOMATED COUNT: 55.1 FL (ref 35–45)
ERYTHROCYTE [DISTWIDTH] IN BLOOD BY AUTOMATED COUNT: 56.7 FL (ref 35–45)
ERYTHROCYTE [DISTWIDTH] IN BLOOD BY AUTOMATED COUNT: 57.2 FL (ref 35–45)
ERYTHROCYTE [DISTWIDTH] IN BLOOD BY AUTOMATED COUNT: 58.8 FL (ref 35–45)
ESCHERICHIA COLI BY PCR: NOT DETECTED
FERRITIN: 1211 NG/ML (ref 22–322)
FERRITIN: 1243 NG/ML (ref 22–322)
FERRITIN: 1415 NG/ML (ref 22–322)
FERRITIN: 1448 NG/ML (ref 22–322)
FERRITIN: 1774 NG/ML (ref 22–322)
FERRITIN: 1940 NG/ML (ref 22–322)
FERRITIN: 869 NG/ML (ref 22–322)
FUNGUS IDENTIFIED: ABNORMAL
FUNGUS SMEAR: ABNORMAL
GFR SERPL CREATININE-BSD FRML MDRD: 20 ML/MIN/1.73M2
GFR SERPL CREATININE-BSD FRML MDRD: 34 ML/MIN/1.73M2
GFR SERPL CREATININE-BSD FRML MDRD: 68 ML/MIN/1.73M2
GFR SERPL CREATININE-BSD FRML MDRD: 76 ML/MIN/1.73M2
GFR SERPL CREATININE-BSD FRML MDRD: 85 ML/MIN/1.73M2
GFR SERPL CREATININE-BSD FRML MDRD: 85 ML/MIN/1.73M2
GFR SERPL CREATININE-BSD FRML MDRD: > 90 ML/MIN/1.73M2
GLUCOSE BLD-MCNC: 105 MG/DL (ref 70–108)
GLUCOSE BLD-MCNC: 107 MG/DL (ref 70–108)
GLUCOSE BLD-MCNC: 109 MG/DL (ref 70–108)
GLUCOSE BLD-MCNC: 111 MG/DL (ref 70–108)
GLUCOSE BLD-MCNC: 114 MG/DL (ref 70–108)
GLUCOSE BLD-MCNC: 115 MG/DL (ref 70–108)
GLUCOSE BLD-MCNC: 116 MG/DL (ref 70–108)
GLUCOSE BLD-MCNC: 119 MG/DL (ref 70–108)
GLUCOSE BLD-MCNC: 127 MG/DL (ref 70–108)
GLUCOSE BLD-MCNC: 132 MG/DL (ref 70–108)
GLUCOSE BLD-MCNC: 132 MG/DL (ref 70–108)
GLUCOSE BLD-MCNC: 133 MG/DL (ref 70–108)
GLUCOSE BLD-MCNC: 134 MG/DL (ref 70–108)
GLUCOSE BLD-MCNC: 135 MG/DL (ref 70–108)
GLUCOSE BLD-MCNC: 135 MG/DL (ref 70–108)
GLUCOSE BLD-MCNC: 138 MG/DL (ref 70–108)
GLUCOSE BLD-MCNC: 139 MG/DL (ref 70–108)
GLUCOSE BLD-MCNC: 139 MG/DL (ref 70–108)
GLUCOSE BLD-MCNC: 140 MG/DL (ref 70–108)
GLUCOSE BLD-MCNC: 140 MG/DL (ref 70–108)
GLUCOSE BLD-MCNC: 142 MG/DL (ref 70–108)
GLUCOSE BLD-MCNC: 145 MG/DL (ref 70–108)
GLUCOSE BLD-MCNC: 145 MG/DL (ref 70–108)
GLUCOSE BLD-MCNC: 146 MG/DL (ref 70–108)
GLUCOSE BLD-MCNC: 147 MG/DL (ref 70–108)
GLUCOSE BLD-MCNC: 147 MG/DL (ref 70–108)
GLUCOSE BLD-MCNC: 148 MG/DL (ref 70–108)
GLUCOSE BLD-MCNC: 149 MG/DL (ref 70–108)
GLUCOSE BLD-MCNC: 149 MG/DL (ref 70–108)
GLUCOSE BLD-MCNC: 150 MG/DL (ref 70–108)
GLUCOSE BLD-MCNC: 151 MG/DL (ref 70–108)
GLUCOSE BLD-MCNC: 152 MG/DL (ref 70–108)
GLUCOSE BLD-MCNC: 153 MG/DL (ref 70–108)
GLUCOSE BLD-MCNC: 153 MG/DL (ref 70–108)
GLUCOSE BLD-MCNC: 157 MG/DL (ref 70–108)
GLUCOSE BLD-MCNC: 158 MG/DL (ref 70–108)
GLUCOSE BLD-MCNC: 160 MG/DL (ref 70–108)
GLUCOSE BLD-MCNC: 162 MG/DL (ref 70–108)
GLUCOSE BLD-MCNC: 166 MG/DL (ref 70–108)
GLUCOSE BLD-MCNC: 167 MG/DL (ref 70–108)
GLUCOSE BLD-MCNC: 167 MG/DL (ref 70–108)
GLUCOSE BLD-MCNC: 170 MG/DL (ref 70–108)
GLUCOSE BLD-MCNC: 172 MG/DL (ref 70–108)
GLUCOSE BLD-MCNC: 173 MG/DL (ref 70–108)
GLUCOSE BLD-MCNC: 173 MG/DL (ref 70–108)
GLUCOSE BLD-MCNC: 179 MG/DL (ref 70–108)
GLUCOSE BLD-MCNC: 180 MG/DL (ref 70–108)
GLUCOSE BLD-MCNC: 185 MG/DL (ref 70–108)
GLUCOSE BLD-MCNC: 194 MG/DL (ref 70–108)
GLUCOSE BLD-MCNC: 200 MG/DL (ref 70–108)
GLUCOSE BLD-MCNC: 210 MG/DL (ref 70–108)
GLUCOSE BLD-MCNC: 213 MG/DL (ref 70–108)
GLUCOSE BLD-MCNC: 243 MG/DL (ref 70–108)
GLUCOSE BLD-MCNC: 87 MG/DL (ref 70–108)
GLUCOSE BLD-MCNC: 88 MG/DL (ref 70–108)
GLUCOSE BLD-MCNC: 98 MG/DL (ref 70–108)
GLUCOSE URINE: NEGATIVE MG/DL
GLUCOSE, WHOLE BLOOD: 145 MG/DL (ref 70–108)
GLUCOSE, WHOLE BLOOD: 171 MG/DL (ref 70–108)
GLUCOSE: 108 MG/DL (ref 70–110)
GRAM STAIN RESULT: ABNORMAL
GRAM STAIN RESULT: NORMAL
HAEMOPHILUS INFLUENZAE BY PCR: NOT DETECTED
HCO3: 19 MMOL/L (ref 23–28)
HCO3: 21 MMOL/L (ref 23–28)
HCO3: 25 MMOL/L (ref 23–28)
HCO3: 25 MMOL/L (ref 23–28)
HCO3: 35 MMOL/L (ref 23–28)
HCT VFR BLD CALC: 31.4 % (ref 42–52)
HCT VFR BLD CALC: 34.9 % (ref 42–52)
HCT VFR BLD CALC: 35.7 % (ref 42–52)
HCT VFR BLD CALC: 36.1 % (ref 42–52)
HCT VFR BLD CALC: 40.7 % (ref 42–52)
HCT VFR BLD CALC: 41.2 % (ref 42–52)
HCT VFR BLD CALC: 41.5 % (ref 42–52)
HCT VFR BLD CALC: 41.9 % (ref 42–52)
HCT VFR BLD CALC: 42.3 % (ref 42–52)
HCT VFR BLD CALC: 42.4 % (ref 42–52)
HCT VFR BLD CALC: 42.6 % (ref 42–52)
HCT VFR BLD CALC: 42.9 % (ref 42–52)
HCT VFR BLD CALC: 43.3 % (ref 42–52)
HCT VFR BLD CALC: 44.1 % (ref 40–49)
HCT VFR BLD CALC: 46.1 % (ref 42–52)
HCT VFR BLD CALC: 50.8 % (ref 42–52)
HDLC SERPL-MCNC: 34 MG/DL
HEMOGLOBIN: 10.7 GM/DL (ref 14–18)
HEMOGLOBIN: 10.9 GM/DL (ref 14–18)
HEMOGLOBIN: 11.4 GM/DL (ref 14–18)
HEMOGLOBIN: 12.5 GM/DL (ref 14–18)
HEMOGLOBIN: 12.7 GM/DL (ref 14–18)
HEMOGLOBIN: 12.7 GM/DL (ref 14–18)
HEMOGLOBIN: 12.8 GM/DL (ref 14–18)
HEMOGLOBIN: 12.9 GM/DL (ref 14–18)
HEMOGLOBIN: 13 GM/DL (ref 14–18)
HEMOGLOBIN: 13.1 GM/DL (ref 14–18)
HEMOGLOBIN: 13.4 GM/DL (ref 14–18)
HEMOGLOBIN: 13.8 GM/DL (ref 14–18)
HEMOGLOBIN: 14.5 GM/DL (ref 14–18)
HEMOGLOBIN: 14.8 GM/DL (ref 13.5–16.5)
HEMOGLOBIN: 15.7 GM/DL (ref 14–18)
HEMOGLOBIN: 9.3 GM/DL (ref 14–18)
IFIO2: 100
IMMATURE GRANS (ABS): 0.05 THOU/MM3 (ref 0–0.07)
IMMATURE GRANS (ABS): 0.1 THOU/MM3 (ref 0–0.07)
IMMATURE GRANS (ABS): 0.14 THOU/MM3 (ref 0–0.07)
IMMATURE GRANS (ABS): 0.18 THOU/MM3 (ref 0–0.07)
IMMATURE GRANS (ABS): 0.35 THOU/MM3 (ref 0–0.07)
IMMATURE GRANS (ABS): 0.44 THOU/MM3 (ref 0–0.07)
IMMATURE GRANS (ABS): 0.45 THOU/MM3 (ref 0–0.07)
IMMATURE GRANS (ABS): 0.49 THOU/MM3 (ref 0–0.07)
IMMATURE GRANS (ABS): 0.77 THOU/MM3 (ref 0–0.07)
IMMATURE GRANS (ABS): 0.86 THOU/MM3 (ref 0–0.07)
IMMATURE GRANS (ABS): 1.07 THOU/MM3 (ref 0–0.07)
IMMATURE GRANS (ABS): 1.1 THOU/MM3 (ref 0–0.07)
IMMATURE GRANS (ABS): 1.97 THOU/MM3 (ref 0–0.07)
IMMATURE GRANS (ABS): 2.23 THOU/MM3 (ref 0–0.07)
IMMATURE GRANULOCYTES: 0.5 %
IMMATURE GRANULOCYTES: 0.7 %
IMMATURE GRANULOCYTES: 1 %
IMMATURE GRANULOCYTES: 1.2 %
IMMATURE GRANULOCYTES: 1.2 %
IMMATURE GRANULOCYTES: 2 %
IMMATURE GRANULOCYTES: 2.1 %
IMMATURE GRANULOCYTES: 2.2 %
IMMATURE GRANULOCYTES: 3 %
IMMATURE GRANULOCYTES: 3.1 %
IMMATURE GRANULOCYTES: 4.7 %
IMMATURE GRANULOCYTES: 5.1 %
IMMATURE GRANULOCYTES: 5.3 %
IMMATURE GRANULOCYTES: 6.5 %
IMMATURE GRANULOCYTES: 7.9 %
IMMATURE GRANULOCYTES: 9.2 %
INFLUENZA A BY PCR: NOT DETECTED
INFLUENZA B BY PCR: NOT DETECTED
INR BLD: 1.09 (ref 0.85–1.13)
KETONES, URINE: NEGATIVE
KLEBSIELLA AEROGENES BY PCR: NOT DETECTED
KLEBSIELLA OXYTOCA BY PCR: NOT DETECTED
KLEBSIELLA PNEUMONIAE GROUP BY PCR: NOT DETECTED
LACTIC ACID: 0.9 MMOL/L (ref 0.5–2)
LACTIC ACID: 3.8 MMOL/L (ref 0.5–2)
LACTIC ACID: 8.9 MMOL/L (ref 0.5–2)
LD: 368 U/L (ref 100–190)
LD: 383 U/L (ref 100–190)
LD: 387 U/L (ref 100–190)
LD: 394 U/L (ref 100–190)
LD: 407 U/L (ref 100–190)
LD: 442 U/L (ref 100–190)
LD: 461 U/L (ref 100–190)
LDL CHOLESTEROL CALCULATED: 149 MG/DL
LEGIONELLA PNEUMOPHILIA BY PCR: NOT DETECTED
LEUKOCYTE ESTERASE, URINE: NEGATIVE
LYMPHOCYTES # BLD: 1.1 %
LYMPHOCYTES # BLD: 1.3 %
LYMPHOCYTES # BLD: 1.5 %
LYMPHOCYTES # BLD: 1.6 %
LYMPHOCYTES # BLD: 1.6 %
LYMPHOCYTES # BLD: 14.8 %
LYMPHOCYTES # BLD: 2.2 %
LYMPHOCYTES # BLD: 2.3 %
LYMPHOCYTES # BLD: 2.5 %
LYMPHOCYTES # BLD: 2.8 %
LYMPHOCYTES # BLD: 3.2 %
LYMPHOCYTES # BLD: 3.6 %
LYMPHOCYTES # BLD: 4.3 %
LYMPHOCYTES # BLD: 4.7 %
LYMPHOCYTES # BLD: 9.2 %
LYMPHOCYTES # BLD: 9.3 %
LYMPHOCYTES ABSOLUTE: 0.2 THOU/MM3 (ref 1–4.8)
LYMPHOCYTES ABSOLUTE: 0.2 THOU/MM3 (ref 1–4.8)
LYMPHOCYTES ABSOLUTE: 0.3 THOU/MM3 (ref 1–4.8)
LYMPHOCYTES ABSOLUTE: 0.4 THOU/MM3 (ref 1–4.8)
LYMPHOCYTES ABSOLUTE: 0.5 THOU/MM3 (ref 1–4.8)
LYMPHOCYTES ABSOLUTE: 0.5 THOU/MM3 (ref 1–4.8)
LYMPHOCYTES ABSOLUTE: 0.6 THOU/MM3 (ref 1–4.8)
LYMPHOCYTES ABSOLUTE: 0.6 THOU/MM3 (ref 1–4.8)
LYMPHOCYTES ABSOLUTE: 0.9 THOU/MM3 (ref 1–4.8)
LYMPHOCYTES ABSOLUTE: 1 THOU/MM3 (ref 1–4.8)
LYMPHOCYTES RELATIVE PERCENT: 11.1 % (ref 15–45)
MAGNESIUM: 2 MG/DL (ref 1.6–2.4)
MAGNESIUM: 3.2 MG/DL (ref 1.6–2.4)
MCH RBC QN AUTO: 29.7 PG (ref 26–33)
MCH RBC QN AUTO: 30 PG (ref 26–33)
MCH RBC QN AUTO: 30.1 PG (ref 26–33)
MCH RBC QN AUTO: 30.2 PG (ref 26–33)
MCH RBC QN AUTO: 30.3 PG (ref 26–33)
MCH RBC QN AUTO: 30.4 PG (ref 26–33)
MCH RBC QN AUTO: 30.5 PG (ref 26–33)
MCH RBC QN AUTO: 30.6 PG (ref 26–33)
MCH RBC QN AUTO: 30.6 PG (ref 26–33)
MCH RBC QN AUTO: 30.6 PG (ref 27.5–33)
MCH RBC QN AUTO: 30.8 PG (ref 26–33)
MCH RBC QN AUTO: 31.1 PG (ref 26–33)
MCHC RBC AUTO-ENTMCNC: 29.6 GM/DL (ref 32.2–35.5)
MCHC RBC AUTO-ENTMCNC: 30 GM/DL (ref 32.2–35.5)
MCHC RBC AUTO-ENTMCNC: 30 GM/DL (ref 32.2–35.5)
MCHC RBC AUTO-ENTMCNC: 30.1 GM/DL (ref 32.2–35.5)
MCHC RBC AUTO-ENTMCNC: 30.1 GM/DL (ref 32.2–35.5)
MCHC RBC AUTO-ENTMCNC: 30.5 GM/DL (ref 32.2–35.5)
MCHC RBC AUTO-ENTMCNC: 30.5 GM/DL (ref 32.2–35.5)
MCHC RBC AUTO-ENTMCNC: 30.7 GM/DL (ref 32.2–35.5)
MCHC RBC AUTO-ENTMCNC: 30.7 GM/DL (ref 32.2–35.5)
MCHC RBC AUTO-ENTMCNC: 30.8 GM/DL (ref 32.2–35.5)
MCHC RBC AUTO-ENTMCNC: 30.8 GM/DL (ref 32.2–35.5)
MCHC RBC AUTO-ENTMCNC: 30.9 GM/DL (ref 32.2–35.5)
MCHC RBC AUTO-ENTMCNC: 31.2 GM/DL (ref 32.2–35.5)
MCHC RBC AUTO-ENTMCNC: 31.5 GM/DL (ref 32.2–35.5)
MCHC RBC AUTO-ENTMCNC: 31.6 GM/DL (ref 32.2–35.5)
MCHC RBC AUTO-ENTMCNC: 31.6 GM/DL (ref 32.2–35.5)
MCHC RBC AUTO-ENTMCNC: 31.9 GM/DL (ref 32.2–35.5)
MCHC RBC AUTO-ENTMCNC: 33.6 GM/DL (ref 33–36)
MCV RBC AUTO: 100 FL (ref 80–94)
MCV RBC AUTO: 100.7 FL (ref 80–94)
MCV RBC AUTO: 100.7 FL (ref 80–94)
MCV RBC AUTO: 101.6 FL (ref 80–94)
MCV RBC AUTO: 91.1 CU MIC (ref 80–97)
MCV RBC AUTO: 96 FL (ref 80–94)
MCV RBC AUTO: 96.4 FL (ref 80–94)
MCV RBC AUTO: 96.6 FL (ref 80–94)
MCV RBC AUTO: 98.1 FL (ref 80–94)
MCV RBC AUTO: 98.6 FL (ref 80–94)
MCV RBC AUTO: 98.8 FL (ref 80–94)
MCV RBC AUTO: 98.8 FL (ref 80–94)
MCV RBC AUTO: 99.1 FL (ref 80–94)
MCV RBC AUTO: 99.3 FL (ref 80–94)
MCV RBC AUTO: 99.4 FL (ref 80–94)
MCV RBC AUTO: 99.5 FL (ref 80–94)
METAPNEUMOVIRUS BY PCR: NOT DETECTED
MISCELLANEOUS 2: ABNORMAL
MODE: ABNORMAL
MONOCYTES # BLD: 10.5 %
MONOCYTES # BLD: 13.3 %
MONOCYTES # BLD: 15.7 %
MONOCYTES # BLD: 19.2 %
MONOCYTES # BLD: 6.3 %
MONOCYTES # BLD: 6.5 %
MONOCYTES # BLD: 6.8 %
MONOCYTES # BLD: 7.6 %
MONOCYTES # BLD: 7.7 %
MONOCYTES # BLD: 8 %
MONOCYTES # BLD: 8.1 %
MONOCYTES # BLD: 8.7 %
MONOCYTES # BLD: 8.7 %
MONOCYTES # BLD: 8.9 %
MONOCYTES # BLD: 9.6 %
MONOCYTES # BLD: 9.7 %
MONOCYTES ABSOLUTE: 0.5 THOU/MM3 (ref 0.4–1.3)
MONOCYTES ABSOLUTE: 0.7 THOU/MM3 (ref 0.4–1.3)
MONOCYTES ABSOLUTE: 0.8 THOU/MM3 (ref 0.4–1.3)
MONOCYTES ABSOLUTE: 1 THOU/MM3 (ref 0.4–1.3)
MONOCYTES ABSOLUTE: 1.1 THOU/MM3 (ref 0.4–1.3)
MONOCYTES ABSOLUTE: 1.4 THOU/MM3 (ref 0.4–1.3)
MONOCYTES ABSOLUTE: 1.4 THOU/MM3 (ref 0.4–1.3)
MONOCYTES ABSOLUTE: 1.5 THOU/MM3 (ref 0.4–1.3)
MONOCYTES ABSOLUTE: 1.6 THOU/MM3 (ref 0.4–1.3)
MONOCYTES ABSOLUTE: 1.7 THOU/MM3 (ref 0.4–1.3)
MONOCYTES ABSOLUTE: 1.7 THOU/MM3 (ref 0.4–1.3)
MONOCYTES ABSOLUTE: 2.4 THOU/MM3 (ref 0.4–1.3)
MONOCYTES ABSOLUTE: 3.2 THOU/MM3 (ref 0.4–1.3)
MONOCYTES RELATIVE PERCENT: 14.7 % (ref 2–10)
MORAXELLA CATARRHALIS BY PCR: NOT DETECTED
MYCOPLASMA PNEUMONIAE BY PCR: NOT DETECTED
NEUTROPHILS RELATIVE PERCENT: 71.3 % (ref 40–70)
NITRITE, URINE: NEGATIVE
NON-SARS CORONAVIRUS: NOT DETECTED
NUCLEATED RBCS: 0.4 /100 WBC
NUCLEATED RED BLOOD CELLS: 0 /100 WBC
O2 SATURATION: 86 %
O2 SATURATION: 87 %
O2 SATURATION: 91 %
O2 SATURATION: 92 %
O2 SATURATION: 98 %
ORGANISM: ABNORMAL
OSMOLALITY CALCULATION: 273.6 MOSMOL/KG (ref 275–300)
OSMOLALITY CALCULATION: 274.8 MOSMOL/KG (ref 275–300)
PARAINFLUENZA VIRUS BY PCR: NOT DETECTED
PATHOLOGIST REVIEW: ABNORMAL
PCO2: 35 MMHG (ref 35–45)
PCO2: 45 MMHG (ref 35–45)
PCO2: 60 MMHG (ref 35–45)
PCO2: 62 MMHG (ref 35–45)
PCO2: 76 MMHG (ref 35–45)
PDW BLD-RTO: 14.4 % (ref 12–16)
PH BLOOD GAS: 7.22 (ref 7.35–7.45)
PH BLOOD GAS: 7.22 (ref 7.35–7.45)
PH BLOOD GAS: 7.23 (ref 7.35–7.45)
PH BLOOD GAS: 7.27 (ref 7.35–7.45)
PH BLOOD GAS: 7.39 (ref 7.35–7.45)
PH UA: 6 (ref 5–9)
PLATELET # BLD: 187 THOU/MM3 (ref 130–400)
PLATELET # BLD: 197 THOU/MM3 (ref 130–400)
PLATELET # BLD: 231 THOU/MM3 (ref 130–400)
PLATELET # BLD: 239 THOU/MM3 (ref 130–400)
PLATELET # BLD: 245 THOU/MM3 (ref 130–400)
PLATELET # BLD: 253 THOU/MM3 (ref 130–400)
PLATELET # BLD: 271 TH/CMM (ref 150–400)
PLATELET # BLD: 276 THOU/MM3 (ref 130–400)
PLATELET # BLD: 326 THOU/MM3 (ref 130–400)
PLATELET # BLD: 371 THOU/MM3 (ref 130–400)
PLATELET # BLD: 413 THOU/MM3 (ref 130–400)
PLATELET # BLD: 413 THOU/MM3 (ref 130–400)
PLATELET # BLD: 420 THOU/MM3 (ref 130–400)
PLATELET # BLD: 422 THOU/MM3 (ref 130–400)
PLATELET # BLD: 426 THOU/MM3 (ref 130–400)
PLATELET # BLD: 484 THOU/MM3 (ref 130–400)
PLATELET # BLD: 495 THOU/MM3 (ref 130–400)
PLATELET # BLD: 532 THOU/MM3 (ref 130–400)
PLATELET ESTIMATE: ABNORMAL
PLATELET ESTIMATE: ABNORMAL
PMV BLD AUTO: 10.1 FL (ref 9.4–12.4)
PMV BLD AUTO: 10.3 FL (ref 9.4–12.4)
PMV BLD AUTO: 10.3 FL (ref 9.4–12.4)
PMV BLD AUTO: 10.4 FL (ref 9.4–12.4)
PMV BLD AUTO: 10.5 FL (ref 9.4–12.4)
PMV BLD AUTO: 11.1 FL (ref 9.4–12.4)
PMV BLD AUTO: 11.1 FL (ref 9.4–12.4)
PMV BLD AUTO: 11.4 FL (ref 9.4–12.4)
PMV BLD AUTO: 11.4 FL (ref 9.4–12.4)
PMV BLD AUTO: 11.7 FL (ref 9.4–12.4)
PMV BLD AUTO: 11.8 FL (ref 9.4–12.4)
PMV BLD AUTO: 12.1 FL (ref 9.4–12.4)
PO2: 116 MMHG (ref 71–104)
PO2: 61 MMHG (ref 71–104)
PO2: 64 MMHG (ref 71–104)
PO2: 65 MMHG (ref 71–104)
PO2: 75 MMHG (ref 71–104)
POC CREATININE WHOLE BLOOD: 0.7 MG/DL (ref 0.5–1.2)
POC CREATININE WHOLE BLOOD: 1.3 MG/DL (ref 0.5–1.2)
POC LACTIC ACID: 0.9 MMOL/L (ref 0.5–1.9)
POC LACTIC ACID: 1 MMOL/L (ref 0.5–1.9)
POTASSIUM REFLEX MAGNESIUM: 3.5 MEQ/L (ref 3.5–5.2)
POTASSIUM REFLEX MAGNESIUM: 3.7 MEQ/L (ref 3.5–5.2)
POTASSIUM REFLEX MAGNESIUM: 3.9 MEQ/L (ref 3.5–5.2)
POTASSIUM REFLEX MAGNESIUM: 4 MEQ/L (ref 3.5–5.2)
POTASSIUM REFLEX MAGNESIUM: 4.3 MEQ/L (ref 3.5–5.2)
POTASSIUM REFLEX MAGNESIUM: 4.5 MEQ/L (ref 3.5–5.2)
POTASSIUM REFLEX MAGNESIUM: 5.4 MEQ/L (ref 3.5–5.2)
POTASSIUM REFLEX MAGNESIUM: 5.4 MEQ/L (ref 3.5–5.2)
POTASSIUM REFLEX MAGNESIUM: 5.7 MEQ/L (ref 3.5–5.2)
POTASSIUM REFLEX MAGNESIUM: 5.8 MEQ/L (ref 3.5–5.2)
POTASSIUM REFLEX MAGNESIUM: 6 MEQ/L (ref 3.5–5.2)
POTASSIUM REFLEX MAGNESIUM: 6.2 MEQ/L (ref 3.5–5.2)
POTASSIUM SERPL-SCNC: 4 MEQ/L (ref 3.6–5)
POTASSIUM SERPL-SCNC: 4.2 MEQ/L (ref 3.5–5.2)
POTASSIUM SERPL-SCNC: 4.2 MEQ/L (ref 3.5–5.2)
POTASSIUM SERPL-SCNC: 4.8 MEQ/L (ref 3.5–5.2)
POTASSIUM SERPL-SCNC: 5 MEQ/L (ref 3.5–5.2)
POTASSIUM SERPL-SCNC: 5.2 MEQ/L (ref 3.5–5.2)
POTASSIUM SERPL-SCNC: 5.4 MEQ/L (ref 3.5–5.2)
POTASSIUM SERPL-SCNC: 5.7 MEQ/L (ref 3.5–5.2)
POTASSIUM SERPL-SCNC: 5.9 MEQ/L (ref 3.5–5.2)
POTASSIUM SERPL-SCNC: 6.2 MEQ/L (ref 3.5–5.2)
POTASSIUM SERPL-SCNC: 7.2 MEQ/L (ref 3.5–5.2)
POTASSIUM, WHOLE BLOOD: 6 MEQ/L (ref 3.5–4.9)
POTASSIUM, WHOLE BLOOD: 6 MEQ/L (ref 3.5–4.9)
PRO-BNP: 153 PG/ML (ref 0–900)
PROCALCITONIN: 0.09 NG/ML (ref 0.01–0.09)
PROCALCITONIN: 0.09 NG/ML (ref 0.01–0.09)
PROCALCITONIN: 0.21 NG/ML (ref 0.01–0.09)
PROTEIN UA: ABNORMAL
PROTEUS SPECIES BY PCR: NOT DETECTED
PSEUDOMONAS AERUGINOSA BY PCR: NOT DETECTED
RBC # BLD: 3.09 MILL/MM3 (ref 4.7–6.1)
RBC # BLD: 3.54 MILL/MM3 (ref 4.7–6.1)
RBC # BLD: 3.59 MILL/MM3 (ref 4.7–6.1)
RBC # BLD: 3.66 MILL/MM3 (ref 4.7–6.1)
RBC # BLD: 4.12 MILL/MM3 (ref 4.7–6.1)
RBC # BLD: 4.15 MILL/MM3 (ref 4.7–6.1)
RBC # BLD: 4.17 MILL/MM3 (ref 4.7–6.1)
RBC # BLD: 4.24 MILL/MM3 (ref 4.7–6.1)
RBC # BLD: 4.26 MILL/MM3 (ref 4.7–6.1)
RBC # BLD: 4.29 MILL/MM3 (ref 4.7–6.1)
RBC # BLD: 4.29 MILL/MM3 (ref 4.7–6.1)
RBC # BLD: 4.3 MILL/MM3 (ref 4.7–6.1)
RBC # BLD: 4.37 MILL/MM3 (ref 4.7–6.1)
RBC # BLD: 4.39 MILL/MM3 (ref 4.7–6.1)
RBC # BLD: 4.51 MILL/MM3 (ref 4.7–6.1)
RBC # BLD: 4.78 MILL/MM3 (ref 4.7–6.1)
RBC # BLD: 4.84 MIL/CMM (ref 4.5–6)
RBC # BLD: 5.14 MILL/MM3 (ref 4.7–6.1)
RBC URINE: ABNORMAL /HPF
REASON FOR REJECTION: NORMAL
REJECTED TEST: NORMAL
RENAL EPITHELIAL, UA: ABNORMAL
RESISTANT GENE CTX-M BY PCR: ABNORMAL
RESISTANT GENE IMP BY PCR: ABNORMAL
RESISTANT GENE KPC BY PCR: ABNORMAL
RESISTANT GENE MECA/C & MREJ BY PCR: NOT DETECTED
RESISTANT GENE NDM BY PCR: ABNORMAL
RESISTANT GENE OXA-48-LIKE BY PCR: ABNORMAL
RESISTANT GENE VIM BY PCR: ABNORMAL
RESPIRATORY CULTURE: ABNORMAL
RESPIRATORY CULTURE: NORMAL
RESPIRATORY SYNCYTIAL VIRUS BY PCR: NOT DETECTED
RHINOVIRUS ENTEROVIRUS PCR: NOT DETECTED
SARS-COV-2, NAAT: DETECTED
SARS-COV-2: NOT DETECTED
SCAN OF BLOOD SMEAR: NORMAL
SEG NEUTROPHILS: 66.3 %
SEG NEUTROPHILS: 66.6 %
SEG NEUTROPHILS: 73.3 %
SEG NEUTROPHILS: 79 %
SEG NEUTROPHILS: 79.1 %
SEG NEUTROPHILS: 82.8 %
SEG NEUTROPHILS: 83 %
SEG NEUTROPHILS: 84.5 %
SEG NEUTROPHILS: 84.6 %
SEG NEUTROPHILS: 85.1 %
SEG NEUTROPHILS: 85.2 %
SEG NEUTROPHILS: 87 %
SEG NEUTROPHILS: 87.3 %
SEG NEUTROPHILS: 88.6 %
SEG NEUTROPHILS: 89.5 %
SEG NEUTROPHILS: 90.2 %
SEGMENTED NEUTROPHILS ABSOLUTE COUNT: 10.3 THOU/MM3 (ref 1.8–7.7)
SEGMENTED NEUTROPHILS ABSOLUTE COUNT: 11 THOU/MM3 (ref 1.8–7.7)
SEGMENTED NEUTROPHILS ABSOLUTE COUNT: 13.2 THOU/MM3 (ref 1.8–7.7)
SEGMENTED NEUTROPHILS ABSOLUTE COUNT: 13.2 THOU/MM3 (ref 1.8–7.7)
SEGMENTED NEUTROPHILS ABSOLUTE COUNT: 14.2 THOU/MM3 (ref 1.8–7.7)
SEGMENTED NEUTROPHILS ABSOLUTE COUNT: 17.5 THOU/MM3 (ref 1.8–7.7)
SEGMENTED NEUTROPHILS ABSOLUTE COUNT: 17.7 THOU/MM3 (ref 1.8–7.7)
SEGMENTED NEUTROPHILS ABSOLUTE COUNT: 18 THOU/MM3 (ref 1.8–7.7)
SEGMENTED NEUTROPHILS ABSOLUTE COUNT: 19.6 THOU/MM3 (ref 1.8–7.7)
SEGMENTED NEUTROPHILS ABSOLUTE COUNT: 19.8 THOU/MM3 (ref 1.8–7.7)
SEGMENTED NEUTROPHILS ABSOLUTE COUNT: 22.5 THOU/MM3 (ref 1.8–7.7)
SEGMENTED NEUTROPHILS ABSOLUTE COUNT: 3.4 THOU/MM3 (ref 1.8–7.7)
SEGMENTED NEUTROPHILS ABSOLUTE COUNT: 3.9 THOU/MM3 (ref 1.8–7.7)
SEGMENTED NEUTROPHILS ABSOLUTE COUNT: 4.5 THOU/MM3 (ref 1.8–7.7)
SEGMENTED NEUTROPHILS ABSOLUTE COUNT: 7.7 THOU/MM3 (ref 1.8–7.7)
SEGMENTED NEUTROPHILS ABSOLUTE COUNT: 8.2 THOU/MM3 (ref 1.8–7.7)
SERRATIA MARCESCENS BY PCR: NOT DETECTED
SET PEEP: 20 MMHG
SET PEEP: 20 MMHG
SET PRESS SUPP: 36 CMH2O
SET RESPIRATORY RATE: 16 BPM
SET RESPIRATORY RATE: 20 BPM
SODIUM BLD-SCNC: 135 MEQ/L (ref 135–145)
SODIUM BLD-SCNC: 136 MEQ/L (ref 135–145)
SODIUM BLD-SCNC: 136 MEQ/L (ref 135–145)
SODIUM BLD-SCNC: 137 MEQ/L (ref 135–145)
SODIUM BLD-SCNC: 138 MEQ/L (ref 135–145)
SODIUM BLD-SCNC: 139 MEQ/L (ref 135–145)
SODIUM BLD-SCNC: 140 MEQ/L (ref 135–145)
SODIUM BLD-SCNC: 141 MEQ/L (ref 135–145)
SODIUM BLD-SCNC: 142 MEQ/L (ref 135–145)
SODIUM BLD-SCNC: 143 MEQ/L (ref 135–145)
SODIUM BLD-SCNC: 145 MEQ/L (ref 135–145)
SODIUM BLD-SCNC: 145 MEQ/L (ref 135–145)
SODIUM BLD-SCNC: 146 MEQ/L (ref 135–145)
SODIUM, WHOLE BLOOD: 137 MEQ/L (ref 138–146)
SODIUM, WHOLE BLOOD: 143 MEQ/L (ref 138–146)
SOURCE, BLOOD GAS: ABNORMAL
SOURCE: ABNORMAL
SPECIFIC GRAVITY, URINE: 1.03 (ref 1–1.03)
SPECIMEN ACCEPTABILITY: ABNORMAL
STAPH AUREUS BY PCR: DETECTED
STREP AGALACTIAE BY PCR: NOT DETECTED
STREP PNEUMONIAE BY PCR: NOT DETECTED
STREP PYOGENES BY PCR: NOT DETECTED
TOTAL PROTEIN: 6.2 G/DL (ref 6.1–8)
TOTAL PROTEIN: 6.5 G/DL (ref 6.1–8)
TOTAL PROTEIN: 6.5 G/DL (ref 6.1–8)
TOTAL PROTEIN: 6.6 G/DL (ref 6.2–8)
TOTAL PROTEIN: 7.2 G/DL (ref 6.1–8)
TOXIC GRANULATION: PRESENT
TOXIC GRANULATION: PRESENT
TRIGL SERPL-MCNC: 191 MG/DL (ref 0–199)
TRIGL SERPL-MCNC: 257 MG/DL (ref 0–199)
TRIGL SERPL-MCNC: 353 MG/DL (ref 0–199)
TRIGL SERPL-MCNC: 455 MG/DL (ref 0–199)
TRIGL SERPL-MCNC: 652 MG/DL (ref 0–199)
TROPONIN T: < 0.01 NG/ML
TROPONIN T: < 0.01 NG/ML
UROBILINOGEN, URINE: 1 EU/DL (ref 0–1)
WBC # BLD: 12.1 THOU/MM3 (ref 4.8–10.8)
WBC # BLD: 13.3 THOU/MM3 (ref 4.8–10.8)
WBC # BLD: 15.2 THOU/MM3 (ref 4.8–10.8)
WBC # BLD: 15.6 THOU/MM3 (ref 4.8–10.8)
WBC # BLD: 15.9 THOU/MM3 (ref 4.8–10.8)
WBC # BLD: 20.7 THOU/MM3 (ref 4.8–10.8)
WBC # BLD: 21.2 THOU/MM3 (ref 4.8–10.8)
WBC # BLD: 21.9 THOU/MM3 (ref 4.8–10.8)
WBC # BLD: 24.2 THOU/MM3 (ref 4.8–10.8)
WBC # BLD: 24.8 THOU/MM3 (ref 4.8–10.8)
WBC # BLD: 25.4 THOU/MM3 (ref 4.8–10.8)
WBC # BLD: 25.8 THOU/MM3 (ref 4.8–10.8)
WBC # BLD: 4.9 THOU/MM3 (ref 4.8–10.8)
WBC # BLD: 5.1 THOU/MM3 (ref 4.8–10.8)
WBC # BLD: 6.7 THOU/MM3 (ref 4.8–10.8)
WBC # BLD: 9.3 THOU/MM3 (ref 4.8–10.8)
WBC # BLD: 9.4 TH/CMM (ref 4.4–10.5)
WBC # BLD: 9.4 THOU/MM3 (ref 4.8–10.8)
WBC UA: ABNORMAL /HPF
YEAST: ABNORMAL

## 2021-01-01 PROCEDURE — 2500000003 HC RX 250 WO HCPCS: Performed by: STUDENT IN AN ORGANIZED HEALTH CARE EDUCATION/TRAINING PROGRAM

## 2021-01-01 PROCEDURE — 99213 OFFICE O/P EST LOW 20 MIN: CPT | Performed by: OTOLARYNGOLOGY

## 2021-01-01 PROCEDURE — 31575 DIAGNOSTIC LARYNGOSCOPY: CPT | Performed by: OTOLARYNGOLOGY

## 2021-01-01 PROCEDURE — 84478 ASSAY OF TRIGLYCERIDES: CPT

## 2021-01-01 PROCEDURE — 99233 SBSQ HOSP IP/OBS HIGH 50: CPT | Performed by: INTERNAL MEDICINE

## 2021-01-01 PROCEDURE — 93005 ELECTROCARDIOGRAM TRACING: CPT | Performed by: STUDENT IN AN ORGANIZED HEALTH CARE EDUCATION/TRAINING PROGRAM

## 2021-01-01 PROCEDURE — 83615 LACTATE (LD) (LDH) ENZYME: CPT

## 2021-01-01 PROCEDURE — 31622 DX BRONCHOSCOPE/WASH: CPT

## 2021-01-01 PROCEDURE — 6370000000 HC RX 637 (ALT 250 FOR IP): Performed by: STUDENT IN AN ORGANIZED HEALTH CARE EDUCATION/TRAINING PROGRAM

## 2021-01-01 PROCEDURE — 87102 FUNGUS ISOLATION CULTURE: CPT

## 2021-01-01 PROCEDURE — G8417 CALC BMI ABV UP PARAM F/U: HCPCS | Performed by: PHYSICIAN ASSISTANT

## 2021-01-01 PROCEDURE — 85025 COMPLETE CBC W/AUTO DIFF WBC: CPT

## 2021-01-01 PROCEDURE — 1036F TOBACCO NON-USER: CPT | Performed by: PHYSICIAN ASSISTANT

## 2021-01-01 PROCEDURE — 87077 CULTURE AEROBIC IDENTIFY: CPT

## 2021-01-01 PROCEDURE — 2580000003 HC RX 258: Performed by: INTERNAL MEDICINE

## 2021-01-01 PROCEDURE — 6370000000 HC RX 637 (ALT 250 FOR IP): Performed by: NURSE PRACTITIONER

## 2021-01-01 PROCEDURE — 6360000002 HC RX W HCPCS: Performed by: NURSE PRACTITIONER

## 2021-01-01 PROCEDURE — 84132 ASSAY OF SERUM POTASSIUM: CPT

## 2021-01-01 PROCEDURE — 99223 1ST HOSP IP/OBS HIGH 75: CPT | Performed by: INTERNAL MEDICINE

## 2021-01-01 PROCEDURE — 82533 TOTAL CORTISOL: CPT

## 2021-01-01 PROCEDURE — 99291 CRITICAL CARE FIRST HOUR: CPT | Performed by: INTERNAL MEDICINE

## 2021-01-01 PROCEDURE — 94760 N-INVAS EAR/PLS OXIMETRY 1: CPT

## 2021-01-01 PROCEDURE — 6370000000 HC RX 637 (ALT 250 FOR IP): Performed by: INTERNAL MEDICINE

## 2021-01-01 PROCEDURE — 71275 CT ANGIOGRAPHY CHEST: CPT

## 2021-01-01 PROCEDURE — C1713 ANCHOR/SCREW BN/BN,TIS/BN: HCPCS | Performed by: OTOLARYNGOLOGY

## 2021-01-01 PROCEDURE — 85379 FIBRIN DEGRADATION QUANT: CPT

## 2021-01-01 PROCEDURE — G8427 DOCREV CUR MEDS BY ELIG CLIN: HCPCS | Performed by: NURSE PRACTITIONER

## 2021-01-01 PROCEDURE — 2709999900 HC NON-CHARGEABLE SUPPLY: Performed by: OTOLARYNGOLOGY

## 2021-01-01 PROCEDURE — 2580000003 HC RX 258: Performed by: NURSE PRACTITIONER

## 2021-01-01 PROCEDURE — G8417 CALC BMI ABV UP PARAM F/U: HCPCS | Performed by: OTOLARYNGOLOGY

## 2021-01-01 PROCEDURE — 2060000000 HC ICU INTERMEDIATE R&B

## 2021-01-01 PROCEDURE — 70450 CT HEAD/BRAIN W/O DYE: CPT

## 2021-01-01 PROCEDURE — 99213 OFFICE O/P EST LOW 20 MIN: CPT | Performed by: PHYSICIAN ASSISTANT

## 2021-01-01 PROCEDURE — 87075 CULTR BACTERIA EXCEPT BLOOD: CPT

## 2021-01-01 PROCEDURE — 2500000003 HC RX 250 WO HCPCS: Performed by: NURSE PRACTITIONER

## 2021-01-01 PROCEDURE — 2100000000 HC CCU R&B

## 2021-01-01 PROCEDURE — 82565 ASSAY OF CREATININE: CPT

## 2021-01-01 PROCEDURE — 3600000004 HC SURGERY LEVEL 4 BASE: Performed by: OTOLARYNGOLOGY

## 2021-01-01 PROCEDURE — G8427 DOCREV CUR MEDS BY ELIG CLIN: HCPCS | Performed by: OTOLARYNGOLOGY

## 2021-01-01 PROCEDURE — XW0DXM6 INTRODUCTION OF BARICITINIB INTO MOUTH AND PHARYNX, EXTERNAL APPROACH, NEW TECHNOLOGY GROUP 6: ICD-10-PCS | Performed by: INTERNAL MEDICINE

## 2021-01-01 PROCEDURE — 99211 OFF/OP EST MAY X REQ PHY/QHP: CPT

## 2021-01-01 PROCEDURE — 7100000011 HC PHASE II RECOVERY - ADDTL 15 MIN: Performed by: OTOLARYNGOLOGY

## 2021-01-01 PROCEDURE — 99213 OFFICE O/P EST LOW 20 MIN: CPT | Performed by: NURSE PRACTITIONER

## 2021-01-01 PROCEDURE — 6360000002 HC RX W HCPCS: Performed by: STUDENT IN AN ORGANIZED HEALTH CARE EDUCATION/TRAINING PROGRAM

## 2021-01-01 PROCEDURE — G8484 FLU IMMUNIZE NO ADMIN: HCPCS | Performed by: OTOLARYNGOLOGY

## 2021-01-01 PROCEDURE — 83605 ASSAY OF LACTIC ACID: CPT

## 2021-01-01 PROCEDURE — 1036F TOBACCO NON-USER: CPT | Performed by: NURSE PRACTITIONER

## 2021-01-01 PROCEDURE — 80048 BASIC METABOLIC PNL TOTAL CA: CPT

## 2021-01-01 PROCEDURE — 71045 X-RAY EXAM CHEST 1 VIEW: CPT

## 2021-01-01 PROCEDURE — 6360000002 HC RX W HCPCS: Performed by: INTERNAL MEDICINE

## 2021-01-01 PROCEDURE — 6360000002 HC RX W HCPCS: Performed by: NURSE ANESTHETIST, CERTIFIED REGISTERED

## 2021-01-01 PROCEDURE — 87205 SMEAR GRAM STAIN: CPT

## 2021-01-01 PROCEDURE — 36415 COLL VENOUS BLD VENIPUNCTURE: CPT

## 2021-01-01 PROCEDURE — U0003 INFECTIOUS AGENT DETECTION BY NUCLEIC ACID (DNA OR RNA); SEVERE ACUTE RESPIRATORY SYNDROME CORONAVIRUS 2 (SARS-COV-2) (CORONAVIRUS DISEASE [COVID-19]), AMPLIFIED PROBE TECHNIQUE, MAKING USE OF HIGH THROUGHPUT TECHNOLOGIES AS DESCRIBED BY CMS-2020-01-R: HCPCS

## 2021-01-01 PROCEDURE — 3023F SPIROM DOC REV: CPT | Performed by: OTOLARYNGOLOGY

## 2021-01-01 PROCEDURE — 2500000003 HC RX 250 WO HCPCS: Performed by: INTERNAL MEDICINE

## 2021-01-01 PROCEDURE — 85610 PROTHROMBIN TIME: CPT

## 2021-01-01 PROCEDURE — 99214 OFFICE O/P EST MOD 30 MIN: CPT | Performed by: OTOLARYNGOLOGY

## 2021-01-01 PROCEDURE — 6370000000 HC RX 637 (ALT 250 FOR IP): Performed by: PHYSICIAN ASSISTANT

## 2021-01-01 PROCEDURE — 87486 CHLMYD PNEUM DNA AMP PROBE: CPT

## 2021-01-01 PROCEDURE — 94003 VENT MGMT INPAT SUBQ DAY: CPT

## 2021-01-01 PROCEDURE — 2580000003 HC RX 258: Performed by: STUDENT IN AN ORGANIZED HEALTH CARE EDUCATION/TRAINING PROGRAM

## 2021-01-01 PROCEDURE — 99232 SBSQ HOSP IP/OBS MODERATE 35: CPT | Performed by: INTERNAL MEDICINE

## 2021-01-01 PROCEDURE — 87147 CULTURE TYPE IMMUNOLOGIC: CPT

## 2021-01-01 PROCEDURE — 1036F TOBACCO NON-USER: CPT | Performed by: OTOLARYNGOLOGY

## 2021-01-01 PROCEDURE — G8926 SPIRO NO PERF OR DOC: HCPCS | Performed by: OTOLARYNGOLOGY

## 2021-01-01 PROCEDURE — 82948 REAGENT STRIP/BLOOD GLUCOSE: CPT

## 2021-01-01 PROCEDURE — 88305 TISSUE EXAM BY PATHOLOGIST: CPT

## 2021-01-01 PROCEDURE — 5A1955Z RESPIRATORY VENTILATION, GREATER THAN 96 CONSECUTIVE HOURS: ICD-10-PCS | Performed by: INTERNAL MEDICINE

## 2021-01-01 PROCEDURE — 6370000000 HC RX 637 (ALT 250 FOR IP): Performed by: OTOLARYNGOLOGY

## 2021-01-01 PROCEDURE — 6370000000 HC RX 637 (ALT 250 FOR IP)

## 2021-01-01 PROCEDURE — 43450 DILATE ESOPHAGUS 1/MULT PASS: CPT | Performed by: OTOLARYNGOLOGY

## 2021-01-01 PROCEDURE — 6360000002 HC RX W HCPCS: Performed by: ANESTHESIOLOGY

## 2021-01-01 PROCEDURE — 87186 SC STD MICRODIL/AGAR DIL: CPT

## 2021-01-01 PROCEDURE — 31641 BRONCHOSCOPY TREAT BLOCKAGE: CPT | Performed by: OTOLARYNGOLOGY

## 2021-01-01 PROCEDURE — G8484 FLU IMMUNIZE NO ADMIN: HCPCS | Performed by: NURSE PRACTITIONER

## 2021-01-01 PROCEDURE — 82803 BLOOD GASES ANY COMBINATION: CPT

## 2021-01-01 PROCEDURE — 3017F COLORECTAL CA SCREEN DOC REV: CPT | Performed by: OTOLARYNGOLOGY

## 2021-01-01 PROCEDURE — 87040 BLOOD CULTURE FOR BACTERIA: CPT

## 2021-01-01 PROCEDURE — 36600 WITHDRAWAL OF ARTERIAL BLOOD: CPT

## 2021-01-01 PROCEDURE — 6360000002 HC RX W HCPCS: Performed by: OTOLARYNGOLOGY

## 2021-01-01 PROCEDURE — 6360000004 HC RX CONTRAST MEDICATION: Performed by: EMERGENCY MEDICINE

## 2021-01-01 PROCEDURE — 3017F COLORECTAL CA SCREEN DOC REV: CPT | Performed by: PHYSICIAN ASSISTANT

## 2021-01-01 PROCEDURE — 7100000000 HC PACU RECOVERY - FIRST 15 MIN: Performed by: OTOLARYNGOLOGY

## 2021-01-01 PROCEDURE — 94640 AIRWAY INHALATION TREATMENT: CPT

## 2021-01-01 PROCEDURE — 6360000002 HC RX W HCPCS: Performed by: EMERGENCY MEDICINE

## 2021-01-01 PROCEDURE — 3700000001 HC ADD 15 MINUTES (ANESTHESIA): Performed by: OTOLARYNGOLOGY

## 2021-01-01 PROCEDURE — 99212 OFFICE O/P EST SF 10 MIN: CPT

## 2021-01-01 PROCEDURE — 81001 URINALYSIS AUTO W/SCOPE: CPT

## 2021-01-01 PROCEDURE — 7100000010 HC PHASE II RECOVERY - FIRST 15 MIN: Performed by: OTOLARYNGOLOGY

## 2021-01-01 PROCEDURE — 88312 SPECIAL STAINS GROUP 1: CPT

## 2021-01-01 PROCEDURE — 99284 EMERGENCY DEPT VISIT MOD MDM: CPT

## 2021-01-01 PROCEDURE — 2500000003 HC RX 250 WO HCPCS: Performed by: NURSE ANESTHETIST, CERTIFIED REGISTERED

## 2021-01-01 PROCEDURE — 99285 EMERGENCY DEPT VISIT HI MDM: CPT

## 2021-01-01 PROCEDURE — 3600000014 HC SURGERY LEVEL 4 ADDTL 15MIN: Performed by: OTOLARYNGOLOGY

## 2021-01-01 PROCEDURE — 87070 CULTURE OTHR SPECIMN AEROBIC: CPT

## 2021-01-01 PROCEDURE — 93010 ELECTROCARDIOGRAM REPORT: CPT | Performed by: NUCLEAR MEDICINE

## 2021-01-01 PROCEDURE — 80053 COMPREHEN METABOLIC PANEL: CPT

## 2021-01-01 PROCEDURE — 31575 DIAGNOSTIC LARYNGOSCOPY: CPT

## 2021-01-01 PROCEDURE — 31536 LARYNGOSCOPY W/BX & OP SCOPE: CPT | Performed by: OTOLARYNGOLOGY

## 2021-01-01 PROCEDURE — 86140 C-REACTIVE PROTEIN: CPT

## 2021-01-01 PROCEDURE — 31546 REMOVE VC LESION SCOPE/GRAFT: CPT | Performed by: OTOLARYNGOLOGY

## 2021-01-01 PROCEDURE — 71250 CT THORAX DX C-: CPT

## 2021-01-01 PROCEDURE — 84295 ASSAY OF SERUM SODIUM: CPT

## 2021-01-01 PROCEDURE — 2580000003 HC RX 258: Performed by: EMERGENCY MEDICINE

## 2021-01-01 PROCEDURE — 87635 SARS-COV-2 COVID-19 AMP PRB: CPT

## 2021-01-01 PROCEDURE — 94761 N-INVAS EAR/PLS OXIMETRY MLT: CPT

## 2021-01-01 PROCEDURE — 3700000000 HC ANESTHESIA ATTENDED CARE: Performed by: OTOLARYNGOLOGY

## 2021-01-01 PROCEDURE — 82435 ASSAY OF BLOOD CHLORIDE: CPT

## 2021-01-01 PROCEDURE — 82947 ASSAY GLUCOSE BLOOD QUANT: CPT

## 2021-01-01 PROCEDURE — 2700000000 HC OXYGEN THERAPY PER DAY

## 2021-01-01 PROCEDURE — 7100000001 HC PACU RECOVERY - ADDTL 15 MIN: Performed by: OTOLARYNGOLOGY

## 2021-01-01 PROCEDURE — 6360000002 HC RX W HCPCS: Performed by: PHYSICIAN ASSISTANT

## 2021-01-01 PROCEDURE — 31541 LARYNSCOP W/TUMR EXC + SCOPE: CPT | Performed by: OTOLARYNGOLOGY

## 2021-01-01 PROCEDURE — 6360000002 HC RX W HCPCS

## 2021-01-01 PROCEDURE — 87541 LEGION PNEUMO DNA AMP PROB: CPT

## 2021-01-01 PROCEDURE — 87798 DETECT AGENT NOS DNA AMP: CPT

## 2021-01-01 PROCEDURE — 82728 ASSAY OF FERRITIN: CPT

## 2021-01-01 PROCEDURE — 82330 ASSAY OF CALCIUM: CPT

## 2021-01-01 PROCEDURE — 87581 M.PNEUMON DNA AMP PROBE: CPT

## 2021-01-01 PROCEDURE — 84484 ASSAY OF TROPONIN QUANT: CPT

## 2021-01-01 PROCEDURE — 6370000000 HC RX 637 (ALT 250 FOR IP): Performed by: EMERGENCY MEDICINE

## 2021-01-01 PROCEDURE — 87150 DNA/RNA AMPLIFIED PROBE: CPT

## 2021-01-01 PROCEDURE — 99215 OFFICE O/P EST HI 40 MIN: CPT | Performed by: OTOLARYNGOLOGY

## 2021-01-01 PROCEDURE — 96374 THER/PROPH/DIAG INJ IV PUSH: CPT

## 2021-01-01 PROCEDURE — 84145 PROCALCITONIN (PCT): CPT

## 2021-01-01 PROCEDURE — 84450 TRANSFERASE (AST) (SGOT): CPT

## 2021-01-01 PROCEDURE — 89220 SPUTUM SPECIMEN COLLECTION: CPT

## 2021-01-01 PROCEDURE — 3017F COLORECTAL CA SCREEN DOC REV: CPT | Performed by: NURSE PRACTITIONER

## 2021-01-01 PROCEDURE — 2720000010 HC SURG SUPPLY STERILE: Performed by: OTOLARYNGOLOGY

## 2021-01-01 PROCEDURE — 83735 ASSAY OF MAGNESIUM: CPT

## 2021-01-01 PROCEDURE — 87106 FUNGI IDENTIFICATION YEAST: CPT

## 2021-01-01 PROCEDURE — 80061 LIPID PANEL: CPT

## 2021-01-01 PROCEDURE — 3023F SPIROM DOC REV: CPT | Performed by: NURSE PRACTITIONER

## 2021-01-01 PROCEDURE — 85730 THROMBOPLASTIN TIME PARTIAL: CPT

## 2021-01-01 PROCEDURE — 31540 LARYNGOSCOPY W/EXC OF TUMOR: CPT | Performed by: OTOLARYNGOLOGY

## 2021-01-01 PROCEDURE — 94002 VENT MGMT INPAT INIT DAY: CPT

## 2021-01-01 PROCEDURE — 3023F SPIROM DOC REV: CPT | Performed by: PHYSICIAN ASSISTANT

## 2021-01-01 PROCEDURE — 2580000003 HC RX 258: Performed by: NURSE ANESTHETIST, CERTIFIED REGISTERED

## 2021-01-01 PROCEDURE — 83880 ASSAY OF NATRIURETIC PEPTIDE: CPT

## 2021-01-01 PROCEDURE — 84460 ALANINE AMINO (ALT) (SGPT): CPT

## 2021-01-01 PROCEDURE — 71271 CT THORAX LUNG CANCER SCR C-: CPT

## 2021-01-01 PROCEDURE — 31573 LARGSC W/THER INJECTION: CPT | Performed by: OTOLARYNGOLOGY

## 2021-01-01 PROCEDURE — 0BH18EZ INSERTION OF ENDOTRACHEAL AIRWAY INTO TRACHEA, VIA NATURAL OR ARTIFICIAL OPENING ENDOSCOPIC: ICD-10-PCS | Performed by: INTERNAL MEDICINE

## 2021-01-01 PROCEDURE — G8427 DOCREV CUR MEDS BY ELIG CLIN: HCPCS | Performed by: PHYSICIAN ASSISTANT

## 2021-01-01 PROCEDURE — 31625 BRONCHOSCOPY W/BIOPSY(S): CPT

## 2021-01-01 PROCEDURE — 31515 LARYNGOSCOPY FOR ASPIRATION: CPT | Performed by: OTOLARYNGOLOGY

## 2021-01-01 PROCEDURE — 94010 BREATHING CAPACITY TEST: CPT

## 2021-01-01 PROCEDURE — 31500 INSERT EMERGENCY AIRWAY: CPT | Performed by: NURSE PRACTITIONER

## 2021-01-01 PROCEDURE — G8417 CALC BMI ABV UP PARAM F/U: HCPCS | Performed by: NURSE PRACTITIONER

## 2021-01-01 PROCEDURE — 87631 RESP VIRUS 3-5 TARGETS: CPT

## 2021-01-01 PROCEDURE — 85027 COMPLETE CBC AUTOMATED: CPT

## 2021-01-01 PROCEDURE — 93005 ELECTROCARDIOGRAM TRACING: CPT | Performed by: EMERGENCY MEDICINE

## 2021-01-01 PROCEDURE — C1751 CATH, INF, PER/CENT/MIDLINE: HCPCS

## 2021-01-01 PROCEDURE — 93970 EXTREMITY STUDY: CPT

## 2021-01-01 PROCEDURE — 94660 CPAP INITIATION&MGMT: CPT

## 2021-01-01 PROCEDURE — 2580000003 HC RX 258: Performed by: OTOLARYNGOLOGY

## 2021-01-01 PROCEDURE — 93925 LOWER EXTREMITY STUDY: CPT

## 2021-01-01 PROCEDURE — 76937 US GUIDE VASCULAR ACCESS: CPT

## 2021-01-01 PROCEDURE — 74018 RADEX ABDOMEN 1 VIEW: CPT

## 2021-01-01 PROCEDURE — 82248 BILIRUBIN DIRECT: CPT

## 2021-01-01 PROCEDURE — 93010 ELECTROCARDIOGRAM REPORT: CPT | Performed by: INTERNAL MEDICINE

## 2021-01-01 PROCEDURE — 31622 DX BRONCHOSCOPE/WASH: CPT | Performed by: OTOLARYNGOLOGY

## 2021-01-01 PROCEDURE — C1726 CATH, BAL DIL, NON-VASCULAR: HCPCS | Performed by: OTOLARYNGOLOGY

## 2021-01-01 PROCEDURE — 36569 INSJ PICC 5 YR+ W/O IMAGING: CPT

## 2021-01-01 PROCEDURE — 88342 IMHCHEM/IMCYTCHM 1ST ANTB: CPT

## 2021-01-01 PROCEDURE — 05HC33Z INSERTION OF INFUSION DEVICE INTO LEFT BASILIC VEIN, PERCUTANEOUS APPROACH: ICD-10-PCS | Performed by: INTERNAL MEDICINE

## 2021-01-01 PROCEDURE — G8926 SPIRO NO PERF OR DOC: HCPCS | Performed by: PHYSICIAN ASSISTANT

## 2021-01-01 PROCEDURE — 31529 LARYNGOSCOPY AND DILATION: CPT | Performed by: OTOLARYNGOLOGY

## 2021-01-01 PROCEDURE — G8484 FLU IMMUNIZE NO ADMIN: HCPCS | Performed by: PHYSICIAN ASSISTANT

## 2021-01-01 PROCEDURE — G8926 SPIRO NO PERF OR DOC: HCPCS | Performed by: NURSE PRACTITIONER

## 2021-01-01 PROCEDURE — 99291 CRITICAL CARE FIRST HOUR: CPT | Performed by: SURGERY

## 2021-01-01 RX ORDER — FENTANYL CITRATE 50 UG/ML
INJECTION, SOLUTION INTRAMUSCULAR; INTRAVENOUS
Status: COMPLETED
Start: 2021-01-01 | End: 2021-01-01

## 2021-01-01 RX ORDER — METOCLOPRAMIDE HYDROCHLORIDE 5 MG/ML
10 INJECTION INTRAMUSCULAR; INTRAVENOUS
Status: DISCONTINUED | OUTPATIENT
Start: 2021-01-01 | End: 2021-01-01 | Stop reason: HOSPADM

## 2021-01-01 RX ORDER — ONDANSETRON 2 MG/ML
INJECTION INTRAMUSCULAR; INTRAVENOUS PRN
Status: DISCONTINUED | OUTPATIENT
Start: 2021-01-01 | End: 2021-01-01 | Stop reason: SDUPTHER

## 2021-01-01 RX ORDER — IBUPROFEN 200 MG
TABLET ORAL
Status: DISCONTINUED
Start: 2021-01-01 | End: 2021-01-01

## 2021-01-01 RX ORDER — VORICONAZOLE 200 MG/1
200 TABLET, FILM COATED ORAL 2 TIMES DAILY
Status: DISCONTINUED | OUTPATIENT
Start: 2021-01-01 | End: 2021-12-01 | Stop reason: HOSPADM

## 2021-01-01 RX ORDER — FENTANYL CITRATE 50 UG/ML
50 INJECTION, SOLUTION INTRAMUSCULAR; INTRAVENOUS EVERY 5 MIN PRN
Status: COMPLETED | OUTPATIENT
Start: 2021-01-01 | End: 2021-01-01

## 2021-01-01 RX ORDER — SODIUM CHLORIDE 9 MG/ML
INJECTION, SOLUTION INTRAVENOUS CONTINUOUS
Status: DISCONTINUED | OUTPATIENT
Start: 2021-01-01 | End: 2021-01-01 | Stop reason: HOSPADM

## 2021-01-01 RX ORDER — BUDESONIDE AND FORMOTEROL FUMARATE DIHYDRATE 160; 4.5 UG/1; UG/1
2 AEROSOL RESPIRATORY (INHALATION) 2 TIMES DAILY
Status: DISCONTINUED | OUTPATIENT
Start: 2021-01-01 | End: 2021-01-01

## 2021-01-01 RX ORDER — TRAMADOL HYDROCHLORIDE 50 MG/1
50 TABLET ORAL EVERY 6 HOURS PRN
Qty: 12 TABLET | Refills: 0 | Status: SHIPPED | OUTPATIENT
Start: 2021-01-01 | End: 2021-01-01

## 2021-01-01 RX ORDER — EPHEDRINE SULFATE/0.9% NACL/PF 50 MG/5 ML
SYRINGE (ML) INTRAVENOUS PRN
Status: DISCONTINUED | OUTPATIENT
Start: 2021-01-01 | End: 2021-01-01 | Stop reason: SDUPTHER

## 2021-01-01 RX ORDER — FENTANYL CITRATE 50 UG/ML
25 INJECTION, SOLUTION INTRAMUSCULAR; INTRAVENOUS EVERY 5 MIN PRN
Status: DISCONTINUED | OUTPATIENT
Start: 2021-01-01 | End: 2021-01-01 | Stop reason: HOSPADM

## 2021-01-01 RX ORDER — BISACODYL 10 MG
10 SUPPOSITORY, RECTAL RECTAL DAILY
Status: DISCONTINUED | OUTPATIENT
Start: 2021-01-01 | End: 2021-01-01

## 2021-01-01 RX ORDER — METOCLOPRAMIDE 10 MG/1
10 TABLET ORAL
Status: DISCONTINUED | OUTPATIENT
Start: 2021-01-01 | End: 2021-12-01 | Stop reason: HOSPADM

## 2021-01-01 RX ORDER — CALCIUM GLUCONATE 94 MG/ML
1000 INJECTION, SOLUTION INTRAVENOUS ONCE
Status: DISCONTINUED | OUTPATIENT
Start: 2021-01-01 | End: 2021-01-01 | Stop reason: SDUPTHER

## 2021-01-01 RX ORDER — SULFAMETHOXAZOLE AND TRIMETHOPRIM 800; 160 MG/1; MG/1
1 TABLET ORAL 2 TIMES DAILY
Qty: 56 TABLET | Refills: 0 | Status: SHIPPED | OUTPATIENT
Start: 2021-01-01 | End: 2021-01-01

## 2021-01-01 RX ORDER — ACETYLCYSTEINE 200 MG/ML
600 SOLUTION ORAL; RESPIRATORY (INHALATION) 2 TIMES DAILY
Status: DISCONTINUED | OUTPATIENT
Start: 2021-01-01 | End: 2021-01-01

## 2021-01-01 RX ORDER — VORICONAZOLE 200 MG/1
200 TABLET, FILM COATED ORAL 2 TIMES DAILY
Qty: 60 TABLET | Refills: 1 | Status: SHIPPED | OUTPATIENT
Start: 2021-01-01 | End: 2021-01-01

## 2021-01-01 RX ORDER — MIDAZOLAM HYDROCHLORIDE 1 MG/ML
INJECTION INTRAMUSCULAR; INTRAVENOUS PRN
Status: DISCONTINUED | OUTPATIENT
Start: 2021-01-01 | End: 2021-01-01 | Stop reason: SDUPTHER

## 2021-01-01 RX ORDER — FENTANYL CITRATE 50 UG/ML
INJECTION, SOLUTION INTRAMUSCULAR; INTRAVENOUS PRN
Status: DISCONTINUED | OUTPATIENT
Start: 2021-01-01 | End: 2021-01-01 | Stop reason: SDUPTHER

## 2021-01-01 RX ORDER — HYDROCODONE BITARTRATE AND ACETAMINOPHEN 5; 325 MG/1; MG/1
1 TABLET ORAL EVERY 6 HOURS PRN
Qty: 20 TABLET | Refills: 0 | Status: SHIPPED | OUTPATIENT
Start: 2021-01-01 | End: 2021-01-01

## 2021-01-01 RX ORDER — LIDOCAINE HYDROCHLORIDE 40 MG/ML
SOLUTION TOPICAL ONCE
Status: COMPLETED | OUTPATIENT
Start: 2021-01-01 | End: 2021-01-01

## 2021-01-01 RX ORDER — OXYCODONE HYDROCHLORIDE AND ACETAMINOPHEN 5; 325 MG/1; MG/1
1 TABLET ORAL ONCE
Status: COMPLETED | OUTPATIENT
Start: 2021-01-01 | End: 2021-01-01

## 2021-01-01 RX ORDER — EPINEPHRINE 1 MG/ML
INJECTION, SOLUTION, CONCENTRATE INTRAVENOUS PRN
Status: DISCONTINUED | OUTPATIENT
Start: 2021-01-01 | End: 2021-01-01 | Stop reason: ALTCHOICE

## 2021-01-01 RX ORDER — POLYETHYLENE GLYCOL 3350 17 G/17G
17 POWDER, FOR SOLUTION ORAL DAILY
Status: DISCONTINUED | OUTPATIENT
Start: 2021-01-01 | End: 2021-12-01 | Stop reason: HOSPADM

## 2021-01-01 RX ORDER — ROCURONIUM BROMIDE 10 MG/ML
INJECTION, SOLUTION INTRAVENOUS PRN
Status: DISCONTINUED | OUTPATIENT
Start: 2021-01-01 | End: 2021-01-01 | Stop reason: SDUPTHER

## 2021-01-01 RX ORDER — DEXAMETHASONE SODIUM PHOSPHATE 4 MG/ML
INJECTION, SOLUTION INTRA-ARTICULAR; INTRALESIONAL; INTRAMUSCULAR; INTRAVENOUS; SOFT TISSUE PRN
Status: DISCONTINUED | OUTPATIENT
Start: 2021-01-01 | End: 2021-01-01 | Stop reason: SDUPTHER

## 2021-01-01 RX ORDER — LIDOCAINE HCL/PF 100 MG/5ML
SYRINGE (ML) INJECTION PRN
Status: DISCONTINUED | OUTPATIENT
Start: 2021-01-01 | End: 2021-01-01 | Stop reason: SDUPTHER

## 2021-01-01 RX ORDER — EPINEPHRINE 0.1 MG/ML
SYRINGE (ML) INJECTION PRN
Status: DISCONTINUED | OUTPATIENT
Start: 2021-01-01 | End: 2021-01-01 | Stop reason: ALTCHOICE

## 2021-01-01 RX ORDER — PROMETHAZINE HYDROCHLORIDE 25 MG/ML
12.5 INJECTION, SOLUTION INTRAMUSCULAR; INTRAVENOUS
Status: DISCONTINUED | OUTPATIENT
Start: 2021-01-01 | End: 2021-01-01 | Stop reason: HOSPADM

## 2021-01-01 RX ORDER — TIOTROPIUM BROMIDE INHALATION SPRAY 3.12 UG/1
SPRAY, METERED RESPIRATORY (INHALATION)
Qty: 12 G | Refills: 0 | Status: SHIPPED | OUTPATIENT
Start: 2021-01-01 | End: 2021-01-01

## 2021-01-01 RX ORDER — DEXTROSE MONOHYDRATE 25 G/50ML
25 INJECTION, SOLUTION INTRAVENOUS ONCE
Status: COMPLETED | OUTPATIENT
Start: 2021-01-01 | End: 2021-01-01

## 2021-01-01 RX ORDER — OXYCODONE AND ACETAMINOPHEN 7.5; 325 MG/1; MG/1
1 TABLET ORAL EVERY 6 HOURS PRN
Qty: 12 TABLET | Refills: 0 | Status: SHIPPED | OUTPATIENT
Start: 2021-01-01 | End: 2021-01-01

## 2021-01-01 RX ORDER — FENTANYL CITRATE 50 UG/ML
50 INJECTION, SOLUTION INTRAMUSCULAR; INTRAVENOUS EVERY 5 MIN PRN
Status: DISCONTINUED | OUTPATIENT
Start: 2021-01-01 | End: 2021-01-01 | Stop reason: HOSPADM

## 2021-01-01 RX ORDER — SODIUM CHLORIDE 0.9 % (FLUSH) 0.9 %
5-40 SYRINGE (ML) INJECTION PRN
Status: CANCELLED | OUTPATIENT
Start: 2021-01-01

## 2021-01-01 RX ORDER — PROPOFOL 10 MG/ML
INJECTION, EMULSION INTRAVENOUS PRN
Status: DISCONTINUED | OUTPATIENT
Start: 2021-01-01 | End: 2021-01-01 | Stop reason: SDUPTHER

## 2021-01-01 RX ORDER — OXYCODONE HYDROCHLORIDE AND ACETAMINOPHEN 5; 325 MG/1; MG/1
1 TABLET ORAL ONCE
Status: DISCONTINUED | OUTPATIENT
Start: 2021-01-01 | End: 2021-01-01 | Stop reason: SDUPTHER

## 2021-01-01 RX ORDER — DEXTROSE MONOHYDRATE 50 MG/ML
100 INJECTION, SOLUTION INTRAVENOUS PRN
Status: DISCONTINUED | OUTPATIENT
Start: 2021-01-01 | End: 2021-01-01 | Stop reason: SDUPTHER

## 2021-01-01 RX ORDER — MULTIVIT WITH MINERALS/LUTEIN
500 TABLET ORAL DAILY
COMMUNITY
End: 2021-01-01

## 2021-01-01 RX ORDER — KETAMINE HCL IN NACL, ISO-OSM 100MG/10ML
100 SYRINGE (ML) INJECTION ONCE
Status: DISCONTINUED | OUTPATIENT
Start: 2021-01-01 | End: 2021-01-01

## 2021-01-01 RX ORDER — SULFAMETHOXAZOLE AND TRIMETHOPRIM 800; 160 MG/1; MG/1
1 TABLET ORAL 2 TIMES DAILY
Qty: 28 TABLET | Refills: 0 | Status: SHIPPED | OUTPATIENT
Start: 2021-01-01 | End: 2021-01-01

## 2021-01-01 RX ORDER — LIDOCAINE HYDROCHLORIDE 20 MG/ML
INJECTION, SOLUTION INTRAVENOUS PRN
Status: DISCONTINUED | OUTPATIENT
Start: 2021-01-01 | End: 2021-01-01 | Stop reason: SDUPTHER

## 2021-01-01 RX ORDER — ACETAMINOPHEN 325 MG/1
650 TABLET ORAL EVERY 6 HOURS PRN
Status: DISCONTINUED | OUTPATIENT
Start: 2021-01-01 | End: 2021-12-01 | Stop reason: HOSPADM

## 2021-01-01 RX ORDER — DEXTROSE MONOHYDRATE 25 G/50ML
12.5 INJECTION, SOLUTION INTRAVENOUS PRN
Status: DISCONTINUED | OUTPATIENT
Start: 2021-01-01 | End: 2021-01-01 | Stop reason: SDUPTHER

## 2021-01-01 RX ORDER — EPINEPHRINE 1 MG/ML
0.3 INJECTION, SOLUTION, CONCENTRATE INTRAVENOUS PRN
Status: CANCELLED | OUTPATIENT
Start: 2021-01-01

## 2021-01-01 RX ORDER — FLUDROCORTISONE ACETATE 0.1 MG/1
0.2 TABLET ORAL DAILY
Status: DISCONTINUED | OUTPATIENT
Start: 2021-01-01 | End: 2021-12-01 | Stop reason: HOSPADM

## 2021-01-01 RX ORDER — HEPARIN SODIUM (PORCINE) LOCK FLUSH IV SOLN 100 UNIT/ML 100 UNIT/ML
500 SOLUTION INTRAVENOUS PRN
Status: CANCELLED | OUTPATIENT
Start: 2021-01-01

## 2021-01-01 RX ORDER — FUROSEMIDE 10 MG/ML
40 INJECTION INTRAMUSCULAR; INTRAVENOUS ONCE
Status: COMPLETED | OUTPATIENT
Start: 2021-01-01 | End: 2021-01-01

## 2021-01-01 RX ORDER — CALCIUM GLUCONATE 94 MG/ML
1000 INJECTION, SOLUTION INTRAVENOUS ONCE
Status: COMPLETED | OUTPATIENT
Start: 2021-01-01 | End: 2021-01-01

## 2021-01-01 RX ORDER — DEXAMETHASONE SODIUM PHOSPHATE 10 MG/ML
INJECTION, EMULSION INTRAMUSCULAR; INTRAVENOUS PRN
Status: DISCONTINUED | OUTPATIENT
Start: 2021-01-01 | End: 2021-01-01 | Stop reason: SDUPTHER

## 2021-01-01 RX ORDER — SODIUM CHLORIDE 9 MG/ML
INJECTION, SOLUTION INTRAVENOUS CONTINUOUS
Status: CANCELLED | OUTPATIENT
Start: 2021-01-01

## 2021-01-01 RX ORDER — VORICONAZOLE 200 MG/1
200 TABLET, FILM COATED ORAL 2 TIMES DAILY
Qty: 84 TABLET | Refills: 0 | Status: SHIPPED | OUTPATIENT
Start: 2021-01-01 | End: 2021-01-01

## 2021-01-01 RX ORDER — OXYMETAZOLINE HYDROCHLORIDE 0.05 G/100ML
2 SPRAY NASAL 2 TIMES DAILY PRN
Status: DISCONTINUED | OUTPATIENT
Start: 2021-01-01 | End: 2021-01-01 | Stop reason: HOSPADM

## 2021-01-01 RX ORDER — DEXAMETHASONE SODIUM PHOSPHATE 4 MG/ML
10 INJECTION, SOLUTION INTRA-ARTICULAR; INTRALESIONAL; INTRAMUSCULAR; INTRAVENOUS; SOFT TISSUE EVERY 24 HOURS
Status: DISCONTINUED | OUTPATIENT
Start: 2021-01-01 | End: 2021-01-01

## 2021-01-01 RX ORDER — DEXAMETHASONE SODIUM PHOSPHATE 10 MG/ML
10 INJECTION, EMULSION INTRAMUSCULAR; INTRAVENOUS ONCE
Status: COMPLETED | OUTPATIENT
Start: 2021-01-01 | End: 2021-01-01

## 2021-01-01 RX ORDER — POLYETHYLENE GLYCOL 3350 17 G/17G
17 POWDER, FOR SOLUTION ORAL DAILY PRN
Status: DISCONTINUED | OUTPATIENT
Start: 2021-01-01 | End: 2021-01-01 | Stop reason: SDUPTHER

## 2021-01-01 RX ORDER — PROPOFOL 10 MG/ML
INJECTION, EMULSION INTRAVENOUS CONTINUOUS PRN
Status: DISCONTINUED | OUTPATIENT
Start: 2021-01-01 | End: 2021-01-01 | Stop reason: SDUPTHER

## 2021-01-01 RX ORDER — HEPARIN SODIUM 1000 [USP'U]/ML
80 INJECTION, SOLUTION INTRAVENOUS; SUBCUTANEOUS PRN
Status: DISCONTINUED | OUTPATIENT
Start: 2021-01-01 | End: 2021-12-01 | Stop reason: HOSPADM

## 2021-01-01 RX ORDER — COSYNTROPIN 0.25 MG/ML
250 INJECTION, POWDER, FOR SOLUTION INTRAMUSCULAR; INTRAVENOUS ONCE
Status: COMPLETED | OUTPATIENT
Start: 2021-01-01 | End: 2021-01-01

## 2021-01-01 RX ORDER — ALBUTEROL SULFATE 90 UG/1
6 AEROSOL, METERED RESPIRATORY (INHALATION) ONCE
Status: COMPLETED | OUTPATIENT
Start: 2021-01-01 | End: 2021-01-01

## 2021-01-01 RX ORDER — PROPOFOL 10 MG/ML
INJECTION, EMULSION INTRAVENOUS CONTINUOUS PRN
Status: DISCONTINUED | OUTPATIENT
Start: 2021-01-01 | End: 2021-01-01

## 2021-01-01 RX ORDER — AMOXICILLIN AND CLAVULANATE POTASSIUM 875; 125 MG/1; MG/1
1 TABLET, FILM COATED ORAL 2 TIMES DAILY
Qty: 20 TABLET | Refills: 0 | Status: SHIPPED | OUTPATIENT
Start: 2021-01-01 | End: 2021-01-01

## 2021-01-01 RX ORDER — HEPARIN SODIUM 1000 [USP'U]/ML
40 INJECTION, SOLUTION INTRAVENOUS; SUBCUTANEOUS PRN
Status: DISCONTINUED | OUTPATIENT
Start: 2021-01-01 | End: 2021-12-01 | Stop reason: HOSPADM

## 2021-01-01 RX ORDER — CEFAZOLIN SODIUM 2 G/100ML
INJECTION, SOLUTION INTRAVENOUS
Status: DISCONTINUED
Start: 2021-01-01 | End: 2021-01-01 | Stop reason: HOSPADM

## 2021-01-01 RX ORDER — ACETAMINOPHEN 650 MG/1
650 SUPPOSITORY RECTAL EVERY 6 HOURS PRN
Status: DISCONTINUED | OUTPATIENT
Start: 2021-01-01 | End: 2021-01-01 | Stop reason: SDUPTHER

## 2021-01-01 RX ORDER — LIDOCAINE HYDROCHLORIDE 10 MG/ML
5 INJECTION, SOLUTION EPIDURAL; INFILTRATION; INTRACAUDAL; PERINEURAL ONCE
Status: DISCONTINUED | OUTPATIENT
Start: 2021-01-01 | End: 2021-01-01

## 2021-01-01 RX ORDER — SODIUM CHLORIDE 9 MG/ML
INJECTION, SOLUTION INTRAVENOUS CONTINUOUS PRN
Status: DISCONTINUED | OUTPATIENT
Start: 2021-01-01 | End: 2021-01-01 | Stop reason: SDUPTHER

## 2021-01-01 RX ORDER — VORICONAZOLE 200 MG/1
200 TABLET, FILM COATED ORAL 2 TIMES DAILY
COMMUNITY
Start: 2019-05-01

## 2021-01-01 RX ORDER — 0.9 % SODIUM CHLORIDE 0.9 %
1000 INTRAVENOUS SOLUTION INTRAVENOUS ONCE
Status: COMPLETED | OUTPATIENT
Start: 2021-01-01 | End: 2021-01-01

## 2021-01-01 RX ORDER — OXYCODONE AND ACETAMINOPHEN 7.5; 325 MG/1; MG/1
1 TABLET ORAL EVERY 6 HOURS PRN
Qty: 12 TABLET | Refills: 0 | Status: SHIPPED | OUTPATIENT
Start: 2021-01-01 | End: 2021-01-01 | Stop reason: HOSPADM

## 2021-01-01 RX ORDER — SODIUM CHLORIDE 0.9 % (FLUSH) 0.9 %
5-40 SYRINGE (ML) INJECTION EVERY 12 HOURS SCHEDULED
Status: DISCONTINUED | OUTPATIENT
Start: 2021-01-01 | End: 2021-12-01 | Stop reason: HOSPADM

## 2021-01-01 RX ORDER — SODIUM CHLORIDE 0.9 % (FLUSH) 0.9 %
5-40 SYRINGE (ML) INJECTION EVERY 12 HOURS SCHEDULED
Status: CANCELLED | OUTPATIENT
Start: 2021-01-01

## 2021-01-01 RX ORDER — IPRATROPIUM BROMIDE AND ALBUTEROL SULFATE 2.5; .5 MG/3ML; MG/3ML
1 SOLUTION RESPIRATORY (INHALATION) ONCE
Status: COMPLETED | OUTPATIENT
Start: 2021-01-01 | End: 2021-01-01

## 2021-01-01 RX ORDER — BUMETANIDE 0.25 MG/ML
1 INJECTION, SOLUTION INTRAMUSCULAR; INTRAVENOUS DAILY
Status: DISCONTINUED | OUTPATIENT
Start: 2021-01-01 | End: 2021-01-01

## 2021-01-01 RX ORDER — SODIUM CHLORIDE 0.9 % (FLUSH) 0.9 %
5-40 SYRINGE (ML) INJECTION PRN
Status: DISCONTINUED | OUTPATIENT
Start: 2021-01-01 | End: 2021-01-01

## 2021-01-01 RX ORDER — SODIUM CHLORIDE 9 MG/ML
INJECTION, SOLUTION INTRAVENOUS CONTINUOUS
Status: DISCONTINUED | OUTPATIENT
Start: 2021-01-01 | End: 2021-01-01

## 2021-01-01 RX ORDER — 0.9 % SODIUM CHLORIDE 0.9 %
1000 INTRAVENOUS SOLUTION INTRAVENOUS ONCE
Status: DISCONTINUED | OUTPATIENT
Start: 2021-01-01 | End: 2021-01-01

## 2021-01-01 RX ORDER — LIDOCAINE HYDROCHLORIDE AND EPINEPHRINE 10; 10 MG/ML; UG/ML
20 INJECTION, SOLUTION INFILTRATION; PERINEURAL ONCE
Status: DISCONTINUED | OUTPATIENT
Start: 2021-01-01 | End: 2021-01-01 | Stop reason: HOSPADM

## 2021-01-01 RX ORDER — HYDROCODONE BITARTRATE AND ACETAMINOPHEN 5; 325 MG/1; MG/1
1 TABLET ORAL ONCE
Status: COMPLETED | OUTPATIENT
Start: 2021-01-01 | End: 2021-01-01

## 2021-01-01 RX ORDER — HEPARIN SODIUM 10000 [USP'U]/100ML
5-30 INJECTION, SOLUTION INTRAVENOUS CONTINUOUS
Status: DISCONTINUED | OUTPATIENT
Start: 2021-01-01 | End: 2021-12-01 | Stop reason: HOSPADM

## 2021-01-01 RX ORDER — FLUDROCORTISONE ACETATE 0.1 MG/1
0.1 TABLET ORAL DAILY
Status: DISCONTINUED | OUTPATIENT
Start: 2021-01-01 | End: 2021-01-01

## 2021-01-01 RX ORDER — DEXAMETHASONE 4 MG/1
20 TABLET ORAL DAILY
Status: COMPLETED | OUTPATIENT
Start: 2021-01-01 | End: 2021-01-01

## 2021-01-01 RX ORDER — SODIUM CHLORIDE 0.9 % (FLUSH) 0.9 %
5-40 SYRINGE (ML) INJECTION EVERY 12 HOURS SCHEDULED
Status: DISCONTINUED | OUTPATIENT
Start: 2021-01-01 | End: 2021-01-01

## 2021-01-01 RX ORDER — PERPHENAZINE 8 MG
TABLET ORAL 2 TIMES DAILY
COMMUNITY

## 2021-01-01 RX ORDER — CEFAZOLIN SODIUM 1 G/3ML
INJECTION, POWDER, FOR SOLUTION INTRAMUSCULAR; INTRAVENOUS PRN
Status: DISCONTINUED | OUTPATIENT
Start: 2021-01-01 | End: 2021-01-01 | Stop reason: SDUPTHER

## 2021-01-01 RX ORDER — SODIUM CHLORIDE 0.9 % (FLUSH) 0.9 %
5-40 SYRINGE (ML) INJECTION EVERY 12 HOURS SCHEDULED
Status: DISCONTINUED | OUTPATIENT
Start: 2021-01-01 | End: 2021-01-01 | Stop reason: SDUPTHER

## 2021-01-01 RX ORDER — GUAIFENESIN 600 MG/1
1200 TABLET, EXTENDED RELEASE ORAL 2 TIMES DAILY PRN
COMMUNITY

## 2021-01-01 RX ORDER — SODIUM CHLORIDE 0.9 % (FLUSH) 0.9 %
5-40 SYRINGE (ML) INJECTION PRN
Status: DISCONTINUED | OUTPATIENT
Start: 2021-01-01 | End: 2021-01-01 | Stop reason: HOSPADM

## 2021-01-01 RX ORDER — MIDAZOLAM HYDROCHLORIDE 1 MG/ML
4 INJECTION INTRAMUSCULAR; INTRAVENOUS ONCE
Status: DISCONTINUED | OUTPATIENT
Start: 2021-01-01 | End: 2021-01-01

## 2021-01-01 RX ORDER — MORPHINE SULFATE 2 MG/ML
INJECTION, SOLUTION INTRAMUSCULAR; INTRAVENOUS
Status: DISPENSED
Start: 2021-01-01 | End: 2021-01-01

## 2021-01-01 RX ORDER — DIPHENHYDRAMINE HYDROCHLORIDE 50 MG/ML
12.5 INJECTION INTRAMUSCULAR; INTRAVENOUS
Status: DISCONTINUED | OUTPATIENT
Start: 2021-01-01 | End: 2021-01-01 | Stop reason: HOSPADM

## 2021-01-01 RX ORDER — LABETALOL 20 MG/4 ML (5 MG/ML) INTRAVENOUS SYRINGE
5 EVERY 10 MIN PRN
Status: DISCONTINUED | OUTPATIENT
Start: 2021-01-01 | End: 2021-01-01 | Stop reason: HOSPADM

## 2021-01-01 RX ORDER — PRAVASTATIN SODIUM 40 MG
40 TABLET ORAL NIGHTLY
Status: DISCONTINUED | OUTPATIENT
Start: 2021-01-01 | End: 2021-12-01 | Stop reason: HOSPADM

## 2021-01-01 RX ORDER — SODIUM CHLORIDE 9 MG/ML
INJECTION, SOLUTION INTRAVENOUS PRN
Status: DISCONTINUED | OUTPATIENT
Start: 2021-01-01 | End: 2021-12-01 | Stop reason: HOSPADM

## 2021-01-01 RX ORDER — ALBUTEROL SULFATE 2.5 MG/3ML
2.5 SOLUTION RESPIRATORY (INHALATION) EVERY 6 HOURS PRN
Status: DISCONTINUED | OUTPATIENT
Start: 2021-01-01 | End: 2021-12-01 | Stop reason: HOSPADM

## 2021-01-01 RX ORDER — 0.9 % SODIUM CHLORIDE 0.9 %
2000 INTRAVENOUS SOLUTION INTRAVENOUS ONCE
Status: COMPLETED | OUTPATIENT
Start: 2021-01-01 | End: 2021-01-01

## 2021-01-01 RX ORDER — SODIUM CHLORIDE 0.9 % (FLUSH) 0.9 %
5-40 SYRINGE (ML) INJECTION EVERY 12 HOURS SCHEDULED
Status: DISCONTINUED | OUTPATIENT
Start: 2021-01-01 | End: 2021-01-01 | Stop reason: HOSPADM

## 2021-01-01 RX ORDER — SULFAMETHOXAZOLE AND TRIMETHOPRIM 800; 160 MG/1; MG/1
1 TABLET ORAL 2 TIMES DAILY
Status: DISCONTINUED | OUTPATIENT
Start: 2021-01-01 | End: 2021-12-01 | Stop reason: HOSPADM

## 2021-01-01 RX ORDER — SODIUM CHLORIDE FOR INHALATION 3 %
4 VIAL, NEBULIZER (ML) INHALATION ONCE
Status: COMPLETED | OUTPATIENT
Start: 2021-01-01 | End: 2021-01-01

## 2021-01-01 RX ORDER — TRAMADOL HYDROCHLORIDE 50 MG/1
TABLET ORAL
Status: COMPLETED
Start: 2021-01-01 | End: 2021-01-01

## 2021-01-01 RX ORDER — NICOTINE POLACRILEX 4 MG
15 LOZENGE BUCCAL PRN
Status: DISCONTINUED | OUTPATIENT
Start: 2021-01-01 | End: 2021-01-01 | Stop reason: SDUPTHER

## 2021-01-01 RX ORDER — VITAMIN B COMPLEX
1000 TABLET ORAL DAILY
Status: DISCONTINUED | OUTPATIENT
Start: 2021-01-01 | End: 2021-01-01

## 2021-01-01 RX ORDER — LORAZEPAM 2 MG/ML
INJECTION INTRAMUSCULAR
Status: DISPENSED
Start: 2021-01-01 | End: 2021-01-01

## 2021-01-01 RX ORDER — LIDOCAINE HYDROCHLORIDE 40 MG/ML
SOLUTION TOPICAL ONCE
Status: DISCONTINUED | OUTPATIENT
Start: 2021-01-01 | End: 2021-01-01 | Stop reason: HOSPADM

## 2021-01-01 RX ORDER — LACTOBACILLUS RHAMNOSUS GG 10B CELL
1 CAPSULE ORAL
Status: DISCONTINUED | OUTPATIENT
Start: 2021-01-01 | End: 2021-12-01 | Stop reason: HOSPADM

## 2021-01-01 RX ORDER — ACETAMINOPHEN 650 MG/1
650 SUPPOSITORY RECTAL EVERY 6 HOURS PRN
Status: DISCONTINUED | OUTPATIENT
Start: 2021-01-01 | End: 2021-12-01 | Stop reason: HOSPADM

## 2021-01-01 RX ORDER — PROPOFOL 10 MG/ML
5-50 INJECTION, EMULSION INTRAVENOUS
Status: DISCONTINUED | OUTPATIENT
Start: 2021-01-01 | End: 2021-01-01

## 2021-01-01 RX ORDER — DEXAMETHASONE 4 MG/1
6 TABLET ORAL DAILY
Status: DISCONTINUED | OUTPATIENT
Start: 2021-01-01 | End: 2021-01-01

## 2021-01-01 RX ORDER — SODIUM CHLORIDE 0.9 % (FLUSH) 0.9 %
5-40 SYRINGE (ML) INJECTION PRN
Status: DISCONTINUED | OUTPATIENT
Start: 2021-01-01 | End: 2021-12-01 | Stop reason: HOSPADM

## 2021-01-01 RX ORDER — SODIUM CHLORIDE 9 MG/ML
25 INJECTION, SOLUTION INTRAVENOUS PRN
Status: DISCONTINUED | OUTPATIENT
Start: 2021-01-01 | End: 2021-01-01 | Stop reason: SDUPTHER

## 2021-01-01 RX ORDER — IPRATROPIUM BROMIDE AND ALBUTEROL SULFATE 2.5; .5 MG/3ML; MG/3ML
SOLUTION RESPIRATORY (INHALATION)
Status: COMPLETED
Start: 2021-01-01 | End: 2021-01-01

## 2021-01-01 RX ORDER — SODIUM CHLORIDE 9 MG/ML
25 INJECTION, SOLUTION INTRAVENOUS PRN
Status: DISCONTINUED | OUTPATIENT
Start: 2021-01-01 | End: 2021-12-01 | Stop reason: HOSPADM

## 2021-01-01 RX ORDER — NICOTINE POLACRILEX 4 MG
15 LOZENGE BUCCAL PRN
Status: DISCONTINUED | OUTPATIENT
Start: 2021-01-01 | End: 2021-12-01 | Stop reason: HOSPADM

## 2021-01-01 RX ORDER — GUAIFENESIN 100 MG/5ML
200 SYRUP ORAL 3 TIMES DAILY PRN
COMMUNITY

## 2021-01-01 RX ORDER — METHYLPREDNISOLONE ACETATE 80 MG/ML
INJECTION, SUSPENSION INTRA-ARTICULAR; INTRALESIONAL; INTRAMUSCULAR; SOFT TISSUE PRN
Status: DISCONTINUED | OUTPATIENT
Start: 2021-01-01 | End: 2021-01-01 | Stop reason: ALTCHOICE

## 2021-01-01 RX ORDER — SODIUM CHLORIDE 9 MG/ML
25 INJECTION, SOLUTION INTRAVENOUS PRN
Status: CANCELLED | OUTPATIENT
Start: 2021-01-01

## 2021-01-01 RX ORDER — POLYETHYLENE GLYCOL 3350 17 G/17G
17 POWDER, FOR SOLUTION ORAL DAILY PRN
Status: DISCONTINUED | OUTPATIENT
Start: 2021-01-01 | End: 2021-12-01 | Stop reason: HOSPADM

## 2021-01-01 RX ORDER — OXYMETAZOLINE HYDROCHLORIDE 0.05 G/100ML
3 SPRAY NASAL ONCE
Status: COMPLETED | OUTPATIENT
Start: 2021-01-01 | End: 2021-01-01

## 2021-01-01 RX ORDER — IBUPROFEN 200 MG
TABLET ORAL
Status: COMPLETED
Start: 2021-01-01 | End: 2021-01-01

## 2021-01-01 RX ORDER — SODIUM CHLORIDE 0.9 % (FLUSH) 0.9 %
5-40 SYRINGE (ML) INJECTION PRN
Status: DISCONTINUED | OUTPATIENT
Start: 2021-01-01 | End: 2021-01-01 | Stop reason: SDUPTHER

## 2021-01-01 RX ORDER — IBUPROFEN 200 MG
600 TABLET ORAL ONCE
Status: COMPLETED | OUTPATIENT
Start: 2021-01-01 | End: 2021-01-01

## 2021-01-01 RX ORDER — BUMETANIDE 0.25 MG/ML
0.5 INJECTION, SOLUTION INTRAMUSCULAR; INTRAVENOUS 2 TIMES DAILY
Status: DISCONTINUED | OUTPATIENT
Start: 2021-01-01 | End: 2021-01-01

## 2021-01-01 RX ORDER — LIDOCAINE HYDROCHLORIDE 20 MG/ML
INJECTION, SOLUTION INFILTRATION; PERINEURAL PRN
Status: DISCONTINUED | OUTPATIENT
Start: 2021-01-01 | End: 2021-01-01 | Stop reason: SDUPTHER

## 2021-01-01 RX ORDER — SODIUM CHLORIDE 9 MG/ML
25 INJECTION, SOLUTION INTRAVENOUS PRN
Status: DISCONTINUED | OUTPATIENT
Start: 2021-01-01 | End: 2021-01-01

## 2021-01-01 RX ORDER — CISATRACURIUM BESYLATE 2 MG/ML
10 INJECTION, SOLUTION INTRAVENOUS ONCE
Status: COMPLETED | OUTPATIENT
Start: 2021-01-01 | End: 2021-01-01

## 2021-01-01 RX ORDER — IPRATROPIUM BROMIDE AND ALBUTEROL SULFATE 2.5; .5 MG/3ML; MG/3ML
1 SOLUTION RESPIRATORY (INHALATION)
Status: COMPLETED | OUTPATIENT
Start: 2021-01-01 | End: 2021-01-01

## 2021-01-01 RX ORDER — TRAMADOL HYDROCHLORIDE 50 MG/1
50 TABLET ORAL ONCE
Status: COMPLETED | OUTPATIENT
Start: 2021-01-01 | End: 2021-01-01

## 2021-01-01 RX ORDER — SODIUM POLYSTYRENE SULFONATE 15 G/60ML
15 SUSPENSION ORAL; RECTAL ONCE
Status: COMPLETED | OUTPATIENT
Start: 2021-01-01 | End: 2021-01-01

## 2021-01-01 RX ORDER — VORICONAZOLE 200 MG/1
200 TABLET, FILM COATED ORAL 2 TIMES DAILY
Qty: 60 TABLET | Refills: 0 | Status: SHIPPED | OUTPATIENT
Start: 2021-01-01 | End: 2021-01-01

## 2021-01-01 RX ORDER — DEXTROSE MONOHYDRATE 50 MG/ML
100 INJECTION, SOLUTION INTRAVENOUS PRN
Status: DISCONTINUED | OUTPATIENT
Start: 2021-01-01 | End: 2021-12-01 | Stop reason: HOSPADM

## 2021-01-01 RX ORDER — ASCORBIC ACID 500 MG
500 TABLET ORAL DAILY
Status: DISCONTINUED | OUTPATIENT
Start: 2021-01-01 | End: 2021-01-01

## 2021-01-01 RX ORDER — METHYLPREDNISOLONE SODIUM SUCCINATE 125 MG/2ML
125 INJECTION, POWDER, LYOPHILIZED, FOR SOLUTION INTRAMUSCULAR; INTRAVENOUS ONCE
Status: CANCELLED | OUTPATIENT
Start: 2021-01-01 | End: 2021-01-01

## 2021-01-01 RX ORDER — BUMETANIDE 0.25 MG/ML
1 INJECTION, SOLUTION INTRAMUSCULAR; INTRAVENOUS 2 TIMES DAILY
Status: DISCONTINUED | OUTPATIENT
Start: 2021-01-01 | End: 2021-12-01 | Stop reason: HOSPADM

## 2021-01-01 RX ORDER — ONDANSETRON 4 MG/1
4 TABLET, ORALLY DISINTEGRATING ORAL EVERY 8 HOURS PRN
Status: DISCONTINUED | OUTPATIENT
Start: 2021-01-01 | End: 2021-01-01 | Stop reason: SDUPTHER

## 2021-01-01 RX ORDER — IPRATROPIUM BROMIDE AND ALBUTEROL SULFATE 2.5; .5 MG/3ML; MG/3ML
1 SOLUTION RESPIRATORY (INHALATION) EVERY 4 HOURS PRN
Status: DISCONTINUED | OUTPATIENT
Start: 2021-01-01 | End: 2021-01-01

## 2021-01-01 RX ORDER — GUAIFENESIN/DEXTROMETHORPHAN 100-10MG/5
5 SYRUP ORAL EVERY 4 HOURS PRN
Status: DISCONTINUED | OUTPATIENT
Start: 2021-01-01 | End: 2021-01-01

## 2021-01-01 RX ORDER — SODIUM CHLORIDE FOR INHALATION 0.9 %
VIAL, NEBULIZER (ML) INHALATION
Status: COMPLETED
Start: 2021-01-01 | End: 2021-01-01

## 2021-01-01 RX ORDER — ALBUTEROL SULFATE 2.5 MG/3ML
2.5 SOLUTION RESPIRATORY (INHALATION) ONCE
Status: COMPLETED | OUTPATIENT
Start: 2021-01-01 | End: 2021-01-01

## 2021-01-01 RX ORDER — NALOXONE HYDROCHLORIDE 0.4 MG/ML
INJECTION, SOLUTION INTRAMUSCULAR; INTRAVENOUS; SUBCUTANEOUS PRN
Status: DISCONTINUED | OUTPATIENT
Start: 2021-01-01 | End: 2021-01-01 | Stop reason: SDUPTHER

## 2021-01-01 RX ORDER — HEPARIN SODIUM 10000 [USP'U]/100ML
5-30 INJECTION, SOLUTION INTRAVENOUS CONTINUOUS
Status: DISCONTINUED | OUTPATIENT
Start: 2021-01-01 | End: 2021-01-01 | Stop reason: SDUPTHER

## 2021-01-01 RX ORDER — SENNA PLUS 8.6 MG/1
1 TABLET ORAL NIGHTLY
Status: DISCONTINUED | OUTPATIENT
Start: 2021-01-01 | End: 2021-12-01 | Stop reason: HOSPADM

## 2021-01-01 RX ORDER — ONDANSETRON 2 MG/ML
4 INJECTION INTRAMUSCULAR; INTRAVENOUS EVERY 6 HOURS PRN
Status: DISCONTINUED | OUTPATIENT
Start: 2021-01-01 | End: 2021-01-01 | Stop reason: SDUPTHER

## 2021-01-01 RX ORDER — MORPHINE SULFATE 2 MG/ML
1 INJECTION, SOLUTION INTRAMUSCULAR; INTRAVENOUS EVERY 4 HOURS PRN
Status: DISCONTINUED | OUTPATIENT
Start: 2021-01-01 | End: 2021-12-01 | Stop reason: HOSPADM

## 2021-01-01 RX ORDER — METHYLPREDNISOLONE ACETATE 40 MG/ML
40 INJECTION, SUSPENSION INTRA-ARTICULAR; INTRALESIONAL; INTRAMUSCULAR; SOFT TISSUE ONCE
Status: CANCELLED | OUTPATIENT
Start: 2021-01-01 | End: 2021-01-01

## 2021-01-01 RX ORDER — BUMETANIDE 0.25 MG/ML
1 INJECTION, SOLUTION INTRAMUSCULAR; INTRAVENOUS 2 TIMES DAILY
Status: DISCONTINUED | OUTPATIENT
Start: 2021-01-01 | End: 2021-01-01

## 2021-01-01 RX ORDER — PIPERACILLIN SODIUM, TAZOBACTAM SODIUM 3; .375 G/15ML; G/15ML
INJECTION, POWDER, LYOPHILIZED, FOR SOLUTION INTRAVENOUS PRN
Status: DISCONTINUED | OUTPATIENT
Start: 2021-01-01 | End: 2021-01-01 | Stop reason: SDUPTHER

## 2021-01-01 RX ORDER — DIPHENHYDRAMINE HYDROCHLORIDE 50 MG/ML
50 INJECTION INTRAMUSCULAR; INTRAVENOUS ONCE
Status: CANCELLED | OUTPATIENT
Start: 2021-01-01 | End: 2021-01-01

## 2021-01-01 RX ORDER — BUDESONIDE AND FORMOTEROL FUMARATE DIHYDRATE 160; 4.5 UG/1; UG/1
2 AEROSOL RESPIRATORY (INHALATION) 2 TIMES DAILY
Qty: 3 INHALER | Refills: 3 | Status: SHIPPED | OUTPATIENT
Start: 2021-01-01 | End: 2022-08-19

## 2021-01-01 RX ORDER — SODIUM CHLORIDE FOR INHALATION 0.9 %
3 VIAL, NEBULIZER (ML) INHALATION PRN
Status: DISCONTINUED | OUTPATIENT
Start: 2021-01-01 | End: 2021-01-01

## 2021-01-01 RX ORDER — DEXTROSE MONOHYDRATE 25 G/50ML
12.5 INJECTION, SOLUTION INTRAVENOUS PRN
Status: DISCONTINUED | OUTPATIENT
Start: 2021-01-01 | End: 2021-12-01 | Stop reason: HOSPADM

## 2021-01-01 RX ORDER — DEXTROSE MONOHYDRATE 25 G/50ML
25 INJECTION, SOLUTION INTRAVENOUS ONCE
Status: DISCONTINUED | OUTPATIENT
Start: 2021-01-01 | End: 2021-12-01 | Stop reason: HOSPADM

## 2021-01-01 RX ORDER — PROPOFOL 10 MG/ML
5-20 INJECTION, EMULSION INTRAVENOUS
Status: DISCONTINUED | OUTPATIENT
Start: 2021-01-01 | End: 2021-12-01 | Stop reason: HOSPADM

## 2021-01-01 RX ORDER — HEPARIN SODIUM 1000 [USP'U]/ML
80 INJECTION, SOLUTION INTRAVENOUS; SUBCUTANEOUS ONCE
Status: COMPLETED | OUTPATIENT
Start: 2021-01-01 | End: 2021-01-01

## 2021-01-01 RX ORDER — ACETYLCYSTEINE 200 MG/ML
600 SOLUTION ORAL; RESPIRATORY (INHALATION) 3 TIMES DAILY
Status: DISCONTINUED | OUTPATIENT
Start: 2021-01-01 | End: 2021-01-01

## 2021-01-01 RX ORDER — NOREPINEPHRINE BIT/0.9 % NACL 16MG/250ML
2-200 INFUSION BOTTLE (ML) INTRAVENOUS CONTINUOUS
Status: DISCONTINUED | OUTPATIENT
Start: 2021-01-01 | End: 2021-12-01 | Stop reason: HOSPADM

## 2021-01-01 RX ORDER — LACTULOSE 10 G/15ML
20 SOLUTION ORAL ONCE
Status: COMPLETED | OUTPATIENT
Start: 2021-01-01 | End: 2021-01-01

## 2021-01-01 RX ORDER — MEPERIDINE HYDROCHLORIDE 25 MG/ML
12.5 INJECTION INTRAMUSCULAR; INTRAVENOUS; SUBCUTANEOUS EVERY 5 MIN PRN
Status: DISCONTINUED | OUTPATIENT
Start: 2021-01-01 | End: 2021-01-01 | Stop reason: HOSPADM

## 2021-01-01 RX ORDER — OXYMETAZOLINE HYDROCHLORIDE 0.05 G/100ML
4 SPRAY NASAL ONCE
Status: COMPLETED | OUTPATIENT
Start: 2021-01-01 | End: 2021-01-01

## 2021-01-01 RX ORDER — SODIUM CHLORIDE 9 MG/ML
25 INJECTION, SOLUTION INTRAVENOUS PRN
Status: DISCONTINUED | OUTPATIENT
Start: 2021-01-01 | End: 2021-01-01 | Stop reason: HOSPADM

## 2021-01-01 RX ORDER — DEXAMETHASONE SODIUM PHOSPHATE 4 MG/ML
8 INJECTION, SOLUTION INTRA-ARTICULAR; INTRALESIONAL; INTRAMUSCULAR; INTRAVENOUS; SOFT TISSUE ONCE
Status: COMPLETED | OUTPATIENT
Start: 2021-01-01 | End: 2021-01-01

## 2021-01-01 RX ORDER — SODIUM CHLORIDE FOR INHALATION 0.9 %
6 VIAL, NEBULIZER (ML) INHALATION PRN
Status: COMPLETED | OUTPATIENT
Start: 2021-01-01 | End: 2021-01-01

## 2021-01-01 RX ORDER — ACETAMINOPHEN 325 MG/1
650 TABLET ORAL EVERY 6 HOURS PRN
Status: DISCONTINUED | OUTPATIENT
Start: 2021-01-01 | End: 2021-01-01 | Stop reason: SDUPTHER

## 2021-01-01 RX ADMIN — Medication 75 MCG/HR: at 17:04

## 2021-01-01 RX ADMIN — VORICONAZOLE 200 MG: 200 TABLET ORAL at 08:41

## 2021-01-01 RX ADMIN — HEPARIN SODIUM AND DEXTROSE 16.6 UNITS/KG/HR: 10000; 5 INJECTION INTRAVENOUS at 09:37

## 2021-01-01 RX ADMIN — FENTANYL CITRATE 200 MCG/HR: 0.05 INJECTION, SOLUTION INTRAMUSCULAR; INTRAVENOUS at 10:03

## 2021-01-01 RX ADMIN — BUMETANIDE 1 MG: 0.25 INJECTION INTRAMUSCULAR; INTRAVENOUS at 19:36

## 2021-01-01 RX ADMIN — SODIUM CHLORIDE, PRESERVATIVE FREE 10 ML: 5 INJECTION INTRAVENOUS at 07:59

## 2021-01-01 RX ADMIN — IPRATROPIUM BROMIDE AND ALBUTEROL SULFATE 1 AMPULE: 2.5; .5 SOLUTION RESPIRATORY (INHALATION) at 13:20

## 2021-01-01 RX ADMIN — FAMOTIDINE 20 MG: 10 INJECTION, SOLUTION INTRAVENOUS at 08:14

## 2021-01-01 RX ADMIN — METOCLOPRAMIDE 10 MG: 10 TABLET ORAL at 09:29

## 2021-01-01 RX ADMIN — CEFTRIAXONE SODIUM 1000 MG: 1 INJECTION, POWDER, FOR SOLUTION INTRAMUSCULAR; INTRAVENOUS at 10:36

## 2021-01-01 RX ADMIN — DEXAMETHASONE SODIUM PHOSPHATE 10 MG: 10 INJECTION, EMULSION INTRAMUSCULAR; INTRAVENOUS at 09:39

## 2021-01-01 RX ADMIN — INSULIN LISPRO 4 UNITS: 100 INJECTION, SOLUTION INTRAVENOUS; SUBCUTANEOUS at 18:45

## 2021-01-01 RX ADMIN — PROPOFOL 30 MCG/KG/MIN: 10 INJECTION, EMULSION INTRAVENOUS at 09:07

## 2021-01-01 RX ADMIN — HYDROCORTISONE SODIUM SUCCINATE 100 MG: 100 INJECTION, POWDER, FOR SOLUTION INTRAMUSCULAR; INTRAVENOUS at 09:29

## 2021-01-01 RX ADMIN — TIOTROPIUM BROMIDE INHALATION SPRAY 2 PUFF: 3.12 SPRAY, METERED RESPIRATORY (INHALATION) at 09:23

## 2021-01-01 RX ADMIN — Medication 10 MG/HR: at 06:16

## 2021-01-01 RX ADMIN — FENTANYL CITRATE 50 MCG: 50 INJECTION, SOLUTION INTRAMUSCULAR; INTRAVENOUS at 09:49

## 2021-01-01 RX ADMIN — BARICITINIB 4 MG: 2 TABLET, FILM COATED ORAL at 07:54

## 2021-01-01 RX ADMIN — Medication 6 MG/HR: at 10:34

## 2021-01-01 RX ADMIN — ALBUTEROL SULFATE 2.5 MG: 2.5 SOLUTION RESPIRATORY (INHALATION) at 08:24

## 2021-01-01 RX ADMIN — METOCLOPRAMIDE 10 MG: 10 TABLET ORAL at 20:23

## 2021-01-01 RX ADMIN — VORICONAZOLE 200 MG: 200 TABLET ORAL at 20:30

## 2021-01-01 RX ADMIN — SODIUM CHLORIDE, PRESERVATIVE FREE 10 ML: 5 INJECTION INTRAVENOUS at 09:28

## 2021-01-01 RX ADMIN — FENTANYL CITRATE 200 MCG/HR: 0.05 INJECTION, SOLUTION INTRAMUSCULAR; INTRAVENOUS at 16:55

## 2021-01-01 RX ADMIN — POLYETHYLENE GLYCOL 3350 17 G: 17 POWDER, FOR SOLUTION ORAL at 07:55

## 2021-01-01 RX ADMIN — PROPOFOL 20 MCG/KG/MIN: 10 INJECTION, EMULSION INTRAVENOUS at 03:20

## 2021-01-01 RX ADMIN — TIOTROPIUM BROMIDE INHALATION SPRAY 2 PUFF: 3.12 SPRAY, METERED RESPIRATORY (INHALATION) at 10:37

## 2021-01-01 RX ADMIN — Medication 6 ML: at 09:30

## 2021-01-01 RX ADMIN — SENNOSIDES 8.6 MG: 8.6 TABLET, COATED ORAL at 21:17

## 2021-01-01 RX ADMIN — SODIUM CHLORIDE 2000 ML: 9 INJECTION, SOLUTION INTRAVENOUS at 20:01

## 2021-01-01 RX ADMIN — FENTANYL CITRATE 50 MCG: 0.05 INJECTION, SOLUTION INTRAMUSCULAR; INTRAVENOUS at 11:51

## 2021-01-01 RX ADMIN — HYDROCORTISONE SODIUM SUCCINATE 100 MG: 100 INJECTION, POWDER, FOR SOLUTION INTRAMUSCULAR; INTRAVENOUS at 16:53

## 2021-01-01 RX ADMIN — HYDROCORTISONE SODIUM SUCCINATE 100 MG: 100 INJECTION, POWDER, FOR SOLUTION INTRAMUSCULAR; INTRAVENOUS at 08:40

## 2021-01-01 RX ADMIN — PROPOFOL 10 MCG/KG/MIN: 10 INJECTION, EMULSION INTRAVENOUS at 13:39

## 2021-01-01 RX ADMIN — VORICONAZOLE 200 MG: 200 TABLET ORAL at 20:28

## 2021-01-01 RX ADMIN — TIOTROPIUM BROMIDE AND OLODATEROL 2 PUFF: 3.124; 2.736 SPRAY, METERED RESPIRATORY (INHALATION) at 08:00

## 2021-01-01 RX ADMIN — Medication 130 MCG/MIN: at 20:17

## 2021-01-01 RX ADMIN — METOCLOPRAMIDE 10 MG: 10 TABLET ORAL at 12:48

## 2021-01-01 RX ADMIN — CEFAZOLIN 2000 MG: 1 INJECTION, POWDER, FOR SOLUTION INTRAMUSCULAR; INTRAVENOUS at 07:46

## 2021-01-01 RX ADMIN — DEXAMETHASONE 20 MG: 4 TABLET ORAL at 09:07

## 2021-01-01 RX ADMIN — BUMETANIDE 1 MG: 0.25 INJECTION, SOLUTION INTRAMUSCULAR; INTRAVENOUS at 09:24

## 2021-01-01 RX ADMIN — BARICITINIB 4 MG: 2 TABLET, FILM COATED ORAL at 09:23

## 2021-01-01 RX ADMIN — PROPOFOL 30 MCG/KG/MIN: 10 INJECTION, EMULSION INTRAVENOUS at 20:14

## 2021-01-01 RX ADMIN — VORICONAZOLE 200 MG: 200 TABLET ORAL at 09:24

## 2021-01-01 RX ADMIN — SENNOSIDES 8.6 MG: 8.6 TABLET, COATED ORAL at 21:20

## 2021-01-01 RX ADMIN — SODIUM CHLORIDE, PRESERVATIVE FREE 10 ML: 5 INJECTION INTRAVENOUS at 19:45

## 2021-01-01 RX ADMIN — BUMETANIDE 1 MG: 0.25 INJECTION INTRAMUSCULAR; INTRAVENOUS at 19:27

## 2021-01-01 RX ADMIN — VORICONAZOLE 200 MG: 200 TABLET ORAL at 21:17

## 2021-01-01 RX ADMIN — POLYETHYLENE GLYCOL 3350 17 G: 17 POWDER, FOR SOLUTION ORAL at 08:05

## 2021-01-01 RX ADMIN — Medication 10 MG: at 09:37

## 2021-01-01 RX ADMIN — Medication 10 MG/HR: at 15:26

## 2021-01-01 RX ADMIN — LIDOCAINE HYDROCHLORIDE 100 MG: 20 INJECTION, SOLUTION INTRAVENOUS at 07:36

## 2021-01-01 RX ADMIN — PRAVASTATIN SODIUM 40 MG: 40 TABLET ORAL at 20:21

## 2021-01-01 RX ADMIN — METOCLOPRAMIDE 10 MG: 10 TABLET ORAL at 05:37

## 2021-01-01 RX ADMIN — METOCLOPRAMIDE 10 MG: 10 TABLET ORAL at 09:24

## 2021-01-01 RX ADMIN — HYDROCORTISONE SODIUM SUCCINATE 100 MG: 100 INJECTION, POWDER, FOR SOLUTION INTRAMUSCULAR; INTRAVENOUS at 16:39

## 2021-01-01 RX ADMIN — HYDROCORTISONE SODIUM SUCCINATE 100 MG: 100 INJECTION, POWDER, FOR SOLUTION INTRAMUSCULAR; INTRAVENOUS at 07:54

## 2021-01-01 RX ADMIN — SUGAMMADEX 200 MG: 100 INJECTION, SOLUTION INTRAVENOUS at 12:09

## 2021-01-01 RX ADMIN — CEFTRIAXONE SODIUM 1000 MG: 1 INJECTION, POWDER, FOR SOLUTION INTRAMUSCULAR; INTRAVENOUS at 11:46

## 2021-01-01 RX ADMIN — FAMOTIDINE 20 MG: 10 INJECTION, SOLUTION INTRAVENOUS at 09:27

## 2021-01-01 RX ADMIN — PROPOFOL 150 MCG/KG/MIN: 10 INJECTION, EMULSION INTRAVENOUS at 09:31

## 2021-01-01 RX ADMIN — SODIUM CHLORIDE, PRESERVATIVE FREE 10 ML: 5 INJECTION INTRAVENOUS at 08:01

## 2021-01-01 RX ADMIN — FAMOTIDINE 20 MG: 10 INJECTION, SOLUTION INTRAVENOUS at 19:27

## 2021-01-01 RX ADMIN — FENTANYL CITRATE 200 MCG/HR: 0.05 INJECTION, SOLUTION INTRAMUSCULAR; INTRAVENOUS at 03:11

## 2021-01-01 RX ADMIN — SODIUM CHLORIDE: 9 INJECTION, SOLUTION INTRAVENOUS at 02:07

## 2021-01-01 RX ADMIN — VORICONAZOLE 200 MG: 200 TABLET ORAL at 08:23

## 2021-01-01 RX ADMIN — Medication 8 MG/HR: at 12:25

## 2021-01-01 RX ADMIN — OXYCODONE AND ACETAMINOPHEN 1 TABLET: 5; 325 TABLET ORAL at 11:13

## 2021-01-01 RX ADMIN — SODIUM POLYSTYRENE SULFONATE 15 G: 15 SUSPENSION ORAL; RECTAL at 08:21

## 2021-01-01 RX ADMIN — PROPOFOL 50 MCG/KG/MIN: 10 INJECTION, EMULSION INTRAVENOUS at 22:36

## 2021-01-01 RX ADMIN — PROPOFOL 50 MCG/KG/MIN: 10 INJECTION, EMULSION INTRAVENOUS at 12:24

## 2021-01-01 RX ADMIN — HYDROMORPHONE HYDROCHLORIDE 0.5 MG: 1 INJECTION, SOLUTION INTRAMUSCULAR; INTRAVENOUS; SUBCUTANEOUS at 09:35

## 2021-01-01 RX ADMIN — VORICONAZOLE 200 MG: 200 TABLET ORAL at 08:22

## 2021-01-01 RX ADMIN — SODIUM CHLORIDE, PRESERVATIVE FREE 10 ML: 5 INJECTION INTRAVENOUS at 21:37

## 2021-01-01 RX ADMIN — SODIUM ZIRCONIUM CYCLOSILICATE 10 G: 5 POWDER, FOR SUSPENSION ORAL at 13:09

## 2021-01-01 RX ADMIN — VORICONAZOLE 200 MG: 200 TABLET ORAL at 21:33

## 2021-01-01 RX ADMIN — FAMOTIDINE 20 MG: 10 INJECTION, SOLUTION INTRAVENOUS at 21:20

## 2021-01-01 RX ADMIN — ACETAMINOPHEN 650 MG: 325 TABLET ORAL at 18:05

## 2021-01-01 RX ADMIN — FLUDROCORTISONE ACETATE 0.1 MG: 0.1 TABLET ORAL at 08:08

## 2021-01-01 RX ADMIN — CEFTRIAXONE SODIUM 1000 MG: 1 INJECTION, POWDER, FOR SOLUTION INTRAMUSCULAR; INTRAVENOUS at 10:25

## 2021-01-01 RX ADMIN — CEFAZOLIN SODIUM 2000 MG: 10 INJECTION, POWDER, FOR SOLUTION INTRAVENOUS at 08:00

## 2021-01-01 RX ADMIN — TIOTROPIUM BROMIDE AND OLODATEROL 2 PUFF: 3.124; 2.736 SPRAY, METERED RESPIRATORY (INHALATION) at 08:24

## 2021-01-01 RX ADMIN — ROCURONIUM BROMIDE 20 MG: 10 INJECTION INTRAVENOUS at 11:25

## 2021-01-01 RX ADMIN — Medication 1 CAPSULE: at 09:27

## 2021-01-01 RX ADMIN — SODIUM CHLORIDE, PRESERVATIVE FREE 10 ML: 5 INJECTION INTRAVENOUS at 19:29

## 2021-01-01 RX ADMIN — DEXAMETHASONE SODIUM PHOSPHATE 10 MG: 4 INJECTION, SOLUTION INTRAMUSCULAR; INTRAVENOUS at 12:02

## 2021-01-01 RX ADMIN — PRAVASTATIN SODIUM 40 MG: 40 TABLET ORAL at 21:33

## 2021-01-01 RX ADMIN — FENTANYL CITRATE 200 MCG/HR: 0.05 INJECTION, SOLUTION INTRAMUSCULAR; INTRAVENOUS at 06:26

## 2021-01-01 RX ADMIN — ACETYLCYSTEINE 600 MG: 200 SOLUTION ORAL; RESPIRATORY (INHALATION) at 00:33

## 2021-01-01 RX ADMIN — CISATRACURIUM BESYLATE 10 MG: 2 INJECTION INTRAVENOUS at 21:42

## 2021-01-01 RX ADMIN — SUGAMMADEX 200 MG: 100 INJECTION, SOLUTION INTRAVENOUS at 12:44

## 2021-01-01 RX ADMIN — HYDROCORTISONE SODIUM SUCCINATE 100 MG: 100 INJECTION, POWDER, FOR SOLUTION INTRAMUSCULAR; INTRAVENOUS at 08:08

## 2021-01-01 RX ADMIN — FENTANYL CITRATE 200 MCG/HR: 0.05 INJECTION, SOLUTION INTRAMUSCULAR; INTRAVENOUS at 23:32

## 2021-01-01 RX ADMIN — CEFTRIAXONE SODIUM 1000 MG: 1 INJECTION, POWDER, FOR SOLUTION INTRAMUSCULAR; INTRAVENOUS at 10:11

## 2021-01-01 RX ADMIN — HEPARIN SODIUM 8290 UNITS: 1000 INJECTION, SOLUTION INTRAVENOUS; SUBCUTANEOUS at 15:12

## 2021-01-01 RX ADMIN — ENOXAPARIN SODIUM 40 MG: 100 INJECTION SUBCUTANEOUS at 23:07

## 2021-01-01 RX ADMIN — PROPOFOL 30 MCG/KG/MIN: 10 INJECTION, EMULSION INTRAVENOUS at 14:50

## 2021-01-01 RX ADMIN — Medication 1 CAPSULE: at 08:42

## 2021-01-01 RX ADMIN — FAMOTIDINE 20 MG: 10 INJECTION, SOLUTION INTRAVENOUS at 20:23

## 2021-01-01 RX ADMIN — VORICONAZOLE 200 MG: 200 TABLET ORAL at 07:54

## 2021-01-01 RX ADMIN — SODIUM CHLORIDE: 9 INJECTION, SOLUTION INTRAVENOUS at 08:22

## 2021-01-01 RX ADMIN — FENTANYL CITRATE 200 MCG/HR: 0.05 INJECTION, SOLUTION INTRAMUSCULAR; INTRAVENOUS at 17:45

## 2021-01-01 RX ADMIN — SODIUM ZIRCONIUM CYCLOSILICATE 10 G: 5 POWDER, FOR SUSPENSION ORAL at 10:31

## 2021-01-01 RX ADMIN — FLUDROCORTISONE ACETATE 0.2 MG: 0.1 TABLET ORAL at 09:24

## 2021-01-01 RX ADMIN — CISATRACURIUM BESYLATE 10 MG: 2 INJECTION INTRAVENOUS at 00:23

## 2021-01-01 RX ADMIN — PROPOFOL 180 MG: 10 INJECTION, EMULSION INTRAVENOUS at 10:35

## 2021-01-01 RX ADMIN — HYDROCORTISONE SODIUM SUCCINATE 100 MG: 100 INJECTION, POWDER, FOR SOLUTION INTRAMUSCULAR; INTRAVENOUS at 01:13

## 2021-01-01 RX ADMIN — PROPOFOL 45 MCG/KG/MIN: 10 INJECTION, EMULSION INTRAVENOUS at 18:17

## 2021-01-01 RX ADMIN — HYDROCORTISONE SODIUM SUCCINATE 100 MG: 100 INJECTION, POWDER, FOR SOLUTION INTRAMUSCULAR; INTRAVENOUS at 17:56

## 2021-01-01 RX ADMIN — SODIUM CHLORIDE: 9 INJECTION, SOLUTION INTRAVENOUS at 05:58

## 2021-01-01 RX ADMIN — BARICITINIB 4 MG: 2 TABLET, FILM COATED ORAL at 18:00

## 2021-01-01 RX ADMIN — METOCLOPRAMIDE 10 MG: 10 TABLET ORAL at 07:56

## 2021-01-01 RX ADMIN — METOCLOPRAMIDE 10 MG: 10 TABLET ORAL at 05:45

## 2021-01-01 RX ADMIN — BUMETANIDE 1 MG: 0.25 INJECTION INTRAMUSCULAR; INTRAVENOUS at 21:20

## 2021-01-01 RX ADMIN — BUDESONIDE AND FORMOTEROL FUMARATE DIHYDRATE 2 PUFF: 160; 4.5 AEROSOL RESPIRATORY (INHALATION) at 22:19

## 2021-01-01 RX ADMIN — SODIUM CHLORIDE: 9 INJECTION, SOLUTION INTRAVENOUS at 18:00

## 2021-01-01 RX ADMIN — TIOTROPIUM BROMIDE AND OLODATEROL 2 PUFF: 3.124; 2.736 SPRAY, METERED RESPIRATORY (INHALATION) at 08:10

## 2021-01-01 RX ADMIN — INSULIN LISPRO 2 UNITS: 100 INJECTION, SOLUTION INTRAVENOUS; SUBCUTANEOUS at 05:48

## 2021-01-01 RX ADMIN — FENTANYL CITRATE 200 MCG/HR: 0.05 INJECTION, SOLUTION INTRAMUSCULAR; INTRAVENOUS at 08:31

## 2021-01-01 RX ADMIN — COSYNTROPIN 250 MCG: 0.25 INJECTION, POWDER, LYOPHILIZED, FOR SOLUTION INTRAVENOUS at 08:21

## 2021-01-01 RX ADMIN — SODIUM CHLORIDE, PRESERVATIVE FREE 10 ML: 5 INJECTION INTRAVENOUS at 20:28

## 2021-01-01 RX ADMIN — PRAVASTATIN SODIUM 40 MG: 40 TABLET ORAL at 21:20

## 2021-01-01 RX ADMIN — DEXAMETHASONE SODIUM PHOSPHATE 10 MG: 10 INJECTION, EMULSION INTRAMUSCULAR; INTRAVENOUS at 07:55

## 2021-01-01 RX ADMIN — PRAVASTATIN SODIUM 40 MG: 40 TABLET ORAL at 20:30

## 2021-01-01 RX ADMIN — SODIUM CHLORIDE, PRESERVATIVE FREE 10 ML: 5 INJECTION INTRAVENOUS at 08:09

## 2021-01-01 RX ADMIN — SULFAMETHOXAZOLE AND TRIMETHOPRIM 1 TABLET: 800; 160 TABLET ORAL at 19:39

## 2021-01-01 RX ADMIN — BARICITINIB 4 MG: 2 TABLET, FILM COATED ORAL at 08:14

## 2021-01-01 RX ADMIN — FENTANYL CITRATE 50 MCG: 50 INJECTION, SOLUTION INTRAMUSCULAR; INTRAVENOUS at 08:56

## 2021-01-01 RX ADMIN — SENNOSIDES 8.6 MG: 8.6 TABLET, COATED ORAL at 20:16

## 2021-01-01 RX ADMIN — PROPOFOL 50 MCG/KG/MIN: 10 INJECTION, EMULSION INTRAVENOUS at 16:20

## 2021-01-01 RX ADMIN — Medication 5 MCG/MIN: at 09:52

## 2021-01-01 RX ADMIN — TIOTROPIUM BROMIDE AND OLODATEROL 2 PUFF: 3.124; 2.736 SPRAY, METERED RESPIRATORY (INHALATION) at 08:40

## 2021-01-01 RX ADMIN — SODIUM CHLORIDE: 9 INJECTION, SOLUTION INTRAVENOUS at 07:55

## 2021-01-01 RX ADMIN — FENTANYL CITRATE 50 MCG: 50 INJECTION, SOLUTION INTRAMUSCULAR; INTRAVENOUS at 08:02

## 2021-01-01 RX ADMIN — ACETYLCYSTEINE 600 MG: 200 SOLUTION ORAL; RESPIRATORY (INHALATION) at 09:38

## 2021-01-01 RX ADMIN — FENTANYL CITRATE 50 MCG: 50 INJECTION, SOLUTION INTRAMUSCULAR; INTRAVENOUS at 10:15

## 2021-01-01 RX ADMIN — Medication 10 MG/HR: at 00:27

## 2021-01-01 RX ADMIN — FENTANYL CITRATE 50 MCG: 50 INJECTION, SOLUTION INTRAMUSCULAR; INTRAVENOUS at 08:01

## 2021-01-01 RX ADMIN — Medication 80 MG: at 09:23

## 2021-01-01 RX ADMIN — VORICONAZOLE 200 MG: 200 TABLET ORAL at 20:22

## 2021-01-01 RX ADMIN — PRAVASTATIN SODIUM 40 MG: 40 TABLET ORAL at 23:07

## 2021-01-01 RX ADMIN — SUGAMMADEX 200 MG: 100 INJECTION, SOLUTION INTRAVENOUS at 09:10

## 2021-01-01 RX ADMIN — FENTANYL CITRATE 100 MCG: 50 INJECTION, SOLUTION INTRAMUSCULAR; INTRAVENOUS at 11:07

## 2021-01-01 RX ADMIN — ACETAMINOPHEN 650 MG: 325 TABLET ORAL at 09:07

## 2021-01-01 RX ADMIN — HYDROCORTISONE SODIUM SUCCINATE 100 MG: 100 INJECTION, POWDER, FOR SOLUTION INTRAMUSCULAR; INTRAVENOUS at 00:05

## 2021-01-01 RX ADMIN — SODIUM CHLORIDE: 9 INJECTION, SOLUTION INTRAVENOUS at 01:36

## 2021-01-01 RX ADMIN — SULFAMETHOXAZOLE AND TRIMETHOPRIM 1 TABLET: 800; 160 TABLET ORAL at 23:07

## 2021-01-01 RX ADMIN — Medication 135 MCG/HR: at 02:44

## 2021-01-01 RX ADMIN — ACETAMINOPHEN 650 MG: 325 TABLET ORAL at 14:30

## 2021-01-01 RX ADMIN — PROPOFOL 150 MG: 10 INJECTION, EMULSION INTRAVENOUS at 07:36

## 2021-01-01 RX ADMIN — VORICONAZOLE 200 MG: 200 TABLET ORAL at 21:36

## 2021-01-01 RX ADMIN — PROPOFOL 45 MCG/KG/MIN: 10 INJECTION, EMULSION INTRAVENOUS at 08:36

## 2021-01-01 RX ADMIN — PROPOFOL 20 MCG/KG/MIN: 10 INJECTION, EMULSION INTRAVENOUS at 10:09

## 2021-01-01 RX ADMIN — FAMOTIDINE 20 MG: 10 INJECTION, SOLUTION INTRAVENOUS at 09:24

## 2021-01-01 RX ADMIN — PRAVASTATIN SODIUM 40 MG: 40 TABLET ORAL at 21:17

## 2021-01-01 RX ADMIN — PROPOFOL 45 MCG/KG/MIN: 10 INJECTION, EMULSION INTRAVENOUS at 14:29

## 2021-01-01 RX ADMIN — PRAVASTATIN SODIUM 40 MG: 40 TABLET ORAL at 20:16

## 2021-01-01 RX ADMIN — PRAVASTATIN SODIUM 40 MG: 40 TABLET ORAL at 21:36

## 2021-01-01 RX ADMIN — SODIUM CHLORIDE: 9 INJECTION, SOLUTION INTRAVENOUS at 07:14

## 2021-01-01 RX ADMIN — Medication 10 MG/HR: at 14:13

## 2021-01-01 RX ADMIN — SODIUM CHLORIDE: 9 INJECTION, SOLUTION INTRAVENOUS at 07:20

## 2021-01-01 RX ADMIN — CALCIUM GLUCONATE 1000 MG: 98 INJECTION, SOLUTION INTRAVENOUS at 00:58

## 2021-01-01 RX ADMIN — INSULIN LISPRO 2 UNITS: 100 INJECTION, SOLUTION INTRAVENOUS; SUBCUTANEOUS at 05:37

## 2021-01-01 RX ADMIN — PROPOFOL 10 MCG/KG/MIN: 10 INJECTION, EMULSION INTRAVENOUS at 02:39

## 2021-01-01 RX ADMIN — VORICONAZOLE 200 MG: 200 TABLET ORAL at 08:16

## 2021-01-01 RX ADMIN — CEFTRIAXONE SODIUM 1000 MG: 1 INJECTION, POWDER, FOR SOLUTION INTRAMUSCULAR; INTRAVENOUS at 11:27

## 2021-01-01 RX ADMIN — FAMOTIDINE 20 MG: 10 INJECTION, SOLUTION INTRAVENOUS at 08:33

## 2021-01-01 RX ADMIN — INSULIN LISPRO 2 UNITS: 100 INJECTION, SOLUTION INTRAVENOUS; SUBCUTANEOUS at 12:20

## 2021-01-01 RX ADMIN — PROPOFOL 20 MCG/KG/MIN: 10 INJECTION, EMULSION INTRAVENOUS at 16:59

## 2021-01-01 RX ADMIN — LIDOCAINE HYDROCHLORIDE: 40 SOLUTION TOPICAL at 12:57

## 2021-01-01 RX ADMIN — FENTANYL CITRATE 200 MCG/HR: 0.05 INJECTION, SOLUTION INTRAMUSCULAR; INTRAVENOUS at 18:40

## 2021-01-01 RX ADMIN — ROCURONIUM BROMIDE 30 MG: 10 INJECTION INTRAVENOUS at 10:15

## 2021-01-01 RX ADMIN — HYDROCORTISONE SODIUM SUCCINATE 100 MG: 100 INJECTION, POWDER, FOR SOLUTION INTRAMUSCULAR; INTRAVENOUS at 17:27

## 2021-01-01 RX ADMIN — FENTANYL CITRATE 200 MCG/HR: 0.05 INJECTION, SOLUTION INTRAMUSCULAR; INTRAVENOUS at 14:50

## 2021-01-01 RX ADMIN — SODIUM CHLORIDE 1000 ML: 9 INJECTION, SOLUTION INTRAVENOUS at 15:53

## 2021-01-01 RX ADMIN — VORICONAZOLE 200 MG: 200 TABLET ORAL at 09:27

## 2021-01-01 RX ADMIN — HYDROMORPHONE HYDROCHLORIDE 0.5 MG: 1 INJECTION, SOLUTION INTRAMUSCULAR; INTRAVENOUS; SUBCUTANEOUS at 09:44

## 2021-01-01 RX ADMIN — BUDESONIDE AND FORMOTEROL FUMARATE DIHYDRATE 2 PUFF: 160; 4.5 AEROSOL RESPIRATORY (INHALATION) at 10:21

## 2021-01-01 RX ADMIN — BUMETANIDE 1 MG: 0.25 INJECTION INTRAMUSCULAR; INTRAVENOUS at 07:54

## 2021-01-01 RX ADMIN — PROPOFOL 140 MCG/KG/MIN: 10 INJECTION, EMULSION INTRAVENOUS at 11:50

## 2021-01-01 RX ADMIN — INSULIN LISPRO 2 UNITS: 100 INJECTION, SOLUTION INTRAVENOUS; SUBCUTANEOUS at 11:24

## 2021-01-01 RX ADMIN — Medication 1 CAPSULE: at 08:04

## 2021-01-01 RX ADMIN — INSULIN HUMAN 10 UNITS: 100 INJECTION, SOLUTION PARENTERAL at 00:59

## 2021-01-01 RX ADMIN — FENTANYL CITRATE 200 MCG/HR: 0.05 INJECTION, SOLUTION INTRAMUSCULAR; INTRAVENOUS at 23:59

## 2021-01-01 RX ADMIN — BUMETANIDE 1 MG: 0.25 INJECTION INTRAMUSCULAR; INTRAVENOUS at 21:18

## 2021-01-01 RX ADMIN — DEXTROSE MONOHYDRATE 25 G: 25 INJECTION, SOLUTION INTRAVENOUS at 00:59

## 2021-01-01 RX ADMIN — CALCIUM GLUCONATE 1000 MG: 98 INJECTION, SOLUTION INTRAVENOUS at 06:18

## 2021-01-01 RX ADMIN — Medication 8 MG/HR: at 13:49

## 2021-01-01 RX ADMIN — METOCLOPRAMIDE 10 MG: 10 TABLET ORAL at 21:17

## 2021-01-01 RX ADMIN — ISODIUM CHLORIDE 6 ML: 0.03 SOLUTION RESPIRATORY (INHALATION) at 09:30

## 2021-01-01 RX ADMIN — Medication 10 MG/HR: at 21:22

## 2021-01-01 RX ADMIN — DEXTROSE MONOHYDRATE 25 G: 25 INJECTION, SOLUTION INTRAVENOUS at 06:18

## 2021-01-01 RX ADMIN — NALOXONE HYDROCHLORIDE 0.08 MG: 0.4 INJECTION, SOLUTION INTRAMUSCULAR; INTRAVENOUS; SUBCUTANEOUS at 11:50

## 2021-01-01 RX ADMIN — DEXAMETHASONE 20 MG: 4 TABLET ORAL at 08:04

## 2021-01-01 RX ADMIN — ACETYLCYSTEINE 600 MG: 200 SOLUTION ORAL; RESPIRATORY (INHALATION) at 18:23

## 2021-01-01 RX ADMIN — IPRATROPIUM BROMIDE AND ALBUTEROL SULFATE 1 AMPULE: .5; 3 SOLUTION RESPIRATORY (INHALATION) at 12:05

## 2021-01-01 RX ADMIN — CEFTRIAXONE SODIUM 1000 MG: 1 INJECTION, POWDER, FOR SOLUTION INTRAMUSCULAR; INTRAVENOUS at 09:26

## 2021-01-01 RX ADMIN — ALBUTEROL SULFATE 6 PUFF: 90 AEROSOL, METERED RESPIRATORY (INHALATION) at 09:08

## 2021-01-01 RX ADMIN — DEXAMETHASONE 20 MG: 4 TABLET ORAL at 08:16

## 2021-01-01 RX ADMIN — FENTANYL CITRATE 200 MCG/HR: 0.05 INJECTION, SOLUTION INTRAMUSCULAR; INTRAVENOUS at 19:16

## 2021-01-01 RX ADMIN — HYDROMORPHONE HYDROCHLORIDE 0.5 MG: 1 INJECTION, SOLUTION INTRAMUSCULAR; INTRAVENOUS; SUBCUTANEOUS at 09:49

## 2021-01-01 RX ADMIN — FAMOTIDINE 20 MG: 10 INJECTION, SOLUTION INTRAVENOUS at 09:29

## 2021-01-01 RX ADMIN — IPRATROPIUM BROMIDE AND ALBUTEROL SULFATE 1 AMPULE: 2.5; .5 SOLUTION RESPIRATORY (INHALATION) at 12:40

## 2021-01-01 RX ADMIN — SODIUM CHLORIDE, PRESERVATIVE FREE 10 ML: 5 INJECTION INTRAVENOUS at 23:07

## 2021-01-01 RX ADMIN — TIOTROPIUM BROMIDE AND OLODATEROL 2 PUFF: 3.124; 2.736 SPRAY, METERED RESPIRATORY (INHALATION) at 08:59

## 2021-01-01 RX ADMIN — FLUDROCORTISONE ACETATE 0.2 MG: 0.1 TABLET ORAL at 07:54

## 2021-01-01 RX ADMIN — ENOXAPARIN SODIUM 40 MG: 100 INJECTION SUBCUTANEOUS at 20:23

## 2021-01-01 RX ADMIN — SODIUM ZIRCONIUM CYCLOSILICATE 10 G: 5 POWDER, FOR SUSPENSION ORAL at 19:31

## 2021-01-01 RX ADMIN — BARICITINIB 4 MG: 2 TABLET, FILM COATED ORAL at 08:21

## 2021-01-01 RX ADMIN — PIPERACILLIN SODIUM,TAZOBACTAM SODIUM 3.38 G: 3; .375 INJECTION, POWDER, FOR SOLUTION INTRAVENOUS at 09:29

## 2021-01-01 RX ADMIN — PHENYLEPHRINE HYDROCHLORIDE 100 MCG: 10 INJECTION INTRAVENOUS at 09:32

## 2021-01-01 RX ADMIN — FENTANYL CITRATE 50 MCG: 50 INJECTION INTRAMUSCULAR; INTRAVENOUS at 13:45

## 2021-01-01 RX ADMIN — SODIUM CHLORIDE, PRESERVATIVE FREE 10 ML: 5 INJECTION INTRAVENOUS at 20:24

## 2021-01-01 RX ADMIN — PROPOFOL 45 MCG/KG/MIN: 10 INJECTION, EMULSION INTRAVENOUS at 10:20

## 2021-01-01 RX ADMIN — PROPOFOL 25 MCG/KG/MIN: 10 INJECTION, EMULSION INTRAVENOUS at 14:20

## 2021-01-01 RX ADMIN — SODIUM CHLORIDE: 9 INJECTION, SOLUTION INTRAVENOUS at 07:44

## 2021-01-01 RX ADMIN — FAMOTIDINE 20 MG: 10 INJECTION, SOLUTION INTRAVENOUS at 20:16

## 2021-01-01 RX ADMIN — FENTANYL CITRATE 200 MCG/HR: 0.05 INJECTION, SOLUTION INTRAMUSCULAR; INTRAVENOUS at 06:35

## 2021-01-01 RX ADMIN — VASOPRESSIN 0.03 UNITS/MIN: 20 INJECTION INTRAVENOUS at 14:51

## 2021-01-01 RX ADMIN — BUMETANIDE 1 MG: 0.25 INJECTION INTRAMUSCULAR; INTRAVENOUS at 08:33

## 2021-01-01 RX ADMIN — Medication 135 MCG/HR: at 22:45

## 2021-01-01 RX ADMIN — METOCLOPRAMIDE 10 MG: 10 TABLET ORAL at 10:23

## 2021-01-01 RX ADMIN — SODIUM CHLORIDE: 9 INJECTION, SOLUTION INTRAVENOUS at 15:21

## 2021-01-01 RX ADMIN — PROPOFOL 100 MCG/KG/MIN: 10 INJECTION, EMULSION INTRAVENOUS at 10:40

## 2021-01-01 RX ADMIN — ONDANSETRON HYDROCHLORIDE 4 MG: 4 INJECTION, SOLUTION INTRAMUSCULAR; INTRAVENOUS at 07:55

## 2021-01-01 RX ADMIN — PROPOFOL 25 MCG/KG/MIN: 10 INJECTION, EMULSION INTRAVENOUS at 03:29

## 2021-01-01 RX ADMIN — PRAVASTATIN SODIUM 40 MG: 40 TABLET ORAL at 19:26

## 2021-01-01 RX ADMIN — Medication 8 MG/HR: at 00:47

## 2021-01-01 RX ADMIN — Medication 2 MG/HR: at 10:17

## 2021-01-01 RX ADMIN — INSULIN LISPRO 2 UNITS: 100 INJECTION, SOLUTION INTRAVENOUS; SUBCUTANEOUS at 11:20

## 2021-01-01 RX ADMIN — PROPOFOL 150 MG: 10 INJECTION, EMULSION INTRAVENOUS at 09:27

## 2021-01-01 RX ADMIN — INSULIN LISPRO 2 UNITS: 100 INJECTION, SOLUTION INTRAVENOUS; SUBCUTANEOUS at 14:10

## 2021-01-01 RX ADMIN — Medication 8 MG/HR: at 12:45

## 2021-01-01 RX ADMIN — INSULIN LISPRO 2 UNITS: 100 INJECTION, SOLUTION INTRAVENOUS; SUBCUTANEOUS at 11:13

## 2021-01-01 RX ADMIN — METOCLOPRAMIDE 10 MG: 10 TABLET ORAL at 08:26

## 2021-01-01 RX ADMIN — HYDROCORTISONE SODIUM SUCCINATE 100 MG: 100 INJECTION, POWDER, FOR SOLUTION INTRAMUSCULAR; INTRAVENOUS at 08:14

## 2021-01-01 RX ADMIN — SODIUM CHLORIDE, PRESERVATIVE FREE 10 ML: 5 INJECTION INTRAVENOUS at 08:23

## 2021-01-01 RX ADMIN — TRAMADOL HYDROCHLORIDE 50 MG: 50 TABLET ORAL at 11:21

## 2021-01-01 RX ADMIN — POLYETHYLENE GLYCOL 3350 17 G: 17 POWDER, FOR SOLUTION ORAL at 09:24

## 2021-01-01 RX ADMIN — PROPOFOL 150 MCG/KG/MIN: 10 INJECTION, EMULSION INTRAVENOUS at 07:58

## 2021-01-01 RX ADMIN — METOCLOPRAMIDE 10 MG: 10 TABLET ORAL at 15:56

## 2021-01-01 RX ADMIN — Medication 1 CAPSULE: at 08:22

## 2021-01-01 RX ADMIN — SODIUM CHLORIDE: 9 INJECTION, SOLUTION INTRAVENOUS at 15:24

## 2021-01-01 RX ADMIN — ACETYLCYSTEINE 600 MG: 200 SOLUTION ORAL; RESPIRATORY (INHALATION) at 17:52

## 2021-01-01 RX ADMIN — PROPOFOL 30 MCG/KG/MIN: 10 INJECTION, EMULSION INTRAVENOUS at 03:00

## 2021-01-01 RX ADMIN — HYDROCORTISONE SODIUM SUCCINATE 100 MG: 100 INJECTION, POWDER, FOR SOLUTION INTRAMUSCULAR; INTRAVENOUS at 00:42

## 2021-01-01 RX ADMIN — VORICONAZOLE 200 MG: 200 TABLET ORAL at 23:07

## 2021-01-01 RX ADMIN — INSULIN LISPRO 2 UNITS: 100 INJECTION, SOLUTION INTRAVENOUS; SUBCUTANEOUS at 23:28

## 2021-01-01 RX ADMIN — HEPARIN SODIUM AND DEXTROSE 16.6 UNITS/KG/HR: 10000; 5 INJECTION INTRAVENOUS at 15:51

## 2021-01-01 RX ADMIN — ENOXAPARIN SODIUM 40 MG: 100 INJECTION SUBCUTANEOUS at 19:26

## 2021-01-01 RX ADMIN — VORICONAZOLE 200 MG: 200 TABLET ORAL at 08:14

## 2021-01-01 RX ADMIN — OXYMETAZOLINE HYDROCHLORIDE 3 SPRAY: 0.05 SPRAY NASAL at 12:57

## 2021-01-01 RX ADMIN — INSULIN LISPRO 2 UNITS: 100 INJECTION, SOLUTION INTRAVENOUS; SUBCUTANEOUS at 12:39

## 2021-01-01 RX ADMIN — SODIUM CHLORIDE, PRESERVATIVE FREE 10 ML: 5 INJECTION INTRAVENOUS at 20:20

## 2021-01-01 RX ADMIN — Medication 10 MG/HR: at 04:46

## 2021-01-01 RX ADMIN — ROCURONIUM BROMIDE 20 MG: 10 INJECTION INTRAVENOUS at 10:43

## 2021-01-01 RX ADMIN — SENNOSIDES 8.6 MG: 8.6 TABLET, COATED ORAL at 21:33

## 2021-01-01 RX ADMIN — CISATRACURIUM BESYLATE 2 MCG/KG/MIN: 200 INJECTION INTRAVENOUS at 17:51

## 2021-01-01 RX ADMIN — SODIUM CHLORIDE: 9 INJECTION, SOLUTION INTRAVENOUS at 16:55

## 2021-01-01 RX ADMIN — SODIUM CHLORIDE, PRESERVATIVE FREE 10 ML: 5 INJECTION INTRAVENOUS at 08:17

## 2021-01-01 RX ADMIN — Medication 25 MCG/HR: at 15:18

## 2021-01-01 RX ADMIN — HYDROMORPHONE HYDROCHLORIDE 0.5 MG: 1 INJECTION, SOLUTION INTRAMUSCULAR; INTRAVENOUS; SUBCUTANEOUS at 09:40

## 2021-01-01 RX ADMIN — Medication 1 CAPSULE: at 09:29

## 2021-01-01 RX ADMIN — SODIUM CHLORIDE SOLN NEBU 3% 4 ML: 3 NEBU SOLN at 13:37

## 2021-01-01 RX ADMIN — FAMOTIDINE 20 MG: 10 INJECTION, SOLUTION INTRAVENOUS at 21:17

## 2021-01-01 RX ADMIN — VORICONAZOLE 200 MG: 200 TABLET ORAL at 09:05

## 2021-01-01 RX ADMIN — PRAVASTATIN SODIUM 40 MG: 40 TABLET ORAL at 20:23

## 2021-01-01 RX ADMIN — PROPOFOL 19.95 MCG/KG/MIN: 10 INJECTION, EMULSION INTRAVENOUS at 12:35

## 2021-01-01 RX ADMIN — PROPOFOL 180 MG: 10 INJECTION, EMULSION INTRAVENOUS at 07:49

## 2021-01-01 RX ADMIN — ALBUTEROL SULFATE 10 MG: 2.5 SOLUTION RESPIRATORY (INHALATION) at 06:15

## 2021-01-01 RX ADMIN — BARICITINIB 4 MG: 2 TABLET, FILM COATED ORAL at 08:33

## 2021-01-01 RX ADMIN — BARICITINIB 4 MG: 2 TABLET, FILM COATED ORAL at 09:27

## 2021-01-01 RX ADMIN — SULFAMETHOXAZOLE AND TRIMETHOPRIM 1 TABLET: 800; 160 TABLET ORAL at 08:21

## 2021-01-01 RX ADMIN — CEFTRIAXONE SODIUM 1000 MG: 1 INJECTION, POWDER, FOR SOLUTION INTRAMUSCULAR; INTRAVENOUS at 10:31

## 2021-01-01 RX ADMIN — IPRATROPIUM BROMIDE AND ALBUTEROL SULFATE 1 AMPULE: .5; 3 SOLUTION RESPIRATORY (INHALATION) at 09:23

## 2021-01-01 RX ADMIN — IPRATROPIUM BROMIDE AND ALBUTEROL SULFATE 1 AMPULE: .5; 3 SOLUTION RESPIRATORY (INHALATION) at 00:21

## 2021-01-01 RX ADMIN — FLUDROCORTISONE ACETATE 0.1 MG: 0.1 TABLET ORAL at 08:41

## 2021-01-01 RX ADMIN — HEPARIN SODIUM AND DEXTROSE 18 UNITS/KG/HR: 10000; 5 INJECTION INTRAVENOUS at 03:22

## 2021-01-01 RX ADMIN — ROCURONIUM BROMIDE 50 MG: 10 INJECTION INTRAVENOUS at 12:02

## 2021-01-01 RX ADMIN — PROPOFOL 100 MG: 10 INJECTION, EMULSION INTRAVENOUS at 12:07

## 2021-01-01 RX ADMIN — Medication 1 MG/HR: at 15:20

## 2021-01-01 RX ADMIN — BARICITINIB 4 MG: 2 TABLET, FILM COATED ORAL at 08:16

## 2021-01-01 RX ADMIN — FENTANYL CITRATE 200 MCG/HR: 0.05 INJECTION, SOLUTION INTRAMUSCULAR; INTRAVENOUS at 02:12

## 2021-01-01 RX ADMIN — SENNOSIDES 8.6 MG: 8.6 TABLET, COATED ORAL at 20:22

## 2021-01-01 RX ADMIN — Medication 10 MG: at 09:31

## 2021-01-01 RX ADMIN — INSULIN LISPRO 10 UNITS: 100 INJECTION, SOLUTION INTRAVENOUS; SUBCUTANEOUS at 10:11

## 2021-01-01 RX ADMIN — ACETAMINOPHEN 650 MG: 325 TABLET ORAL at 15:47

## 2021-01-01 RX ADMIN — FENTANYL CITRATE 200 MCG/HR: 0.05 INJECTION, SOLUTION INTRAMUSCULAR; INTRAVENOUS at 13:46

## 2021-01-01 RX ADMIN — TIOTROPIUM BROMIDE AND OLODATEROL 2 PUFF: 3.124; 2.736 SPRAY, METERED RESPIRATORY (INHALATION) at 08:36

## 2021-01-01 RX ADMIN — FENTANYL CITRATE 200 MCG/HR: 0.05 INJECTION, SOLUTION INTRAMUSCULAR; INTRAVENOUS at 01:49

## 2021-01-01 RX ADMIN — VORICONAZOLE 200 MG: 200 TABLET ORAL at 19:26

## 2021-01-01 RX ADMIN — DEXAMETHASONE 20 MG: 4 TABLET ORAL at 09:27

## 2021-01-01 RX ADMIN — BUMETANIDE 1 MG: 0.25 INJECTION INTRAMUSCULAR; INTRAVENOUS at 09:29

## 2021-01-01 RX ADMIN — Medication 100 MG: at 10:35

## 2021-01-01 RX ADMIN — Medication 1 CAPSULE: at 08:16

## 2021-01-01 RX ADMIN — GUAIFENESIN AND DEXTROMETHORPHAN 5 ML: 100; 10 SYRUP ORAL at 02:30

## 2021-01-01 RX ADMIN — DEXTROSE MONOHYDRATE 25 G: 25 INJECTION, SOLUTION INTRAVENOUS at 15:15

## 2021-01-01 RX ADMIN — Medication 7 MG/HR: at 08:04

## 2021-01-01 RX ADMIN — HYDROCORTISONE SODIUM SUCCINATE 100 MG: 100 INJECTION, POWDER, FOR SOLUTION INTRAMUSCULAR; INTRAVENOUS at 17:31

## 2021-01-01 RX ADMIN — PROPOFOL 45 MCG/KG/MIN: 10 INJECTION, EMULSION INTRAVENOUS at 19:21

## 2021-01-01 RX ADMIN — HYDROCORTISONE SODIUM SUCCINATE 100 MG: 100 INJECTION, POWDER, FOR SOLUTION INTRAMUSCULAR; INTRAVENOUS at 16:05

## 2021-01-01 RX ADMIN — VORICONAZOLE 200 MG: 200 TABLET ORAL at 20:21

## 2021-01-01 RX ADMIN — FENTANYL CITRATE 50 MCG: 50 INJECTION, SOLUTION INTRAMUSCULAR; INTRAVENOUS at 10:43

## 2021-01-01 RX ADMIN — LACTULOSE 20 G: 20 SOLUTION ORAL at 15:15

## 2021-01-01 RX ADMIN — METOCLOPRAMIDE 10 MG: 10 TABLET ORAL at 20:28

## 2021-01-01 RX ADMIN — PROPOFOL 30 MCG/KG/MIN: 10 INJECTION, EMULSION INTRAVENOUS at 17:00

## 2021-01-01 RX ADMIN — CEFTRIAXONE SODIUM 1000 MG: 1 INJECTION, POWDER, FOR SOLUTION INTRAMUSCULAR; INTRAVENOUS at 10:07

## 2021-01-01 RX ADMIN — FENTANYL CITRATE 50 MCG: 50 INJECTION INTRAMUSCULAR; INTRAVENOUS at 13:25

## 2021-01-01 RX ADMIN — Medication 75 MCG/HR: at 10:16

## 2021-01-01 RX ADMIN — BARICITINIB 4 MG: 2 TABLET, FILM COATED ORAL at 09:07

## 2021-01-01 RX ADMIN — FENTANYL CITRATE 200 MCG/HR: 0.05 INJECTION, SOLUTION INTRAMUSCULAR; INTRAVENOUS at 13:55

## 2021-01-01 RX ADMIN — INSULIN LISPRO 2 UNITS: 100 INJECTION, SOLUTION INTRAVENOUS; SUBCUTANEOUS at 05:32

## 2021-01-01 RX ADMIN — ENOXAPARIN SODIUM 40 MG: 100 INJECTION SUBCUTANEOUS at 21:36

## 2021-01-01 RX ADMIN — BUMETANIDE 0.5 MG: 0.25 INJECTION INTRAMUSCULAR; INTRAVENOUS at 23:05

## 2021-01-01 RX ADMIN — FENTANYL CITRATE 200 MCG/HR: 0.05 INJECTION, SOLUTION INTRAMUSCULAR; INTRAVENOUS at 12:34

## 2021-01-01 RX ADMIN — FENTANYL CITRATE 50 MCG: 50 INJECTION INTRAMUSCULAR; INTRAVENOUS at 13:50

## 2021-01-01 RX ADMIN — METOCLOPRAMIDE 10 MG: 10 TABLET ORAL at 11:15

## 2021-01-01 RX ADMIN — Medication 140 MCG/HR: at 14:12

## 2021-01-01 RX ADMIN — MEPERIDINE HYDROCHLORIDE 12.5 MG: 25 INJECTION INTRAMUSCULAR; INTRAVENOUS; SUBCUTANEOUS at 09:39

## 2021-01-01 RX ADMIN — HEPARIN SODIUM AND DEXTROSE 16.6 UNITS/KG/HR: 10000; 5 INJECTION INTRAVENOUS at 23:45

## 2021-01-01 RX ADMIN — PHENYLEPHRINE HYDROCHLORIDE 30 MCG/MIN: 10 INJECTION INTRAVENOUS at 19:16

## 2021-01-01 RX ADMIN — SODIUM CHLORIDE, PRESERVATIVE FREE 10 ML: 5 INJECTION INTRAVENOUS at 21:05

## 2021-01-01 RX ADMIN — PROPOFOL 50 MG: 10 INJECTION, EMULSION INTRAVENOUS at 09:25

## 2021-01-01 RX ADMIN — POLYETHYLENE GLYCOL 3350 17 G: 17 POWDER, FOR SOLUTION ORAL at 09:28

## 2021-01-01 RX ADMIN — FUROSEMIDE 40 MG: 40 INJECTION, SOLUTION INTRAMUSCULAR; INTRAVENOUS at 10:57

## 2021-01-01 RX ADMIN — FENTANYL CITRATE 200 MCG/HR: 0.05 INJECTION, SOLUTION INTRAMUSCULAR; INTRAVENOUS at 20:54

## 2021-01-01 RX ADMIN — FENTANYL CITRATE 50 MCG: 50 INJECTION INTRAMUSCULAR; INTRAVENOUS at 13:30

## 2021-01-01 RX ADMIN — SULFAMETHOXAZOLE AND TRIMETHOPRIM 1 TABLET: 800; 160 TABLET ORAL at 21:36

## 2021-01-01 RX ADMIN — Medication 1 CAPSULE: at 08:23

## 2021-01-01 RX ADMIN — MIDAZOLAM 2 MG: 1 INJECTION INTRAMUSCULAR; INTRAVENOUS at 10:28

## 2021-01-01 RX ADMIN — HYDROCORTISONE SODIUM SUCCINATE 100 MG: 100 INJECTION, POWDER, FOR SOLUTION INTRAMUSCULAR; INTRAVENOUS at 09:24

## 2021-01-01 RX ADMIN — METOCLOPRAMIDE 10 MG: 10 TABLET ORAL at 11:46

## 2021-01-01 RX ADMIN — SODIUM CHLORIDE: 9 INJECTION, SOLUTION INTRAVENOUS at 12:40

## 2021-01-01 RX ADMIN — INSULIN HUMAN 10 UNITS: 100 INJECTION, SOLUTION PARENTERAL at 06:19

## 2021-01-01 RX ADMIN — PROPOFOL 45 MCG/KG/MIN: 10 INJECTION, EMULSION INTRAVENOUS at 11:41

## 2021-01-01 RX ADMIN — SODIUM CHLORIDE, PRESERVATIVE FREE 10 ML: 5 INJECTION INTRAVENOUS at 08:42

## 2021-01-01 RX ADMIN — PROPOFOL 30 MCG/KG/MIN: 10 INJECTION, EMULSION INTRAVENOUS at 10:11

## 2021-01-01 RX ADMIN — PROPOFOL 20 MG: 10 INJECTION, EMULSION INTRAVENOUS at 11:21

## 2021-01-01 RX ADMIN — PRAVASTATIN SODIUM 40 MG: 40 TABLET ORAL at 19:37

## 2021-01-01 RX ADMIN — BARICITINIB 4 MG: 2 TABLET, FILM COATED ORAL at 08:04

## 2021-01-01 RX ADMIN — BUMETANIDE 1 MG: 0.25 INJECTION INTRAMUSCULAR; INTRAVENOUS at 08:05

## 2021-01-01 RX ADMIN — Medication 8 MG/HR: at 01:11

## 2021-01-01 RX ADMIN — PROPOFOL 20 MCG/KG/MIN: 10 INJECTION, EMULSION INTRAVENOUS at 03:42

## 2021-01-01 RX ADMIN — Medication 600 MG: at 23:28

## 2021-01-01 RX ADMIN — HYDROCORTISONE SODIUM SUCCINATE 100 MG: 100 INJECTION, POWDER, FOR SOLUTION INTRAMUSCULAR; INTRAVENOUS at 00:38

## 2021-01-01 RX ADMIN — METOCLOPRAMIDE 10 MG: 10 TABLET ORAL at 17:56

## 2021-01-01 RX ADMIN — ROCURONIUM BROMIDE 50 MG: 10 INJECTION INTRAVENOUS at 09:41

## 2021-01-01 RX ADMIN — SODIUM CHLORIDE, PRESERVATIVE FREE 10 ML: 5 INJECTION INTRAVENOUS at 09:29

## 2021-01-01 RX ADMIN — PROPOFOL 40 MCG/KG/MIN: 10 INJECTION, EMULSION INTRAVENOUS at 08:50

## 2021-01-01 RX ADMIN — Medication 80 MG: at 07:49

## 2021-01-01 RX ADMIN — Medication 2 MG/HR: at 08:27

## 2021-01-01 RX ADMIN — INSULIN HUMAN 10 UNITS: 100 INJECTION, SOLUTION PARENTERAL at 15:15

## 2021-01-01 RX ADMIN — IPRATROPIUM BROMIDE AND ALBUTEROL SULFATE 1 AMPULE: .5; 3 SOLUTION RESPIRATORY (INHALATION) at 17:51

## 2021-01-01 RX ADMIN — Medication 50 MCG/HR: at 02:09

## 2021-01-01 RX ADMIN — SODIUM CHLORIDE 1000 ML: 9 INJECTION, SOLUTION INTRAVENOUS at 09:07

## 2021-01-01 RX ADMIN — INSULIN LISPRO 2 UNITS: 100 INJECTION, SOLUTION INTRAVENOUS; SUBCUTANEOUS at 12:18

## 2021-01-01 RX ADMIN — FAMOTIDINE 20 MG: 10 INJECTION, SOLUTION INTRAVENOUS at 23:06

## 2021-01-01 RX ADMIN — IBUPROFEN 600 MG: 200 TABLET, FILM COATED ORAL at 23:28

## 2021-01-01 RX ADMIN — ROCURONIUM BROMIDE 50 MG: 10 INJECTION INTRAVENOUS at 10:56

## 2021-01-01 RX ADMIN — Medication 1 CAPSULE: at 08:08

## 2021-01-01 RX ADMIN — MIDAZOLAM HYDROCHLORIDE 2 MG: 1 INJECTION, SOLUTION INTRAMUSCULAR; INTRAVENOUS at 09:14

## 2021-01-01 RX ADMIN — SODIUM CHLORIDE: 9 INJECTION, SOLUTION INTRAVENOUS at 11:10

## 2021-01-01 RX ADMIN — FENTANYL CITRATE 200 MCG/HR: 0.05 INJECTION, SOLUTION INTRAMUSCULAR; INTRAVENOUS at 04:20

## 2021-01-01 RX ADMIN — BUMETANIDE 1 MG: 0.25 INJECTION, SOLUTION INTRAMUSCULAR; INTRAVENOUS at 09:59

## 2021-01-01 RX ADMIN — BUDESONIDE AND FORMOTEROL FUMARATE DIHYDRATE 2 PUFF: 160; 4.5 AEROSOL RESPIRATORY (INHALATION) at 09:23

## 2021-01-01 RX ADMIN — PROPOFOL 45 MCG/KG/MIN: 10 INJECTION, EMULSION INTRAVENOUS at 23:29

## 2021-01-01 RX ADMIN — METOCLOPRAMIDE 10 MG: 10 TABLET ORAL at 20:16

## 2021-01-01 RX ADMIN — Medication 140 MCG/HR: at 01:49

## 2021-01-01 RX ADMIN — Medication 10 MG/HR: at 17:28

## 2021-01-01 RX ADMIN — SODIUM CHLORIDE: 9 INJECTION, SOLUTION INTRAVENOUS at 19:12

## 2021-01-01 RX ADMIN — ACETYLCYSTEINE 600 MG: 200 SOLUTION ORAL; RESPIRATORY (INHALATION) at 10:34

## 2021-01-01 RX ADMIN — BUMETANIDE 1 MG: 0.25 INJECTION INTRAMUSCULAR; INTRAVENOUS at 22:47

## 2021-01-01 RX ADMIN — SODIUM CHLORIDE, PRESERVATIVE FREE 10 ML: 5 INJECTION INTRAVENOUS at 20:16

## 2021-01-01 RX ADMIN — METOCLOPRAMIDE 10 MG: 10 TABLET ORAL at 16:05

## 2021-01-01 RX ADMIN — CISATRACURIUM BESYLATE 2 MCG/KG/MIN: 200 INJECTION INTRAVENOUS at 00:28

## 2021-01-01 RX ADMIN — ACETYLCYSTEINE 600 MG: 200 SOLUTION ORAL; RESPIRATORY (INHALATION) at 09:43

## 2021-01-01 RX ADMIN — BUMETANIDE 1 MG: 0.25 INJECTION INTRAMUSCULAR; INTRAVENOUS at 09:27

## 2021-01-01 RX ADMIN — Medication 10 MG/HR: at 11:20

## 2021-01-01 RX ADMIN — IPRATROPIUM BROMIDE AND ALBUTEROL SULFATE 1 AMPULE: 2.5; .5 SOLUTION RESPIRATORY (INHALATION) at 12:05

## 2021-01-01 RX ADMIN — PROPOFOL 45 MCG/KG/MIN: 10 INJECTION, EMULSION INTRAVENOUS at 23:50

## 2021-01-01 RX ADMIN — PROPOFOL 250 MG: 10 INJECTION, EMULSION INTRAVENOUS at 11:46

## 2021-01-01 RX ADMIN — FENTANYL CITRATE 100 MCG: 50 INJECTION, SOLUTION INTRAMUSCULAR; INTRAVENOUS at 07:36

## 2021-01-01 RX ADMIN — IPRATROPIUM BROMIDE AND ALBUTEROL SULFATE 1 AMPULE: .5; 3 SOLUTION RESPIRATORY (INHALATION) at 12:40

## 2021-01-01 RX ADMIN — MIDAZOLAM 2 MG: 1 INJECTION INTRAMUSCULAR; INTRAVENOUS at 12:27

## 2021-01-01 RX ADMIN — IPRATROPIUM BROMIDE AND ALBUTEROL SULFATE 1 AMPULE: .5; 3 SOLUTION RESPIRATORY (INHALATION) at 09:24

## 2021-01-01 RX ADMIN — FENTANYL CITRATE 200 MCG/HR: 0.05 INJECTION, SOLUTION INTRAMUSCULAR; INTRAVENOUS at 20:25

## 2021-01-01 RX ADMIN — INSULIN LISPRO 2 UNITS: 100 INJECTION, SOLUTION INTRAVENOUS; SUBCUTANEOUS at 23:23

## 2021-01-01 RX ADMIN — FENTANYL CITRATE 200 MCG/HR: 0.05 INJECTION, SOLUTION INTRAMUSCULAR; INTRAVENOUS at 22:21

## 2021-01-01 RX ADMIN — OXYCODONE AND ACETAMINOPHEN 1 TABLET: 5; 325 TABLET ORAL at 15:14

## 2021-01-01 RX ADMIN — BUMETANIDE 1 MG: 0.25 INJECTION INTRAMUSCULAR; INTRAVENOUS at 08:22

## 2021-01-01 RX ADMIN — VORICONAZOLE 200 MG: 200 TABLET ORAL at 09:28

## 2021-01-01 RX ADMIN — BARICITINIB 4 MG: 2 TABLET, FILM COATED ORAL at 09:28

## 2021-01-01 RX ADMIN — ROCURONIUM BROMIDE 50 MG: 10 INJECTION INTRAVENOUS at 07:44

## 2021-01-01 RX ADMIN — FENTANYL CITRATE 200 MCG/HR: 0.05 INJECTION, SOLUTION INTRAMUSCULAR; INTRAVENOUS at 12:54

## 2021-01-01 RX ADMIN — SODIUM ZIRCONIUM CYCLOSILICATE 5 G: 5 POWDER, FOR SUSPENSION ORAL at 20:15

## 2021-01-01 RX ADMIN — PROPOFOL 20 MCG/KG/MIN: 10 INJECTION, EMULSION INTRAVENOUS at 08:28

## 2021-01-01 RX ADMIN — SODIUM CHLORIDE, PRESERVATIVE FREE 10 ML: 5 INJECTION INTRAVENOUS at 08:43

## 2021-01-01 RX ADMIN — ENOXAPARIN SODIUM 40 MG: 100 INJECTION SUBCUTANEOUS at 20:21

## 2021-01-01 RX ADMIN — PROPOFOL 45 MCG/KG/MIN: 10 INJECTION, EMULSION INTRAVENOUS at 08:20

## 2021-01-01 RX ADMIN — FAMOTIDINE 20 MG: 10 INJECTION, SOLUTION INTRAVENOUS at 08:22

## 2021-01-01 RX ADMIN — FENTANYL CITRATE 200 MCG/HR: 0.05 INJECTION, SOLUTION INTRAMUSCULAR; INTRAVENOUS at 04:27

## 2021-01-01 RX ADMIN — VORICONAZOLE 200 MG: 200 TABLET ORAL at 21:20

## 2021-01-01 RX ADMIN — ENOXAPARIN SODIUM 40 MG: 100 INJECTION SUBCUTANEOUS at 19:36

## 2021-01-01 RX ADMIN — SUGAMMADEX 200 MG: 100 INJECTION, SOLUTION INTRAVENOUS at 08:59

## 2021-01-01 RX ADMIN — SENNOSIDES 8.6 MG: 8.6 TABLET, COATED ORAL at 20:28

## 2021-01-01 RX ADMIN — Medication 80 MCG/MIN: at 17:26

## 2021-01-01 RX ADMIN — FENTANYL CITRATE 50 MCG: 50 INJECTION, SOLUTION INTRAMUSCULAR; INTRAVENOUS at 09:30

## 2021-01-01 RX ADMIN — VORICONAZOLE 200 MG: 200 TABLET ORAL at 08:04

## 2021-01-01 RX ADMIN — PROPOFOL 45 MCG/KG/MIN: 10 INJECTION, EMULSION INTRAVENOUS at 02:12

## 2021-01-01 RX ADMIN — IPRATROPIUM BROMIDE AND ALBUTEROL SULFATE 1 AMPULE: .5; 3 SOLUTION RESPIRATORY (INHALATION) at 13:20

## 2021-01-01 RX ADMIN — HYDROCORTISONE SODIUM SUCCINATE 100 MG: 100 INJECTION, POWDER, FOR SOLUTION INTRAMUSCULAR; INTRAVENOUS at 00:36

## 2021-01-01 RX ADMIN — FAMOTIDINE 20 MG: 10 INJECTION, SOLUTION INTRAVENOUS at 08:05

## 2021-01-01 RX ADMIN — BUDESONIDE AND FORMOTEROL FUMARATE DIHYDRATE 2 PUFF: 160; 4.5 AEROSOL RESPIRATORY (INHALATION) at 18:24

## 2021-01-01 RX ADMIN — VORICONAZOLE 200 MG: 200 TABLET ORAL at 19:36

## 2021-01-01 RX ADMIN — PROPOFOL 50 MCG/KG/MIN: 10 INJECTION, EMULSION INTRAVENOUS at 18:46

## 2021-01-01 RX ADMIN — Medication 1 CAPSULE: at 07:54

## 2021-01-01 RX ADMIN — SODIUM CHLORIDE: 9 INJECTION, SOLUTION INTRAVENOUS at 03:37

## 2021-01-01 RX ADMIN — IOPAMIDOL 80 ML: 755 INJECTION, SOLUTION INTRAVENOUS at 11:21

## 2021-01-01 RX ADMIN — SUGAMMADEX 200 MG: 100 INJECTION, SOLUTION INTRAVENOUS at 11:32

## 2021-01-01 RX ADMIN — SODIUM ZIRCONIUM CYCLOSILICATE 10 G: 5 POWDER, FOR SUSPENSION ORAL at 14:43

## 2021-01-01 RX ADMIN — FENTANYL CITRATE 200 MCG/HR: 0.05 INJECTION, SOLUTION INTRAMUSCULAR; INTRAVENOUS at 17:10

## 2021-01-01 RX ADMIN — BUDESONIDE AND FORMOTEROL FUMARATE DIHYDRATE 2 PUFF: 160; 4.5 AEROSOL RESPIRATORY (INHALATION) at 21:49

## 2021-01-01 RX ADMIN — VORICONAZOLE 200 MG: 200 TABLET ORAL at 08:44

## 2021-01-01 RX ADMIN — SODIUM ZIRCONIUM CYCLOSILICATE 10 G: 5 POWDER, FOR SUSPENSION ORAL at 08:21

## 2021-01-01 RX ADMIN — FAMOTIDINE 20 MG: 10 INJECTION, SOLUTION INTRAVENOUS at 19:36

## 2021-01-01 RX ADMIN — HYDROCODONE BITARTRATE AND ACETAMINOPHEN 1 TABLET: 5; 325 TABLET ORAL at 15:31

## 2021-01-01 RX ADMIN — FENTANYL CITRATE 200 MCG/HR: 0.05 INJECTION, SOLUTION INTRAMUSCULAR; INTRAVENOUS at 07:54

## 2021-01-01 RX ADMIN — CISATRACURIUM BESYLATE 2 MCG/KG/MIN: 200 INJECTION INTRAVENOUS at 06:37

## 2021-01-01 RX ADMIN — Medication 10 MG: at 09:34

## 2021-01-01 RX ADMIN — LIDOCAINE HYDROCHLORIDE 100 MG: 20 INJECTION, SOLUTION INFILTRATION; PERINEURAL at 11:46

## 2021-01-01 RX ADMIN — HEPARIN SODIUM AND DEXTROSE 18.6 UNITS/KG/HR: 10000; 5 INJECTION INTRAVENOUS at 17:40

## 2021-01-01 RX ADMIN — SULFAMETHOXAZOLE AND TRIMETHOPRIM 1 TABLET: 800; 160 TABLET ORAL at 08:16

## 2021-01-01 RX ADMIN — SODIUM ZIRCONIUM CYCLOSILICATE 10 G: 5 POWDER, FOR SUSPENSION ORAL at 20:22

## 2021-01-01 RX ADMIN — FENTANYL CITRATE 50 MCG: 50 INJECTION, SOLUTION INTRAMUSCULAR; INTRAVENOUS at 11:09

## 2021-01-01 RX ADMIN — Medication 25 MCG/HR: at 08:27

## 2021-01-01 RX ADMIN — Medication 1 CAPSULE: at 08:14

## 2021-01-01 RX ADMIN — BARICITINIB 4 MG: 2 TABLET, FILM COATED ORAL at 08:05

## 2021-01-01 RX ADMIN — ONDANSETRON HYDROCHLORIDE 4 MG: 4 INJECTION, SOLUTION INTRAMUSCULAR; INTRAVENOUS at 12:07

## 2021-01-01 RX ADMIN — PROPOFOL 45 MCG/KG/MIN: 10 INJECTION, EMULSION INTRAVENOUS at 03:57

## 2021-01-01 RX ADMIN — ONDANSETRON HYDROCHLORIDE 4 MG: 4 INJECTION, SOLUTION INTRAMUSCULAR; INTRAVENOUS at 12:12

## 2021-01-01 RX ADMIN — FENTANYL CITRATE 100 MCG: 50 INJECTION, SOLUTION INTRAMUSCULAR; INTRAVENOUS at 09:23

## 2021-01-01 RX ADMIN — HYDROCORTISONE SODIUM SUCCINATE 100 MG: 100 INJECTION, POWDER, FOR SOLUTION INTRAMUSCULAR; INTRAVENOUS at 03:03

## 2021-01-01 RX ADMIN — PROPOFOL 30 MCG/KG/MIN: 10 INJECTION, EMULSION INTRAVENOUS at 10:04

## 2021-01-01 RX ADMIN — FENTANYL CITRATE 200 MCG/HR: 0.05 INJECTION, SOLUTION INTRAMUSCULAR; INTRAVENOUS at 10:12

## 2021-01-01 RX ADMIN — INSULIN LISPRO 2 UNITS: 100 INJECTION, SOLUTION INTRAVENOUS; SUBCUTANEOUS at 17:31

## 2021-01-01 RX ADMIN — Medication 140 MCG/HR: at 10:35

## 2021-01-01 RX ADMIN — METOCLOPRAMIDE 10 MG: 10 TABLET ORAL at 21:20

## 2021-01-01 RX ADMIN — FAMOTIDINE 20 MG: 10 INJECTION, SOLUTION INTRAVENOUS at 19:37

## 2021-01-01 RX ADMIN — METOCLOPRAMIDE 10 MG: 10 TABLET ORAL at 17:28

## 2021-01-01 RX ADMIN — PROPOFOL 50 MCG/KG/MIN: 10 INJECTION, EMULSION INTRAVENOUS at 21:54

## 2021-01-01 RX ADMIN — HYDROCORTISONE SODIUM SUCCINATE 100 MG: 100 INJECTION, POWDER, FOR SOLUTION INTRAMUSCULAR; INTRAVENOUS at 15:55

## 2021-01-01 RX ADMIN — ENOXAPARIN SODIUM 40 MG: 100 INJECTION SUBCUTANEOUS at 20:38

## 2021-01-01 RX ADMIN — DEXTROSE MONOHYDRATE 25 G: 25 INJECTION, SOLUTION INTRAVENOUS at 08:25

## 2021-01-01 RX ADMIN — TIOTROPIUM BROMIDE AND OLODATEROL 2 PUFF: 3.124; 2.736 SPRAY, METERED RESPIRATORY (INHALATION) at 08:16

## 2021-01-01 RX ADMIN — PROPOFOL 25 MCG/KG/MIN: 10 INJECTION, EMULSION INTRAVENOUS at 20:18

## 2021-01-01 RX ADMIN — SODIUM CHLORIDE 25 ML: 9 INJECTION, SOLUTION INTRAVENOUS at 08:25

## 2021-01-01 RX ADMIN — Medication 7 MG/HR: at 23:04

## 2021-01-01 RX ADMIN — FENTANYL CITRATE 200 MCG/HR: 0.05 INJECTION, SOLUTION INTRAMUSCULAR; INTRAVENOUS at 18:59

## 2021-01-01 RX ADMIN — SULFAMETHOXAZOLE AND TRIMETHOPRIM 1 TABLET: 800; 160 TABLET ORAL at 09:27

## 2021-01-01 RX ADMIN — PROPOFOL 50 MCG/KG/MIN: 10 INJECTION, EMULSION INTRAVENOUS at 08:12

## 2021-01-01 RX ADMIN — VORICONAZOLE 200 MG: 200 TABLET ORAL at 20:16

## 2021-01-01 RX ADMIN — SODIUM ZIRCONIUM CYCLOSILICATE 5 G: 5 POWDER, FOR SUSPENSION ORAL at 13:51

## 2021-01-01 RX ADMIN — SODIUM CHLORIDE, PRESERVATIVE FREE 10 ML: 5 INJECTION INTRAVENOUS at 21:21

## 2021-01-01 RX ADMIN — POLYETHYLENE GLYCOL 3350 17 G: 17 POWDER, FOR SOLUTION ORAL at 07:54

## 2021-01-01 RX ADMIN — Medication 140 MCG/HR: at 18:21

## 2021-01-01 RX ADMIN — DEXAMETHASONE SODIUM PHOSPHATE 10 MG: 10 INJECTION, EMULSION INTRAMUSCULAR; INTRAVENOUS at 07:32

## 2021-01-01 RX ADMIN — ENOXAPARIN SODIUM 40 MG: 100 INJECTION SUBCUTANEOUS at 19:37

## 2021-01-01 RX ADMIN — ACETYLCYSTEINE 600 MG: 200 SOLUTION ORAL; RESPIRATORY (INHALATION) at 09:22

## 2021-01-01 RX ADMIN — DEXAMETHASONE SODIUM PHOSPHATE 8 MG: 4 INJECTION, SOLUTION INTRA-ARTICULAR; INTRALESIONAL; INTRAMUSCULAR; INTRAVENOUS; SOFT TISSUE at 09:06

## 2021-01-01 RX ADMIN — FLUDROCORTISONE ACETATE 0.1 MG: 0.1 TABLET ORAL at 16:38

## 2021-01-01 RX ADMIN — Medication 200 MCG/HR: at 11:25

## 2021-01-01 RX ADMIN — SODIUM ZIRCONIUM CYCLOSILICATE 10 G: 5 POWDER, FOR SUSPENSION ORAL at 09:43

## 2021-01-01 RX ADMIN — FENTANYL CITRATE 150 MCG: 50 INJECTION, SOLUTION INTRAMUSCULAR; INTRAVENOUS at 07:49

## 2021-01-01 RX ADMIN — HYDROCORTISONE SODIUM SUCCINATE 100 MG: 100 INJECTION, POWDER, FOR SOLUTION INTRAMUSCULAR; INTRAVENOUS at 18:09

## 2021-01-01 RX ADMIN — POLYETHYLENE GLYCOL 3350 17 G: 17 POWDER, FOR SOLUTION ORAL at 10:37

## 2021-01-01 RX ADMIN — INSULIN LISPRO 2 UNITS: 100 INJECTION, SOLUTION INTRAVENOUS; SUBCUTANEOUS at 05:11

## 2021-01-01 RX ADMIN — Medication 1 CAPSULE: at 07:56

## 2021-01-01 RX ADMIN — PROPOFOL 50 MCG/KG/MIN: 10 INJECTION, EMULSION INTRAVENOUS at 00:53

## 2021-01-01 RX ADMIN — Medication 200 MCG/HR: at 08:35

## 2021-01-01 RX ADMIN — FAMOTIDINE 20 MG: 10 INJECTION, SOLUTION INTRAVENOUS at 07:54

## 2021-01-01 RX ADMIN — PROPOFOL 50 MCG/KG/MIN: 10 INJECTION, EMULSION INTRAVENOUS at 15:02

## 2021-01-01 RX ADMIN — HYDROCORTISONE SODIUM SUCCINATE 100 MG: 100 INJECTION, POWDER, FOR SOLUTION INTRAMUSCULAR; INTRAVENOUS at 00:25

## 2021-01-01 RX ADMIN — Medication 1 CAPSULE: at 09:24

## 2021-01-01 RX ADMIN — HYDROMORPHONE HYDROCHLORIDE 0.5 MG: 1 INJECTION, SOLUTION INTRAMUSCULAR; INTRAVENOUS; SUBCUTANEOUS at 10:15

## 2021-01-01 RX ADMIN — FLUDROCORTISONE ACETATE 0.2 MG: 0.1 TABLET ORAL at 09:29

## 2021-01-01 RX ADMIN — ENOXAPARIN SODIUM 40 MG: 100 INJECTION SUBCUTANEOUS at 20:15

## 2021-01-01 RX ADMIN — VORICONAZOLE 200 MG: 200 TABLET ORAL at 07:55

## 2021-01-01 RX ADMIN — PROPOFOL 40 MCG/KG/MIN: 10 INJECTION, EMULSION INTRAVENOUS at 01:51

## 2021-01-01 RX ADMIN — PROPOFOL 45 MCG/KG/MIN: 10 INJECTION, EMULSION INTRAVENOUS at 06:17

## 2021-01-01 RX ADMIN — PROPOFOL 20 MCG/KG/MIN: 10 INJECTION, EMULSION INTRAVENOUS at 17:56

## 2021-01-01 RX ADMIN — TIOTROPIUM BROMIDE INHALATION SPRAY 2 PUFF: 3.12 SPRAY, METERED RESPIRATORY (INHALATION) at 09:44

## 2021-01-01 RX ADMIN — BUDESONIDE AND FORMOTEROL FUMARATE DIHYDRATE 2 PUFF: 160; 4.5 AEROSOL RESPIRATORY (INHALATION) at 09:44

## 2021-01-01 RX ADMIN — SODIUM CHLORIDE: 9 INJECTION, SOLUTION INTRAVENOUS at 05:29

## 2021-01-01 RX ADMIN — SENNOSIDES 8.6 MG: 8.6 TABLET, COATED ORAL at 19:41

## 2021-01-01 RX ADMIN — ACETAMINOPHEN 650 MG: 325 TABLET ORAL at 02:06

## 2021-01-01 RX ADMIN — PROPOFOL 50 MCG/KG/MIN: 10 INJECTION, EMULSION INTRAVENOUS at 19:39

## 2021-01-01 RX ADMIN — SODIUM CHLORIDE, PRESERVATIVE FREE 10 ML: 5 INJECTION INTRAVENOUS at 19:37

## 2021-01-01 RX ADMIN — Medication 200 MCG/HR: at 05:56

## 2021-01-01 RX ADMIN — CEFAZOLIN 2000 MG: 10 INJECTION, POWDER, FOR SOLUTION INTRAVENOUS at 10:36

## 2021-01-01 RX ADMIN — CISATRACURIUM BESYLATE 2 MCG/KG/MIN: 200 INJECTION INTRAVENOUS at 00:25

## 2021-01-01 RX ADMIN — BUDESONIDE AND FORMOTEROL FUMARATE DIHYDRATE 2 PUFF: 160; 4.5 AEROSOL RESPIRATORY (INHALATION) at 21:00

## 2021-01-01 RX ADMIN — METOCLOPRAMIDE 10 MG: 10 TABLET ORAL at 17:47

## 2021-01-01 RX ADMIN — INSULIN LISPRO 2 UNITS: 100 INJECTION, SOLUTION INTRAVENOUS; SUBCUTANEOUS at 12:02

## 2021-01-01 RX ADMIN — CISATRACURIUM BESYLATE 2 MCG/KG/MIN: 200 INJECTION INTRAVENOUS at 19:20

## 2021-01-01 RX ADMIN — INSULIN LISPRO 2 UNITS: 100 INJECTION, SOLUTION INTRAVENOUS; SUBCUTANEOUS at 05:53

## 2021-01-01 RX ADMIN — Medication 140 MCG/HR: at 06:39

## 2021-01-01 RX ADMIN — SODIUM CHLORIDE: 9 INJECTION, SOLUTION INTRAVENOUS at 05:38

## 2021-01-01 RX ADMIN — SODIUM CHLORIDE: 9 INJECTION, SOLUTION INTRAVENOUS at 12:43

## 2021-01-01 RX ADMIN — DEXAMETHASONE SODIUM PHOSPHATE 10 MG: 4 INJECTION, SOLUTION INTRAMUSCULAR; INTRAVENOUS at 23:50

## 2021-01-01 RX ADMIN — FENTANYL CITRATE 50 MCG: 0.05 INJECTION, SOLUTION INTRAMUSCULAR; INTRAVENOUS at 12:00

## 2021-01-01 RX ADMIN — PRAVASTATIN SODIUM 40 MG: 40 TABLET ORAL at 20:28

## 2021-01-01 RX ADMIN — Medication 10 MG: at 10:51

## 2021-01-01 RX ADMIN — IPRATROPIUM BROMIDE AND ALBUTEROL SULFATE 1 AMPULE: .5; 3 SOLUTION RESPIRATORY (INHALATION) at 09:41

## 2021-01-01 RX ADMIN — SULFAMETHOXAZOLE AND TRIMETHOPRIM 1 TABLET: 800; 160 TABLET ORAL at 08:04

## 2021-01-01 RX ADMIN — FENTANYL CITRATE 200 MCG/HR: 0.05 INJECTION, SOLUTION INTRAMUSCULAR; INTRAVENOUS at 09:33

## 2021-01-01 RX ADMIN — ACETYLCYSTEINE 600 MG: 200 SOLUTION ORAL; RESPIRATORY (INHALATION) at 09:23

## 2021-01-01 RX ADMIN — FENTANYL CITRATE 100 MCG: 50 INJECTION, SOLUTION INTRAMUSCULAR; INTRAVENOUS at 11:37

## 2021-01-01 RX ADMIN — CEFAZOLIN 2000 MG: 10 INJECTION, POWDER, FOR SOLUTION INTRAVENOUS at 12:01

## 2021-01-01 RX ADMIN — INSULIN LISPRO 2 UNITS: 100 INJECTION, SOLUTION INTRAVENOUS; SUBCUTANEOUS at 05:15

## 2021-01-01 RX ADMIN — SULFAMETHOXAZOLE AND TRIMETHOPRIM 1 TABLET: 800; 160 TABLET ORAL at 09:05

## 2021-01-01 RX ADMIN — FENTANYL CITRATE 200 MCG/HR: 0.05 INJECTION, SOLUTION INTRAMUSCULAR; INTRAVENOUS at 11:50

## 2021-01-01 RX ADMIN — SULFAMETHOXAZOLE AND TRIMETHOPRIM 1 TABLET: 800; 160 TABLET ORAL at 20:30

## 2021-01-01 RX ADMIN — Medication 8 MG/HR: at 22:21

## 2021-01-01 RX ADMIN — TIOTROPIUM BROMIDE INHALATION SPRAY 2 PUFF: 3.12 SPRAY, METERED RESPIRATORY (INHALATION) at 09:21

## 2021-01-01 RX ADMIN — DEXTROSE MONOHYDRATE 25 G: 25 INJECTION, SOLUTION INTRAVENOUS at 10:12

## 2021-01-01 RX ADMIN — FLUDROCORTISONE ACETATE 0.2 MG: 0.1 TABLET ORAL at 08:14

## 2021-01-01 RX ADMIN — BUDESONIDE AND FORMOTEROL FUMARATE DIHYDRATE 2 PUFF: 160; 4.5 AEROSOL RESPIRATORY (INHALATION) at 09:22

## 2021-01-01 RX ADMIN — PROPOFOL 150 MG: 10 INJECTION, EMULSION INTRAVENOUS at 09:23

## 2021-01-01 RX ADMIN — SODIUM CHLORIDE: 9 INJECTION, SOLUTION INTRAVENOUS at 19:46

## 2021-01-01 RX ADMIN — FENTANYL CITRATE 200 MCG/HR: 0.05 INJECTION, SOLUTION INTRAMUSCULAR; INTRAVENOUS at 22:44

## 2021-01-01 RX ADMIN — INSULIN LISPRO 2 UNITS: 100 INJECTION, SOLUTION INTRAVENOUS; SUBCUTANEOUS at 23:06

## 2021-01-01 RX ADMIN — Medication 140 MCG/HR: at 21:41

## 2021-01-01 RX ADMIN — OXYMETAZOLINE HYDROCHLORIDE 4 SPRAY: 0.05 SPRAY NASAL at 13:52

## 2021-01-01 RX ADMIN — HEPARIN SODIUM AND DEXTROSE 18 UNITS/KG/HR: 10000; 5 INJECTION INTRAVENOUS at 15:09

## 2021-01-01 RX ADMIN — BUDESONIDE AND FORMOTEROL FUMARATE DIHYDRATE 2 PUFF: 160; 4.5 AEROSOL RESPIRATORY (INHALATION) at 09:38

## 2021-01-01 RX ADMIN — INSULIN LISPRO 2 UNITS: 100 INJECTION, SOLUTION INTRAVENOUS; SUBCUTANEOUS at 05:14

## 2021-01-01 RX ADMIN — DEXAMETHASONE SODIUM PHOSPHATE 10 MG: 10 INJECTION, EMULSION INTRAMUSCULAR; INTRAVENOUS at 11:11

## 2021-01-01 RX ADMIN — PROPOFOL 45 MCG/KG/MIN: 10 INJECTION, EMULSION INTRAVENOUS at 15:24

## 2021-01-01 RX ADMIN — PROPOFOL 50 MCG/KG/MIN: 10 INJECTION, EMULSION INTRAVENOUS at 04:18

## 2021-01-01 RX ADMIN — FLUDROCORTISONE ACETATE 0.1 MG: 0.1 TABLET ORAL at 07:55

## 2021-01-01 RX ADMIN — DEXAMETHASONE 20 MG: 4 TABLET ORAL at 08:21

## 2021-01-01 RX ADMIN — PROPOFOL 100 MCG/KG/MIN: 10 INJECTION, EMULSION INTRAVENOUS at 07:45

## 2021-01-01 RX ADMIN — TIOTROPIUM BROMIDE INHALATION SPRAY 2 PUFF: 3.12 SPRAY, METERED RESPIRATORY (INHALATION) at 09:38

## 2021-01-01 RX ADMIN — BUMETANIDE 1 MG: 0.25 INJECTION INTRAMUSCULAR; INTRAVENOUS at 15:48

## 2021-01-01 RX ADMIN — PROPOFOL 30 MCG/KG/MIN: 10 INJECTION, EMULSION INTRAVENOUS at 03:04

## 2021-01-01 RX ADMIN — SODIUM CHLORIDE, PRESERVATIVE FREE 10 ML: 5 INJECTION INTRAVENOUS at 09:24

## 2021-01-01 RX ADMIN — SODIUM CHLORIDE: 9 INJECTION, SOLUTION INTRAVENOUS at 10:28

## 2021-01-01 RX ADMIN — PROPOFOL 30 MCG/KG/MIN: 10 INJECTION, EMULSION INTRAVENOUS at 22:43

## 2021-01-01 RX ADMIN — ROCURONIUM BROMIDE 50 MG: 10 INJECTION INTRAVENOUS at 08:00

## 2021-01-01 RX ADMIN — SODIUM ZIRCONIUM CYCLOSILICATE 5 G: 5 POWDER, FOR SUSPENSION ORAL at 08:02

## 2021-01-01 RX ADMIN — ENOXAPARIN SODIUM 40 MG: 100 INJECTION SUBCUTANEOUS at 20:30

## 2021-01-01 RX ADMIN — SODIUM CHLORIDE: 9 INJECTION, SOLUTION INTRAVENOUS at 22:15

## 2021-01-01 RX ADMIN — INSULIN HUMAN 10 UNITS: 100 INJECTION, SOLUTION PARENTERAL at 08:25

## 2021-01-01 RX ADMIN — TIOTROPIUM BROMIDE AND OLODATEROL 2 PUFF: 3.124; 2.736 SPRAY, METERED RESPIRATORY (INHALATION) at 08:25

## 2021-01-01 RX ADMIN — POLYETHYLENE GLYCOL 3350 17 G: 17 POWDER, FOR SOLUTION ORAL at 08:35

## 2021-01-01 RX ADMIN — Medication 1 CAPSULE: at 08:15

## 2021-01-01 RX ADMIN — FENTANYL CITRATE 200 MCG/HR: 0.05 INJECTION, SOLUTION INTRAMUSCULAR; INTRAVENOUS at 03:13

## 2021-01-01 RX ADMIN — FAMOTIDINE 20 MG: 10 INJECTION, SOLUTION INTRAVENOUS at 21:36

## 2021-01-01 ASSESSMENT — PULMONARY FUNCTION TESTS
PIF_VALUE: 34
PIF_VALUE: 1
PIF_VALUE: 0
PIF_VALUE: 25
PIF_VALUE: 2
PIF_VALUE: 36
PIF_VALUE: 0
PIF_VALUE: 19
PIF_VALUE: 1
PIF_VALUE: 0
PIF_VALUE: 5
PIF_VALUE: 32
PIF_VALUE: 1
PIF_VALUE: 0
PIF_VALUE: 1
PIF_VALUE: 0
PIF_VALUE: 1
PIF_VALUE: 1
PIF_VALUE: 28
PIF_VALUE: 1
PIF_VALUE: 24
PIF_VALUE: 1
PIF_VALUE: 11
PIF_VALUE: 1
PIF_VALUE: 33
PIF_VALUE: 0
PIF_VALUE: 25
PIF_VALUE: 1
PIF_VALUE: 35
PIF_VALUE: 1
PIF_VALUE: 36
PIF_VALUE: 16
PIF_VALUE: 31
PIF_VALUE: 0
PIF_VALUE: 30
PIF_VALUE: 34
PIF_VALUE: 36
PIF_VALUE: 1
PIF_VALUE: 0
PIF_VALUE: 0
PIF_VALUE: 13
PIF_VALUE: 16
PIF_VALUE: 30
PIF_VALUE: 1
PIF_VALUE: 12
PIF_VALUE: 1
PIF_VALUE: 12
PIF_VALUE: 1
PIF_VALUE: 3
PIF_VALUE: 0
PIF_VALUE: 1
PIF_VALUE: 0
PIF_VALUE: 0
PIF_VALUE: 1
PIF_VALUE: 34
PIF_VALUE: 1
PIF_VALUE: 30
PIF_VALUE: 1
PIF_VALUE: 1
PIF_VALUE: 0
PIF_VALUE: 1
PIF_VALUE: 10
PIF_VALUE: 0
PIF_VALUE: 1
PIF_VALUE: 0
PIF_VALUE: 0
PIF_VALUE: 10
PIF_VALUE: 36
PIF_VALUE: 31
PIF_VALUE: 0
PIF_VALUE: 0
PIF_VALUE: 35
PIF_VALUE: 0
PIF_VALUE: 1
PIF_VALUE: 30
PIF_VALUE: 19
PIF_VALUE: 31
PIF_VALUE: 26
PIF_VALUE: 3
PIF_VALUE: 0
PIF_VALUE: 1
PIF_VALUE: 1
PIF_VALUE: 17
PIF_VALUE: 0
PIF_VALUE: 1
PIF_VALUE: 10
PIF_VALUE: 1
PIF_VALUE: 1
PIF_VALUE: 0
PIF_VALUE: 0
PIF_VALUE: 37
PIF_VALUE: 1
PIF_VALUE: 17
PIF_VALUE: 3
PIF_VALUE: 0
PIF_VALUE: 0
PIF_VALUE: 31
PIF_VALUE: 15
PIF_VALUE: 1
PIF_VALUE: 0
PIF_VALUE: 1
PIF_VALUE: 31
PIF_VALUE: 8
PIF_VALUE: 1
PIF_VALUE: 0
PIF_VALUE: 1
PIF_VALUE: 0
PIF_VALUE: 1
PIF_VALUE: 1
PIF_VALUE: 0
PIF_VALUE: 30
PIF_VALUE: 1
PIF_VALUE: 1
PIF_VALUE: 37
PIF_VALUE: 19
PIF_VALUE: 0
PIF_VALUE: 1
PIF_VALUE: 0
PIF_VALUE: 37
PIF_VALUE: 0
PIF_VALUE: 24
PIF_VALUE: 1
PIF_VALUE: 0
PIF_VALUE: 1
PIF_VALUE: 34
PIF_VALUE: 3
PIF_VALUE: 1
PIF_VALUE: 1
PIF_VALUE: 0
PIF_VALUE: 0
PIF_VALUE: 16
PIF_VALUE: 1
PIF_VALUE: 0
PIF_VALUE: 1
PIF_VALUE: 31
PIF_VALUE: 25
PIF_VALUE: 30
PIF_VALUE: 37
PIF_VALUE: 0
PIF_VALUE: 16
PIF_VALUE: 30
PIF_VALUE: 19
PIF_VALUE: 0
PIF_VALUE: 15
PIF_VALUE: 1
PIF_VALUE: 29
PIF_VALUE: 30
PIF_VALUE: 0
PIF_VALUE: 0
PIF_VALUE: 9
PIF_VALUE: 0
PIF_VALUE: 0
PIF_VALUE: 33
PIF_VALUE: 1
PIF_VALUE: 30
PIF_VALUE: 9
PIF_VALUE: 0
PIF_VALUE: 37
PIF_VALUE: 16
PIF_VALUE: 37
PIF_VALUE: 11
PIF_VALUE: 37
PIF_VALUE: 2
PIF_VALUE: 1
PIF_VALUE: 1
PIF_VALUE: 35
PIF_VALUE: 0
PIF_VALUE: 19
PIF_VALUE: 4
PIF_VALUE: 16
PIF_VALUE: 1
PIF_VALUE: 17
PIF_VALUE: 19
PIF_VALUE: 0
PIF_VALUE: 1
PIF_VALUE: 1
PIF_VALUE: 17
PIF_VALUE: 0
PIF_VALUE: 30
PIF_VALUE: 1
PIF_VALUE: 32
PIF_VALUE: 17
PIF_VALUE: 31
PIF_VALUE: 18
PIF_VALUE: 19
PIF_VALUE: 0
PIF_VALUE: 0
PIF_VALUE: 11
PIF_VALUE: 15
PIF_VALUE: 1
PIF_VALUE: 0
PIF_VALUE: 36
PIF_VALUE: 36
PIF_VALUE: 1
PIF_VALUE: 1
PIF_VALUE: 15
PIF_VALUE: 1
PIF_VALUE: 35
PIF_VALUE: 1
PIF_VALUE: 0
PIF_VALUE: 17
PIF_VALUE: 0
PIF_VALUE: 1
PIF_VALUE: 12
PIF_VALUE: 15
PIF_VALUE: 29
PIF_VALUE: 8
PIF_VALUE: 0
PIF_VALUE: 1
PIF_VALUE: 30
PIF_VALUE: 1
PIF_VALUE: 0
PIF_VALUE: 1
PIF_VALUE: 0
PIF_VALUE: 28
PIF_VALUE: 0
PIF_VALUE: 11
PIF_VALUE: 0
PIF_VALUE: 11
PIF_VALUE: 1
PIF_VALUE: 0
PIF_VALUE: 1
PIF_VALUE: 0
PIF_VALUE: 1
PIF_VALUE: 30
PIF_VALUE: 1
PIF_VALUE: 14
PIF_VALUE: 16
PIF_VALUE: 1
PIF_VALUE: 2
PIF_VALUE: 0
PIF_VALUE: 1
PIF_VALUE: 1
PIF_VALUE: 31
PIF_VALUE: 15
PIF_VALUE: 0
PIF_VALUE: 18
PIF_VALUE: 28
PIF_VALUE: 19
PIF_VALUE: 0
PIF_VALUE: 16
PIF_VALUE: 2
PIF_VALUE: 0
PIF_VALUE: 32
PIF_VALUE: 1
PIF_VALUE: 32
PIF_VALUE: 19
PIF_VALUE: 32
PIF_VALUE: 1
PIF_VALUE: 32
PIF_VALUE: 16
PIF_VALUE: 0
PIF_VALUE: 30
PIF_VALUE: 0
PIF_VALUE: 1
PIF_VALUE: 0
PIF_VALUE: 1
PIF_VALUE: 1
PIF_VALUE: 0
PIF_VALUE: 1
PIF_VALUE: 1
PIF_VALUE: 0
PIF_VALUE: 1
PIF_VALUE: 0
PIF_VALUE: 19
PIF_VALUE: 46
PIF_VALUE: 1
PIF_VALUE: 0
PIF_VALUE: 24
PIF_VALUE: 1
PIF_VALUE: 1
PIF_VALUE: 10
PIF_VALUE: 1
PIF_VALUE: 1
PIF_VALUE: 11
PIF_VALUE: 19
PIF_VALUE: 19
PIF_VALUE: 0
PIF_VALUE: 10
PIF_VALUE: 0
PIF_VALUE: 16
PIF_VALUE: 1
PIF_VALUE: 19
PIF_VALUE: 30
PIF_VALUE: 1
PIF_VALUE: 1
PIF_VALUE: 0
PIF_VALUE: 8
PIF_VALUE: 1
PIF_VALUE: 1
PIF_VALUE: 11
PIF_VALUE: 0
PIF_VALUE: 22
PIF_VALUE: 0
PIF_VALUE: 1
PIF_VALUE: 0
PIF_VALUE: 1
PIF_VALUE: 0
PIF_VALUE: 8
PIF_VALUE: 14
PIF_VALUE: 1
PIF_VALUE: 0
PIF_VALUE: 0
PIF_VALUE: 11
PIF_VALUE: 1
PIF_VALUE: 1
PIF_VALUE: 2
PIF_VALUE: 32
PIF_VALUE: 0
PIF_VALUE: 15
PIF_VALUE: 39
PIF_VALUE: 7
PIF_VALUE: 1
PIF_VALUE: 1
PIF_VALUE: 16
PIF_VALUE: 1
PIF_VALUE: 1
PIF_VALUE: 0
PIF_VALUE: 36
PIF_VALUE: 19
PIF_VALUE: 19
PIF_VALUE: 32
PIF_VALUE: 0
PIF_VALUE: 11
PIF_VALUE: 0
PIF_VALUE: 31
PIF_VALUE: 35
PIF_VALUE: 0
PIF_VALUE: 1
PIF_VALUE: 19
PIF_VALUE: 1
PIF_VALUE: 1
PIF_VALUE: 0
PIF_VALUE: 11
PIF_VALUE: 37
PIF_VALUE: 3
PIF_VALUE: 0
PIF_VALUE: 1
PIF_VALUE: 35
PIF_VALUE: 0
PIF_VALUE: 7
PIF_VALUE: 1
PIF_VALUE: 0
PIF_VALUE: 1
PIF_VALUE: 9
PIF_VALUE: 36
PIF_VALUE: 1
PIF_VALUE: 37
PIF_VALUE: 0
PIF_VALUE: 5
PIF_VALUE: 1
PIF_VALUE: 17
PIF_VALUE: 31
PIF_VALUE: 18
PIF_VALUE: 1
PIF_VALUE: 11
PIF_VALUE: 37
PIF_VALUE: 0
PIF_VALUE: 0
PIF_VALUE: 1
PIF_VALUE: 0
PIF_VALUE: 35
PIF_VALUE: 11
PIF_VALUE: 1
PIF_VALUE: 11
PIF_VALUE: 0
PIF_VALUE: 17
PIF_VALUE: 2
PIF_VALUE: 1
PIF_VALUE: 5
PIF_VALUE: 0
PIF_VALUE: 1
PIF_VALUE: 0
PIF_VALUE: 1
PIF_VALUE: 26
PIF_VALUE: 16
PIF_VALUE: 0
PIF_VALUE: 16
PIF_VALUE: 1
PIF_VALUE: 1
PIF_VALUE: 0
PIF_VALUE: 1
PIF_VALUE: 18
PIF_VALUE: 31
PIF_VALUE: 0
PIF_VALUE: 36
PIF_VALUE: 5
PIF_VALUE: 0
PIF_VALUE: 35
PIF_VALUE: 0
PIF_VALUE: 26
PIF_VALUE: 9
PIF_VALUE: 0
PIF_VALUE: 15
PIF_VALUE: 33
PIF_VALUE: 1
PIF_VALUE: 5
PIF_VALUE: 11
PIF_VALUE: 0
PIF_VALUE: 0
PIF_VALUE: 17
PIF_VALUE: 13
PIF_VALUE: 28
PIF_VALUE: 0
PIF_VALUE: 32
PIF_VALUE: 34
PIF_VALUE: 0
PIF_VALUE: 7
PIF_VALUE: 37
PIF_VALUE: 0
PIF_VALUE: 1
PIF_VALUE: 0
PIF_VALUE: 0
PIF_VALUE: 11
PIF_VALUE: 0
PIF_VALUE: 1
PIF_VALUE: 1
PIF_VALUE: 16
PIF_VALUE: 0
PIF_VALUE: 1
PIF_VALUE: 1
PIF_VALUE: 0
PIF_VALUE: 0
PIF_VALUE: 1
PIF_VALUE: 8
PIF_VALUE: 1
PIF_VALUE: 11
PIF_VALUE: 0
PIF_VALUE: 15
PIF_VALUE: 1
PIF_VALUE: 19
PIF_VALUE: 0
PIF_VALUE: 1
PIF_VALUE: 0
PIF_VALUE: 15
PIF_VALUE: 1
PIF_VALUE: 37
PIF_VALUE: 1
PIF_VALUE: 0
PIF_VALUE: 0
PIF_VALUE: 1
PIF_VALUE: 1
PIF_VALUE: 0
PIF_VALUE: 0
PIF_VALUE: 1
PIF_VALUE: 1
PIF_VALUE: 3
PIF_VALUE: 12
PIF_VALUE: 17
PIF_VALUE: 36
PIF_VALUE: 1
PIF_VALUE: 10
PIF_VALUE: 1
PIF_VALUE: 0
PIF_VALUE: 0
PIF_VALUE: 8
PIF_VALUE: 1
PIF_VALUE: 1
PIF_VALUE: 11
PIF_VALUE: 1
PIF_VALUE: 34
PIF_VALUE: 1
PIF_VALUE: 1
PIF_VALUE: 0
PIF_VALUE: 11
PIF_VALUE: 17
PIF_VALUE: 30
PIF_VALUE: 14
PIF_VALUE: 31
PIF_VALUE: 1
PIF_VALUE: 37
PIF_VALUE: 0
PIF_VALUE: 19
PIF_VALUE: 0
PIF_VALUE: 0
PIF_VALUE: 1
PIF_VALUE: 1
PIF_VALUE: 0
PIF_VALUE: 0
PIF_VALUE: 1
PIF_VALUE: 1
PIF_VALUE: 0
PIF_VALUE: 5
PIF_VALUE: 29

## 2021-01-01 ASSESSMENT — PAIN DESCRIPTION - DESCRIPTORS
DESCRIPTORS: ACHING
DESCRIPTORS: SORE
DESCRIPTORS: CONSTANT
DESCRIPTORS: ACHING;THROBBING

## 2021-01-01 ASSESSMENT — PAIN DESCRIPTION - ONSET
ONSET: GRADUAL
ONSET: ON-GOING

## 2021-01-01 ASSESSMENT — PAIN DESCRIPTION - LOCATION
LOCATION: THROAT
LOCATION: THROAT;HEAD
LOCATION: BACK;HEAD
LOCATION: THROAT
LOCATION: THROAT;HEAD
LOCATION: THROAT
LOCATION: HEAD
LOCATION: THROAT

## 2021-01-01 ASSESSMENT — PAIN DESCRIPTION - FREQUENCY
FREQUENCY: CONTINUOUS
FREQUENCY: INTERMITTENT
FREQUENCY: CONTINUOUS

## 2021-01-01 ASSESSMENT — PAIN SCALES - GENERAL
PAINLEVEL_OUTOF10: 8
PAINLEVEL_OUTOF10: 7
PAINLEVEL_OUTOF10: 5
PAINLEVEL_OUTOF10: 8
PAINLEVEL_OUTOF10: 0
PAINLEVEL_OUTOF10: 8
PAINLEVEL_OUTOF10: 7
PAINLEVEL_OUTOF10: 0
PAINLEVEL_OUTOF10: 0
PAINLEVEL_OUTOF10: 8
PAINLEVEL_OUTOF10: 0
PAINLEVEL_OUTOF10: 7
PAINLEVEL_OUTOF10: 8
PAINLEVEL_OUTOF10: 0
PAINLEVEL_OUTOF10: 7
PAINLEVEL_OUTOF10: 0
PAINLEVEL_OUTOF10: 0
PAINLEVEL_OUTOF10: 8
PAINLEVEL_OUTOF10: 5
PAINLEVEL_OUTOF10: 0
PAINLEVEL_OUTOF10: 2
PAINLEVEL_OUTOF10: 8
PAINLEVEL_OUTOF10: 6
PAINLEVEL_OUTOF10: 7
PAINLEVEL_OUTOF10: 2
PAINLEVEL_OUTOF10: 7
PAINLEVEL_OUTOF10: 0
PAINLEVEL_OUTOF10: 0
PAINLEVEL_OUTOF10: 6
PAINLEVEL_OUTOF10: 7
PAINLEVEL_OUTOF10: 6
PAINLEVEL_OUTOF10: 4
PAINLEVEL_OUTOF10: 6
PAINLEVEL_OUTOF10: 8
PAINLEVEL_OUTOF10: 7
PAINLEVEL_OUTOF10: 0
PAINLEVEL_OUTOF10: 8
PAINLEVEL_OUTOF10: 5
PAINLEVEL_OUTOF10: 8
PAINLEVEL_OUTOF10: 7
PAINLEVEL_OUTOF10: 6
PAINLEVEL_OUTOF10: 7
PAINLEVEL_OUTOF10: 0
PAINLEVEL_OUTOF10: 0
PAINLEVEL_OUTOF10: 6
PAINLEVEL_OUTOF10: 8
PAINLEVEL_OUTOF10: 3
PAINLEVEL_OUTOF10: 7
PAINLEVEL_OUTOF10: 8
PAINLEVEL_OUTOF10: 6
PAINLEVEL_OUTOF10: 0

## 2021-01-01 ASSESSMENT — COPD QUESTIONNAIRES
CAT_SEVERITY: SEVERE

## 2021-01-01 ASSESSMENT — ENCOUNTER SYMPTOMS
COUGH: 1
SHORTNESS OF BREATH: 1
SHORTNESS OF BREATH: 1
RHINORRHEA: 0
SHORTNESS OF BREATH: 1
NAUSEA: 0
SHORTNESS OF BREATH: 1
VOMITING: 0
SHORTNESS OF BREATH: 1
DIARRHEA: 0
VOICE CHANGE: 1
CHEST TIGHTNESS: 0
ALLERGIC/IMMUNOLOGIC NEGATIVE: 1
DIARRHEA: 0
STRIDOR: 0
ABDOMINAL PAIN: 0
SHORTNESS OF BREATH: 1
EYES NEGATIVE: 1
BACK PAIN: 1
NAUSEA: 0
GASTROINTESTINAL NEGATIVE: 1
WHEEZING: 0
VOMITING: 0
SHORTNESS OF BREATH: 1
CONSTIPATION: 0

## 2021-01-01 ASSESSMENT — PAIN DESCRIPTION - PAIN TYPE
TYPE: CHRONIC PAIN
TYPE: SURGICAL PAIN
TYPE: SURGICAL PAIN
TYPE: CHRONIC PAIN
TYPE: SURGICAL PAIN
TYPE: ACUTE PAIN
TYPE: ACUTE PAIN
TYPE: SURGICAL PAIN
TYPE: ACUTE PAIN

## 2021-01-01 ASSESSMENT — PAIN - FUNCTIONAL ASSESSMENT
PAIN_FUNCTIONAL_ASSESSMENT: 0-10
PAIN_FUNCTIONAL_ASSESSMENT: 0-10
PAIN_FUNCTIONAL_ASSESSMENT: PREVENTS OR INTERFERES SOME ACTIVE ACTIVITIES AND ADLS
PAIN_FUNCTIONAL_ASSESSMENT: 0-10

## 2021-01-01 ASSESSMENT — PAIN DESCRIPTION - ORIENTATION: ORIENTATION: LOWER;MID

## 2021-01-01 ASSESSMENT — PAIN DESCRIPTION - PROGRESSION
CLINICAL_PROGRESSION: NOT CHANGED
CLINICAL_PROGRESSION: GRADUALLY IMPROVING

## 2021-01-06 NOTE — PROGRESS NOTES
Toñito Christensen R.N. has reviewed and agrees with Aldair Worthington LPN's shift   Assessment.     Peewee Zavala  1/6/2021

## 2021-01-06 NOTE — PROGRESS NOTES
[facility]          Progress Note and Procedure Note      Geetha Funez  MEDICAL RECORD NUMBER:  [id number]  AGE: 63 y.o. GENDER: male  : 1960  EPISODE DATE:  2021    Subjective:     SUBJECTIVE     Chief Complaint   Patient presents with    Wound Check     id           HISTORY OF PRESENT ILLNESS      Geetha Funez is a 63 y.o. male who presents today for wound/ulcer evaluation. He is here for follow-up visit he has history of head and neck cancer required multiple operations and surgery unfortunately he had infection with Aspergillus and has been on voriconazole currently he is on a low-dose suppressive treatment. 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 He has been on voriconazole for long-term use he denies any nausea or vomiting. He has advanced COPD and was not a candidate for hyperbaric oxygen treatment.      PAST MEDICAL HISTORY         Diagnosis Date    Arthritis     COPD (chronic obstructive pulmonary disease) ([address]     PAD (peripheral artery disease) ([address]     bilateral lower legs    Pneumonia 2017 and 2017    Rectal cancer ([address]     Squamous cell carcinoma of vocal cord ([address]          PAST SURGICAL HISTORY     Past Surgical History:   Procedure Laterality Date    BRONCHOSCOPY N/A 2020    BR[facility]  2016    EYE SURGERY      CROSS EYED    HAND SURGERY Bilateral     KNEE ARTHROSCOPY Right 1996    LARYNGOSCOPY  2017    Biopsy    LARYNGOSCOPY N/A 2019    MICRO LARYNGOSCOPY W/ BIOPSY performed by Signe Favre, MD at [address] [place] [address] 2019    DIRECT LARYNGOSCOPY WITH BIOPSY performed by Signe Favre, MD at [address] [place] [address] 10/16/2020    BRONCHOSCOPY, MICRO LARYNGOSCOPY WITH REMOVAL mLs by nebulization as needed (Throat dryness, cough, wheezing) 500 mL 3    budesonide-formoterol (SYMBICORT) 160-4.5 MCG/ACT AERO Inhale 2 puffs into the lungs 2 times daily Rinse mouth after its use. 3 Inhaler 3    pravastatin (PRAVACHOL) 40 MG tablet Take 40 mg by mouth daily      SPIRIVA RESPIMAT 2.5 MCG/ACT AERS inhaler INHALE 2 PUFFS BY MOUTH ONCE DAILY 3 Inhaler 3    acetaminophen (TYLENOL) 650 MG extended release tablet Take 1 tablet by mouth as needed for Pain Do not take with hydrocodone/acetominophen 60 tablet 3    loperamide (IMODIUM) 2 MG capsule Take 2 mg by mouth daily        No current facility-administered medications on file prior to encounter. REVIEW OF SYSTEMS:     Constitutional: no fever, no night sweats, no fatigue. Head: no head ache , no head injury, no migranes. Eye: no blurring of vision, no double vision. Ears: no hearing difficulty, no tinnitus  Mouth/throat: no ulceration, dental caries , dysphagia hoarseness of voice  Lungs: + cough, + shortness of breath, no wheeze  CVS: no palpitation, no chest pain, no shortness of breath  GI: no abdominal pain, no nausea , no vomiting, no constipation  ALDEN: no dysuria, frequency and urgency, no hematuria, no kidney stones  Musculoskeletal: no joint pain, swelling , stiffness,  Endocrine: no polyuria, polydipsia, no cold or heat intolerance  Hematology: no anemia, no easy brusing or bleeding, no hx of clotting disorder  Dermatology: no skin rash, no eczema, no pruritis,  Psychiatry: no depression, no anxiety,no panic attacks, no suicide ideation        PHYSICAL EXAM      vitals were not taken for this visit.        Wt Readings from Last 3 Encounters:   12/07/20 229 lb (103.9 kg)   12/02/20 223 lb 9.6 oz (101.4 kg)   11/18/20 225 lb (102.1 kg)          General Appearance  Awake, alert, oriented,  not  In acute distress  HEENT - normocephalic, atraumatic, pink conjunctiva,  anicteric sclera  Neck - Supple, no mass  Lungs -diminished breath sounds. Cardiovascular - Heart sounds are normal.  Regular rate and rhythm without murmur, gallop or rub. 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          Discharge Instructions       Visit

## 2021-01-06 NOTE — PLAN OF CARE
Problem: Physical Regulation:  Goal: Will remain free from infection  Description: Will remain free from infection  Outcome: Ongoing   Patient seen for Antibiotics. Patient presents with no s/s of infection. New increased antibiotic prescribed today. Follow up in 6 weeks. See AVS for order changes. Care plan reviewed with patient. Patient verbalize understanding of the plan of care and contribute to goal setting.

## 2021-01-13 PROBLEM — J34.89 REFRACTORY OBSTRUCTION OF NASAL AIRWAY: Status: ACTIVE | Noted: 2021-01-01

## 2021-01-13 NOTE — PROGRESS NOTES
[address] EAR, NOSE AND THROAT  [facility]  Dept: [phone]  Dept Fax: [phone]  Loc: [phone]    Rxoanna Coronado is a 63 y.o. male who was referred by No ref. provider found for:  Chief Complaint   Patient presents with    Pre-op Exam     Patient is here for pre-op sy arytenoidectomy, laryngoscopy w/ debridement of subglottic tissue. HPI:     Roxanna Coronado is a 63 y.o. 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 He is status post 2 suspension laryngoscopy laser debridement operations in conjunction with bronchiolar debridement which is all been in conjunction with aggressive antifungal systemic therapy provided by my infectious disease colleague. The patient was about to have his antifungal medication tapered to half dose for maintenance but after a week of the lower dose he noticed a distinct worsening of his respiratory symptoms and he was increased back to full dose. Patient is also complaining of difficulty swallowing particularly pills. He indicates to the esophageal inlet as the location where this is the case. He states that about 3 years ago he underwent an upper esophageal dilation to help him with this kind of dysphagia and it was very effective. He does not know the diameter of the balloon that was used to dilate him at that point. In addition the patient is complaining of nasal airway obstruction particularly on his left side. He states that when his right side is congested, he can breathe through his nose at all. That is a frequent condition. And makes breathing at night given more difficult. History:      Allergies   Allergen Reactions    Povidone Iodine Rash     Surgery prep     Current Outpatient Medications   Medication Sig Dispense Refill  voriconazole (VFEND) 200 MG tablet Take 1 tablet by mouth 2 times daily 84 tablet 0    acetylcysteine (MUCOMYST) 20 % nebulizer solution 4 mL via nebulizer 3 times daily 360 mL 5    albuterol sulfate HFA (VENTOLIN HFA) 108 (90 Base) MCG/ACT inhaler Inhale 2 puffs into the lungs every 6 hours as needed for Wheezing or Shortness of Breath 3 Inhaler 3    albuterol (PROVENTIL) (2.5 MG/3ML) 0.083% nebulizer solution Take 3 mLs by nebulization every 6 hours as needed for Wheezing or Shortness of Breath 360 vial 3    voriconazole (VFEND) 200 MG tablet Take 1 tablet by mouth 2 times daily 180 tablet 2    sodium chloride, Inhalant, 3 % nebulizer solution Take 3 mLs by nebulization as needed (Throat dryness, cough, wheezing) 500 mL 3    budesonide-formoterol (SYMBICORT) 160-4.5 MCG/ACT AERO Inhale 2 puffs into the lungs 2 times daily Rinse mouth after its use. 3 Inhaler 3    pravastatin (PRAVACHOL) 40 MG tablet Take 40 mg by mouth daily      SPIRIVA RESPIMAT 2.5 MCG/ACT AERS inhaler INHALE 2 PUFFS BY MOUTH ONCE DAILY 3 Inhaler 3    acetaminophen (TYLENOL) 650 MG extended release tablet Take 1 tablet by mouth as needed for Pain Do not take with hydrocodone/acetominophen 60 tablet 3    loperamide (IMODIUM) 2 MG capsule Take 2 mg by mouth daily        No current facility-administered medications for this visit.       Past Medical History:   Diagnosis Date    Arthritis     [facility]     PAD (peripheral artery disease) ([address]     bilateral lower legs    Pneumonia 01/2017 1/2017 and 2/2017    [facility] cancer [facility]     Squamous cell carcinoma of vocal cord [facility])       Past Surgical History:   Procedure Laterality Date    BRONCHOSCOPY N/A 11/18/2020    BRONCHOSCOPY performed by Kendra Gomez MD at [address] [place] [address] Rd  2016    EYE SURGERY      CROSS EYED    HAND SURGERY Bilateral 1995    KNEE ARTHROSCOPY Right 1996    LARYNGOSCOPY  07/12/2017    Biopsy  LARYNGOSCOPY N/A 2019    MICRO LARYNGOSCOPY W/ BIOPSY performed by Marilyn Garcia MD at [place] N/A 2019    DIRECT LARYNGOSCOPY WITH BIOPSY performed by Marilyn Garcia MD at [place] N/A 10/16/2020    BRONCHOSCOPY, MICRO LARYNGOSCOPY WITH REMOVAL OF SUBMUCCOSAL NON NOEPLASTIC LESION JET VENTILATION performed by Paola Phillips MD at [place] N/A 2020    MICROLARYNGOSCOPY WITH BX & RESECTION OF OBSTRUCTING SOFT TISSUES WITH LASER & AIRWAY DEBRIDER, MICROLARYNGOPLASTY WITH RESECTION OF OBSTRUCTING SOFT TISSUE performed by Paloa Phillips MD at [facility]  RECTAL SURGERY  2016    colostemy bag[place]    ROTATOR CUFF REPAIR Left 80'S     Family History   Problem Relation Age of Onset    Prostate Cancer Father 48    Cancer Father     Heart Disease Father     Diabetes Mother     Heart Disease Mother     Stroke Mother     Heart Disease Brother     High Blood Pressure Other     Cancer Other         LUNG,PROSTATE    Hearing Loss Other      Social History     Tobacco Use    Smoking status: Former Smoker     Packs/day: 2.25     Years: 31.00     Pack years: 69.75     Start date:      Quit date: 2006     Years since quittin.9    Smokeless tobacco: Never Used   Substance Use Topics    Alcohol use: No     Alcohol/week: 0.0 standard drinks        Subjective:      Review of Systems  Rest of review of systems are negative, except as noted in HPI.      Objective:     /88 (Site: Left Upper Arm, Position: Sitting)   Pulse 74   Temp 98.4 °F (36.9 °C) (Infrared)   Resp 18   Ht 6' 2\" (1.88 m)   Wt 228 lb (103.4 kg)   BMI 29.27 kg/m²     Physical Exam On general physical exam the patient is pleasant alert well oriented and cooperative older middle-age man in no acute distress. His voice is very low in pitch and mildly to moderately raspy for his age and gender. I heard no throat clearing coughing or inspiratory stridor. He is breathing without labor. On auscultation the patient's breath sounds were dim and distant but were vesicular throughout. His heart tones were virtually absent across the anterior chest but along the anterior lateral aspect of where I would expect the pericardium to be, I was able to hear clear heart tones and they demonstrated a regular rate and rhythm without murmur or gallop. Vitals reviewed. No results found. Lab Results   Component Value Date     10/17/2020     06/12/2020     02/06/2020    K 4.9 10/17/2020    K 5.2 06/12/2020    K 4.4 02/06/2020    CL 96 10/17/2020     06/12/2020     02/06/2020    CO2 25 10/17/2020    CO2 29 06/12/2020    CO2 29 02/06/2020    BUN 15 10/17/2020    BUN 19 06/12/2020    BUN 18 02/06/2020    CREATININE 0.6 10/17/2020    CREATININE 0.71 06/12/2020    CREATININE 0.78 02/06/2020    CALCIUM 8.6 10/17/2020    CALCIUM 9.10 06/12/2020    CALCIUM 8.70 02/06/2020    PROT 6.6 10/17/2020    PROT 6.5 06/12/2020    PROT 6.6 02/06/2020    LABALBU 4.0 10/17/2020    LABALBU 4.3 06/12/2020    LABALBU 4.5 02/06/2020    LABALBU 4.6 12/06/2011    BILITOT 0.2 10/17/2020    BILITOT 0.3 06/12/2020    BILITOT 0.5 02/06/2020    ALKPHOS 159 10/17/2020    ALKPHOS 124 06/12/2020    ALKPHOS 119 02/06/2020    AST 23 10/17/2020    AST 22 06/12/2020    AST 24 02/06/2020    ALT 29 10/17/2020    ALT 31 06/12/2020    ALT 26 02/06/2020       All of the past medical history, past surgical history, family history,social history, allergies and current medications were reviewed with the patient.      Assessment & Plan   Diagnoses and all orders for this visit:     Diagnosis Orders 1. Pharyngoesophageal dysphagia  32044 - [place] UPPER GI ENDOSCOPY,DIAGNOSIS   2. Invasive fungal infection     3. Subglottic stenosis not due to surgery     4. Laryngeal stenosis     5. Airway obstruction     6. Tracheal stenosis     7. Infection due to Candida glabrata     8. Mixed simple and mucopurulent chronic bronchitis ([address]     9. Chronic laryngitis     10. Deviated septum     11. Refractory obstruction of nasal airway     12. Cricopharyngeus muscle dysfunction  78975 - [place] UPPER GI ENDOSCOPY,DIAGNOSIS       Based on his history and these physical findings as well as my review of his CT scan performed last year, I reviewed each of his primary problems and made recommendations and plans to treat them. Regarding his dysphagia, I will get a barium esophagram to determine the location and caliber of his upper esophageal mucosa and plan for a balloon dilation at the same time as he has his laryngeal and tracheal treatment. Regarding his laryngeal stenosis: My intention is to perform a resection of his supraglottic redundant mucosa and so the mucous membranes together at the wound to reduce the risk of chondral radionecrosis. We will also speed his healing and improve his breathing. As far as his invasive fungal disease, my intention is to do exactly what I have done thus far but concentrate on segments of the larynx that were untreated to avoid producing a circumferential wound and a cicatricial scar. Regarding his tracheal disease: Like the laryngeal pathology, my intention is to concentrate on areas that I avoided to avoid a cicatrix and extend the treatment as much as possible. I will use a debrider and laser to accomplish both. Given that he has oxygen at home now, I will likely be able to discharge him to home following the procedure. I spent over 30 minutes of face-to-face time with the patient more than half of which was dedicated to reviewing his multisystem problems and structuring a diagnostic and therapeutic plan to treat each one. No follow-ups on file. **This report has been created using voice recognition software. It may contain minor errors which are inherent in voice recognition technology. **

## 2021-01-13 NOTE — TELEPHONE ENCOUNTER
Veronica Pizarro is being scheduled for another portion of his surgery with Dr Alejandra Reno. His surgery is scheduled on 2/5/21 for a Bilateral Arytenoidotomy and advancement flap, Laryngoscopy with derbridement and Bronchoscopy with debridement of obstructing tissues. Please advise of clearance. He was cleared for his previous surgery on 11/18/20 by ALIVIA Martinez on 10/1/20. Last visit was with Ning Ospina on 12/7/21. Thank you!

## 2021-01-14 NOTE — TELEPHONE ENCOUNTER
Will see for surgery clearance spoke with Nicolas Watkins she is OK to see again   CXR 1 view prior to appointment

## 2021-01-19 NOTE — Clinical Note
Pre operative evaluation done feel the patient is moderate risk for issues after surgery. Was recently evaluated for home O2 and did qualify for 2LPM with activity but patient isn't using but does have at home if needed.

## 2021-01-19 NOTE — PROGRESS NOTES
[place] [facility]    Patient: Ryan Lucas, 63 y.o.   : 1960  2021    Pt of Dr. Chip Krause   Patient presents with    Follow-up     Pre-op clearence. Last seen 20 with Del Quiñones is here for pre operative surgery clearance for bilateral arytenoidectomy, laryngoscopy with debridement of subglottic tissue. This will be the patients second surgery with ENT. Dr. Christina Lindsey MD will be performing this surgery on 21. Last surgery patient also underwent surgical resection of scar tissue and was treated for recurrent fungal infection per Dr. Kalyn Mejia MD.   After reviewing previous office notes the patient has complaints of cough, hoarseness of voice, shortness of breath and sputum production. Patient has past medical history significant for COPD, emphysema, rectal cancer, and h/o squamous cell cancer of the vocal cords. Last hospital admission patient was sent home on supplemental oxygen at 2LPM with rest and 6LPM with activity. Last 6MWT done here in the office on 20 patient only needed 2LPM with activity. Patient is not using the supplemental oxygen as prescribed. CXR done on 1/15/21 showed chronic changes but no acute incidental findings. Patient was cleared for last surgery with a moderate risk and faired well. Former smoker quit in  with a 69.75 PYH. Last PFT done 19 revealed Severe Obstruction with an FEV1 of 36. Current inhaler regimen of: Symbicort, Spiriva, Mucomyst (per ENT), and Albuterol PRN for SOB/wheezing.      MMRC Dyspnea Scale:   0: Dyspneic on strenuous exercise  1: Dyspneic on walking a slight hill  2: Dyspneic on walking level ground; must stop occasionally due to breathlessness  3: Must stop for breathlessness after walking 100 yards or after a few minutes  4: Cannot leave house; breathless on dressing/undressing     MMRC dyspnea score: 2    Past Medical hx   PMH: Past Medical History:   Diagnosis Date    Arthritis     [facility]     PAD (peripheral artery disease) [facility]     bilateral lower legs    Pneumonia 2017 and 2017    Rectal cancer [facility]     Squamous cell carcinoma of vocal cord ([facility]      SURGICAL HISTORY:  Past Surgical History:   Procedure Laterality Date    BRONCHOSCOPY N/A 2020    BRONCHOSCOPY performed by Harry Choi MD at [facility]     [address] [place] [address] EYE    HAND SURGERY Bilateral     KNEE ARTHROSCOPY Right 1996    LARYNGOSCOPY  2017    Biopsy    LARYNGOSCOPY N/A 2019    MICRO LARYNGOSCOPY W/ BIOPSY performed [facility] Yissel Lerner MD [facility] N/A 2019    DIRECT LARYNGOSCOPY WITH BIOPSY performed by Yissel Lerner MD at [address] [place] [address] 10/16/2020    BRONCHOSCOPY, MICRO LARYNGOSCOPY WITH REMOVAL OF SUBMUCCOSAL NON NOEPLASTIC LESION JET VENTILATION performed by Harry Choi MD at [address] [place] [address] N/A 2020    MICROLARYNGOSCOPY WITH BX & RESECTION OF OBSTRUCTING SOFT TISSUES WITH LASER & AIRWAY DEBRIDER, MICROLARYNGOPLASTY WITH RESECTION OF OBSTRUCTING SOFT TISSUE performed by Harry Choi MD at [facility]  RECTAL SURGERY  2016    colostemy bag[place]    ROTATOR CUFF REPAIR Left      SOCIAL HISTORY:  Social History     Tobacco Use    Smoking status: Former Smoker     Packs/day: 2.25     Years: 31.00     Pack years: 69.75     Start date:      Quit date: 2006     Years since quittin.9    Smokeless tobacco: Never Used   Substance Use Topics    Alcohol use: No     Alcohol/week: 0.0 standard drinks    Drug use: No     ALLERGIES:  Allergies   Allergen Reactions    Povidone Iodine Rash     Surgery prep     FAMILY HISTORY:  Family History   Problem Relation Age of Onset    Prostate Cancer Father 48    Cancer Father  Heart Disease Father     Diabetes Mother     Heart Disease Mother     Stroke Mother     Heart Disease Brother     High Blood Pressure Other     Cancer Other         LUNG,PROSTATE    Hearing Loss Other      CURRENT MEDICATIONS:  Current Outpatient Medications   Medication Sig Dispense Refill    voriconazole (VFEND) 200 MG tablet Take 1 tablet by mouth 2 times daily 84 tablet 0    acetylcysteine (MUCOMYST) 20 % nebulizer solution 4 mL via nebulizer 3 times daily 360 mL 5    albuterol sulfate HFA (VENTOLIN HFA) 108 (90 Base) MCG/ACT inhaler Inhale 2 puffs into the lungs every 6 hours as needed for Wheezing or Shortness of Breath 3 Inhaler 3    albuterol (PROVENTIL) (2.5 MG/3ML) 0.083% nebulizer solution Take 3 mLs by nebulization every 6 hours as needed for Wheezing or Shortness of Breath 360 vial 3    voriconazole (VFEND) 200 MG tablet Take 1 tablet by mouth 2 times daily 180 tablet 2    sodium chloride, Inhalant, 3 % nebulizer solution Take 3 mLs by nebulization as needed (Throat dryness, cough, wheezing) 500 mL 3    budesonide-formoterol (SYMBICORT) 160-4.5 MCG/ACT AERO Inhale 2 puffs into the lungs 2 times daily Rinse mouth after its use. 3 Inhaler 3    pravastatin (PRAVACHOL) 40 MG tablet Take 40 mg by mouth daily      SPIRIVA RESPIMAT 2.5 MCG/ACT AERS inhaler INHALE 2 PUFFS BY MOUTH ONCE DAILY 3 Inhaler 3    acetaminophen (TYLENOL) 650 MG extended release tablet Take 1 tablet by mouth as needed for Pain Do not take with hydrocodone/acetominophen 60 tablet 3    loperamide (IMODIUM) 2 MG capsule Take 2 mg by mouth daily        No current facility-administered medications for this visit. ROS   Review of Systems   Constitutional: Negative. Negative for chills, fever and unexpected weight change. HENT: Positive for voice change (Hoarseness ). Eyes: Negative. Respiratory: Positive for shortness of breath (with exertion ). Negative for chest tightness, wheezing and stridor.

## 2021-01-29 NOTE — PROGRESS NOTES

## 2021-02-01 NOTE — TELEPHONE ENCOUNTER
Solange Egan calls this morning stating he had a Barium Swallow scheduled today but his ride cancelled on him. The next opening they have is next Thursday 2/11/21. (He tells me he was getting this done because he wanted his throat stretched while already in surgery)? However, he is scheduled for surgery this Friday 2/5/21 for his Laryngeal and Tracheal treatment and he does not want to cancel the surgery. Please advise.

## 2021-02-05 NOTE — BRIEF OP NOTE
Brief Postoperative Note      Patient: Samir Soriano  YOB: 1960  MRN: [id number]    Date of Procedure: 2/5/2021    Pre-Op Diagnosis: INVASIVE FUNGAL INFECTION, LARYNGOTRACHEAL STENOSIS, SUPRAGLOTTIC AIRWAY OBSTRUCTION, PHARYNGOESOPHAGEAL DYSPHAGIA    Post-Op Diagnosis: Same       Procedure(s): MICROLARYNGOSCOPY WITH DEBRIDEMENT OF SUBGLOTTIC OBSTRUCTING TISSUES, BRONCHOSCOPY WITH SAME OF PROXIMAL TRACHEA, UPPER GI ENDOSCOPY FOR BALLOON DILATION  ADVANCEMENT FLAP RECONSTRUCTION BILATERAL RESECTION OF POSTERIOR SUPRAGLOTTIS    Surgeon(s):  Smitha Peterson MD    Assistant:  * No surgical staff found *    Anesthesia: General    Estimated Blood Loss (mL): less than 50     Complications: None    Specimens:   ID Type Source Tests Collected by Time Destination   A : right posterior superior supraglottis Tissue Pharynx SURGICAL PATHOLOGY Smitha Peterson MD 2/5/2021 1022    B : left posterior superior supraglottis Tissue Pharynx SURGICAL PATHOLOGY Smitha Peterson MD 2/5/2021 1107    C : sub glootic eschar Tissue Pharynx SURGICAL PATHOLOGY Smitha Peterson MD 2/5/2021 1124        Implants:  * No implants in log *      Drains:   Colostomy LLQ (Active)   Stomal Appliance Intact 02/05/21 1255   Stoma  Assessment Pink 02/05/21 0742   Mucocutaneous Junction Intact 02/05/21 0742   Stool Appearance Loose 02/05/21 0742   Stool Color Brown 02/05/21 0742   Stool Amount Smear 02/05/21 0742       Findings: 1. 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 Trachea clear of disease from invasive fungal mucositis  3. More difficult to control hypoxemia on this operation than previously  4. Redundant mucus of the posterior lateral supraglottis; resected and reconstructed  5.   Stenotic esophageal inlet; dilated to 19 mm    Electronically signed by Smitha Peterson MD on 2/5/2021 at 3:02 PM

## 2021-02-05 NOTE — PROGRESS NOTES
Pt and family oriented to SDS 16 and unit. Pt verbalized approval for first name, last initial and physician name on unit whiteboard. Fall band applied. Vaccine information given. Plan of care reviewed.

## 2021-02-05 NOTE — PROGRESS NOTES
Dr Melody Feldman called to see if he wanted the patient to have another albuterol treatment, and he stated not necessary at this time.

## 2021-02-05 NOTE — ANESTHESIA PRE PROCEDURE
Department of Anesthesiology  Preprocedure Note       Name:  Cullen Lr   Age:  63 y.o.  :  1960                                          MRN:  [id number]         Date:  2021      Surgeon: Ivelisse Ingram):  Neil Huertas MD    Procedure: Procedure(s): MICROLARYNGOSCOPY WITH DEBRIDEMENT OF SUBGLOTTIC OBSTRUCTING TISSUES, BRONCHOSCOPY WITH SAME OF PROXIMAL TRACHEA, UPPER GI ENDOSCOPY FOR BALLOON DILATION  BILATERAL ARYTENOIDECTOMY AND ADVANCEMENT FLAP RECONSTRUCTION, RESECTION OF POSTERIOR SUPRAGLOTTIS    Medications prior to admission:   Prior to Admission medications    Medication Sig Start Date End Date Taking?  Authorizing Provider   Acetylcysteine (NAC) 500 MG CAPS Take by mouth 2 times daily    Historical Provider, MD   guaiFENesin (ROBITUSSIN) 100 MG/5ML syrup Take 200 mg by mouth 3 times daily as needed for Cough    Historical Provider, MD ramirzeaiFENesin (MUCINEX) 600 MG extended release tablet Take 1,200 mg by mouth 2 times daily as needed for Congestion    Historical Provider, MD   Ascorbic Acid (VITAMIN C) 250 MG tablet Take 250 mg by mouth 2 times daily    Historical Provider, MD   zinc 50 MG CAPS Take by mouth Twice weekly    Historical Provider, MD   acetylcysteine (MUCOMYST) 20 % nebulizer solution 4 mL via nebulizer 3 times daily 20   ASHWIN Wiley   albuterol sulfate HFA (VENTOLIN HFA) 108 (90 Base) MCG/ACT inhaler Inhale 2 puffs into the lungs every 6 hours as needed for Wheezing or Shortness of Breath 20   JOSH Delvalle CNP   albuterol (PROVENTIL) (2.5 MG/3ML) 0.083% nebulizer solution Take 3 mLs by nebulization every 6 hours as needed for Wheezing or Shortness of Breath 20  JOSH Delvalle - CNP   sodium chloride, Inhalant, 3 % nebulizer solution Take 3 mLs by nebulization as needed (Throat dryness, cough, wheezing) 10/19/20   ASHWIN Wiley   budesonide-formoterol (SYMBICORT) 160-4.5 MCG/ACT AERO Inhale 2 puffs into the lungs 2 times daily Rinse mouth after its use. 8/28/20 8/28/21  Khushi Reid APRN - CNP   pravastatin (PRAVACHOL) 40 MG tablet Take 40 mg by mouth nightly  7/23/20   Historical Provider, MD   SPIRIVA RESPIMAT 2.5 MCG/ACT AERS inhaler INHALE 2 PUFFS BY MOUTH ONCE DAILY 5/11/20   JOSH Ivy - CNP   acetaminophen (TYLENOL) 650 MG extended release tablet Take 1 tablet by mouth as needed for Pain Do not take with hydrocodone/acetominophen 12/30/19   Aguila Valdes MD   loperamide (IMODIUM) 2 MG capsule Take 2 mg by mouth daily     Historical Provider, MD       Current medications:    No current facility-administered medications for this visit. No current outpatient medications on file. Facility-Administered Medications Ordered in Other Visits   Medication Dose Route Frequency Provider Last Rate Last Admin    0.9 % sodium chloride infusion   Intravenous Continuous Ana Holm  mL/hr at 02/05/21 0755 New Bag at 02/05/21 0755       Allergies: Allergies   Allergen Reactions    Povidone Iodine Rash     Surgery prep       Problem List:    Patient Active Problem List   Diagnosis Code    Dysphonia R49.0    Alcohol abuse F10.10    FHx: smoking Z81.2    Deviated septum J34.2    Hypertrophy of nasal turbinates J34.3    Rhinitis J31.0    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    Infection due to Candida glabrata B37.9    Subglottic stenosis not due to surgery J38.6    Invasive fungal infection B49    Stage 3 severe COPD by GOLD classification ([facility]) J44.9    Hoarseness R49.0    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    Laryngeal stenosis J38.6    Airway obstruction J98.8    Airway stricture J98.8    Bullous emphysema ([facility]) J43.9    Refractory obstruction of nasal airway J34.89       Past Medical History:        Diagnosis Date    Arthritis     COPD (chronic obstructive pulmonary disease) ([facility])     PAD (peripheral artery disease) ([facility])     bilateral lower legs    Pneumonia 01/2017 1/2017 and 2/2017    Rectal cancer ([facility])     Squamous cell carcinoma of vocal cord [facility])        Past Surgical History:        Procedure Laterality Date    BRONCHOSCOPY N/A 11/18/2020    BRONCHOSCOPY performed by Frida Galarza MD at [address] [place] [address]  2016    EYE SURGERY      CROSS EYED    HAND SURGERY Bilateral 1995    KNEE ARTHROSCOPY Right 1996    LARYNGOSCOPY  07/12/2017    Biopsy    LARYNGOSCOPY N/A 7/12/2019    MICRO LARYNGOSCOPY W/ BIOPSY performed by Bernie Mclean MD at [facility] N/A 12/30/2019    DIRECT LARYNGOSCOPY WITH BIOPSY performed by Bernie Mclean MD at [facility] N/A 10/16/2020    BRONCHOSCOPY, MICRO LARYNGOSCOPY WITH REMOVAL OF SUBMUCCOSAL NON NOEPLASTIC LESION JET VENTILATION performed by Frida Galarza MD at [facility] N/A 11/18/2020    MICROLARYNGOSCOPY WITH BX & RESECTION OF OBSTRUCTING SOFT TISSUES WITH LASER & AIRWAY DEBRIDER, MICROLARYNGOPLASTY WITH RESECTION OF OBSTRUCTING SOFT TISSUE performed by Frida Galarza MD at [address] [place] [address] 2050 RECTAL SURGERY  08/29/2016    [facility] [place]    ROTATOR CUFF REPAIR Left 1990'S       Social History:    Social History     Tobacco Use    Smoking status: Former Smoker     Packs/day: 2.25     Years: 31.00     Pack years: 69.75     Start date: 1975     Quit date: 2/5/2006     Years since quitting: 15.0    Smokeless tobacco: Never Used   Substance Use Topics    Alcohol use: No     Alcohol/week: 0.0 standard drinks                                Counseling given: Not Answered      Vital Signs (Current): There were no vitals filed for this visit. BP Readings from Last 3 Encounters:   02/05/21 (!) 159/94   01/19/21 136/82   01/13/21 138/88       NPO Status:                                                                                 BMI:   Wt Readings from Last 3 Encounters:   02/05/21 226 lb 6.4 oz (102.7 kg)   01/19/21 230 lb (104.3 kg)   01/13/21 228 lb (103.4 kg)     There is no height or weight on file to calculate BMI.    CBC:   Lab Results   Component Value Date    WBC 6.7 01/29/2021    RBC 5.14 01/29/2021    RBC 4.28 12/06/2011    HGB 15.7 01/29/2021    HCT 50.8 01/29/2021    MCV 98.8 01/29/2021    RDW 13.8 06/12/2020     01/29/2021       CMP:   Lab Results   Component Value Date     01/29/2021    K 5.2 01/29/2021     01/29/2021    CO2 28 01/29/2021    BUN 19 01/29/2021    CREATININE 0.6 01/29/2021    AGRATIO 1.7 06/11/2019    LABGLOM >90 01/29/2021    GLUCOSE 98 01/29/2021    GLUCOSE 94 06/12/2020    PROT 7.2 01/29/2021    CALCIUM 9.1 01/29/2021    BILITOT 0.4 01/29/2021    ALKPHOS 165 01/29/2021    AST 20 01/29/2021    ALT 28 01/29/2021       POC Tests: No results for input(s): POCGLU, POCNA, POCK, POCCL, POCBUN, POCHEMO, POCHCT in the last 72 hours.     Coags: No results found for: PROTIME, INR, APTT    HCG (If Applicable): No results found for: PREGTESTUR, PREGSERUM, HCG, HCGQUANT     ABGs: No results found for: PHART, PO2ART, PCP0XVN, REP0HGK, BEART, J0PFCUWS     Type & Screen (If Applicable):  No results found for: LABABO, LABRH    Drug/Infectious Status (If Applicable):  No results found for: HIV, HEPCAB    COVID-19 Screening (If Applicable):   Lab Results   Component Value Date    COVID19 Not Detected 01/29/2021         Anesthesia Evaluation  Patient summary reviewed and Nursing notes reviewed  Airway: Mallampati: III        Dental:          Pulmonary:   (+) pneumonia:  COPD: severe,  shortness of breath: chronic,  recent URI:  rhonchi,  decreased breath sounds,                             Cardiovascular:  Exercise tolerance: poor (<4 METS),           Rhythm: regular  Rate: normal                    Neuro/Psych:               GI/Hepatic/Renal:   (+) GERD:,           Endo/Other:    (+) malignancy/cancer. Abdominal:           Vascular:                                          Anesthesia Plan      general     ASA 4     (Jet Vent)  Induction: intravenous. MIPS: Postoperative opioids intended and Prophylactic antiemetics administered. Anesthetic plan and risks discussed with patient and spouse.       Plan discussed with CRNA and surgical team.                  Jada New MD   2/5/2021

## 2021-02-05 NOTE — H&P
Mr. Beth Schaefer comes to the operating room today for a suspension laryngoscopy and transoral laryngeal reconstruction as well as continued efforts at reducing surface area affected by his invasive fungal disease infestation post radiation therapy. He reports breathing about baseline. He reports also that his dysphagia particularly with pills has gotten significantly worse since his visit just a few weeks ago. I reviewed with him the plans and he presents about how he was during his last visit relative to my physical exam.  Therefore his note from January 13, 2021 is still an accurate representation of his physical exam and surgical plans moving forward. The only addition is that I will add balloon dilation of his esophageal inlet in an effort to improve his pill dysphagia. He understands the risk of perforation particularly in the context of radiation therapy. We will proceed as such. Mikayla Reid. MD David Polanco MD   Physician   Specialty:  Otolaryngology   Progress Notes   Signed   Encounter Date:  1/13/2021               Signed        Expand AllCollapse All      Show:Clear all  [x]Manual[x]Template[]Copied    Added by:  Brenda Erwin MD    [][facility] for details    [facility], NOSE AND THROAT  [facility] [address] [place] [address]  Dept: [phone]  Dept Fax: [phone]  Loc: [phone]     Quincy Pitts is a 63 y.o. male who was referred by No ref. provider found for:       Chief Complaint   Patient presents with    Pre-op Exam       Patient is here for pre-op sy arytenoidectomy, laryngoscopy w/ debridement of subglottic tissue.         HPI:      Quincy Pitts is a 63 y.o. 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   He is status post 2 suspension laryngoscopy laser debridement operations in conjunction with bronchiolar debridement which is all been in conjunction with aggressive antifungal systemic therapy provided by my infectious disease colleague. The patient was about to have his antifungal medication tapered to half dose for maintenance but after a week of the lower dose he noticed a distinct worsening of his respiratory symptoms and he was increased back to full dose.     Patient is also complaining of difficulty swallowing particularly pills. He indicates to the esophageal inlet as the location where this is the case. He states that about 3 years ago he underwent an upper esophageal dilation to help him with this kind of dysphagia and it was very effective. He does not know the diameter of the balloon that was used to dilate him at that point.     In addition the patient is complaining of nasal airway obstruction particularly on his left side. He states that when his right side is congested, he can breathe through his nose at all. That is a frequent condition. And makes breathing at night given more difficult.                 History:            Allergies   Allergen Reactions    Povidone Iodine Rash       Surgery prep      Current Facility-Administered Medications          Current Outpatient Medications   Medication Sig Dispense Refill    voriconazole (VFEND) 200 MG tablet Take 1 tablet by mouth 2 times daily 84 tablet 0    acetylcysteine (MUCOMYST) 20 % nebulizer solution 4 mL via nebulizer 3 times daily 360 mL 5    albuterol sulfate HFA (VENTOLIN HFA) 108 (90 Base) MCG/ACT inhaler Inhale 2 puffs into the lungs every 6 hours as needed for Wheezing or Shortness of Breath 3 Inhaler 3    albuterol (PROVENTIL) (2.5 MG/3ML) 0.083% nebulizer solution Take 3 mLs by nebulization every 6 hours as needed for Wheezing or Shortness of Breath 360 vial 3    voriconazole (VFEND) 200 MG tablet Take 1 tablet by mouth 2 times daily 180 tablet 2    sodium chloride, Inhalant, 3 % nebulizer solution Take 3 mLs by nebulization as needed (Throat dryness, cough, wheezing) 500 mL 3    budesonide-formoterol (SYMBICORT) 160-4.5 MCG/ACT AERO Inhale 2 puffs into the lungs 2 times daily Rinse mouth after its use.  3 Inhaler 3    pravastatin (PRAVACHOL) 40 MG tablet Take 40 mg by mouth daily        SPIRIVA RESPIMAT 2.5 MCG/ACT AERS inhaler INHALE 2 PUFFS BY MOUTH ONCE DAILY 3 Inhaler 3    acetaminophen (TYLENOL) 650 MG extended release tablet Take 1 tablet by mouth as needed for Pain Do not take with hydrocodone/acetominophen 60 tablet 3    loperamide (IMODIUM) 2 MG capsule Take 2 mg by mouth daily           No current facility-administered medications for this visit.          Past Medical History        Past Medical History:   Diagnosis Date    Arthritis      COPD (chronic obstructive pulmonary disease) (HCC)      PAD (peripheral artery disease) (HCC)       bilateral lower legs    Pneumonia 01/2017 1/2017 and 2/2017    [facility]      Squamous cell carcinoma of vocal cord [facility])           Past Surgical History         Past Surgical History:   Procedure Laterality Date    BRONCHOSCOPY N/A 11/18/2020     BRONCHOSCOPY performed by Arsalan Garcia MD at [place] [address]   2016    EYE SURGERY         CROSS EYED    HAND SURGERY Bilateral 1995    KNEE ARTHROSCOPY Right 1996    LARYNGOSCOPY   07/12/2017     Biopsy    LARYNGOSCOPY N/A 7/12/2019     MICRO LARYNGOSCOPY W/ BIOPSY performed by Jose Carlos Castellanos MD at [address] [place] [address] E 12/30/2019     DIRECT LARYNGOSCOPY WITH BIOPSY performed by Jose Carlos Castellanos MD at [address] [place] [address] E 10/16/2020     BRONCHOSCOPY, MICRO LARYNGOSCOPY WITH REMOVAL OF SUBMUCCOSAL NON NOEPLASTIC LESION JET VENTILATION performed by Arsalan Garcia MD at [address] [place] [address] N/A 11/18/2020     MICROLARYNGOSCOPY WITH BX & RESECTION OF OBSTRUCTING SOFT TISSUES WITH LASER & AIRWAY DEBRIDER, MICROLARYNGOPLASTY WITH RESECTION OF OBSTRUCTING SOFT TISSUE performed by Barrera Liu MD at [address]        RECTAL SURGERY   2016     [facility]    ROTATOR CUFF REPAIR Left 80'S         Family History         Family History   Problem Relation Age of Onset    Prostate Cancer Father 48    Cancer Father      Heart Disease Father      Diabetes Mother      Heart Disease Mother      Stroke Mother      Heart Disease Brother      High Blood Pressure Other      Cancer Other           LUNG,PROSTATE    Hearing Loss Other           Social History            Tobacco Use    Smoking status: Former Smoker       Packs/day: 2.25       Years: 31.00       Pack years: 69.75       Start date: 64       Quit date: 2006       Years since quittin.9    Smokeless tobacco: Never Used   Substance Use Topics    Alcohol use: No       Alcohol/week: 0.0 standard drinks                    Subjective:      Review of Systems  Rest of review of systems are negative, except as noted in HPI.      Objective:      /88 (Site: Left Upper Arm, Position: Sitting)   Pulse 74   Temp 98.4 °F (36.9 °C) (Infrared)   Resp 18   Ht 6' 2\" (1.88 m)   Wt 228 lb (103.4 kg)   BMI 29.27 kg/m²      Physical Exam         On general physical exam the patient is pleasant alert well oriented and cooperative older middle-age man in no acute distress. His voice is very low in pitch and mildly to moderately raspy for his age and gender. I heard no throat clearing coughing or inspiratory stridor. He is breathing without labor.     On auscultation the patient's breath sounds were dim and distant but were vesicular throughout. His heart tones were virtually absent across the anterior chest but along the anterior lateral aspect of where I would expect the pericardium to be, I was able to hear clear heart tones and they demonstrated a regular rate and rhythm without murmur or gallop.     Vitals reviewed.     No results found.          Lab Results   Component Value Date      10/17/2020      06/12/2020      02/06/2020     K 4.9 10/17/2020     K 5.2 06/12/2020     K 4.4 02/06/2020     CL 96 10/17/2020      06/12/2020      02/06/2020     CO2 25 10/17/2020     CO2 29 06/12/2020     CO2 29 02/06/2020     BUN 15 10/17/2020     BUN 19 06/12/2020     BUN 18 02/06/2020     CREATININE 0.6 10/17/2020     CREATININE 0.71 06/12/2020     CREATININE 0.78 02/06/2020     CALCIUM 8.6 10/17/2020     CALCIUM 9.10 06/12/2020     CALCIUM 8.70 02/06/2020     PROT 6.6 10/17/2020     PROT 6.5 06/12/2020     PROT 6.6 02/06/2020     LABALBU 4.0 10/17/2020     LABALBU 4.3 06/12/2020     LABALBU 4.5 02/06/2020     LABALBU 4.6 12/06/2011     BILITOT 0.2 10/17/2020     BILITOT 0.3 06/12/2020     BILITOT 0.5 02/06/2020     ALKPHOS 159 10/17/2020     ALKPHOS 124 06/12/2020     ALKPHOS 119 02/06/2020     AST 23 10/17/2020     AST 22 06/12/2020     AST 24 02/06/2020     ALT 29 10/17/2020     ALT 31 06/12/2020     ALT 26 02/06/2020         All of the past medical history, past surgical history, family history,social history, allergies and current medications were reviewed with the patient. Assessment & Plan   Diagnoses and all orders for this visit:       Diagnosis Orders   1. Pharyngoesophageal dysphagia  12596 - [place] UPPER GI ENDOSCOPY,DIAGNOSIS   2. Invasive fungal infection      3. Subglottic stenosis not due to surgery      4. Laryngeal stenosis      5. Airway obstruction      6. Tracheal stenosis      7. Infection due to Candida glabrata      8. Mixed simple and mucopurulent chronic bronchitis (HCC)      9. Chronic laryngitis      10. Deviated septum      11. Refractory obstruction of nasal airway      12.  Cricopharyngeus muscle dysfunction  26939 - [place] UPPER GI ENDOSCOPY,DIAGNOSIS         Based on his history and these physical findings as well as my review of his CT scan performed last year, I reviewed each of his primary problems and made recommendations and plans to treat them.      Regarding his dysphagia, I will get a barium esophagram to determine the location and caliber of his upper esophageal mucosa and plan for a balloon dilation at the same time as he has his laryngeal and tracheal treatment.     Regarding his laryngeal stenosis: My intention is to perform a resection of his supraglottic redundant mucosa and so the mucous membranes together at the wound to reduce the risk of chondral radionecrosis. We will also speed his healing and improve his breathing. As far as his invasive fungal disease, my intention is to do exactly what I have done thus far but concentrate on segments of the larynx that were untreated to avoid producing a circumferential wound and a cicatricial scar.     Regarding his tracheal disease: Like the laryngeal pathology, my intention is to concentrate on areas that I avoided to avoid a cicatrix and extend the treatment as much as possible. I will use a debrider and laser to accomplish both.     Given that he has oxygen at home now, I will likely be able to discharge him to home following the procedure.     I spent over 30 minutes of face-to-face time with the patient more than half of which was dedicated to reviewing his multisystem problems and structuring a diagnostic and therapeutic plan to treat each one.     No follow-ups on file.           **This report has been created using voice recognition software. It may contain minor errors which are inherent in voice recognition technology. **                                       Office Visit on 1/13/2021        Detailed Report        Note shared with patient  Progress Notes Info    Author Note Status Last Update User   Kendra Gomez MD Signed Kendra Gomez MD   Last Update Date/Time: 1/13/2021 10:18 AM   Chart Review Routing History    No routing history on file.

## 2021-02-05 NOTE — OP NOTE
Operative Note      Patient: Cece Fritz  YOB: 1960  MRN: [id number]    Date of Procedure: 2/5/2021    Pre-Op Diagnosis: INVASIVE FUNGAL INFECTION, LARYNGOTRACHEAL STENOSIS, SUPRAGLOTTIC AIRWAY OBSTRUCTION, PHARYNGOESOPHAGEAL DYSPHAGIA    Post-Op Diagnosis: Same       Procedure(s): MICROLARYNGOSCOPY WITH DEBRIDEMENT OF SUBGLOTTIC OBSTRUCTING TISSUES, BRONCHOSCOPY WITH SAME OF PROXIMAL TRACHEA, UPPER GI ENDOSCOPY FOR BALLOON DILATION  ADVANCEMENT FLAP RECONSTRUCTION BILATERAL RESECTION OF POSTERIOR SUPRAGLOTTIS    Surgeon(s):  Marino Marcus MD    Assistant:   * No surgical staff found *    Anesthesia: General    Estimated Blood Loss (mL): less than 50     Complications: None    Specimens:   ID Type Source Tests Collected by Time Destination   A : right posterior superior supraglottis Tissue Pharynx SURGICAL PATHOLOGY Marino Marcus MD 2/5/2021 1022    B : left posterior superior supraglottis Tissue Pharynx SURGICAL PATHOLOGY Marino Marcus MD 2/5/2021 1107    C : sub glootic eschar Tissue Pharynx SURGICAL PATHOLOGY Marino Marcus MD 2/5/2021 1124        Implants:  * No implants in log *      Drains:   Colostomy LLQ (Active)   Stomal Appliance Intact 02/05/21 1255   Stoma  Assessment Pink 02/05/21 0742   Mucocutaneous Junction Intact 02/05/21 0742   Stool Appearance Loose 02/05/21 0742   Stool Color Brown 02/05/21 0742   Stool Amount Smear 02/05/21 0742       Findings: 1. 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 Trachea clear of disease from invasive fungal mucositis  3. More difficult to control hypoxemia on this operation than previously  4. Redundant mucus of the posterior lateral supraglottis; resected and reconstructed  5. Stenotic esophageal inlet; dilated to 19 mm    Detailed Description of Procedure: The patient was taken to the operating room awake and placed in supine position.   General anesthesia was induced and the patient was intubated with an LMA to secure his airway in the supraglottis and avoid having to instrument his airway prior to the start of his surgery. I then performed a timeout verifying the patient's identity and planned procedure. I had the table turned 90 degrees for the procedure and had the patient draped in usual fashion for a transoral operation. Suspension microlaryngoscopy with transoral bilateral laryngoplasty with resection of benign mucosal and submucosal disease and mucosal advancement flap x2: With the patient asleep I protected his eyes and his maxillary mucosa in the usual manner. I then preoxygenated the patient and extubated him of the LMA incoordination with the anesthesia team.  Jet ventilator system was prepared. I placed a Mj laryngoscope into the patient's vallecula that extended anteriorly and provided direct line of sight view the airway. Using a 30 degree optical telescope I carefully examined the airway and placed a jet ventilation catheter into the distal trachea and began jet ventilating. The patient initially was easy to keep in the high 90s with 100% FiO2. Later in the case this became more challenging and he was found to have accumulated significant amounts of CO2. I placed a traction suture on the epiglottis in usual manner using 2-0 Prolene on FS needle. I removed the Mj and replaced it behind the epiglottis using the traction suture to keep the epiglottis in control. Once the laryngoscope was in good position I carefully examined the patient supraglottis and found the redundant soft tissue that was prolapsing into the posterior airway and obstructing significant airflow. I brought into the field and operating microscope and used UltraPulse CO2 laser with a digital Accu blade robotic joystick controller to deliver the energy. I first turned my attention to the right side of the patient's supraglottis.   Using suction grasping forceps and a 1 mm [place] airway when I first began taking care of the patient now almost 9 months ago. As his third operation of this condition, I carefully examined the soft tissues of the subglottis proximal trachea and glottis and supraglottis and found a remarkably diminished area of involved soft tissues inclusive of the anterior true vocal folds, the posterior supraglottis of the AE fold false fold and periarytenoid areas in the subglottis to the right of the posterior commissure and in a separate area on the left and right sides of the anterior subglottis. For the subglottic disease anteriorly I used a [place] Panjur microdebrider system with a laryngeal airway blade to remove the involved mucosa and submucosa. After removing the gross disease I used topical epinephrine to achieve hemostasis and used only suction cautery and 2 small areas on both sides of the midline. In the posterior subglottis to the right of midline I was able to resect away a large area of involved mucosa that was separate from the anterior disease by significant amount of uninvolved mucosa. I did not expose the arytenoid cartilage or the cricoid cartilage in these regions. I did not enter the cricoarytenoid joint capsule. I then turned my attention to the posterior supraglottis. Orienting the laser obliquely towards the left and right sides sequentially, I removed the wall mucosa of the posterior AE fold, the posterior false fold, and the posterior true fold on the right and left sides. I readjusted my view and removed involved soft tissue of the anterior residual true fold and a portion of the false fold on the left only. There was abnormal tissue in the region of the right anterior true fold which was relatively normal in appearance but I chose not to remove it to reduce the risk of generating an anterior glottic web in this region.   The chances of producing a confluent wound at the anterior commissure were too high with the proximity of the disease spaces. Rigid esophagoscopy with balloon dilation to 19 mm: Prior to reintubated the patient I used a 30 degree optical scope to pass an 18 to 20 mm rigid balloon into the patient's esophageal inlet in preparation for dilation. I first inflated at 18 mm. Since that maintained pressure without signs of shearing, I extended it to 19 mm where again retained pressure. Concerned about the potential for perforation I opted not to take him to full 20 mm. I deflated the balloon and removed it and found only a minimal amount of bloody staining indicative of a superficial dilatation of the process. As such I achieved hemostasis in these regions with a combination of topical epinephrine on pledgets and the judicious use of suction cautery. Once hemostasis had been achieved I cleaned the distal airway again with the bronchoscope and then removed the jet ventilation catheter and cottonoid in the distal airway prior to reintubated the patient with a 7.0 endotracheal tube without difficulty or vital sign instability. I removed the Mj and turned the patient back to anesthesia for reversal and extubation in the operating room. This was carried out without incident and the patient was taken to recovery in satisfactory condition. He was found to be hypoxic and in need of relatively high FiO2's in the recovery room. This was expected. Estimated blood loss in the case was less than 30 cc and the patient received approximately a liter of intravenous lactated Ringer's intraoperatively. No blood products were transfused. No intraoperative complications were detected. Counts were considered accurate x3. I was present for and performed the patient's entire operation myself. Disposition: The patient will be observed closely in phase 1 recovery to make sure that he is appropriately safe for discharged home.   He has oxygen for getting back to his house and has an oxygen concentrator at home for his additional needs. He will have a prescription for Norco 5 mg pills for analgesia with a dispensing of 20 for postop pain control. Kaedem Israel.  Angelito Schwab MD    Electronically signed by Jyotsna Lambert MD on 2/5/2021 at 3:05 PM

## 2021-02-05 NOTE — PROGRESS NOTES
Pt has met discharge criteria and states he is ready for discharge to home. IV removed, gauze and tape applied. Dressed in own clothes and personal belongings gathered. Discharge instructions (with opioid medication education information) given to pt and family; pt and family verbalized understanding of discharge instructions, prescriptions and follow up appointments. Pt states he has oxygen in his car to go home on and he has oxygen at home. He states he is very familiar with the post op of this procedure he had today. Patient states he feels good and is ready to go home. Pt transported to discharge lobby by [facility] staff.

## 2021-02-05 NOTE — DISCHARGE SUMMARY
Physician Discharge Summary     Patient ID:  Cullen Lr  [id number]  89 y.o.  1960    Admit date: 2/5/2021    Discharge date and time: No discharge date for patient encounter. Admitting Physician: Neil Huertas MD     Discharge Physician: Same    Admission Diagnoses: INVASIVE FUNGAL INFECTION, LARYNGOTRACHEAL STENOSIS, SUPRAGLOTTIC AIRWAY OBSTRUCTION, PHARYNGOESOPHAGEAL DYSPHAGIA    Discharge Diagnoses: Same    Admission Condition: fair    Discharged Condition: fair    Indication for Admission: Pain control and improvement of oxygenation    Hospital Course: Postoperatively the patient remained in phase 1 for a protracted interval time working to improve his ability to maintain his oxygen saturation in the high 80s and low 90s on a nasal cannula for rate that he could achieve on his way home from the hospital.  That was 4 L/min. His chest x-ray with the portable film was negative for pneumothorax. He had multiple nebulizer treatments to help improve his gas exchange. It was successful. Consults: none    Significant Diagnostic Studies: Portable chest x-ray    Treatments: surgery: Suspension microlaryngoscopy with bilateral reconstruction of resected soft tissues, suspension microlaryngoscopy with ablation of invasive fungal mucosa of the supraglottis glottis and subglottis; balloon dilation of esophageal stenosis    Discharge Exam:  The patient was significantly improved in phase 1 before being transferred to phase 2 for discharge. Disposition: home    In process/preliminary results:  Outstanding Order Results     No orders found from 1/7/2021 to 2/6/2021. Patient Instructions:   Current Discharge Medication List      START taking these medications    Details   HYDROcodone-acetaminophen (NORCO) 5-325 MG per tablet Take 1 tablet by mouth every 6 hours as needed for Pain for up to 5 days. Intended supply: 5 days.  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; Tracheal stenosis; Laryngeal stenosis; Airway obstruction; Airway stricture         CONTINUE these medications which have NOT CHANGED    Details   Acetylcysteine (NAC) 500 MG CAPS Take by mouth 2 times daily      guaiFENesin (ROBITUSSIN) 100 MG/5ML syrup Take 200 mg by mouth 3 times daily as needed for Cough      guaiFENesin (MUCINEX) 600 MG extended release tablet Take 1,200 mg by mouth 2 times daily as needed for Congestion      Ascorbic Acid (VITAMIN C) 250 MG tablet Take 250 mg by mouth 2 times daily      zinc 50 MG CAPS Take by mouth Twice weekly      acetylcysteine (MUCOMYST) 20 % nebulizer solution 4 mL via nebulizer 3 times daily  Qty: 360 mL, Refills: 5    Associated Diagnoses: Airway obstruction; Tracheal stenosis; Mucopurulent chronic bronchitis (HCC)      albuterol sulfate HFA (VENTOLIN HFA) 108 (90 Base) MCG/ACT inhaler Inhale 2 puffs into the lungs every 6 hours as needed for Wheezing or Shortness of Breath  Qty: 3 Inhaler, Refills: 3    Associated Diagnoses: Mucopurulent chronic bronchitis (HCC)      albuterol (PROVENTIL) (2.5 MG/3ML) 0.083% nebulizer solution Take 3 mLs by nebulization every 6 hours as needed for Wheezing or Shortness of Breath  Qty: 360 vial, Refills: 3    Associated Diagnoses: Mucopurulent chronic bronchitis (HCC)      sodium chloride, Inhalant, 3 % nebulizer solution Take 3 mLs by nebulization as needed (Throat dryness, cough, wheezing)  Qty: 500 mL, Refills: 3      budesonide-formoterol (SYMBICORT) 160-4.5 MCG/ACT AERO Inhale 2 puffs into the lungs 2 times daily Rinse mouth after its use. Qty: 3 Inhaler, Refills: 3    Associated Diagnoses: Mucopurulent chronic bronchitis ([address]  Pulmonary emphysema, unspecified emphysema type (HCC)      pravastatin (PRAVACHOL) 40 MG tablet Take 40 mg by mouth nightly       SPIRIVA RESPIMAT 2.5 MCG/ACT AERS inhaler INHALE 2 PUFFS BY MOUTH ONCE DAILY  Qty: 3 Inhaler, Refills: 3    Associated Diagnoses: Chronic bronchitis, mucopurulent (HCC)      acetaminophen (TYLENOL) 650 MG extended release tablet Take 1 tablet by mouth as needed for Pain Do not take with hydrocodone/acetominophen  Qty: 60 tablet, Refills: 3      loperamide (IMODIUM) 2 MG capsule Take 2 mg by mouth daily            Activity: no lifting, Driving, or Strenuous exercise for 1 week  Diet: clear liquids, advance as tolerated  Wound Care: as directed    Follow-up with Dr. Jessica Ariza in 2 weeks. Signed:  Cristal Donahue.  Jessica Ariza MD  2/5/2021  3:33 PM

## 2021-02-16 NOTE — PROGRESS NOTES
[address] EAR, NOSE AND THROAT  [facility] [place]  Dept: [phone]  Dept Fax: [phone]  Loc: [phone]    Annemarie Calvillo is a 63 y.o. male who was referred by No ref. provider found for:  Chief Complaint   Patient presents with    Post-Op Check     Patient is here post-op Arytenoidectomy w/reconst, Microlaryngoscopy, Bronchoscopy 2/5/21       HPI:     Annemarie Calvillo is a 63 y.o. male who is approximately 2 weeks from his third suspension laryngoscopy and resection of invasive fungal disease over the mucosa of his supraglottis glottis and subglottis. In addition I performed a transoral supraglottoplasty with resection of redundant mucosa and advancement flap reconstruction of the supraglottis. The patient is here today reporting proportional improvements in his breathing ability and he notices that he is able to get up thick secretions much more easily than he could previously. He is using his 3% saline but he states that it does burn his throat when he inhales it as he would expect given that he has raw tissue in his upper airway. He also states that he is having problem with some thin liquid intolerance and that he has to take small sips of liquids to prevent this from happening. His voice is still not really returned from the procedure. He is off oxygen during the daytime. History:      Allergies   Allergen Reactions    Povidone Iodine Rash     Surgery prep     Current Outpatient Medications   Medication Sig Dispense Refill    Ascorbic Acid (VITAMIN C PO) Take by mouth      Cholecalciferol (VITAMIN D3 PO) Take by mouth      Acetylcysteine (NAC) 500 MG CAPS Take by mouth 2 times daily      guaiFENesin (ROBITUSSIN) 100 MG/5ML syrup Take 200 mg by mouth 3 times daily as needed for Cough      guaiFENesin (MUCINEX) 600 MG extended release tablet Take 1,200 mg by mouth 2 times daily as needed for Congestion  Ascorbic Acid (VITAMIN C) 250 MG tablet Take 250 mg by mouth 2 times daily      zinc 50 MG CAPS Take by mouth Twice weekly      acetylcysteine (MUCOMYST) 20 % nebulizer solution 4 mL via nebulizer 3 times daily 360 mL 5    albuterol sulfate HFA (VENTOLIN HFA) 108 (90 Base) MCG/ACT inhaler Inhale 2 puffs into the lungs every 6 hours as needed for Wheezing or Shortness of Breath 3 Inhaler 3    albuterol (PROVENTIL) (2.5 MG/3ML) 0.083% nebulizer solution Take 3 mLs by nebulization every 6 hours as needed for Wheezing or Shortness of Breath 360 vial 3    sodium chloride, Inhalant, 3 % nebulizer solution Take 3 mLs by nebulization as needed (Throat dryness, cough, wheezing) 500 mL 3    budesonide-formoterol (SYMBICORT) 160-4.5 MCG/ACT AERO Inhale 2 puffs into the lungs 2 times daily Rinse mouth after its use. 3 Inhaler 3    pravastatin (PRAVACHOL) 40 MG tablet Take 40 mg by mouth nightly       SPIRIVA RESPIMAT 2.5 MCG/ACT AERS inhaler INHALE 2 PUFFS BY MOUTH ONCE DAILY 3 Inhaler 3    acetaminophen (TYLENOL) 650 MG extended release tablet Take 1 tablet by mouth as needed for Pain Do not take with hydrocodone/acetominophen 60 tablet 3    loperamide (IMODIUM) 2 MG capsule Take 2 mg by mouth daily        No current facility-administered medications for this visit.       Past Medical History:   Diagnosis Date    Arthritis     [facility]     PAD (peripheral artery disease) ([facility]     bilateral lower legs    Pneumonia 01/2017 1/2017 and 2/2017    Rectal cancer ([facility]     Squamous cell carcinoma of vocal cord [facility])       Past Surgical History:   Procedure Laterality Date    BRONCHOSCOPY N/A 11/18/2020    BRONCHOSCOPY performed by Jose Hernandez MD at [place] [address]  2016    EYE SURGERY      CROSS EYED    HAND SURGERY Bilateral 1995    KNEE ARTHROSCOPY Right 1996    LARYNGOSCOPY  07/12/2017    Biopsy    LARYNGOSCOPY N/A 7/12/2019 MICRO LARYNGOSCOPY W/ BIOPSY performed by Jessica Mackey MD at [facility] N/A 12/30/2019    DIRECT LARYNGOSCOPY WITH BIOPSY performed by Jessica Mackey MD at [facility] N/A 10/16/2020    BRONCHOSCOPY, MICRO LARYNGOSCOPY WITH REMOVAL OF SUBMUCCOSAL NON NOEPLASTIC LESION JET VENTILATION performed by Serene Ryder MD at [facility] N/A 11/18/2020    MICROLARYNGOSCOPY WITH BX & RESECTION OF OBSTRUCTING SOFT TISSUES WITH LASER & AIRWAY DEBRIDER, MICROLARYNGOPLASTY WITH RESECTION OF OBSTRUCTING SOFT TISSUE performed by Serene Ryder MD at [facility] N/A 2/5/2021    MICROLARYNGOSCOPY WITH DEBRIDEMENT OF SUBGLOTTIC OBSTRUCTING TISSUES, BRONCHOSCOPY WITH SAME OF PROXIMAL TRACHEA, UPPER GI ENDOSCOPY FOR BALLOON DILATION performed by Serene Ryder MD at [address] [place] [address] 2050 RECTAL SURGERY  08/29/2016    [place]    ROTATOR CUFF REPAIR Left 1990'S    TONSILLECTOMY AND ADENOIDECTOMY N/A 2/5/2021    ADVANCEMENT FLAP RECONSTRUCTION BILATERAL RESECTION OF POSTERIOR SUPRAGLOTTIS performed by Serene Ryder MD at [facility] History   Problem Relation Age of Onset    Prostate Cancer Father 48    Cancer Father     Heart Disease Father     Diabetes Mother     Heart Disease Mother     Stroke Mother     Heart Disease Brother     High Blood Pressure Other     Cancer Other         LUNG,PROSTATE    Hearing Loss Other      Social History     Tobacco Use    Smoking status: Former Smoker     Packs/day: 2.25     Years: 31.00     Pack years: 69.75     Start date: 1975     Quit date: 2/5/2006     Years since quitting: 15.0    Smokeless tobacco: Never Used   Substance Use Topics    Alcohol use: No     Alcohol/week: 0.0 standard drinks        Subjective:      Review of Systems  Rest of review of systems are negative, except as noted in HPI.      Objective: BP (!) 148/82 (Site: Right Upper Arm, Position: Sitting)   Pulse 77   Temp 97.4 °F (36.3 °C) (Infrared)   Resp 16   Ht 6' 2\" (1.88 m)   Wt 225 lb 9.6 oz (102.3 kg)   SpO2 93%   BMI 28.97 kg/m²     Physical Exam       On general physical exam the patient is a pleasant alert well oriented and cooperative older middle-age man in no acute distress. He is breathing without labor. He is able to walk at a normal rate from the exam room to the endoscopy room without dyspnea. His voice is nonvibratory but easy to hear. I heard no throat clearing coughing or inspiratory stridor. Procedure: Flexible laryngoscopy  Indication: The patient is status post extensive laryngeal surgery including reconstructive transoral laser microsurgery and warrants an interval endoscopy to make sure that he is having no untoward healing problems  Findings: The patient's laryngopharynx was very abnormal for extensive postsurgical changes. His bilateral suture line was intact with 4 clips and sutures being readily visible. The tissues were still edematous and boggy but is transglottic airway was generous. He had numerous fibrinous plaques over the majority of his supraglottis glottis and subglottis. I was able to see well within his glottic aperture into the subglottis. His tissues were healing well. No evidence of recent bleeding could be seen. No laryngomalacia was seen. He had modest to minimal vocal fold movement visible likely secondary to the inflammatory process occurring in his larynx post laser surgery. His anterior commissure was sharp. His subglottic aperture was essentially normal.  He tolerated the procedure well. Vitals reviewed.     Xr Chest Portable    Result Date: 2/5/2021 1. No definite pneumothorax noted. 2. Increased density at the right lung base worse than on previous study dated 15 January 2021 consistent with infiltrate. . **This report has been created using voice recognition software. It may contain minor errors which are inherent in voice recognition technology. ** Final report electronically signed by DR Vanessa Tadeo on 2/5/2021 1:02 PM     Lab Results   Component Value Date     01/29/2021     10/17/2020     06/12/2020    K 5.2 01/29/2021    K 4.9 10/17/2020    K 5.2 06/12/2020     01/29/2021    CL 96 10/17/2020     06/12/2020    CO2 28 01/29/2021    CO2 25 10/17/2020    CO2 29 06/12/2020    BUN 19 01/29/2021    BUN 15 10/17/2020    BUN 19 06/12/2020    CREATININE 0.6 01/29/2021    CREATININE 0.6 10/17/2020    CREATININE 0.71 06/12/2020    CALCIUM 9.1 01/29/2021    CALCIUM 8.6 10/17/2020    CALCIUM 9.10 06/12/2020    PROT 7.2 01/29/2021    PROT 6.6 10/17/2020    PROT 6.5 06/12/2020    LABALBU 4.7 01/29/2021    LABALBU 4.0 10/17/2020    LABALBU 4.3 06/12/2020    LABALBU 4.6 12/06/2011    BILITOT 0.4 01/29/2021    BILITOT 0.2 10/17/2020    BILITOT 0.3 06/12/2020    ALKPHOS 165 01/29/2021    ALKPHOS 159 10/17/2020    ALKPHOS 124 06/12/2020    AST 20 01/29/2021    AST 23 10/17/2020    AST 22 06/12/2020    ALT 28 01/29/2021    ALT 29 10/17/2020    ALT 31 06/12/2020       All of the past medical history, past surgical history, family history,social history, allergies and current medications were reviewed with the patient. Assessment & Plan   Diagnoses and all orders for this visit:     Diagnosis Orders   1. Airway obstruction  [facility] LARYNGOSCOPY FLEXIBLE DIAGNOSTIC    Miscellaneous Surgery   2. Hoarseness  [facility] LARYNGOSCOPY FLEXIBLE DIAGNOSTIC    Miscellaneous Surgery   3. 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  Pharyngoesophageal dysphagia  [facility] LARYNGOSCOPY FLEXIBLE DIAGNOSTIC Miscellaneous Surgery   5. Radiation adverse effect, subsequent encounter  [place] LARYNGOSCOPY FLEXIBLE DIAGNOSTIC    Miscellaneous Surgery   6. Gastroesophageal reflux disease without esophagitis  [place] LARYNGOSCOPY FLEXIBLE DIAGNOSTIC    Miscellaneous Surgery   7. Tracheal stenosis  Miscellaneous Surgery   8. Bullous emphysema (HCC)         Based on this patient's history and physical exams, I am very pleased for him with how well he has done from these laser procedures using jet ventilation to secure his airway. My expectoration is that he will need between 2 and 3 more of these procedures until he has enough normal mucosa recovering to give him a 3 to 6-month hiatus to allow his soft tissues to declare themselves either involved or uninvolved with fungus. This will also allow him to his larynx and subglottis to declare itself as far as its stability with respect to his airway aperture. At the end of that process, if he has sufficiently large amounts of normal tissue, he could come off of his antifungal medications for a few months with close surveillance and the potential for returning to the antifungal medicines if the process becomes vigorously recurrent. On the other side of this laser treatment program, I will likely refer him to pulmonology and thoracic surgery at [facility] for evaluation as a possible candidate for volume reduction surgery for his bullous emphysema. This disease has been what has prevented him from being able to have hyperbaric oxygen therapy in combination with this laser treatment, something that would have markedly improved his response to the antifungal treatment and the laser treatment together. I described this to him so that he is aware. This would likely be sometime in the summer or fall of this year. I answered his questions and addressed his concerns. I spent over 40 minutes of face-to-face time with the patient more than half of which was used to review his symptom profile, his sequence of surgeries, and plan this diagnostic and treatment plan moving forward. Return in about 7 weeks (around 4/6/2021) for Postop evaluation and flexible laryngoscopy. **This report has been created using voice recognition software. It may contain minor errors which are inherent in voice recognition technology. **

## 2021-02-17 NOTE — PROGRESS NOTES
[facility]          Progress Note and Procedure Note      Petty De La Cruz  MEDICAL RECORD NUMBER:  [id number]  AGE: 63 y.o. GENDER: male  : 1960  EPISODE DATE:  2021    Subjective:     SUBJECTIVE     Chief Complaint   Patient presents with    Other     ID follow up           Myles Davis is a 63 y.o. male who presents today for wound/ulcer evaluation. He is here for follow-up visit he has history of head and neck cancer required multiple operations and surgery unfortunately he had infection with Aspergillus and has been on voriconazole      He has been on voriconazole for long-term use he denies any nausea or vomiting.   He has advanced COPD he follows with ENT  No new complaints  PAST MEDICAL HISTORY         Diagnosis Date    Arthritis     COPD (chronic obstructive pulmonary disease) ([address]     PAD (peripheral artery disease) ([address]     bilateral lower legs    Pneumonia 2017 and 2017    Rectal cancer ([address]     Squamous cell carcinoma of vocal cord ([address]          PAST SURGICAL HISTORY     Past Surgical History:   Procedure Laterality Date    BRONCHOSCOPY N/A 2020    BRONCHOSCOPY performed by Aniya Conteh MD at [place] [address]      EYE SURGERY      CROSS EYED    HAND SURGERY Bilateral     KNEE ARTHROSCOPY Right     LARYNGOSCOPY  2017    Biopsy    LARYNGOSCOPY N/A 2019    MICRO LARYNGOSCOPY W/ BIOPSY performed by Sergio Cleaning MD at [address] [place] [address] E 2019    DIRECT LARYNGOSCOPY WITH BIOPSY performed by Sergio Cleaning MD at [address] [place] [address] E 10/16/2020    BRONCHOSCOPY, MICRO LARYNGOSCOPY WITH REMOVAL OF SUBMUCCOSAL NON NOEPLASTIC LESION JET VENTILATION performed by Aniya Conteh MD at [address] N/A 2020    MICROLARYNGOSCOPY WITH BX & RESECTION OF OBSTRUCTING SOFT TISSUES WITH LASER & AIRWAY DEBRIDER, MICROLARYNGOPLASTY WITH Rinse mouth after its use. 3 Inhaler 3    pravastatin (PRAVACHOL) 40 MG tablet Take 40 mg by mouth nightly       SPIRIVA RESPIMAT 2.5 MCG/ACT AERS inhaler INHALE 2 PUFFS BY MOUTH ONCE DAILY 3 Inhaler 3    loperamide (IMODIUM) 2 MG capsule Take 2 mg by mouth daily       guaiFENesin (ROBITUSSIN) 100 MG/5ML syrup Take 200 mg by mouth 3 times daily as needed for Cough      guaiFENesin (MUCINEX) 600 MG extended release tablet Take 1,200 mg by mouth 2 times daily as needed for Congestion      albuterol sulfate HFA (VENTOLIN HFA) 108 (90 Base) MCG/ACT inhaler Inhale 2 puffs into the lungs every 6 hours as needed for Wheezing or Shortness of Breath 3 Inhaler 3    albuterol (PROVENTIL) (2.5 MG/3ML) 0.083% nebulizer solution Take 3 mLs by nebulization every 6 hours as needed for Wheezing or Shortness of Breath 360 vial 3    sodium chloride, Inhalant, 3 % nebulizer solution Take 3 mLs by nebulization as needed (Throat dryness, cough, wheezing) 500 mL 3    acetaminophen (TYLENOL) 650 MG extended release tablet Take 1 tablet by mouth as needed for Pain Do not take with hydrocodone/acetominophen 60 tablet 3     No current facility-administered medications on file prior to encounter. REVIEW OF SYSTEMS:     Constitutional: no fever, no night sweats, no fatigue. Head: no head ache , no head injury, no migranes. Eye: no blurring of vision, no double vision.   Ears: no hearing difficulty, no tinnitus  Mouth/throat: no ulceration, dental caries , dysphagia hoarseness of voice  Lungs: + cough, + shortness of breath, no wheeze  CVS: no palpitation, no chest pain, no shortness of breath  GI: no abdominal pain, no nausea , no vomiting, no constipation  ALDEN: no dysuria, frequency and urgency, no hematuria, no kidney stones  Musculoskeletal: no joint pain, swelling , stiffness,  Endocrine: no polyuria, polydipsia, no cold or heat intolerance  Hematology: no anemia, no easy brusing or bleeding, no hx of clotting disorder  Dermatology: no skin rash, no eczema, no pruritis,  Psychiatry: no depression, no anxiety,no panic attacks, no suicide ideation        PHYSICAL EXAM      infrared temperature is 97.9 °F (36.6 °C). His blood pressure is 137/84 and his pulse is 83. His respiration is 20 and oxygen saturation is 91%. Wt Readings from Last 3 Encounters:   02/16/21 225 lb 9.6 oz (102.3 kg)   02/05/21 226 lb 6.4 oz (102.7 kg)   01/19/21 230 lb (104.3 kg)          General Appearance  Awake, alert, oriented,  not  In acute distress  HEENT - normocephalic, atraumatic, pink conjunctiva,  anicteric sclera  Neck - Supple, no mass  Lungs -diminished breath sounds. Cardiovascular - Heart sounds are normal.     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 Discharge Instructions       Visit Discharge/Physician Orders:  -Continue Voriconzole. Will need to take 6 month to 1 year. Take as prescribed. Call when you need refills.  -Keep follow up visit with Dr. Carlos Alberto Flynn as scheduled.     Follow up visit:  2 months on Wednesday April 14th at 10:30 am     Keep next scheduled appointment. Please give 24 hour notice if unable to keep appointment. [phone]     If you experience any of the following, please call the Wound Care Service during business hours: Monday through Friday 8:00 am - 4:30 pm  ([phone]).             *Increase in pain              *Temperature over 101              *Increase in drainage from your wound or a foul odor              *Uncontrolled swelling              *Need for compression bandage changes due to slippage, breakthrough drainage     If you need medical attention outside of business hours, please contact your Primary Care Doctor or go to the nearest emergency room.          Electronically signed by Lv Phelps MD on 2/17/2021 at 10:10 AM

## 2021-02-17 NOTE — PLAN OF CARE
Problem: Physical Regulation:  Goal: Will remain free from infection  Description: Will remain free from infection  Outcome: Ongoing   Patient presents to wound clinic for ID follow up appointment. No s/s of infection. Patient afebrile. Continue Voriconzole as prescribed. Instructed patient to call when he needs refills. Follow up visit:  2 months on Wednesday April 14th at 10:30 am.    Care plan reviewed with patient. Patient verbalizes understanding of the plan of care and contribute to goal setting.

## 2021-03-05 NOTE — TELEPHONE ENCOUNTER
Jovanna Delgadillo is scheduled for another staged surgery with Dr Mp Matthews on 5/7/21. Surgery: Suspension Microlaryngoscopy with laser ablasion of subglottic and supraglottic invasion fungal disease-Bilateral, Rigid Esophagoscopy with balloon dilation. Jet Ventilation. He was cleared for surgery by ALIVIA Botello on 1/19/21. Is it ok to use the same clearance for this next surgery or does he need to be seen again for clearance? Please advise.

## 2021-03-05 NOTE — TELEPHONE ENCOUNTER
Pt. Called for medication refill.voriconazole 200 mg BID x 30 days with 1 refill sent to pharmacy per Dr. Mane Dutta. Patient notified.

## 2021-03-15 NOTE — TELEPHONE ENCOUNTER
Patient called in this morning stating he has a sinus infection going on. He states it began about 4 days ago. He has green colored mucus when he blows his nose, facial pain and pressure and headaches. He would like to have an antibiotic sent in to [facility] on [address] Please advise.

## 2021-03-15 NOTE — TELEPHONE ENCOUNTER
Spoke with patient and informed him a rx sent to pharmacy. Patient has a follow up appointment with Dr. Daron Tirado 03/30/2021.

## 2021-03-30 NOTE — PROGRESS NOTES
[address] EAR, NOSE AND THROAT  [facility]  Dept: [phone]  Dept Fax: [phone]  Loc: [phone]    Nazia Medley is a 63 y.o. male who was referred by No ref. provider found for:  Chief Complaint   Patient presents with   [facility] Post-Op Check     Patient here for post op 6 week check. HPI:     Nazia Medley is a 63 y.o. male with a history of chronic invasive fungal laryngotracheitis in the context of radiation and chemotherapy treatment of an advanced laryngeal malignancy. The patient is status post his most recent suspension laryngoscopy and bronchoscopy with laser ablation of obstructing soft tissues. This last operation was done without the debrider and with the laser almost exclusively. Very little of the obvious fungal infected tissues was left undisturbed. He has also had a transoral laryngoplasty with a takedown of the overhanging supraglottic soft tissues. That was 2 operations ago. He reports a modest degree of worsened dyspnea during his recovery. He also states that his voice has not recovered yet which she had prior to this point of his last operation. History:      Allergies   Allergen Reactions    Povidone Iodine Rash     Surgery prep     Current Outpatient Medications   Medication Sig Dispense Refill    voriconazole (VFEND) 200 MG tablet Take 1 tablet by mouth 2 times daily 60 tablet 1    Ascorbic Acid (VITAMIN C PO) Take 500 mg by mouth daily       Cholecalciferol (VITAMIN D3 PO) Take by mouth      Acetylcysteine (NAC) 500 MG CAPS Take by mouth 2 times daily      guaiFENesin (ROBITUSSIN) 100 MG/5ML syrup Take 200 mg by mouth 3 times daily as needed for Cough      guaiFENesin (MUCINEX) 600 MG extended release tablet Take 1,200 mg by mouth 2 times daily as needed for Congestion      zinc 50 MG CAPS Take by mouth Twice weekly      acetylcysteine (MUCOMYST) 20 % nebulizer solution 4 mL via nebulizer 3 times daily 360 mL 5    albuterol sulfate HFA (VENTOLIN HFA) 108 (90 Base) MCG/ACT inhaler Inhale 2 puffs into the lungs every 6 hours as needed for Wheezing or Shortness of Breath 3 Inhaler 3    albuterol (PROVENTIL) (2.5 MG/3ML) 0.083% nebulizer solution Take 3 mLs by nebulization every 6 hours as needed for Wheezing or Shortness of Breath 360 vial 3    sodium chloride, Inhalant, 3 % nebulizer solution Take 3 mLs by nebulization as needed (Throat dryness, cough, wheezing) 500 mL 3    budesonide-formoterol (SYMBICORT) 160-4.5 MCG/ACT AERO Inhale 2 puffs into the lungs 2 times daily Rinse mouth after its use. 3 Inhaler 3    pravastatin (PRAVACHOL) 40 MG tablet Take 40 mg by mouth nightly       SPIRIVA RESPIMAT 2.5 MCG/ACT AERS inhaler INHALE 2 PUFFS BY MOUTH ONCE DAILY 3 Inhaler 3    acetaminophen (TYLENOL) 650 MG extended release tablet Take 1 tablet by mouth as needed for Pain Do not take with hydrocodone/acetominophen 60 tablet 3    loperamide (IMODIUM) 2 MG capsule Take 2 mg by mouth daily        No current facility-administered medications for this visit.       Past Medical History:   Diagnosis Date    Arthritis     [facility]     PAD (peripheral artery disease) ([facility]     bilateral lower legs    Pneumonia 01/2017 1/2017 and 2/2017    Rectal cancer ([facility]     Squamous cell carcinoma of vocal cord [facility])       Past Surgical History:   Procedure Laterality Date    BRONCHOSCOPY N/A 11/18/2020    BRONCHOSCOPY performed by Arsalan Garcia MD at [address] [place] [address]  2016    EYE SURGERY      CROSS EYED    HAND SURGERY Bilateral 1995    KNEE ARTHROSCOPY Right 1996    LARYNGOSCOPY  07/12/2017    Biopsy    LARYNGOSCOPY N/A 7/12/2019    MICRO LARYNGOSCOPY W/ BIOPSY performed by Jose Carlos Castellanos MD at [address] [place] [address] 12/30/2019    DIRECT LARYNGOSCOPY WITH BIOPSY performed by Jose Carlos Castellanos MD at [address] [place] [address] 10/16/2020    BRONCHOSCOPY, MICRO LARYNGOSCOPY WITH REMOVAL OF SUBMUCCOSAL NON NOEPLASTIC LESION JET VENTILATION performed by Kyle Jhaveri MD at [address] [facility] N/A 11/18/2020    MICROLARYNGOSCOPY WITH BX & RESECTION OF OBSTRUCTING SOFT TISSUES WITH LASER & AIRWAY DEBRIDER, MICROLARYNGOPLASTY WITH RESECTION OF OBSTRUCTING SOFT TISSUE performed by Kyle Jhaveri MD at [address] [place] [address] N/A 2/5/2021    MICROLARYNGOSCOPY WITH DEBRIDEMENT OF SUBGLOTTIC OBSTRUCTING TISSUES, BRONCHOSCOPY WITH SAME OF PROXIMAL TRACHEA, UPPER GI ENDOSCOPY FOR BALLOON DILATION performed by Kyle Jhaveri MD at [address] [facility] [address]50 RECTAL SURGERY  08/29/2016    [place]    ROTATOR CUFF REPAIR Left 1990'S    TONSILLECTOMY AND ADENOIDECTOMY N/A 2/5/2021    ADVANCEMENT FLAP RECONSTRUCTION BILATERAL RESECTION OF POSTERIOR SUPRAGLOTTIS performed by Kyle Jhaveri MD at [address] History   Problem Relation Age of Onset    Prostate Cancer Father 48    Cancer Father     Heart Disease Father     Diabetes Mother     Heart Disease Mother     Stroke Mother     Heart Disease Brother     High Blood Pressure Other     Cancer Other         LUNG,PROSTATE    Hearing Loss Other      Social History     Tobacco Use    Smoking status: Former Smoker     Packs/day: 2.25     Years: 31.00     Pack years: 69.75     Start date: 1975     Quit date: 2/5/2006     Years since quitting: 15.1    Smokeless tobacco: Never Used   Substance Use Topics    Alcohol use: No     Alcohol/week: 0.0 standard drinks        Subjective:      Review of Systems  Rest of review of systems are negative, except as noted in HPI.      Objective:     /88 (Site: Right Upper Arm, Position: Sitting)   Pulse 71   Temp 98.1 °F (36.7 °C) (Infrared)   Resp 16   Wt 222 lb 9.6 oz (101 kg)   SpO2 94%   BMI 28.58 kg/m²     Physical Exam       On general physical exam the patient is a pleasant barrel chested aphonic middle-aged adult in no acute distress. He is breathing without labor. His voice had no vibratory characteristic except on rare occasions where low pitch vibration could be heard. I heard no throat clearing coughing or inspiratory stridor. Procedure: Flexible laryngoscopy  Indication: The patient status post extensive laser surgery and maintains his aphonia as well as some worsened dyspnea so therefore warrants an interval endoscopy to determine the cause of these problems  Findings: The patient supraglottis was abnormal for the presence of mild webbing of the posterior reconstructive soft tissues though his glottic aperture was generous in his glottis could be seen from above his epiglottis. His epiglottis on the lingual and laryngeal side was free of obvious invasive fungal disease. Beginning with his false cords he had rind-like yellow plaques attached to most of the soft tissues with patches of near normal mucosa between them. His vocal folds were enlarged but mobile. He was capable of full flush contact in the midline. His anterior commissure was sharp. His subglottis was widely patent. I was able to instill lidocaine onto his glottis to be able to traverse the glottis into the subglottis. He had anterior and posterior mucous membrane areas that looked normal.  No clear fungal elements could be seen. He did have rind-like yellow plaques of the subglottis as the other regions of his airway did. The remainder of his trachea to his mainstem bronchi was healthy. The patient tolerated the procedure well. Vitals reviewed. No results found.    Lab Results   Component Value Date     01/29/2021     10/17/2020     06/12/2020    K 5.2 01/29/2021    K 4.9 10/17/2020    K 5.2 06/12/2020     01/29/2021    CL 96 10/17/2020     06/12/2020    CO2 28 01/29/2021    CO2 25 10/17/2020    CO2 29 06/12/2020    BUN 19 01/29/2021    BUN 15 10/17/2020    BUN 19 06/12/2020    CREATININE 0.6 01/29/2021    CREATININE 0.6 10/17/2020    CREATININE 0.71 06/12/2020    CALCIUM 9.1 01/29/2021    CALCIUM 8.6 10/17/2020    CALCIUM 9.10 06/12/2020    PROT 7.2 01/29/2021    PROT 6.6 10/17/2020    PROT 6.5 06/12/2020    LABALBU 4.7 01/29/2021    LABALBU 4.0 10/17/2020    LABALBU 4.3 06/12/2020    LABALBU 4.6 12/06/2011    BILITOT 0.4 01/29/2021    BILITOT 0.2 10/17/2020    BILITOT 0.3 06/12/2020    ALKPHOS 165 01/29/2021    ALKPHOS 159 10/17/2020    ALKPHOS 124 06/12/2020    AST 20 01/29/2021    AST 23 10/17/2020    AST 22 06/12/2020    ALT 28 01/29/2021    ALT 29 10/17/2020    ALT 31 06/12/2020       All of the past medical history, past surgical history, family history,social history, allergies and current medications were reviewed with the patient. Assessment & Plan   Diagnoses and all orders for this visit:     Diagnosis Orders   1. Airway obstruction  MT LARYNGOSCOPY FLEXIBLE DIAGNOSTIC   2. Hoarseness  MT LARYNGOSCOPY FLEXIBLE DIAGNOSTIC   3. Invasive fungal infection  MT LARYNGOSCOPY FLEXIBLE DIAGNOSTIC   4. Infection due to Candida glabrata     5. Gastroesophageal reflux disease without esophagitis     6. Radiation adverse effect, subsequent encounter  MT LARYNGOSCOPY FLEXIBLE DIAGNOSTIC         Based on the patient's history and these physical findings, this is actually the best his larynx is looked at this stage in his care. We will forego the planned operation in May and I will see him back in May for an interval awake endoscopy. I will schedule him for June unless new problems warrant us going back to the OR in May. I will use the debrider more if not entirely on that visit with hopes of returning vibratory quality back to his airway. He is to continue on his Mucomyst and hypertonic saline as well as his budesonide and voriconazole. He reported being pleased with the outcome of the visit and being willing to proceed as such.   I spent over 30 minutes of face-to-face time with the patient more than half of which was dedicated to planning his follow-up care. Return in about 4 weeks (around 4/27/2021) for Follow-up exam and laryngoscopy. **This report has been created using voice recognition software. It may contain minor errors which are inherent in voice recognition technology. **

## 2021-04-06 NOTE — TELEPHONE ENCOUNTER
voricanozole 200 mg BID per Dr. Xiang Brandt sent in to patients pharmacy.  Patient notified and verbalized understanding

## 2021-04-06 NOTE — TELEPHONE ENCOUNTER
----- Message from [employer] sent at 4/5/2021  8:19 AM EDT -----  Regarding: Prescription  Patient called in and stated he needed a new prescription, I believe he said he needed - Cholecalciferol.

## 2021-04-06 NOTE — TELEPHONE ENCOUNTER
Patient called stating that he needs a refill  On his medication. I called Dr. Sade Bruce and there was no answer so I left a voicemail for him to call us back. I called the patient and informed him of this. I stated that of we do not get back to him tonight, we will get back to him tomorrow.

## 2021-04-14 NOTE — PLAN OF CARE
Problem: Physical Regulation:  Goal: Will remain free from infection  Description: Will remain free from infection  Outcome: Ongoing   Patient seen for oral antibiotics. Patient remains infection free. Follow up in the ID clinic in 3 months. See AVS for order changes. Care plan reviewed with patient. Patient verbalize understanding of the plan of care and contribute to goal setting.

## 2021-04-14 NOTE — PROGRESS NOTES
[facility]          Progress Note and Procedure Note      Anna Gilliam  MEDICAL RECORD NUMBER:  [id number]  AGE: 63 y.o. GENDER: male  : 1960  EPISODE DATE:  2021    Subjective:     SUBJECTIVE     Chief Complaint   Patient presents with    Wound Check     ID f/u           HISTORY OF PRESENT ILLNESS      Anna Gilliam is a 63 y.o. male who presents today for wound/ulcer evaluation. He is here for follow-up visit he has history of head and neck cancer required multiple operations and surgeries unfortunately he had infection with Aspergillus        He has been on voriconazole for long-term use he denies any nausea or vomiting. He has advanced COPD he follows with ENT.  His last surgery was 4 wks ago  He has no new complaints  PAST MEDICAL HISTORY         Diagnosis Date    Arthritis     COPD (chronic obstructive pulmonary disease) ([address]     PAD (peripheral artery disease) (HCC)     bilateral lower legs    Pneumonia 2017 and 2017    Rectal cancer ([address]     Squamous cell carcinoma of vocal cord ([address]          PAST SURGICAL HISTORY     Past Surgical History:   Procedure Laterality Date    BRONCHOSCOPY N/A 2020    BRONCH[facility] at [facility]      EYE SURGERY      CROSS EYED    HAND SURGERY Bilateral     KNEE ARTHROSCOPY Right 1996    LARYNGOSCOPY  2017    Biopsy    LARYNGOSCOPY N/A 2019    MICRO LARYNGOSCOPY W/ BIOPSY performed by Jayde Franklin MD at [facility] N/A 2019    DIRECT LARYNGOSCOPY WITH BIOPSY performed by Jayde Franklin MD at [facility] N/A 10/16/2020    BRONCHOSCOPY, MICRO LARYNGOSCOPY WITH REMOVAL OF SUBMUCCOSAL NON NOEPLASTIC LESION JET VENTILATION performed by Billy Bonilla MD at [facility] N/A 2020    MICROLARYNGOSCOPY WITH BX & RESECTION OF OBSTRUCTING SOFT TISSUES WITH LASER & AIRWAY DEBRIDER, MICROLARYNGOPLASTY WITH RESECTION OF OBSTRUCTING SOFT TISSUE performed by Francesco Amin MD at [address] N/A 2/5/2021    MICROLARYNGOSCOPY WITH DEBRIDEMENT OF SUBGLOTTIC OBSTRUCTING TISSUES, BRONCHOSCOPY WITH SAME OF PROXIMAL TRACHEA, UPPER GI ENDOSCOPY FOR BALLOON DILATION performed by Francesco Amin MD at [address] [facility] [address] RECTAL SURGERY  08/29/2016    [place]    ROTATOR CUFF REPAIR Left 1990'S    TONSILLECTOMY AND ADENOIDECTOMY N/A 2/5/2021    ADVANCEMENT FLAP RECONSTRUCTION BILATERAL RESECTION OF POSTERIOR SUPRAGLOTTIS performed by Francesco Amin MD at [address] HISTORY         Family History   Problem Relation Age of Onset    Prostate Cancer Father 48    Cancer Father     Heart Disease Father     Diabetes Mother     Heart Disease Mother     Stroke Mother     Heart Disease Brother     High Blood Pressure Other     Cancer Other         LUNG,PROSTATE    Hearing Loss Other      SOCIAL HISTORY       Social History     Tobacco Use    Smoking status: Former Smoker     Packs/day: 2.25     Years: 31.00     Pack years: 69.75     Start date: 1975     Quit date: 2/5/2006     Years since quitting: 15.1    Smokeless tobacco: Never Used   Substance Use Topics    Alcohol use: No     Alcohol/week: 0.0 standard drinks    Drug use: No     ALLERGIES         Allergies   Allergen Reactions    Povidone Iodine Rash     Surgery prep     MEDICATIONS         Current Outpatient Medications on File Prior to Encounter   Medication Sig Dispense Refill    SPIRIVA RESPIMAT 2.5 MCG/ACT AERS inhaler INHALE 2 SPRAY(S) BY MOUTH ONCE DAILY 12 g 0    voriconazole (VFEND) 200 MG tablet Take 1 tablet by mouth 2 times daily 60 tablet 0    Ascorbic Acid (VITAMIN C PO) Take 500 mg by mouth daily       Cholecalciferol (VITAMIN D3 PO) Take by mouth      Acetylcysteine (NAC) 500 MG CAPS Take by mouth 2 times daily      guaiFENesin (ROBITUSSIN) 100 MG/5ML syrup Take 200 mg by mouth 3 times daily as needed for Cough      guaiFENesin (MUCINEX) 600 MG extended release tablet Take 1,200 mg by mouth 2 times daily as needed for Congestion      zinc 50 MG CAPS Take by mouth Twice weekly      acetylcysteine (MUCOMYST) 20 % nebulizer solution 4 mL via nebulizer 3 times daily 360 mL 5    albuterol sulfate HFA (VENTOLIN HFA) 108 (90 Base) MCG/ACT inhaler Inhale 2 puffs into the lungs every 6 hours as needed for Wheezing or Shortness of Breath 3 Inhaler 3    albuterol (PROVENTIL) (2.5 MG/3ML) 0.083% nebulizer solution Take 3 mLs by nebulization every 6 hours as needed for Wheezing or Shortness of Breath 360 vial 3    sodium chloride, Inhalant, 3 % nebulizer solution Take 3 mLs by nebulization as needed (Throat dryness, cough, wheezing) 500 mL 3    budesonide-formoterol (SYMBICORT) 160-4.5 MCG/ACT AERO Inhale 2 puffs into the lungs 2 times daily Rinse mouth after its use. 3 Inhaler 3    pravastatin (PRAVACHOL) 40 MG tablet Take 40 mg by mouth nightly       acetaminophen (TYLENOL) 650 MG extended release tablet Take 1 tablet by mouth as needed for Pain Do not take with hydrocodone/acetominophen 60 tablet 3    loperamide (IMODIUM) 2 MG capsule Take 2 mg by mouth daily        No current facility-administered medications on file prior to encounter. REVIEW OF SYSTEMS:     Constitutional: no fever, no night sweats, no fatigue. Head: no head ache , no head injury, no migranes. Eye: no blurring of vision, no double vision.   Ears: no hearing difficulty, no tinnitus  Mouth/throat: no ulceration, dental caries , dysphagia hoarseness of voice  Lungs: + cough, + shortness of breath,    CVS: no palpitation, no chest pain, no shortness of breath  GI: no abdominal pain, no nausea , no vomiting, no constipation  ALDEN: no dysuria, frequency and urgency, no hematuria, no kidney stones  Musculoskeletal: no joint pain, swelling , stiffness,  Endocrine: no polyuria, polydipsia, no cold or heat intolerance  Hematology: no anemia, no easy brusing or bleeding, no hx of clotting disorder  Dermatology: no skin rash, no eczema, no pruritis,  Psychiatry: no depression, no anxiety,no panic attacks, no suicide ideation        PHYSICAL EXAM      height is 6' 2\" (1.88 m) and weight is 222 lb (100.7 kg). His infrared temperature is 98 °F (36.7 °C). His blood pressure is 121/73 and his pulse is 83. His respiration is 18 and oxygen saturation is 91%. Wt Readings from Last 3 Encounters:   04/14/21 222 lb (100.7 kg)   03/30/21 222 lb 9.6 oz (101 kg)   02/16/21 225 lb 9.6 oz (102.3 kg)          General Appearance  Awake, alert, oriented,  not  In acute distress  HEENT - normocephalic, atraumatic, pink conjunctiva,  anicteric sclera  Neck - Supple, no mass  Lungs -diminished breath sounds. Cardiovascular - Heart sounds are normal.     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 Discharge Instructions       Visit Discharge/Physician Orders:  -Continue Voriconzole. Will need to take 6 month to 1 year. Take as prescribed. Call a few days before you need a refill.  -Use a saline nose mist        Follow up visit:  3 months on Wednesday in the ID clinic      Keep next scheduled appointment. Please give 24 hour notice if unable to keep appointment. [phone]     If you experience any of the following, please call the Wound Care Service during business hours: Monday through Friday 8:00 am - 4:30 pm  ([phone]).             *Increase in pain              *Temperature over 101              *Increase in drainage from your wound or a foul odor              *Uncontrolled swelling              *Need for compression bandage changes due to slippage, breakthrough drainage     If you need medical attention outside of business hours, please contact your Primary Care Doctor or go to the nearest emergency room.          Electronically signed by Frances Becker MD on 4/14/2021 at 10:57 AM

## 2021-04-14 NOTE — PROGRESS NOTES
Aniyah DAVIS has reviewed and agrees with Marzena MATHUR's shift  Assessment.     Rubén Began  4/14/2021

## 2021-04-16 NOTE — TELEPHONE ENCOUNTER
I left a message for Elvis White to return my call. We have his Awake Bronchoscopy scheduled. Awake Bronch on 5/12/21 @ 1:00pm in [address]. Pre op appointment on 6/1/21 @ 11:00am.  Surgery is on 6/11/21.

## 2021-04-28 NOTE — INTERVAL H&P NOTE
Pt Name: Danii Pabon  MRN: [id number]  YOB: 1960  Date of evaluation: 12/30/2019    I have examined the patient and reviewed the H&P/Consult and there are no changes to the patient or plans.          Electronically signed by Elías Herrera MD on 12/30/2019 at 1:42 PM no

## 2021-05-04 NOTE — TELEPHONE ENCOUNTER
Santosh Moreland cleared this patient for surgery on 5/7/21 but his surgery was moved to 6/11/21. Is she still okay with using the same clearance?

## 2021-05-12 NOTE — PROGRESS NOTES
Patient arrived in Outpatient Procedure Unit for Bronchoscopy and laryngoscopy. This procedure has been fully reviewed with the patient and written informed consent has been obtained. 1312 Procedure started with Dr. Luh Velazquez. 1329 Procedure completed; patient tolerated well. Dr. Luh Velazquez talked to patient in length about procedure findings. Patient discharged. PLAN Pt. Already scheduled for OR. Continue plan for surgery.

## 2021-05-12 NOTE — PROCEDURES
lidocaine. I needed to repeat that 5 cc gargle 2 more times to achieve anesthesia. Findings: The patient's supraglottis and glottis as well as the subglottis and proximal trachea were all coated with extremely thick \"Downs\" colored secretions that were very tenaciously attached to the underlying mucosa. Initially they felt like they would likely be thin and able to be aspirated easily but the reverse was true. It took multiple irrigations and suctioning over the larynx particularly the glottis to remove the majority of that thick coating of secretions. In the subglottis and proximal trachea the process was even harder because of the anatomy. The undersurface of the right true vocal fold extending to the first and second tracheal rings also had some friable tissue in it. Suctioning in this area resulted in a small amount of bleeding that I was able to keep up with by suctioning. The remaining three quarters of the subglottis and proximal trachea were free of friability. There was a 20degree to 45 degree area along the anterior left that actually had normal mucosa going up to about the level of the glottis from the trachea. This is a new finding. The remainder of his tracheobronchial tree was free of signs of invasive fungal disease. It was abnormal for hyperdynamic cartilage and a tendency towards obstruction on deep coughing. I cleaned out his mainstem bronchi of secretions before considering myself finished with the procedure. Disposition: Based on these findings I recommended that we proceed with already made plans to bring him to the operating room for his next procedure in June in order not only to remove these thick secretions but also to do some focused ablation of the involved epithelium in a segmental manner so as not to make confluent wounds. I would likely use an airway debrider to accomplish some of this and the fiber laser for other parts of it.   That way I can see which of these 2

## 2021-06-01 PROBLEM — Z99.81 DEPENDENCE ON CONTINUOUS SUPPLEMENTAL OXYGEN: Status: ACTIVE | Noted: 2021-01-01

## 2021-06-01 NOTE — PROGRESS NOTES
[address] EAR, NOSE AND THROAT  [facility]  Dept: [phone]  Dept Fax: [phone]  Loc: [phone]    Yaneth Jauregui is a 63 y.o. male who was referred by No ref. provider found for:  Chief Complaint   Patient presents with    Post-Op Check     Patient here for post op 6/11       HPI:     Yaneth Jauregui is a 63 y.o. 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 He has had multiple operations in the recent past in order to widen his airway and to provoke healthy mucosa to grow into the areas of infestation of the fungal disease. His most recent endoscopy in the [address] [place] [address] endoscopy room demonstrated a much larger airway than he had in the past with some ability to oppose his true vocal folds and a small area of relatively healthy mucosa in the anterior subglottis that is new and is a direct result of the debridements that he has had along with the antifungal medication that he has been able to be kept on secondary to the efforts of his infectious disease doctor. The patient reports breathing well overall and only rarely having to use supplemental oxygen. He checks his oxygen saturations routinely and no longer uses it at night. His oxygen saturation on room air today on arrival to the clinic was 96%. History:      Allergies   Allergen Reactions    Povidone Iodine Rash     Surgery prep     Current Outpatient Medications   Medication Sig Dispense Refill    SPIRIVA RESPIMAT 2.5 MCG/ACT AERS inhaler INHALE 2 SPRAY(S) BY MOUTH ONCE DAILY 12 g 0    Ascorbic Acid (VITAMIN C PO) Take 500 mg by mouth daily       Cholecalciferol (VITAMIN D3 PO) Take by mouth      Acetylcysteine (NAC) 500 MG CAPS Take by mouth 2 times daily      guaiFENesin (ROBITUSSIN) 100 MG/5ML syrup Take 200 mg by mouth 3 times daily as needed for Cough      guaiFENesin (MUCINEX) 600 MG extended release tablet Take 1,200 mg by mouth 2 times daily as needed for Congestion      zinc 50 MG CAPS Take by mouth Twice weekly      acetylcysteine (MUCOMYST) 20 % nebulizer solution 4 mL via nebulizer 3 times daily 360 mL 5    albuterol sulfate HFA (VENTOLIN HFA) 108 (90 Base) MCG/ACT inhaler Inhale 2 puffs into the lungs every 6 hours as needed for Wheezing or Shortness of Breath 3 Inhaler 3    albuterol (PROVENTIL) (2.5 MG/3ML) 0.083% nebulizer solution Take 3 mLs by nebulization every 6 hours as needed for Wheezing or Shortness of Breath 360 vial 3    sodium chloride, Inhalant, 3 % nebulizer solution Take 3 mLs by nebulization as needed (Throat dryness, cough, wheezing) 500 mL 3    budesonide-formoterol (SYMBICORT) 160-4.5 MCG/ACT AERO Inhale 2 puffs into the lungs 2 times daily Rinse mouth after its use. 3 Inhaler 3    pravastatin (PRAVACHOL) 40 MG tablet Take 40 mg by mouth nightly       acetaminophen (TYLENOL) 650 MG extended release tablet Take 1 tablet by mouth as needed for Pain Do not take with hydrocodone/acetominophen 60 tablet 3    loperamide (IMODIUM) 2 MG capsule Take 2 mg by mouth daily        No current facility-administered medications for this visit.      Past Medical History:   Diagnosis Date    Arthritis     [facility]     PAD (peripheral artery disease) ([facility]     bilateral lower legs    Pneumonia 01/2017 1/2017 and 2/2017    Rectal cancer ([facility]     Squamous cell carcinoma of vocal cord [facility])       Past Surgical History:   Procedure Laterality Date    BRONCHOSCOPY N/A 11/18/2020    BRONCHOSCOPY performed by Nina Mcwilliams MD at [address] [place] [address]  2016    EYE SURGERY      CROSS EYED    HAND SURGERY Bilateral 1995    KNEE ARTHROSCOPY Right 1996    LARYNGOSCOPY  07/12/2017    Biopsy    LARYNGOSCOPY N/A 7/12/2019    MICRO LARYNGOSCOPY W/ BIOPSY [facility] N/A 12/30/2019    DIRECT LARYNGOSCOPY WITH BIOPSY performed by Verónica Liz MD at [facility] N/A 10/16/2020    BRONCHOSCOPY, MICRO LARYNGOSCOPY WITH REMOVAL OF SUBMUCCOSAL NON NOEPLASTIC LESION JET VENTILATION performed by Nina Mcwilliams MD at [facility] N/A 11/18/2020    MICROLARYNGOSCOPY WITH BX & RESECTION OF OBSTRUCTING SOFT TISSUES WITH LASER & AIRWAY DEBRIDER, MICROLARYNGOPLASTY WITH RESECTION OF OBSTRUCTING SOFT TISSUE performed by Nina Mcwilliams MD at [facility] N/A 2/5/2021    MICROLARYNGOSCOPY WITH DEBRIDEMENT OF SUBGLOTTIC OBSTRUCTING TISSUES, BRONCHOSCOPY WITH SAME OF PROXIMAL TRACHEA, UPPER GI ENDOSCOPY FOR BALLOON DILATION performed by Nina Mcwilliams MD at [facility] RECTAL SURGERY  08/29/2016    [place]    ROTATOR CUFF REPAIR Left 1990'S    TONSILLECTOMY AND ADENOIDECTOMY N/A 2/5/2021    ADVANCEMENT FLAP RECONSTRUCTION BILATERAL RESECTION OF POSTERIOR SUPRAGLOTTIS performed by Nina Mcwilliams MD at [facility] History   Problem Relation Age of Onset    Prostate Cancer Father 48    Cancer Father     Heart Disease Father     Diabetes Mother     Heart Disease Mother     Stroke Mother     Heart Disease Brother     High Blood Pressure Other     Cancer Other         LUNG,PROSTATE    Hearing Loss Other      Social History     Tobacco Use    Smoking status: Former Smoker     Packs/day: 2.25     Years: 31.00     Pack years: 69.75     Start date: 1975     Quit date: 2/5/2006     Years since quitting: 15.3    Smokeless tobacco: Never Used   Substance Use Topics    Alcohol use: No     Alcohol/week: 0.0 standard drinks        Subjective:      Review of Systems  Rest of review of systems are negative, except as noted in HPI.      Objective:     BP (!) 144/98 (Site: Right Upper Arm, Position: Sitting)   Pulse 83   Temp 98.1 °F (36.7 °C) (Infrared)   Resp (!) 96   Wt 219 lb 6.4 oz (99.5 kg)   BMI 28.17 kg/m²     Physical Exam       On general physical exam the patient is a pleasant alert and cooperative somewhat ill-appearing older middle-aged adult male in no acute distress. His voice is now audible through the doorway as I walked in. It is still very breathy. It has a trace of a vibratory quality which is new. He is breathing without labor. I heard no throat clearing coughing or inspiratory stridor. On auscultation the patient's lung sounds were distant but were vesicular. I heard no rales or rhonchi. His heart tones were very distant but I heard no murmurs or gallops. He has no apparent cyanosis. Vitals reviewed. No results found. Lab Results   Component Value Date     01/29/2021     10/17/2020     06/12/2020    K 5.2 01/29/2021    K 4.9 10/17/2020    K 5.2 06/12/2020     01/29/2021    CL 96 10/17/2020     06/12/2020    CO2 28 01/29/2021    CO2 25 10/17/2020    CO2 29 06/12/2020    BUN 19 01/29/2021    BUN 15 10/17/2020    BUN 19 06/12/2020    CREATININE 0.6 01/29/2021    CREATININE 0.6 10/17/2020    CREATININE 0.71 06/12/2020    CALCIUM 9.1 01/29/2021    CALCIUM 8.6 10/17/2020    CALCIUM 9.10 06/12/2020    PROT 7.2 01/29/2021    PROT 6.6 10/17/2020    PROT 6.5 06/12/2020    LABALBU 4.7 01/29/2021    LABALBU 4.0 10/17/2020    LABALBU 4.3 06/12/2020    LABALBU 4.6 12/06/2011    BILITOT 0.4 01/29/2021    BILITOT 0.2 10/17/2020    BILITOT 0.3 06/12/2020    ALKPHOS 165 01/29/2021    ALKPHOS 159 10/17/2020    ALKPHOS 124 06/12/2020    AST 20 01/29/2021    AST 23 10/17/2020    AST 22 06/12/2020    ALT 28 01/29/2021    ALT 29 10/17/2020    ALT 31 06/12/2020       All of the past medical history, past surgical history, family history,social history, allergies and current medications were reviewed with the patient.      Assessment & Plan   Diagnoses

## 2021-06-03 NOTE — PROGRESS NOTES
In preparation for their surgical procedure above patient was screened for Obstructive Sleep Apnea (EMMANUEL) using the STOP-Bang Questionnaire by the Pre-Admission Testing department. This is a pre-surgical screening tool for patient safety and serves as a recommendation, this WILL NOT cause cancellation of surgery. STOP-Bang Questionnaire  * Do you currently see a pulmonologist?  No     If yes STOP, do not complete. Patient follows with  .    1. Do you snore loudly (able to be heard in the next room)? No    2. Do you often feel tired or sleepy during the daytime? No       3. Has anyone ever told you that you stop breathing during your sleep? No    4. Do you have or are you being treated for high blood pressure? No      5. BMI more than 35? BMI (Calculated): 28.3        No    6. Age over 48 years? 63 y.o. Yes    7. Neck Circumference greater than 17 inches for male or 16 inches for female? Measured           (visits only)            No    8. Gender Male? Yes      TOTAL SCORE: 2    EMMANUEL - Low Risk : Yes to 0 - 2 questions  EMMANUEL - Intermediate Risk : Yes to 3 - 4 questions  EMMANUEL - High Risk : Yes to 5 - 8 questions    Adapted from:   STOP Questionnaire: A Tool to Screen Patients for Obstructive Sleep Apnea   Onofre Monday, F.R.C.P.C., Sonja Santos M.B.B.S., Juan Honeycutt M.D., Ismael Mckee. Anais Lokchart, Ph.D., NEO Romo.B.B.S., Walt Lyle, M.Sc., Georgie Lizama M.D., Sherie Muro. WATSON Lee.P.C.    Anesthesiology 2008; 556:355-92 Copyright 2008, the [address] of Anesthesiologists, [facility].   ----------------------------------------------------------------------------------------------------------------

## 2021-06-11 NOTE — H&P
had along with the antifungal medication that he has been able to be kept on secondary to the efforts of his infectious disease doctor. The patient reports breathing well overall and only rarely having to use supplemental oxygen. He checks his oxygen saturations routinely and no longer uses it at night. His oxygen saturation on room air today on arrival to the clinic was 96%. History:            Allergies   Allergen Reactions    Povidone Iodine Rash       Surgery prep      Current Facility-Administered Medications          Current Outpatient Medications   Medication Sig Dispense Refill    SPIRIVA RESPIMAT 2.5 MCG/ACT AERS inhaler INHALE 2 SPRAY(S) BY MOUTH ONCE DAILY 12 g 0    Ascorbic Acid (VITAMIN C PO) Take 500 mg by mouth daily         Cholecalciferol (VITAMIN D3 PO) Take by mouth        Acetylcysteine (NAC) 500 MG CAPS Take by mouth 2 times daily        guaiFENesin (ROBITUSSIN) 100 MG/5ML syrup Take 200 mg by mouth 3 times daily as needed for Cough        guaiFENesin (MUCINEX) 600 MG extended release tablet Take 1,200 mg by mouth 2 times daily as needed for Congestion        zinc 50 MG CAPS Take by mouth Twice weekly        acetylcysteine (MUCOMYST) 20 % nebulizer solution 4 mL via nebulizer 3 times daily 360 mL 5    albuterol sulfate HFA (VENTOLIN HFA) 108 (90 Base) MCG/ACT inhaler Inhale 2 puffs into the lungs every 6 hours as needed for Wheezing or Shortness of Breath 3 Inhaler 3    albuterol (PROVENTIL) (2.5 MG/3ML) 0.083% nebulizer solution Take 3 mLs by nebulization every 6 hours as needed for Wheezing or Shortness of Breath 360 vial 3    sodium chloride, Inhalant, 3 % nebulizer solution Take 3 mLs by nebulization as needed (Throat dryness, cough, wheezing) 500 mL 3    budesonide-formoterol (SYMBICORT) 160-4.5 MCG/ACT AERO Inhale 2 puffs into the lungs 2 times daily Rinse mouth after its use.  3 Inhaler 3    pravastatin (PRAVACHOL) 40 MG tablet Take 40 mg by mouth nightly         acetaminophen (TYLENOL) 650 MG extended release tablet Take 1 tablet by mouth as needed for Pain Do not take with hydrocodone/acetominophen 60 tablet 3    loperamide (IMODIUM) 2 MG capsule Take 2 mg by mouth daily           No current facility-administered medications for this visit.         Past Medical History   Past Medical History:   Diagnosis Date    Arthritis      COPD (chronic obstructive pulmonary disease) (HCC)      PAD (peripheral artery disease) (HCC)       bilateral lower legs    Pneumonia 01/2017 1/2017 and 2/2017    [facility]      Squamous cell carcinoma of vocal cord [facility])           Past Surgical History   Past Surgical History:   Procedure Laterality Date    BRONCHOSCOPY N/A 11/18/2020     BRONCHOSCOPY performed by Oswaldo Moctezuma MD at [place] [address]   2016    EYE SURGERY         CROSS EYED    HAND SURGERY Bilateral 1995    KNEE ARTHROSCOPY Right 1996    LARYNGOSCOPY   07/12/2017     Biopsy    LARYNGOSCOPY N/A 7/12/2019     MICRO LARYNGOSCOPY W/ BIOPSY performed by Aden Douglass MD at [address] N/A 12/30/2019     DIRECT LARYNGOSCOPY WITH BIOPSY performed by Aden Douglass MD at [address] [place] [address] 10/16/2020     BRONCHOSCOPY, MICRO LARYNGOSCOPY WITH REMOVAL OF SUBMUCCOSAL NON NOEPLASTIC LESION JET VENTILATION performed by Oswaldo Moctezuma MD at [address] N/A 11/18/2020     MICROLARYNGOSCOPY WITH BX & RESECTION OF OBSTRUCTING SOFT TISSUES WITH LASER & AIRWAY DEBRIDER, MICROLARYNGOPLASTY WITH RESECTION OF OBSTRUCTING SOFT TISSUE performed by Oswaldo Moctezuma MD at [address] N/A 2/5/2021     MICROLARYNGOSCOPY WITH DEBRIDEMENT OF SUBGLOTTIC OBSTRUCTING TISSUES, BRONCHOSCOPY WITH SAME OF PROXIMAL TRACHEA, UPPER GI ENDOSCOPY FOR BALLOON DILATION performed by Oswaldo Moctezuma MD at [address] [place]        RECTAL SURGERY   08/29/2016     [place]    ROTATOR CUFF REPAIR Left 1990'S    TONSILLECTOMY AND ADENOIDECTOMY N/A 2/5/2021     ADVANCEMENT FLAP RECONSTRUCTION BILATERAL RESECTION OF POSTERIOR SUPRAGLOTTIS performed by [facility] Wells [facility] History         Family History   Problem Relation Age of Onset    Prostate Cancer Father 48    Cancer Father      Heart Disease Father      Diabetes Mother      Heart Disease Mother      Stroke Mother      Heart Disease Brother      High Blood Pressure Other      Cancer Other           LUNG,PROSTATE    Hearing Loss Other           Social History            Tobacco Use    Smoking status: Former Smoker       Packs/day: 2.25       Years: 31.00       Pack years: 69.75       Start date: 64       Quit date: 2/5/2006       Years since quitting: 15.3    Smokeless tobacco: Never Used   Substance Use Topics    Alcohol use: No       Alcohol/week: 0.0 standard drinks                    Subjective:      Review of Systems  Rest of review of systems are negative, except as noted in HPI.      Objective:      BP (!) 144/98 (Site: Right Upper Arm, Position: Sitting)   Pulse 83   Temp 98.1 °F (36.7 °C) (Infrared)   Resp (!) 96   Wt 219 lb 6.4 oz (99.5 kg)   BMI 28.17 kg/m²      Physical Exam         On general physical exam the patient is a pleasant alert and cooperative somewhat ill-appearing older middle-aged adult male in no acute distress. His voice is now audible through the doorway as I walked in. It is still very breathy. It has a trace of a vibratory quality which is new. He is breathing without labor. I heard no throat clearing coughing or inspiratory stridor. On auscultation the patient's lung sounds were distant but were vesicular. I heard no rales or rhonchi. His heart tones were very distant but I heard no murmurs or gallops. He has no apparent cyanosis.     Vitals reviewed.     No results found.          Lab Results   Component Value Date      01/29/2021      10/17/2020      06/12/2020     K 5.2 01/29/2021     K 4.9 10/17/2020     K 5.2 06/12/2020      01/29/2021     CL 96 10/17/2020      06/12/2020     CO2 28 01/29/2021     CO2 25 10/17/2020     CO2 29 06/12/2020     BUN 19 01/29/2021     BUN 15 10/17/2020     BUN 19 06/12/2020     CREATININE 0.6 01/29/2021     CREATININE 0.6 10/17/2020     CREATININE 0.71 06/12/2020     CALCIUM 9.1 01/29/2021     CALCIUM 8.6 10/17/2020     CALCIUM 9.10 06/12/2020     PROT 7.2 01/29/2021     PROT 6.6 10/17/2020     PROT 6.5 06/12/2020     LABALBU 4.7 01/29/2021     LABALBU 4.0 10/17/2020     LABALBU 4.3 06/12/2020     LABALBU 4.6 12/06/2011     BILITOT 0.4 01/29/2021     BILITOT 0.2 10/17/2020     BILITOT 0.3 06/12/2020     ALKPHOS 165 01/29/2021     ALKPHOS 159 10/17/2020     ALKPHOS 124 06/12/2020     AST 20 01/29/2021     AST 23 10/17/2020     AST 22 06/12/2020     ALT 28 01/29/2021     ALT 29 10/17/2020     ALT 31 06/12/2020         All of the past medical history, past surgical history, family history,social history, allergies and current medications were reviewed with the patient. Assessment & Plan   Diagnoses and all orders for this visit:       Diagnosis Orders   1. 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 Invasive fungal infection  Miscellaneous Surgery   3. Infection due to Candida glabrata  Miscellaneous Surgery   4. Dysphonia  Miscellaneous Surgery   5. Chronic laryngitis  Miscellaneous Surgery   6. Airway obstruction  Miscellaneous Surgery   7. Radiation adverse effect, subsequent encounter  Miscellaneous Surgery   8. Bullous emphysema (HCC)      9. Dependence on continuous supplemental oxygen               Based on his history and physical findings, the patient is making steady progress to ridding himself of this invasive fungal disease and hopefully returning to some semblance of laryngotracheal normalcy. His next treatment with jet ventilation based anesthesia is in a few weeks.   My intention is to do little or nothing to his vocal folds to give him a chance to recover more bulk. I will work in the supraglottis and subglottis and trachea with the debrider and with the laser. He is doing so well I will likely send him home postop because he already has oxygen at home. I reviewed the risks and benefits form and they are identical to what they were previously. The only difference in our plan is that I will leave his glottis to recover at least 1 more interval of surgery. He reported being pleased with the outcome of the visit and being willing to proceed as such.        Return in about 4 weeks (around 6/29/2021) for 2-week follow-up; no endoscopy unless he is having problems.           **This report has been created using voice recognition software. It may contain minor errors which are inherent in voice recognition technology. **                                     Office Visit on 6/1/2021     Office Visit on 6/1/2021        Detailed Report      Note shared with patient  Progress Notes Info    Author Note Status Last Update User   Karen Brooks MD Signed Karen Brooks MD   Last Update Date/Time: 6/1/2021 11:59 AM   Chart Review Routing History    No routing history on file.

## 2021-06-11 NOTE — BRIEF OP NOTE
Brief Postoperative Note      Patient: Tejinder Harrison  YOB: 1960  MRN: [id number]    Date of Procedure: 6/11/2021    Pre-Op Diagnosis: SUBGLOTTIC STENOSIS NOT DUE TO SURGERY, INVASIVE FUNGAL INFECTION, INFECTION DUE TO BRADLEY GLABRATA, DYSPHONIA, CHRONIC LARYNGITIS, AIRWAY OBSTRUCTION, RADIATION ADVERSE EFFECT    Post-Op Diagnosis: Same       Procedure(s):  SUSPENSION MICROLARYNGOSCOPY WITH ABLATIONOF OBSTRUCTING SOFT TISSUES, DEBRIDER RESECTION OF OBSTRUCTING SUBGLOTTIC PROXIMAL TRACHEAL SOFT TISSUES WITH JET VENTILATION    Surgeon(s):  Surya Mcwilliams MD    Assistant:  * No surgical staff found *    Anesthesia: General    Estimated Blood Loss (mL): Minimal    Complications: None    Specimens:   * No specimens in log *    Implants:  * No implants in log *      Drains:   Colostomy LLQ (Active)       Findings: 1. Extensive crusting and heaped up mucosa of the proximal trachea and distal as well as proximal subglottis right worse than left. 2.  Modest amount of hypertrophic epithelium of the true cords; judiciously removed with debrider  3. No supraglottic work performed today with minimal soft tissue abnormalities of the supraglottis with the exception of the petiole which I debrided bilaterally. 4.  Bleeding was controlled with topical epinephrine. No suction cautery was used or needed. 5.  The patient was significantly hypercarbic following the procedure with CO2 in the 80s and mild hypertension associated with it. The majority of the jet ventilation was performed using the laryngoscopic catheter. Interval intubations would likely have kept the CO2 under better control for future reference.     Electronically signed by Surya Mcwilliams MD on 6/11/2021 at 10:30 AM

## 2021-06-11 NOTE — PROGRESS NOTES
1780 pt arrived to PACU, awake and alert  0930 placed on face tent  0939 pt shivering, medicated with 12.5 mg Demerol  0944 c/o pain 8/10, medicated with 0.5 mg dilaudid  0949 no change in pain status, medicated with 0.5 mg dilaudid  0959 pt states pain \"is subsiding\", denies need for more pain medication at this time. VSS, resp easy  1015 c/o pain 7/10, medicated with 0.5 mg dilaudid  1025 pt states pain 4/10 and tolerable.  VSS, resp easy  1035 meets criteria for discharge from PACU, transported to [facility]

## 2021-06-11 NOTE — ANESTHESIA PRE PROCEDURE
Department of Anesthesiology  Preprocedure Note       Name:  Estelle Sorto   Age:  63 y.o.  :  1960                                          MRN:  [id number]         Date:  2021      Surgeon: Donovan Carias):  Hattie Alvarenga MD    Procedure: Procedure(s):  SUSPENSION MICROLARYNGOSCOPY WITH LASER ABLATION OF OBSTRUCTING SOFT TISSUES, DEBRIDER RESECTION OF OBSTRUCTING SUBGLOTTIC PROXIMAL TRACHEAL SOFT TISSUES WITH JET VENTILATION    Medications prior to admission:   Prior to Admission medications    Medication Sig Start Date End Date Taking?  Authorizing Provider   SPIRIVA RESPIMAT 2.5 MCG/ACT AERS inhaler INHALE 2 SPRAY(S) BY MOUTH ONCE DAILY 21   JOSH Chau CNP   Ascorbic Acid (VITAMIN C PO) Take 500 mg by mouth daily     Historical Provider, MD   Cholecalciferol (VITAMIN D3 PO) Take by mouth    Historical Provider, MD   Acetylcysteine (NAC) 500 MG CAPS Take by mouth 2 times daily    Historical Provider, MD   guaiFENesin (ROBITUSSIN) 100 MG/5ML syrup Take 200 mg by mouth 3 times daily as needed for Cough    Historical Provider, MD ramirezaiFENesin (MUCINEX) 600 MG extended release tablet Take 1,200 mg by mouth 2 times daily as needed for Congestion    Historical Provider, MD   zinc 50 MG CAPS Take by mouth Twice weekly    Historical Provider, MD   acetylcysteine (MUCOMYST) 20 % nebulizer solution 4 mL via nebulizer 3 times daily 20   ASHWIN Caputo   albuterol sulfate HFA (VENTOLIN HFA) 108 (90 Base) MCG/ACT inhaler Inhale 2 puffs into the lungs every 6 hours as needed for Wheezing or Shortness of Breath 20   JOSH Valadez CNP   albuterol (PROVENTIL) (2.5 MG/3ML) 0.083% nebulizer solution Take 3 mLs by nebulization every 6 hours as needed for Wheezing or Shortness of Breath 20  JOSH Valadez CNP   sodium chloride, Inhalant, 3 % nebulizer solution Take 3 mLs by nebulization as needed (Throat dryness, cough, wheezing) 10/19/20   Garcia Pulido David Woodward   budesonide-formoterol (SYMBICORT) 160-4.5 MCG/ACT AERO Inhale 2 puffs into the lungs 2 times daily Rinse mouth after its use. 8/28/20 8/28/21  JOSH Feliciano - CNP   pravastatin (PRAVACHOL) 40 MG tablet Take 40 mg by mouth nightly  7/23/20   Historical Provider, MD   acetaminophen (TYLENOL) 650 MG extended release tablet Take 1 tablet by mouth as needed for Pain Do not take with hydrocodone/acetominophen 12/30/19   Jeannie Olivo MD   loperamide (IMODIUM) 2 MG capsule Take 2 mg by mouth daily     Historical Provider, MD       Current medications:    No current facility-administered medications for this visit. No current outpatient medications on file. Facility-Administered Medications Ordered in Other Visits   Medication Dose Route Frequency Provider Last Rate Last Admin    0.9 % sodium chloride infusion   Intravenous Continuous Midge MD Ivelisse 100 mL/hr at 06/11/21 0714 New Bag at 06/11/21 0714    ceFAZolin (ANCEF) 2000 mg in dextrose 5 % 50 mL IVPB  2,000 mg Intravenous Once Midge MD Ivelisse        dexamethasone (PF) (DECADRON) injection 10 mg  10 mg Intravenous Once Fatemehge MD Ivelisse           Allergies: Allergies   Allergen Reactions    Povidone Iodine Rash     Surgery prep       Problem List:    Patient Active Problem List   Diagnosis Code    Dysphonia R49.0    Alcohol abuse F10.10    FHx: smoking Z81.2    Deviated septum J34.2    Hypertrophy of nasal turbinates J34.3    Rhinitis J31.0    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    Laryngeal stenosis J38.6    Airway obstruction J98.8    Airway stricture J98.8    Bullous emphysema ([facility]) J43.9    Refractory obstruction of nasal airway J34.89    Dependence on continuous supplemental oxygen Z99.81       Past Medical History:        Diagnosis Date    Arthritis     COPD (chronic obstructive pulmonary disease) ([facility])     PAD (peripheral artery disease) ([facility])     bilateral lower legs    Pneumonia 01/2017 1/2017 and 2/2017    Rectal cancer ([facility])     Squamous cell carcinoma of vocal cord [facility])        Past Surgical History:        Procedure Laterality Date    BRONCHOSCOPY N/A 11/18/2020    BRONCHOSCOPY performed by Marino Guerra MD at [address] [place] [address]  2016    EYE SURGERY      CROSS EYED    HAND SURGERY Bilateral 1995    KNEE ARTHROSCOPY Right 1996    LARYNGOSCOPY  07/12/2017    Biopsy    LARYNGOSCOPY N/A 7/12/2019    MICRO LARYNGOSCOPY W/ BIOPSY performed by Sri Soria MD at [facility] N/A 12/30/2019    DIRECT LARYNGOSCOPY WITH BIOPSY performed by Sri Soria MD at [facility] N/A 10/16/2020    BRONCHOSCOPY, MICRO LARYNGOSCOPY WITH REMOVAL OF SUBMUCCOSAL NON NOEPLASTIC LESION JET VENTILATION [facility]A 11/18/2020    MICROLARYNGOSCOPY WITH BX & RESECTION OF OBSTRUCTING SOFT TISSUES WITH LASER & AIRWAY DEBRIDER, MICROLARYNGOPLASTY WITH RESECTION OF OBSTRUCTING SOFT TISSUE performed by Marino Guerra MD at [facility] N/A 2/5/2021    MICROLARYNGOSCOPY WITH DEBRIDEMENT OF SUBGLOTTIC OBSTRUCTING TISSUES, BRONCHOSCOPY WITH SAME OF PROXIMAL TRACHEA, UPPER GI ENDOSCOPY FOR BALLOON DILATION performed by Naty Montejo MD at [address] RECTAL SURGERY  08/29/2016    [facility]    ROTATOR CUFF REPAIR Left 1990'S    TONSILLECTOMY AND ADENOIDECTOMY N/A 2/5/2021    ADVANCEMENT FLAP RECONSTRUCTION BILATERAL RESECTION OF POSTERIOR SUPRAGLOTTIS performed by Naty Montejo MD at [place] [address] History:    Social History     Tobacco Use    Smoking status: Former Smoker     Packs/day: 2.25     Years: 31.00     Pack years: 69.75     Start date: 1975     Quit date: 2/5/2006     Years since quitting: 15.3    Smokeless tobacco: Never Used   Substance Use Topics    Alcohol use: No     Alcohol/week: 0.0 standard drinks                                Counseling given: Not Answered      Vital Signs (Current): There were no vitals filed for this visit.                                            BP Readings from Last 3 Encounters:   06/11/21 (!) 160/92   06/01/21 (!) 144/98   05/12/21 (!) 144/91       NPO Status:                                                                                 BMI:   Wt Readings from Last 3 Encounters:   06/11/21 214 lb (97.1 kg)   06/01/21 219 lb 6.4 oz (99.5 kg)   05/12/21 224 lb 9.6 oz (101.9 kg)     There is no height or weight on file to calculate BMI.    CBC:   Lab Results   Component Value Date    WBC 9.4 06/07/2021    RBC 4.84 06/07/2021    RBC 4.28 12/06/2011    HGB 14.8 06/07/2021    HCT 44.1 06/07/2021    MCV 91.1 06/07/2021    RDW 14.4 06/07/2021     06/07/2021       CMP:   Lab Results   Component Value Date     06/07/2021    K 4.0 06/07/2021     06/07/2021    CO2 29 06/07/2021    BUN 17 06/07/2021    CREATININE 0.78 06/07/2021    AGRATIO 1.8 06/07/2021    LABGLOM >90 01/29/2021    GLUCOSE 108 06/07/2021    PROT 6.6 06/07/2021    CALCIUM 8.60 06/07/2021    BILITOT 0.5 06/07/2021 ALKPHOS 170 06/07/2021    AST 23 06/07/2021    ALT 32 06/07/2021       POC Tests: No results for input(s): POCGLU, POCNA, POCK, POCCL, POCBUN, POCHEMO, POCHCT in the last 72 hours. Coags: No results found for: PROTIME, INR, APTT    HCG (If Applicable): No results found for: PREGTESTUR, PREGSERUM, HCG, HCGQUANT     ABGs: No results found for: PHART, PO2ART, UHS0QQK, NFF3CSV, BEART, P7XHVPCJ     Type & Screen (If Applicable):  No results found for: LABABO, LABRH    Drug/Infectious Status (If Applicable):  No results found for: HIV, HEPCAB    COVID-19 Screening (If Applicable):   Lab Results   Component Value Date    COVID19 Not Detected 01/29/2021         Anesthesia Evaluation  Patient summary reviewed and Nursing notes reviewed no history of anesthetic complications:   Airway: Mallampati: III        Dental:          Pulmonary:   (+) pneumonia:  COPD: severe,  shortness of breath: chronic,  recent URI:  rhonchi,  decreased breath sounds,                            ROS comment: Former smoker     Cardiovascular:  Exercise tolerance: poor (<4 METS),           Rhythm: regular  Rate: normal                    Neuro/Psych:   (+) psychiatric history:            GI/Hepatic/Renal:   (+) GERD:,           Endo/Other:    (+) malignancy/cancer. Abdominal:           Vascular:                                          Anesthesia Plan      general     ASA 4     (Jet Ventilation)  Induction: intravenous. MIPS: Postoperative opioids intended and Prophylactic antiemetics administered. Anesthetic plan and risks discussed with patient and spouse.       Plan discussed with CRNA and surgical team.                  Bang Alvarez MD   6/11/2021

## 2021-06-11 NOTE — OP NOTE
Operative Note      Patient: Alexi Mcnamara  YOB: 1960  MRN: [id number]    Date of Procedure: 6/11/2021    Pre-Op Diagnosis: SUBGLOTTIC STENOSIS NOT DUE TO SURGERY, INVASIVE FUNGAL INFECTION, INFECTION DUE TO BRADLEY GLABRATA, DYSPHONIA, CHRONIC LARYNGITIS, AIRWAY OBSTRUCTION, RADIATION ADVERSE EFFECT    Post-Op Diagnosis: Same       Procedure(s):  SUSPENSION MICROLARYNGOSCOPY WITH ABLATIONOF OBSTRUCTING SOFT TISSUES, DEBRIDER RESECTION OF OBSTRUCTING SUBGLOTTIC PROXIMAL TRACHEAL SOFT TISSUES WITH JET VENTILATION    Surgeon(s):  Francesco Amin MD    Assistant:   * No surgical staff found *    Anesthesia: General    Estimated Blood Loss (mL): Minimal    Complications: None    Specimens:   * No specimens in log *    Implants:  * No implants in log *      Drains:   Colostomy LLQ (Active)       Findings: 1. Extensive crusting and heaped up mucosa of the proximal trachea and distal as well as proximal subglottis right worse than left. 2.  Modest amount of hypertrophic epithelium of the true cords; judiciously removed with debrider  3. No supraglottic work performed today with minimal soft tissue abnormalities of the supraglottis with the exception of the petiole which I debrided bilaterally. 4.  Bleeding was controlled with topical epinephrine. No suction cautery was used or needed. 5.  The patient was significantly hypercarbic following the procedure with CO2 in the 80s and mild hypertension associated with it. The majority of the jet ventilation was performed using the laryngoscopic catheter. Interval intubations would likely have kept the CO2 under better control for future reference. Detailed Description of Procedure: The patient was taken to the operating [facility] placed in supine position. General anesthesia was induced and the patient was intubated with an LMA device to avoid instrumenting his glottis and subglottis. I turned the table 90 degrees for the procedure.   I performed a timeout verifying the patient's identity and planned procedure. Suspension microlaryngoscopy with resection of supraglottic, glottic and subglottic obstructing soft tissues: With appropriate collaborative airway care, I removed the LMA, placed to saline soaked gauze on his maxilla and passed a Mj laryngoscope into the laryngeal inlet to extend the airway anteriorly and provide a direct line of sight view the airway. I placed the jet ventilation catheter straight through the laryngoscope into the trachea past an extensive amount of crusting and mucoid debris to commence ventilation. This was effective. I then removed the jet ventilation catheter and began to jet ventilated through the laryngoscope which was also effective for maintaining oxygen saturation in the high 90s. Sometimes it was 100. I first used a large suction catheter to remove the thick mucoid secretions from the proximal distal trachea as well as the crusted matter of the periglottic airway. And then turned my attention to resecting the obstructing soft tissues. Using a Moven laryngeal debrider system with a 27 cm Tricut resection blade I began the process of removing the obstructing soft tissues working in the petiole of the supraglottis, true cords, which I treated bilaterally with very judicious removal of the hypertrophic tissue on the superior surface particularly near the anterior commissure, and then largely in the subglottis. In the subglottis I concentrated on the right side I removed some tissues of the left side that were hypertrophic and obstructing. The majority was on the right side that was very much thickened and obstructing the airway to moderate degree. Periodically would stop resecting and use topical epinephrine to achieve hemostasis. I took this resection to the midline posterior subglottis and almost to the midline anteriorly on the right side.     Therapeutic bronchoscopy with debridement of obstructing soft tissues: Using the same debrider technology in a 30 degree optical telescope, I continued the resection into the trachea working on the membranous trachea posterior laterally on the right and then along the first 2 tracheal rings. At no point did expose any cartilage. This area was much more vascular than the subglottis and required multiple applications of topical epinephrine for hemostasis. The left side was not in need of surgical treatment. After the transglottic resection of hypertrophic soft tissues in the removal of the superficial obstructing matter, followed by the tracheal work, the patient's airway was substantially more open and had relatively normal-appearing contours. I opted not to do any posterior superior work even though there was some hypertrophic tissue in that area to reduce the postoperative odynophagia that results since these tissues are nonobstructing of his airway any longer. As such I clean the distal airway and remove the check catheter as I reintubated the patient with a #7 endotracheal tube that was coughed. I remove the Mj device and turned the patient back to anesthesia for reversal extubation in the operating room. This was carried out without incident and the patient was taken to recovery in satisfactory condition. Estimated blood loss in the case was less than 10 cc and the patient received approximately a liter of intravenous lactated Ringer's intraoperatively. No blood products were transfused. Counts were considered accurate x3. No intraoperative complications were detected. I was present for performed the patient's entire operation myself. Disposition: If the patient does well in the first and second phases of the recovery room he will go home on his home O2 to wean himself back to room air as he has in the past.  I will see him back in the office in approximately 2 weeks.     Electronically signed by Liz Keating MD on 6/11/2021 at 10:33 AM

## 2021-06-11 NOTE — PROGRESS NOTES
Pt has met discharge criteria and states he is ready for discharge to home. IV removed, gauze and tape applied. Dressed in own clothes and personal belongings gathered. Discharge instructions (with opioid medication education information) given to pt and family; pt and family verbalized understanding of discharge instructions, prescriptions and follow up appointments. Pt transported to discharge lobby by [facility] staff.

## 2021-06-12 NOTE — ANESTHESIA POSTPROCEDURE EVALUATION
Department of Anesthesiology  Postprocedure Note    Patient: Vianney Mason  MRN: [id number]  YOB: 1960  Date of evaluation: 6/11/2021  Time:  11:24 PM     Procedure Summary     Date: 06/11/21 Room / Location: [address]    Anesthesia Start: 4177 Anesthesia Stop: 0923    Procedure: SUSPENSION MICROLARYNGOSCOPY WITH ABLATIONOF OBSTRUCTING SOFT TISSUES, DEBRIDER RESECTION OF OBSTRUCTING SUBGLOTTIC PROXIMAL TRACHEAL SOFT TISSUES WITH JET VENTILATION (N/A Throat) Diagnosis: (SUBGLOTTIC STENOSIS NOT DUE TO SURGERY, INVASIVE FUNGAL INFECTION, INFECTION DUE TO BRADLEY GLABRATA, DYSPHONIA, CHRONIC LARYNGITIS, AIRWAY OBSTRUCTION, RADIATION ADVERSE EFFECT)    Surgeons: Liz Keating MD Responsible Provider: Jacki Matthew MD    Anesthesia Type: general ASA Status: 4          Anesthesia Type: general    David Phase I: David Score: 9    David Phase II: David Score: 9    Last vitals: Reviewed and per EMR flowsheets. Anesthesia Post Evaluation    Patient location during evaluation: PACU  Patient participation: complete - patient participated  Level of consciousness: awake and alert  Airway patency: patent  Nausea & Vomiting: no nausea and no vomiting  Complications: no  Cardiovascular status: hemodynamically stable  Respiratory status: acceptable  Hydration status: euvolemic    [address]  POST-ANESTHESIA NOTE       Name:  Vianney Mason                                         Age:  63 y.o. MRN:  [id number]      Last Vitals:  BP (!) 152/76   Pulse 93   Temp 98.7 °F (37.1 °C)   Resp 22   Ht 6' 2\" (1.88 m)   Wt 214 lb (97.1 kg)   SpO2 91%   BMI 27.48 kg/m²   No data found.     Level of Consciousness:  Awake    Respiratory:  Stable    Oxygen Saturation:  Stable    Cardiovascular:  Stable    Hydration:  Adequate    PONV:  Stable    Post-op Pain:  Adequate analgesia    Post-op Assessment:  No apparent anesthetic complications    Additional Follow-Up / Treatment / Comment: None    Pk Coburn MD  June 11, 2021   11:24 PM

## 2021-06-29 NOTE — Clinical Note
María,  Please send me the CPT code for bronchoscopy with removal of obstructing soft tissues  Thank you  [facility]

## 2021-06-30 NOTE — PROGRESS NOTES
[address] EAR, NOSE AND THROAT  [facility] [place]  Dept: [phone]  Dept Fax: [phone]  Loc: [phone]    Santa Kelsey is a 63 y.o. male who was referred by No ref. provider found for:  Chief Complaint   Patient presents with    Post-Op Check     Patient here for post op exam.        HPI:     Santa Kelsey is a 63 y.o. male who is approximately 2 weeks status post suspension laryngoscopy and bronchoscopy with resection of obstructing soft tissues. The patient is here today reporting having a negative reaction to his Mucomyst nebulizer and concerned that it is somehow congeal laying in his throat. He has stopped taking it a few days ago and reports feeling better. History:      Allergies   Allergen Reactions    Povidone Iodine Rash     Surgery prep     Current Outpatient Medications   Medication Sig Dispense Refill    SPIRIVA RESPIMAT 2.5 MCG/ACT AERS inhaler INHALE 2 SPRAY(S) BY MOUTH ONCE DAILY 12 g 0    Ascorbic Acid (VITAMIN C PO) Take 500 mg by mouth daily       Cholecalciferol (VITAMIN D3 PO) Take by mouth      Acetylcysteine (NAC) 500 MG CAPS Take by mouth 2 times daily      guaiFENesin (ROBITUSSIN) 100 MG/5ML syrup Take 200 mg by mouth 3 times daily as needed for Cough      guaiFENesin (MUCINEX) 600 MG extended release tablet Take 1,200 mg by mouth 2 times daily as needed for Congestion      zinc 50 MG CAPS Take by mouth Twice weekly      acetylcysteine (MUCOMYST) 20 % nebulizer solution 4 mL via nebulizer 3 times daily 360 mL 5    albuterol sulfate HFA (VENTOLIN HFA) 108 (90 Base) MCG/ACT inhaler Inhale 2 puffs into the lungs every 6 hours as needed for Wheezing or Shortness of Breath 3 Inhaler 3    albuterol (PROVENTIL) (2.5 MG/3ML) 0.083% nebulizer solution Take 3 mLs by nebulization every 6 hours as needed for Wheezing or Shortness of Breath 360 vial 3    sodium chloride, Inhalant, 3 % nebulizer solution Take 3 mLs by nebulization as needed (Throat dryness, cough, wheezing) 500 mL 3    budesonide-formoterol (SYMBICORT) 160-4.5 MCG/ACT AERO Inhale 2 puffs into the lungs 2 times daily Rinse mouth after its use. 3 Inhaler 3    pravastatin (PRAVACHOL) 40 MG tablet Take 40 mg by mouth nightly       acetaminophen (TYLENOL) 650 MG extended release tablet Take 1 tablet by mouth as needed for Pain Do not take with hydrocodone/acetominophen 60 tablet 3    loperamide (IMODIUM) 2 MG capsule Take 2 mg by mouth daily        No current facility-administered medications for this visit.      Past Medical History:   Diagnosis Date    Arthritis     [facility]     PAD (peripheral artery disease) ([facility]     bilateral lower legs    Pneumonia 01/2017 1/2017 and 2/2017    Rectal cancer ([facility]     Squamous cell carcinoma of vocal cord [facility])       Past Surgical History:   Procedure Laterality Date    BRONCHOSCOPY N/A 11/18/2020    BRONCHOSCOPY performed by Willy Pryor MD at [facility]  2016    EYE SURGERY      CROSS EYED    HAND SURGERY Bilateral 1995    KNEE ARTHROSCOPY Right 1996    LARYNGOSCOPY  07/12/2017    Biopsy    LARYNGOSCOPY N/A 7/12/2019    MICRO LARYNGOSCOPY W/ BIOPSY performed by Carlton Gilbert MD at [facility] N/A 12/30/2019    DIRECT LARYNGOSCOPY WITH BIOPSY performed by Carlton Gilbert MD at [facility] N/A 10/16/2020    BRONCHOSCOPY, MICRO LARYNGOSCOPY WITH REMOVAL OF SUBMUCCOSAL NON NOEPLASTIC LESION JET VENTILATION performed by Willy Pryor MD at [facility] N/A 11/18/2020    MICROLARYNGOSCOPY WITH BX & RESECTION OF OBSTRUCTING SOFT TISSUES WITH LASER & AIRWAY DEBRIDER, MICROLARYNGOPLASTY WITH RESECTION OF OBSTRUCTING SOFT TISSUE performed by Willy Pryor MD at [facility] N/A 2/5/2021    MICROLARYNGOSCOPY WITH DEBRIDEMENT OF SUBGLOTTIC OBSTRUCTING TISSUES, audible. Procedure: Laryngoscopy with debridement and bronchoscopy with debridement  Indication: The patient is status post extensive laryngeal surgery and warrants an interval endoscopic debridement of soft tissue debris and thick secretions that may otherwise obstruct his airway  Findings: Using a flexible bronchoscope and topical lidocaine drip anesthesia with a laryngeal gargle, I entered the patient's laryngeal inlet and debrided the fibrinous and soft tissue debris of the supraglottis glottis and subglottis. It is difficult to get underneath the cords but I was able to remove a substantial amount of this abnormal debris. I then entered the proximal trachea and did the same starting a modest amount of bleeding that was self-limited. This significantly widened the patient's airway transglottically and in the proximal trachea. The patient tolerated the procedure well. Vitals reviewed. XR CHEST PORTABLE    Result Date: 6/11/2021  Bibasilar opacities as evidence for recurrent atelectasis or infiltrate. **This report has been created using voice recognition software. It may contain minor errors which are inherent in voice recognition technology. ** Final report electronically signed by Dr. Rosie Jewell MD on 6/11/2021 9:56 AM     Lab Results   Component Value Date     06/07/2021     01/29/2021     10/17/2020    K 4.0 06/07/2021    K 5.2 01/29/2021    K 4.9 10/17/2020     06/07/2021     01/29/2021    CL 96 10/17/2020    CO2 29 06/07/2021    CO2 28 01/29/2021    CO2 25 10/17/2020    BUN 17 06/07/2021    BUN 19 01/29/2021    BUN 15 10/17/2020    CREATININE 0.78 06/07/2021    CREATININE 0.6 01/29/2021    CREATININE 0.6 10/17/2020    CALCIUM 8.60 06/07/2021    CALCIUM 9.1 01/29/2021    CALCIUM 8.6 10/17/2020    PROT 6.6 06/07/2021    PROT 7.2 01/29/2021    PROT 6.6 10/17/2020    LABALBU 4.2 06/07/2021    LABALBU 4.7 01/29/2021    LABALBU 4.0 10/17/2020    LABALBU 4.6 12/06/2011    BILITOT 0.5 06/07/2021    BILITOT 0.4 01/29/2021    BILITOT 0.2 10/17/2020    ALKPHOS 170 06/07/2021    ALKPHOS 165 01/29/2021    ALKPHOS 159 10/17/2020    AST 23 06/07/2021    AST 20 01/29/2021    AST 23 10/17/2020    ALT 32 06/07/2021    ALT 28 01/29/2021    ALT 29 10/17/2020       All of the past medical history, past surgical history, family history,social history, allergies and current medications were reviewed with the patient. Assessment & Plan   Diagnoses and all orders for this visit:     Diagnosis Orders   1. 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 Invasive fungal infection     3. Dysphonia     4. Chronic laryngitis     5. 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 Airway obstruction     7. Radiation adverse effect, subsequent encounter     8. Tracheal stenosis           Based on the patient's history and these physical findings, I am glad to have had the opportunity to clean up his airway from these obstructing secretions and soft tissue debris. Given that he is going to try going off of the Mucomyst, recommended that he see me again in 4 weeks for the same treatment to make sure that his airway stays appropriately enlarged. He assured me that he would. I also recommended in the increase his use of 0.9% saline as an irrigant to help lavage his airway more than 3% is enabling. He agreed to do so. No follow-ups on file. **This report has been created using voice recognition software. It may contain minor errors which are inherent in voice recognition technology. **

## 2021-07-08 NOTE — PATIENT INSTRUCTIONS
Visit Discharge/Physician Orders:    Continue voriconazole as ordered, call when you need a refill. Labs: Have completed in 2 months    Keep next scheduled appointment. Please give 24 hour notice if unable to keep appointment. [phone]    If you experience any of the following, please call the Infectious Disease Clinic during business hours: [phone]     *Increase in pain   *Temperature over 101   *Increase in drainage from your wound or a foul odor   *Uncontrolled swelling   *Need for compression bandage changes due to slippage, breakthrough drainage    If you need medical attention outside of business hours, please contact your Primary Care Doctor or go to the nearest emergency room.

## 2021-07-12 NOTE — PROGRESS NOTES
[facility] Infectious Disease         Consult Note      Ralf Christian  MEDICAL RECORD NUMBER:  [id number]  AGE: 63 y.o. GENDER: male  : 1960  EPISODE DATE:  2021    Subjective:     Chief Complaint   Patient presents with    New Patient     Fungal infection in throat         HISTORY of PRESENT ILLNESS HPI     Ralf Christian is a 63 y.o. male New patient , who presents today for infectious disease evaluation. 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 He has been following with Dr Liz Campos for this problem. He presents today for ongoing monitoring of medication. Last lab work was last completed 2021. Patient voices concerns regarding condition, stating that he has had increased coughing with increased mucus production over the last month. He has been following with Dr. Swati Gupta for ongoing surgical intervention. Patient denies any recent culture or pathology completion. He denies any hemoptysis, has rare use of rescue inhaler. He reports a good appetite, has ongoing fatigue. He denies any fever or chills. Denies any further needs or concerns.     PAST MEDICAL HISTORY        Diagnosis Date    Arthritis     COPD (chronic obstructive pulmonary disease) ([address]     PAD (peripheral artery disease) ([address]     bilateral lower legs    Pneumonia 2017 and 2017    Rectal cancer ([address]     Squamous cell carcinoma of vocal cord ([address]        PAST SURGICAL HISTORY    Past Surgical History:   Procedure Laterality Date    BRONCHOSCOPY N/A 2020    BRONCHOSCOPY performed by Nabeel Thomas MD at [place] [address]      EYE SURGERY      CROSS EYED    HAND SURGERY Bilateral     KNEE ARTHROSCOPY Right     LARYNGOSCOPY  2017    Biopsy    LARYNGOSCOPY N/A 2019    MICRO LARYNGOSCOPY W/ BIOPSY performed by Abdi Torres Keturah Bain MD at [facility] [employer] [facility] N/A 12/30/2019    DIRECT LARYNGOSCOPY WITH BIOPSY performed by Heather Valle MD at [facility] [employer] [facility] N/A 10/16/2020    BRONCHOSCOPY, MICRO LARYNGOSCOPY WITH REMOVAL OF SUBMUCCOSAL NON NOEPLASTIC LESION JET VENTILATION performed by Leonel Larose MD at [facility] [employer] [facility] N/A 11/18/2020    MICROLARYNGOSCOPY WITH BX & RESECTION OF OBSTRUCTING SOFT TISSUES WITH LASER & AIRWAY DEBRIDER, MICROLARYNGOPLASTY WITH RESECTION OF OBSTRUCTING SOFT TISSUE performed by Leonel Larose MD at [facility] [employer] [facility] N/A 2/5/2021    MICROLARYNGOSCOPY WITH DEBRIDEMENT OF SUBGLOTTIC OBSTRUCTING TISSUES, BRONCHOSCOPY WITH SAME OF PROXIMAL TRACHEA, UPPER GI ENDOSCOPY FOR BALLOON DILATION performed by Leonel Larose MD at [facility] [employer] N/A 6/11/2021    SUSPENSION MICROLARYNGOSCOPY WITH ABLATIONOF OBSTRUCTING SOFT TISSUES, DEBRIDER RESECTION OF OBSTRUCTING SUBGLOTTIC PROXIMAL TRACHEAL SOFT TISSUES WITH JET VENTILATION performed by Leonel Larose MD at [address] [place] [address] 205 RECTAL SURGERY  08/29/2016    [employer] [place], [place]    ROTATOR CUFF REPAIR Left 1990'S    TONSILLECTOMY AND ADENOIDECTOMY N/A 2/5/2021    ADVANCEMENT FLAP RECONSTRUCTION BILATERAL RESECTION OF POSTERIOR SUPRAGLOTTIS performed by Leonel Larose MD at [address] [place] [address] HISTORY    Family History   Problem Relation Age of Onset    Prostate Cancer Father 48    Cancer Father     Heart Disease Father     Diabetes Mother     Heart Disease Mother     Stroke Mother     Heart Disease Brother     High Blood Pressure Other     Cancer Other         LUNG,PROSTATE    Hearing Loss Other        SOCIAL HISTORY    Social History     Tobacco Use    Smoking status: Former Smoker     Packs/day: 2.25     Years: 31.00     Pack years: 69.75     Start date: 64     Quit date: 2/5/2006     Years since quitting: 15.4    Smokeless tobacco: Never Used   Vaping Use    Vaping Use: Never used   Substance Use Topics    Alcohol use: No     Alcohol/week: 0.0 standard drinks    Drug use: No       ALLERGIES    Allergies   Allergen Reactions    Povidone Iodine Rash     Surgery prep       MEDICATIONS    Current Outpatient Medications on File Prior to Visit   Medication Sig Dispense Refill    tiotropium (SPIRIVA RESPIMAT) 2.5 MCG/ACT AERS inhaler Inhale 2 puffs into the lungs daily 1 Inhaler 11    Ascorbic Acid (VITAMIN C PO) Take 500 mg by mouth daily       Cholecalciferol (VITAMIN D3 PO) Take by mouth      Acetylcysteine (NAC) 500 MG CAPS Take by mouth 2 times daily      guaiFENesin (ROBITUSSIN) 100 MG/5ML syrup Take 200 mg by mouth 3 times daily as needed for Cough      guaiFENesin (MUCINEX) 600 MG extended release tablet Take 1,200 mg by mouth 2 times daily as needed for Congestion      zinc 50 MG CAPS Take by mouth Twice weekly      acetylcysteine (MUCOMYST) 20 % nebulizer solution 4 mL via nebulizer 3 times daily 360 mL 5    albuterol sulfate HFA (VENTOLIN HFA) 108 (90 Base) MCG/ACT inhaler Inhale 2 puffs into the lungs every 6 hours as needed for Wheezing or Shortness of Breath 3 Inhaler 3    albuterol (PROVENTIL) (2.5 MG/3ML) 0.083% nebulizer solution Take 3 mLs by nebulization every 6 hours as needed for Wheezing or Shortness of Breath 360 vial 3    sodium chloride, Inhalant, 3 % nebulizer solution Take 3 mLs by nebulization as needed (Throat dryness, cough, wheezing) 500 mL 3    budesonide-formoterol (SYMBICORT) 160-4.5 MCG/ACT AERO Inhale 2 puffs into the lungs 2 times daily Rinse mouth after its use. 3 Inhaler 3    pravastatin (PRAVACHOL) 40 MG tablet Take 40 mg by mouth nightly       acetaminophen (TYLENOL) 650 MG extended release tablet Take 1 tablet by mouth as needed for Pain Do not take with hydrocodone/acetominophen 60 tablet 3    loperamide (IMODIUM) 2 MG capsule Take 2 mg by mouth daily        No current facility-administered medications on file prior to visit. REVIEW OF SYSTEMS    Constitutional: +fatigue Denies fever, chills, night sweats,  weight loss/gain, loss of appetite   Head: Denies headache,  dizziness, loss of consciousness  Eye: Denies visual changes  Ears: Denies hearing loss, tinnitus  Mouth/throat: Denies ulceration, dental caries , dysphagia  Respiratory: +cough, sputum production Denies hemoptysis, wheezing  Cardiovascular:Denies chest pain, palpitations, edema  Gastrointestinal: Denies nausea, vomiting, constipation, diarrhea, abdominal pain   ALDEN: Denies dysuria, frequency, urgency, hematuria  Musculoskeletal: +Myalgia  Endocrine: Denies polyuria, polydipsia, cold or heat intolerance  Dermatology: Denies skin rash, eczema, pruritis    Objective:      BP (!) 158/104 (Site: Left Upper Arm, Position: Sitting, Cuff Size: Medium Adult)   Pulse 80   Temp 98 °F (36.7 °C) (Temporal)   Resp 21   Ht 6' 2\" (1.88 m)   Wt 230 lb (104.3 kg)   BMI 29.53 kg/m²     Wt Readings from Last 3 Encounters:   07/12/21 230 lb (104.3 kg)   06/29/21 223 lb (101.2 kg)   06/11/21 214 lb (97.1 kg)         There is no immunization history on file for this patient. PHYSICAL EXAM    Wt Readings from Last 3 Encounters:   07/12/21 230 lb (104.3 kg)   06/29/21 223 lb (101.2 kg)   06/11/21 214 lb (97.1 kg)       General:  Awake, alert, no apparent distress. Appears stated age  [de-identified]: conjuctivae are clear without exudate or hemorrhage, anicteric sclera, moist oral mucosa. Voice hoarse  Chest:  Respirations regular, non-labored. Chest rise and fall equal bilaterally. Lungs clear, diminished to auscultation throughout all fields  Cardiovascular:  RRR,S1S2, no murmur or gallop. Abdomen:  Soft, non tender to palpation. Neurological: Awake, alert, oriented x4  Psychiatric:  Appropriate mood and affect  Extremities: non-traumatic in appearance.    Skin:  Warm and dry           LABS       CBC:   Lab Results   Component Value Date    WBC 9.4 06/07/2021    HGB 14.8 06/07/2021 HCT 44.1 06/07/2021    MCV 91.1 06/07/2021     06/07/2021     BMP:   Lab Results   Component Value Date     06/07/2021    K 4.0 06/07/2021     06/07/2021    CO2 29 06/07/2021    PHOS 4.1 07/08/2017    BUN 17 06/07/2021    CREATININE 0.78 06/07/2021     PT/INR: No results found for: PROTIME, INR  Prealbumin: No results found for: PREALBUMIN  Albumin:  Lab Results   Component Value Date    LABALBU 4.2 06/07/2021    LABALBU 4.6 12/06/2011     Sed Rate:No results found for: Magi Liner  CRP: No results found for: CRP  Micro: No results found for: BC   Hemoglobin A1C: No results found for: LABA1C    Assessment:     -Candida glabrata  -Invasive fungal infection    Patient Active Problem List   Diagnosis Code    Dysphonia R49.0    Alcohol abuse F10.10    FHx: smoking Z81.2    Deviated septum J34.2    Hypertrophy of nasal turbinates J34.3    Rhinitis J31.0    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 lymph node ([facility]) C20, C77.5    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 No adverse effects of medication reported. Continue follow up with Dr. Andressa Yen as scheduled. If cough with sputum production continues to worsen may benefit from repeat culture to ensure no secondary infection. Patient non-toxic at today's visit, states worsening has been gradual over last month or so. Advised him to discuss this with Dr. Andressa Yen at his visit in 2 weeks. -Repeat labs ordered for ongoing monitoring while on voriconazole. Have drawn prior to next visit. Education provided on signs/symptoms of worsening infection. Advised patient to call clinic or seek emergency care should these occur. Orders Placed This Encounter   Procedures    CBC     Standing Status:   Future     Standing Expiration Date:   7/12/2022    Comprehensive Metabolic Panel     Standing Status:   Future     Standing Expiration Date:   7/12/2022         Follow up: 2 months for re-evaluation. Call clinic with any needs or concerns prior to scheduled visit. Plan of care as above reviewed with patient who verbalizes understanding. All questions and concerns addressed prior to completion of visit. Please see attached Discharge Instructions    Written patient dismissal instructions given to patient and signed by patient or POA.              Electronically signed by JOSH Gauthier CNP on 7/12/2021 at 9:02 AM

## 2021-07-26 NOTE — TELEPHONE ENCOUNTER
Patient called in this morning stating he was seen by Sarmad Nieto CNP at [facility] on 07/12/21. Patient says ID suggested he may need another Culture to ensure no secondary infection. Progress note is in Epic. Please review and advise.

## 2021-07-28 NOTE — TELEPHONE ENCOUNTER
Please get patient scheduled for Wednesday afternoon [facility] for bronchoscopy with biopsies    Thank you    PFC Daniel, I will put in the order for this.     Thank you    PFC

## 2021-08-02 NOTE — PROGRESS NOTES
[address] EAR, NOSE AND THROAT  [facility] [place]  Dept: [phone]  Dept Fax: [phone]  Loc: [phone]    Vance Berrios is a 63 y.o. male who was referred by No ref. provider found for:  Chief Complaint   Patient presents with   Jennifer Eisenberg Post-Op Check     Patient is here for post op microlaryngoscopy and debrider resection. Complains of throat irritation   . HPI:     Patient presents for repeat evaluation after microlaryngoscopy and debrider resection with Dr. Juan Pablo Crowe. 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 Patient has reportedly been having noticed increased drainage from the throat than he was previously. He reports that it is green in color and has been ongoing for about 1 month. The patient states he is wondering if the voriconazole is still working since he has been taking it for about 2 years now. Infectious disease recommended following up with ENT for repeat culture if this persists. He reports that hyperbaric treatment is not an option for him due to his lung disease. Patient has continued on 3% saline nebulizers 3 times daily, but is unsure if it is helping. He has not been using 0.9% saline nebulizers very much. He started to use the Mucomyst again and he thinks that is making his throat feel slightly raw today. He states that his voice comes and goes at times. He states overall his voice has gradually improved, but there are days where he can barely whisper reportedly. He reports that he is chronically slightly short of breath, which is related to his COPD. He denies any worsening in his respiratory status in the last few weeks. He denies any other symptoms or concerns at this time.      Subjective:      REVIEW OF SYSTEMS:    A complete multi-organ review of systems was performed using a new patient questionnaire, and reviewed by me. ENT:  negative except as noted in HPI  CONSTITUTIONAL:  negative except as noted in HPI  EYES:  negative except as noted in HPI  RESPIRATORY:  negative except as noted in HPI  CARDIOVASCULAR:  negative except as noted in HPI  GASTROINTESTINAL:  negative except as noted in HPI  GENITOURINARY:  negative except as noted in HPI  MUSCULOSKELETAL:  negative except as noted in HPI  SKIN:  negative except as noted in HPI  ENDOCRINE/METABOLIC: negative except as noted in HPI  HEMATOLOGIC/LYMPHATIC:  negative except as noted in HPI  ALLERGY/IMMUN: negative except as noted in HPI  NEUROLOGICAL:  negative except as noted in HPI  BEHAVIOR/PSYCH:  negative except as noted in HPI    Past Medical History:  Past Medical History:   Diagnosis Date    Arthritis     [facility]     PAD [facility]peripheral artery disease) ([facility] [facility]     bilateral lower legs    Pneumonia 01/2017 1/2017 and 2/2017    Rectal cancer ([facility] [facility]     Squamous cell carcinoma of vocal cord ([facility] [facility]        Social History:    TOBACCO:   reports that he quit smoking about 15 years ago. He started smoking about 46 years ago. He has a 69.75 pack-year smoking history. He has never used smokeless tobacco.  ETOH:   reports no history of alcohol use. DRUGS:   reports no history of drug use.     Family History:       Problem Relation Age of Onset    Prostate Cancer Father 48    Cancer Father     Heart Disease Father     Diabetes Mother     Heart Disease Mother     Stroke Mother     Heart Disease Brother     High Blood Pressure Other     Cancer Other         LUNG,PROSTATE    Hearing Loss Other        Surgical History:  Past Surgical History:   Procedure Laterality Date    BRONCHOSCOPY N/A 11/18/2020    BRONCHOSCOPY performed by Loli Silva MD at [place] [address]  2016    EYE SURGERY      CROSS EYED    HAND SURGERY Bilateral 1995    [facility]    Biopsy    LARYNGOSCOPY N/A 7/12/2019    MICRO [facility] W/ [facility] 12/30/2019    DIRECT LARYNGOSCOPY WITH BIOPSY [facility] N[facility]A 10/16/2020    BRONCHOSCOPY, MICRO LARYNGOSCOPY WITH REMOVAL OF SUBMUCCOSAL NON NOEPLASTIC LESION JET VENTILATION [facility] N/A 11/18/2020    MICROLARYNGOSCOPY WITH BX & RESECTION OF OBSTRUCTING SOFT TISSUES WITH LASER & AIRWAY DEBRIDER, MICROLARYNGOPLASTY WITH RESECTION OF OBSTRUCTING SOFT TISSUE performed by Leonel Larose MD at [facility] [place] N/A 2/5/2021    MICROLARYNGOSCOPY WITH DEBRIDEMENT OF SUBGLOTTIC OBSTRUCTING TISSUES, BRONCHOSCOPY WITH SAME OF PROXIMAL TRACHEA, UPPER GI ENDOSCOPY FOR BALLOON DILATION [facility] N/A 6/11/2021    SUSPENSION MICROLARYNGOSCOPY WITH ABLATIONOF OBSTRUCTING SOFT TISSUES, DEBRIDER RESECTION OF OBSTRUCTING SUBGLOTTIC PROXIMAL TRACHEAL SOFT TISSUES WITH JET VENTILATION performed by Leonel Larose MD at [address] [facility] [address] RECTAL SURGERY  08/29/2016    [facility]    ROTATOR CUFF REPAIR Left 1990'S    TONSILLECTOMY AND ADENOIDECTOMY N/A 2/5/2021    ADVANCEMENT FLAP RECONSTRUCTION BILATERAL RESECTION OF POSTERIOR SUPRAGLOTTIS performed by Leonel Larose MD at [facility] [place] [place]        Objective: This is a 63 y.o. male. Patient is alert and oriented to person, place and time. Patient appears well developed, well nourished. Mood is happy with normal affect. Not obviously hearing impaired. Patient's voice is audibly hoarse on exam.     /84 (Site: Left Upper Arm, Position: Sitting)   Pulse 92   Temp 97.6 °F (36.4 °C) (Infrared)   Resp 16   Ht 6' 2\" (1.88 m)   Wt 227 lb (103 kg)   BMI 29.15 kg/m²     Head:   Normocephalic, atraumatic.   No obvious masses or lesions noted.    Mouth/Throat:  Lips, tongue and oral cavity: Normal. No masses or lesions noted   Dentition: Upper dentures, edentulous lower. no malocclusion  Oral mucosa: moist  Tonsils: absent  Oropharynx: normal-appearing mucosa. Not significantly erythematous appearing. No obvious purulent drainage  Hard and soft palates: symmetrical and intact. Salivary glands: not enlarged and no tenderness to palpation. Uvula: midline, no obvious lesions   Gag reflex is present. Neck: Trachea midline. Palpation of neck is consistent with post radiation changes no obviously palpable masses are noted  Lymphatic: No cervical lymphadenopathy noted. Lungs: Normal effort of breathing, not obviously distressed. Neuro: Cranial nerves II-XII grossly intact. Assessment/Plan:     Diagnosis Orders   1. Invasive fungal infection     2. Mixed simple and mucopurulent chronic bronchitis (HCC)     3. Radiation adverse effect, subsequent encounter     4. Airway obstruction         The patient is a 63 y.o. male that presents for follow-up and evaluation of reported increased sputum production. Plan for patient to go to [place] [address] for outpatient flexible laryngoscopy and therapeutic bronchoscopy with lavage as, biopsies, and culture under topical anesthesia with Dr. Esme Najera tomorrow. The patient denies any other questions or concerns regarding the procedure at this time as he has been through this many times before. We will await testing results to coordinate the next steps in his management. Patient expressed understanding the plan and thanked me.     (Please note that portions of this note may have been completed with a voice recognition program.  Efforts were made to edit the dictation but occasionally words are mis-transcribed.)    Electronically signed by ASHWIN Vences on 8/9/2021 at 9:37 AM

## 2021-08-03 NOTE — PROCEDURES
Wagner Zhang is a 63 y.o. 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 The patient is on chronic antifungal therapy and his infectious disease doctor wishes to get corroborative support from soft tissue biopsies that he still has an invasive fungal disease process. As such he comes to the procedure room for a flexible laryngoscopy and bronchoscopy with biopsies of the proximal membranous trachea and subglottis along with culture from tissue of the 2 locations. The indications benefits limitations and risks of proceeding in this manner were carefully explained to the patient prior to obtaining his informed consent. This was obtained by me and witnessed by the OR staff and a timeout was performed verifying the patient's identity and planned procedure. No diagnosis found. Past Medical History:   Diagnosis Date    Arthritis     [facility] [address]     [facility]peripheral [facility] disease) ([facility]     bilateral lower legs    Pneumonia 01/2017 1/2017 and 2/2017    Rectal cancer ([facility])     Squamous cell carcinoma of vocal cord (HCC)      Blood pressure (!) 165/95, pulse 66, temperature 98.2 °F (36.8 °C), temperature source Skin, weight 221 lb 14.4 oz (100.7 kg). Procedures  Diagnostic flexible laryngoscopy and therapeutic bronchoscopy with lavage, biopsies, and biopsies for culture under topical anesthesia administered by the surgeon:    With the consent performed and a timeout completed, I sprayed the patient's right nasal airway with atomized Afrin decongestant followed by atomized 4% lidocaine.   I then passed a video bronchoscope transnasally through these tissues into the hypopharynx and anesthetized the larynx and trachea with drip 4% anesthesia in the usual manner. Of note is the fact that the patient had some vibratory voice on this visit, something he had not had previously. After anesthetizing these tissues I carefully examined the patient's airway and performed a laryngeal and tracheal lavage with sterile saline to remove the surface secretions that tenaciously attached himself to this abnormal part of his airway. I then used cup forceps to obtain histopathology specimens from the glottis and the posterior membranous trachea at the juncture with the distal subglottis for placement into formalin for permanent section. In addition I took samples from both regions for culture. These were combined in saline with there were taken off of the cup forceps to a single culture cotton tip within culture broth. Bleeding was self-limited. The patient's airway was cleared of debris. The patient tolerated the procedure well. Disposition: The patient was discharged from the clinic for follow-up in 2 months for an interval flexible laryngoscopy/bronchoscopy and to plan further care as indicated. His infectious disease disposition will be based on the cultures of his soft tissues samples.       Mariluz Hancock MD  8/3/2021

## 2021-08-19 NOTE — TELEPHONE ENCOUNTER
Corinne Peon called requesting a refill on the following medications:  Requested Prescriptions     Pending Prescriptions Disp Refills    budesonide-formoterol (SYMBICORT) 160-4.5 MCG/ACT AERO 3 Inhaler 3     Sig: Inhale 2 puffs into the lungs 2 times daily Rinse mouth after its use. Pharmacy verified:[facility]  . pv      Date of last visit:   Date of next visit (if applicable): 86/59/0562

## 2021-09-13 NOTE — PROGRESS NOTES
[facility] Infectious Disease         Consult Note      Gael Ureña  MEDICAL RECORD NUMBER:  [id number]  AGE: 63 y.o. GENDER: male  : 1960  EPISODE DATE:  2021    Subjective:     Chief Complaint   Patient presents with    Follow-up     Infection/ Candida glabrata         HISTORY of PRESENT ILLNESS HPI     Gael Ureña is a 63 y.o. male New patient , who presents today for infectious disease evaluation. 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 He presents today for ongoing monitoring of medication. At last visit patient voiced concerns regarding  increased cough with mucus production over the last month. Dr. Becky Lackey completed flexible laryngoscopy and bronchoscopy with biopsies of the proximal membranous trachea and subglottis along with culture from tissue of the 2 locations on 8/3/2021. Culture reports reviewed, revealing Staph Aureus. Patient states he has appointment next week to review with Dr. Becky Lackey. He notes that he was on Keflex from - regarding wound to right lower leg. States that his cough and sputum production has improved, returning to baseline. He does note ongoing irritation to throat, however. He denies any hemoptysis, has rare use of rescue inhaler. He reports a good appetite, has ongoing fatigue-at baseline. He denies any fever or chills. Denies any further needs or concerns.       PAST MEDICAL HISTORY        Diagnosis Date    Arthritis     COPD (chronic obstructive pulmonary disease) ([address]     PAD (peripheral artery disease) ([address]     bilateral lower legs    Pneumonia 2017 and 2017    Rectal cancer ([address]     Squamous cell carcinoma of vocal cord ([address]        PAST SURGICAL HISTORY    Past Surgical History:   Procedure Laterality Date    BRONCHOSCOPY N/A 2020    BRONCHOSCOPY performed by Alexandr Garcia MD at [place] [address]  2016    EYE SURGERY      CROSS EYED    HAND SURGERY Bilateral 1995    KNEE ARTHROSCOPY Right 1996    LARYNGOSCOPY  07/12/2017    Biopsy    LARYNGOSCOPY N/A 7/12/2019    MICRO LARYNGOSCOPY W/ BIOPSY performed by Radha Uribe MD at [address] [place] [address] Av E 12/30/2019    DIRECT LARYNGOSCOPY WITH BIOPSY performed by Radha Uribe MD at [address] [place] [address] E 10/16/2020    BRONCHOSCOPY, MICRO LARYNGOSCOPY WITH REMOVAL OF SUBMUCCOSAL NON NOEPLASTIC LESION JET VENTILATION performed by Alexandr Garcia MD at [facility] N/A 11/18/2020    MICROLARYNGOSCOPY WITH BX & RESECTION OF OBSTRUCTING SOFT TISSUES WITH LASER & AIRWAY DEBRIDER, MICROLARYNGOPLASTY WITH RESECTION OF OBSTRUCTING SOFT TISSUE performed by Alexandr Garcia MD at [facility] [employer] [facility] N/A 2/5/2021    MICROLARYNGOSCOPY WITH DEBRIDEMENT OF SUBGLOTTIC OBSTRUCTING TISSUES, BRONCHOSCOPY WITH SAME OF PROXIMAL TRACHEA, UPPER GI ENDOSCOPY FOR BALLOON DILATION performed by Alexandr Garcia MD at [facility] N/A 6/11/2021    SUSPENSION MICROLARYNGOSCOPY WITH ABLATIONOF OBSTRUCTING SOFT TISSUES, DEBRIDER RESECTION OF OBSTRUCTING SUBGLOTTIC PROXIMAL TRACHEAL SOFT TISSUES WITH JET VENTILATION performed by Alexandr Garcia MD at [address] [facility] [address] RECTAL SURGERY  08/29/2016    [employer] [facility]    ROTATOR CUFF REPAIR Left 1990'S    TONSILLECTOMY AND ADENOIDECTOMY N/A 2/5/2021    ADVANCEMENT FLAP RECONSTRUCTION BILATERAL RESECTION OF POSTERIOR SUPRAGLOTTIS performed by Alexandr Garcia MD at [address] [place], [place] [address]y HISTORY    Family History   Problem Relation Age of Onset    Prostate Cancer Father 48    Cancer Father     Heart Disease Father     Diabetes Mother     Heart Disease Mother     Stroke Mother     Heart Disease Brother     High Blood Pressure Other     Cancer Other         LUNG,PROSTATE    Hearing Loss Other SOCIAL HISTORY    Social History     Tobacco Use    Smoking status: Former Smoker     Packs/day: 2.25     Years: 31.00     Pack years: 69.75     Start date: 1975     Quit date: 2/5/2006     Years since quitting: 15.6    Smokeless tobacco: Never Used   Vaping Use    Vaping Use: Never used   Substance Use Topics    Alcohol use: No     Alcohol/week: 0.0 standard drinks    Drug use: No       ALLERGIES    Allergies   Allergen Reactions    Povidone Iodine Rash     Surgery prep       MEDICATIONS    Current Outpatient Medications on File Prior to Visit   Medication Sig Dispense Refill    budesonide-formoterol (SYMBICORT) 160-4.5 MCG/ACT AERO Inhale 2 puffs into the lungs 2 times daily Rinse mouth after its use.  3 Inhaler 3    voriconazole (VFEND) 200 MG tablet Take 200 mg by mouth 2 times daily      tiotropium (SPIRIVA RESPIMAT) 2.5 MCG/ACT AERS inhaler Inhale 2 puffs into the lungs daily 1 Inhaler 11    Ascorbic Acid (VITAMIN C PO) Take 500 mg by mouth daily       Cholecalciferol (VITAMIN D3 PO) Take by mouth      Acetylcysteine (NAC) 500 MG CAPS Take by mouth 2 times daily      guaiFENesin (ROBITUSSIN) 100 MG/5ML syrup Take 200 mg by mouth 3 times daily as needed for Cough      guaiFENesin (MUCINEX) 600 MG extended release tablet Take 1,200 mg by mouth 2 times daily as needed for Congestion      zinc 50 MG CAPS Take by mouth Twice weekly      acetylcysteine (MUCOMYST) 20 % nebulizer solution 4 mL via nebulizer 3 times daily 360 mL 5    albuterol sulfate HFA (VENTOLIN HFA) 108 (90 Base) MCG/ACT inhaler Inhale 2 puffs into the lungs every 6 hours as needed for Wheezing or Shortness of Breath 3 Inhaler 3    albuterol (PROVENTIL) (2.5 MG/3ML) 0.083% nebulizer solution Take 3 mLs by nebulization every 6 hours as needed for Wheezing or Shortness of Breath 360 vial 3    sodium chloride, Inhalant, 3 % nebulizer solution Take 3 mLs by nebulization as needed (Throat dryness, cough, wheezing) 500 mL 3    pravastatin (PRAVACHOL) 40 MG tablet Take 40 mg by mouth nightly       acetaminophen (TYLENOL) 650 MG extended release tablet Take 1 tablet by mouth as needed for Pain Do not take with hydrocodone/acetominophen 60 tablet 3    loperamide (IMODIUM) 2 MG capsule Take 2 mg by mouth daily        No current facility-administered medications on file prior to visit. REVIEW OF SYSTEMS    Constitutional: +fatigue Denies fever, chills, night sweats,  weight loss/gain, loss of appetite   Head: Denies headache,  dizziness, loss of consciousness  Eye: Denies visual changes  Ears: Denies hearing loss, tinnitus  Mouth/throat: Denies ulceration, dental caries , dysphagia  Respiratory: +cough, sputum production-chronic, at baseline Denies hemoptysis, wheezing  Cardiovascular:Denies chest pain, palpitations, edema  Gastrointestinal: Denies nausea, vomiting, constipation, diarrhea, abdominal pain   ALDEN: Denies dysuria, frequency, urgency, hematuria  Musculoskeletal: +Myalgia  Endocrine: Denies polyuria, polydipsia, cold or heat intolerance  Dermatology: Denies skin rash, eczema, pruritis    Objective:      /76 (Site: Left Upper Arm, Position: Sitting, Cuff Size: Medium Adult)   Pulse 80   Temp 98.9 °F (37.2 °C) (Temporal)   Resp 20   Ht 6' 2\" (1.88 m)   Wt 225 lb (102.1 kg)   BMI 28.89 kg/m²     Wt Readings from Last 3 Encounters:   09/13/21 225 lb (102.1 kg)   08/03/21 221 lb 14.4 oz (100.7 kg)   08/02/21 227 lb (103 kg)       Immunization History   Administered Date(s) Administered    COVID-19, Moderna, PF, 100mcg/0.5mL 02/06/2021, 03/06/2021       PHYSICAL EXAM    Wt Readings from Last 3 Encounters:   09/13/21 225 lb (102.1 kg)   08/03/21 221 lb 14.4 oz (100.7 kg)   08/02/21 227 lb (103 kg)       General:  Awake, alert, no apparent distress. Appears stated age  [de-identified]: conjuctivae are clear without exudate or hemorrhage, anicteric sclera, moist oral mucosa.  Voice hoarse  Chest:  Respirations regular, non-labored. Chest rise and fall equal bilaterally. Lungs clear, diminished to auscultation throughout all fields  Cardiovascular:  RRR,S1S2, no murmur or gallop. Abdomen:  Soft, non tender to palpation. Neurological: Awake, alert, oriented x4  Psychiatric:  Appropriate mood and affect  Extremities: non-traumatic in appearance. Skin:  Warm and dry         LABS      Component Collected Lab   Anaerobic Culture 08/03/2021 10:30 AM [address] [facility]   Culture yielded moderate mixed growth which included anaerobic gram positive cocci. If a true mixed aerobic and anaerobic infection is suspected, then broad spectrum empiric antibiotic therapy is indicated and should include coverage for anaerobic organisms. Gram Stain Result 08/03/2021 10:30  [facility]   No segmented neutrophils observed. Rare epithelial cells observed. Few gram positive cocci in pairs and clusters. Rare gram negative bacilli. Organism Abnormal  08/03/2021 10:30  [facility]   Staphylococcus aureus    Aerobic Culture 08/03/2021 10:30 AM [facility]   moderate growth In the treatment of gram positive infections, GENTAMICIN should be CONSIDERED a SYNERGYSTIC agent ONLY. Ciprofloxacin and Levofloxacin, regardless of in vitro sensitivity, should not be used for staphylococcal infections other than uncomplicated lower UTIs. Moxifloxacin, regardless of in vitro sensitivity, should not be used for staphylococcal infections. Testing Performed By    [address] Name Director Address Valid Date Range   005-[facility] - [address] Shaun Peter MD [address] [place] [id number] 08/30/17 0855-Present   Narrative  Performed by: [facility]  Source: esophagus       Site: swab          Current Antibiotics: not stated   Susceptibility    Staphylococcus aureus (1)    Antibiotic Interpretation JOANNE Status    gentamicin Sensitive <=0.5 mcg/mL Final    oxacillin Sensitive 0.5 mcg/mL Final    clindamycin Sensitive <=0.25 mcg/mL Final    trimethoprim-sulfamethoxazole Sensitive <=10 mcg/mL Final    tetracycline Sensitive <=1 mcg/mL Final           CBC:   Lab Results   Component Value Date    WBC 9.4 06/07/2021    HGB 14.8 06/07/2021    HCT 44.1 06/07/2021    MCV 91.1 06/07/2021     06/07/2021     BMP:   Lab Results   Component Value Date     06/07/2021    K 4.0 06/07/2021     06/07/2021    CO2 29 06/07/2021    PHOS 4.1 07/08/2017    BUN 17 06/07/2021    CREATININE 0.78 06/07/2021     PT/INR: No results found for: PROTIME, INR  Prealbumin: No results found for: PREALBUMIN  Albumin:  Lab Results   Component Value Date    LABALBU 4.2 06/07/2021    LABALBU 4.6 12/06/2011     Sed Rate:No results found for: Valdemar Mccullough  CRP: No results found for: CRP  Micro: No results found for: BC   Hemoglobin A1C: No results found for: LABA1C    Assessment:     -Candida glabrata  -Invasive fungal infection  -Staph aureus PNA    Patient Active Problem List   Diagnosis Code    Dysphonia R49.0    Alcohol abuse F10.10    FHx: smoking Z81.2    Deviated septum J34.2    Hypertrophy of nasal turbinates J34.3    Rhinitis J31.0    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 lymph node (HCC) C20, C77.5    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 No adverse effects of medication reported. Patient to call when refill is needed.    -Staph aureus PNA- Symptoms improved with recent treatment with Keflex for leg wound. Patient states that he is overall at his baseline respiratory status other than irritation to throat. Patient to have repeat procedure with Dr. Albertina Felty next week. Recommend repeat culture next week with planned procedure.      -Repeat labs ordered for ongoing monitoring while on voriconazole. Have drawn prior to next visit. Education provided on signs/symptoms of worsening infection. Advised patient to call clinic or seek emergency care should these occur. Orders Placed This Encounter   Procedures    CBC Auto Differential     Standing Status:   Future     Standing Expiration Date:   9/13/2022    Comprehensive Metabolic Panel     Standing Status:   Future     Standing Expiration Date:   9/13/2022       Follow up: 3 months for re-evaluation. Call clinic with any needs or concerns prior to scheduled visit. Plan of care as above reviewed with patient who verbalizes understanding. All questions and concerns addressed prior to completion of visit. Please see attached Discharge Instructions    Written patient dismissal instructions given to patient and signed by patient or POA.              Electronically signed by JOSH Kulkarni CNP on 9/13/2021 at 10:10 AM

## 2021-09-13 NOTE — PATIENT INSTRUCTIONS
Visit Discharge/Physician Orders:  Continue voriconazole    Labs: have drawn in 3 months    Keep next scheduled appointment. Please give 24 hour notice if unable to keep appointment. [phone]    If you experience any of the following, please call the Infectious Disease Clinic during business hours: [phone]     *Increase in pain   *Temperature over 101   *Increase in drainage from your wound or a foul odor   *Uncontrolled swelling   *Need for compression bandage changes due to slippage, breakthrough drainage    If you need medical attention outside of business hours, please contact your Primary Care Doctor or go to the nearest emergency room.
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
## 2021-09-14 NOTE — Clinical Note
[address] and Throat  [address] [facility]  Phone: [phone]  Fax: Terrell Mccann MD    September 14, 2021     Arti Gallegos, JOSH - [id number]    Patient: Alexandro Torres   MR Number: [id number]   YOB: 1960   Date of Visit: 9/14/2021       Dear Arti Gallegos:    Thank you for referring Sabrina Lan to me for evaluation/treatment. Below are the relevant portions of my assessment and plan of care. If you have questions, please do not hesitate to call me. I look forward to following Eliana Palencia along with you.     Sincerely,      Milena New MD

## 2021-09-14 NOTE — PROGRESS NOTES
Bronchoscopy Procedure Note      Ana Maria Dietz. Severiano Love MD, [facility]    Date of Operation: 9/14/2021    Pre-op Diagnosis: Airway obstruction  Radiation fibrosis  Invasive fungal laryngotracheitis  Glottic subglottic and proximal tracheal stenosis  Laryngotracheal catarrh    Post-op Diagnosis: Same    Surgeon: Griselda Amin MD    Anesthesia: Topical lidocaine; surgeon instilled    Operation: Flexible fiberoptic bronchoscopy, therapeutic with removal of obstructing secretions    Estimated Blood Loss: Minimal    Complications: None    Indications and History:  The patient is a 63 y.o. male with a history of laryngotracheal stenosis related to radiation fibrosis and locally invasive fungal disease. The patient recently started getting progressively air hungry with increased secretion thickness and inability to exert himself without severe dyspnea and noisy breathing. He contacted me for an earlier visit and I gave him a same day slot to get into see me this afternoon. The risks, benefits, complications, treatment options and expected outcomes were discussed with the patient. The possibilities of reaction to medication, pulmonary aspiration, perforation of a viscus, bleeding, failure to diagnose a condition and creating a complication requiring transfusion or operation were discussed with the patient who freely signed the consent. Description of Procedure: The patient was taken to endoscopy suite, identified as [facility] and the procedure verified as Flexible Fiberoptic Bronchoscopy. A Time Out was held and the above information confirmed. After the induction of topical nasopharyngeal anesthesia, the patient was placed in appropriate position and the bronchoscope was passed through the right nasal airway. The vocal cords were visualized and total of 10 ml of 2% Lidocaine was topically placed onto the cords. The cords were coated with thick secretions that were partially obstructing the airway.  I instilled sterile saline into the larynx in order to enable the suctioning away of the obstructing secretions from the glottis subglottis and proximal trachea. Gradually patient's breathing improved demonstrating clinical benefit. Findings:   1. 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 Obstructive catarrh of the larynx subglottis and proximal trachea likely is a manifestation of locally invasive microbial disease  3. Stenosis of the glottis and subglottis secondary to radiation fibrosis      The Patient tolerated endoscopy acceptably well. No complications were detected. Recommendation:    1. Follow-up endoscopy plans: To the operating room on Friday 2 days from today for more definitive airway widening including balloon dilation of the laryngotracheal complex and esophageal inlet dilation given the fibrosis in that region as well  2. I will get biopsies for culture on his next surgical treatment to rule in or out residual invasive fungal disease. 3.  I will also inject steroids into the any aspect of the laryngotracheal complex that she hears from the balloon dilation to reduce the rate and extent of cicatricial scar recurrence. I explained all this in detail to the patient to his satisfaction. He reported being pleased with the outcome of his care today and pleased with the near future plan.       Griselda Amin MD

## 2021-09-16 NOTE — PROGRESS NOTES
I called the pt to reschedule her procedure with Dr. Kahn. Pt states the area has gotten smaller and she would like to wait longer for the procedure. She will call if she wants to reschedule.    Patient has some difficulty/pain with talking. He states his health\surg history and medications have not changed since his surgery 6/11. He had a procedure with Dr Baron Rebolledo 9/14. He states he is aware of his medication and surgical instructions for 9/17. Patient denies any questions or concerns at this time.

## 2021-09-17 NOTE — ANESTHESIA PRE PROCEDURE
Department of Anesthesiology  Preprocedure Note       Name:  Genevieve Mage   Age:  63 y.o.  :  1960                                          MRN:  [id number]         Date:  2021      Surgeon: Neal Villela):  Debra Platt MD    Procedure: Procedure(s):  LARYNGOSCOPY AND ESOPHAGOSCOPY WITH BIOPSY AND DILATION WITH JET VENTILATION    Medications prior to admission:   Prior to Admission medications    Medication Sig Start Date End Date Taking?  Authorizing Provider   amoxicillin-clavulanate (AUGMENTIN) 875-125 MG per tablet Take 1 tablet by mouth 2 times daily for 10 days 21  ASHWIN Miller   budesonide-formoterol [facility]) 160-4.5 MCG/ACT AERO Inhale 2 puffs into the lungs 2 times daily Rinse mouth after its use. 21  Hersey Pallas Neumeier, APRN - CNP   voriconazole (VFEND) 200 MG tablet Take 200 mg by mouth 2 times daily 19   Historical Provider, MD   tiotropium (SPIRIVA RESPIMAT) 2.5 MCG/ACT AERS inhaler Inhale 2 puffs into the lungs daily 21   Hersey Pallas Neumeier, APRN - CNP   Ascorbic Acid (VITAMIN C PO) Take 500 mg by mouth daily     Historical Provider, MD   Cholecalciferol (VITAMIN D3 PO) Take by mouth    Historical Provider, MD   Acetylcysteine (NAC) 500 MG CAPS Take by mouth 2 times daily    Historical Provider, MD ramirezaiFENesin (ROBITUSSIN) 100 MG/5ML syrup Take 200 mg by mouth 3 times daily as needed for Cough    Historical Provider, MD mendozaFENesin (MUCINEX) 600 MG extended release tablet Take 1,200 mg by mouth 2 times daily as needed for Congestion    Historical Provider, MD   zinc 50 MG CAPS Take by mouth Twice weekly    Historical Provider, MD   acetylcysteine (MUCOMYST) 20 % nebulizer solution 4 mL via nebulizer 3 times daily 20   ASHWIN Miller   albuterol sulfate HFA (VENTOLIN HFA) 108 (90 Base) MCG/ACT inhaler Inhale 2 puffs into the lungs every 6 hours as needed for Wheezing or Shortness of Breath 20   JOSH Ziegler CNP albuterol (PROVENTIL) (2.5 MG/3ML) 0.083% nebulizer solution Take 3 mLs by nebulization every 6 hours as needed for Wheezing or Shortness of Breath 12/7/20 12/7/21  JOSH Meraz - CNP   sodium chloride, Inhalant, 3 % nebulizer solution Take 3 mLs by nebulization as needed (Throat dryness, cough, wheezing) 10/19/20   ASHWIN Hanks   pravastatin (PRAVACHOL) 40 MG tablet Take 40 mg by mouth nightly  7/23/20   Historical Provider, MD   acetaminophen (TYLENOL) 650 MG extended release tablet Take 1 tablet by mouth as needed for Pain Do not take with hydrocodone/acetominophen 12/30/19   Dannielle Wise MD   loperamide (IMODIUM) 2 MG capsule Take 2 mg by mouth daily     Historical Provider, MD       Current medications:    No current facility-administered medications for this visit. No current outpatient medications on file. Facility-Administered Medications Ordered in Other Visits   Medication Dose Route Frequency Provider Last Rate Last Admin    0.9 % sodium chloride infusion  25 mL IntraVENous PRN Beth Centeno  mL/hr at 09/17/21 0825 25 mL at 09/17/21 0825    sodium chloride flush 0.9 % injection 5-40 mL  5-40 mL IntraVENous 2 times per day Beth Centeno MD        sodium chloride flush 0.9 % injection 5-40 mL  5-40 mL IntraVENous PRN Beth Centeno MD        ceFAZolin (ANCEF) 2000 mg in dextrose 5 % 50 mL IVPB  2,000 mg IntraVENous Once Beth Centeno MD        ceFAZolin (ANCEF) 2-4 GM/100ML-% IVPB (premix) SOLN                Allergies:     Allergies   Allergen Reactions    Povidone Iodine Rash     Surgery prep       Problem List:    Patient Active Problem List   Diagnosis Code    Dysphonia R49.0    Alcohol abuse F10.10    FHx: smoking Z81.2    Deviated septum J34.2    Hypertrophy of nasal turbinates J34.3    Rhinitis J31.0    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 lymph node ([facility]) C20, C77.5    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 [address]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    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 D38.0    Infection due to Candida glabrata B37.9    Subglottic stenosis not due to surgery J38.6    Invasive fungal infection B49    Stage 3 severe COPD by GOLD classification ([facility]) J44.9    Hoarseness R49.0    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    Laryngeal stenosis J38.6    Airway obstruction J98.8    Airway stricture J98.8    Bullous emphysema ([facility]) J43.9    Refractory obstruction of nasal airway J34.89    Dependence on continuous supplemental oxygen Z99.81       Past Medical History:        Diagnosis Date    Arthritis     COPD (chronic obstructive pulmonary disease) ([facility])     PAD (peripheral artery disease) ([facility])     bilateral lower legs    Pneumonia 01/2017 1/2017 and 2/2017    Rectal cancer ([facility] [address]     Squamous cell carcinoma of vocal cord [facility])        Past Surgical History:        Procedure Laterality Date    BRONCHOSCOPY N/A 11/18/2020    BRONCHOSCOPY performed by Loi Vincent MD at [address] [place] [address]  2016    COLOSTOMY  2016    EYE SURGERY      CROSS EYED    HAND SURGERY Bilateral 1995    KNEE ARTHROSCOPY Right 1996    LARYNGOSCOPY  07/12/2017    Biopsy    LARYNGOSCOPY N/A 7/12/2019    MICRO LARYNGOSCOPY W/ BIOPSY performed by Sumaya Acharya MD at [address] [place] [address] E 12/30/2019    DIRECT LARYNGOSCOPY WITH BIOPSY performed by Sumaya Acharya MD at [address] [place] [address] E 10/16/2020    BRONCHOSCOPY, MICRO LARYNGOSCOPY WITH REMOVAL OF SUBMUCCOSAL NON NOEPLASTIC LESION JET VENTILATION performed by Deidra Pringle MD at [facility] [employer] N/A 11/18/2020    MICROLARYNGOSCOPY WITH BX & RESECTION OF OBSTRUCTING SOFT TISSUES WITH LASER & AIRWAY DEBRIDER, MICROLARYNGOPLASTY WITH RESECTION OF OBSTRUCTING SOFT TISSUE performed by Deidra Pringle MD at [facility] [employer] N/A 2/5/2021    MICROLARYNGOSCOPY WITH DEBRIDEMENT OF SUBGLOTTIC OBSTRUCTING TISSUES, BRONCHOSCOPY WITH SAME OF PROXIMAL TRACHEA, UPPER GI ENDOSCOPY FOR BALLOON DILATION [facility] 6/11/2021    SUSPENSION MICROLARYNGOSCOPY WITH ABLATIONOF OBSTRUCTING SOFT TISSUES, DEBRIDER RESECTION OF OBSTRUCTING SUBGLOTTIC PROXIMAL TRACHEAL SOFT TISSUES WITH JET VENTILATION performed by Deidra Pringle MD at [address] [facility] [address] RECTAL SURGERY  08/29/2016    [place] [place]    ROTATOR CUFF REPAIR Left 1990'S    TONSILLECTOMY AND ADENOIDECTOMY N/A 2/5/2021    ADVANCEMENT FLAP RECONSTRUCTION BILATERAL RESECTION OF POSTERIOR SUPRAGLOTTIS performed by Deidra Pringle MD at [facility] History:    Social History     Tobacco Use    Smoking status: Former Smoker     Packs/day: 2.25     Years: 31.00     Pack years: 69.75     Start date: 1975     Quit date: 2/5/2006     Years since quitting: 15.6    Smokeless tobacco: Never Used   Substance Use Topics    Alcohol use: No     Alcohol/week: 0.0 standard drinks                                Counseling given: Not Answered      Vital Signs (Current): There were no vitals filed for this visit.                                            BP Readings from Last 3 Encounters:   09/17/21 (!) 161/88   09/14/21 (!) 146/80   09/13/21 134/76       NPO Status:                                                                                 BMI:   Wt Readings from Last 3 Encounters:   09/17/21 214 lb 9.6 oz (97.3 kg)   09/14/21 218 lb (98.9 kg)   09/13/21 225 lb (102.1 kg)     There is no height or weight on file to calculate BMI.    CBC:   Lab Results   Component Value Date    WBC 9.4 06/07/2021    RBC 4.84 06/07/2021    RBC 4.28 12/06/2011    HGB 14.8 06/07/2021    HCT 44.1 06/07/2021    MCV 91.1 06/07/2021    RDW 14.4 06/07/2021     06/07/2021       CMP:   Lab Results   Component Value Date     06/07/2021    K 4.0 06/07/2021     06/07/2021    CO2 29 06/07/2021    BUN 17 06/07/2021    CREATININE 0.78 06/07/2021    AGRATIO 1.8 06/07/2021    LABGLOM >90 01/29/2021    GLUCOSE 108 06/07/2021    PROT 6.6 06/07/2021    CALCIUM 8.60 06/07/2021    BILITOT 0.5 06/07/2021    ALKPHOS 170 06/07/2021    AST 24 09/17/2021    ALT 23 09/17/2021       POC Tests: No results for input(s): POCGLU, POCNA, POCK, POCCL, POCBUN, POCHEMO, POCHCT in the last 72 hours. Coags: No results found for: PROTIME, INR, APTT    HCG (If Applicable): No results found for: PREGTESTUR, PREGSERUM, HCG, HCGQUANT     ABGs: No results found for: PHART, PO2ART, RSK6WSI, TOP7OXU, BEART, W0LKXCAW     Type & Screen (If Applicable):  No results found for: LABABO, LABRH    Drug/Infectious Status (If Applicable):  No results found for: HIV, HEPCAB    COVID-19 Screening (If Applicable):   Lab Results   Component Value Date    COVID19 Not Detected 01/29/2021         Anesthesia Evaluation  Patient summary reviewed and Nursing notes reviewed no history of anesthetic complications:   Airway: Mallampati: III  TM distance: >3 FB   Neck ROM: full  Mouth opening: > = 3 FB Dental:    (+) upper dentures      Pulmonary:   (+) pneumonia:  COPD: severe,  shortness of breath: chronic,  recent URI:  rhonchi,  decreased breath sounds,                            ROS comment: Former smoker     Cardiovascular:  Exercise tolerance: poor (<4 METS),           Rhythm: regular  Rate: normal                    Neuro/Psych:   (+) psychiatric history:            GI/Hepatic/Renal:   (+) GERD:,           Endo/Other:    (+) malignancy/cancer. Abdominal:             Vascular: Other Findings:               Anesthesia Plan      general     ASA 4     (Jet Ventilation)  Induction: intravenous. MIPS: Postoperative opioids intended and Prophylactic antiemetics administered. Anesthetic plan and risks discussed with patient and spouse.       Plan discussed with CRNA and surgical team.                  Swapna Olivera MD   9/17/2021

## 2021-09-17 NOTE — BRIEF OP NOTE
Brief Postoperative Note      Patient: Mayito Ramos  YOB: 1960  MRN: [id number]    Date of Procedure: 9/17/2021    Pre-Op Diagnosis: INVASIVE FUNGAL INFECTION, INFECTION DO TO BRADLEY GLABRATA DYSPHONIA, TRACHEAL STENOSIS, AIRWAY OBSTRUCTION, RADIATION INDUCED FIBROSIS OF SOF TISSUES    Post-Op Diagnosis: Same       Procedure(s):  LARYNGOSCOPY AND ESOPHAGOSCOPY WITH BIOPSY AND DILATION WITH JET VENTILATION   Added procedure list: Laryngoscopy and bronchoscopy with dilation debridement steroid injections and biopsies; esophagoscopy with balloon dilation    Surgeon(s):  Candance Hailey, MD    Assistant:  * No surgical staff found *    Anesthesia: General    Estimated Blood Loss (mL): Minimal    Complications: None    Specimens:   ID Type Source Tests Collected by Time Destination   A : Anterior subglottis Tissue Mouth SURGICAL PATHOLOGY, CULTURE, FUNGUS, GRAM STAIN, CULTURE, ANAEROBIC AND AEROBIC Candance Hailey, MD 9/17/2021 1118        Implants:  * No implants in log *      Drains:   Colostomy LLQ (Active)       Findings: 1. Moderate accumulation of fibrinous debris and signs of local infection  2. Biopsies taken of the worst area anterior inferiorly to the right  3.  Definite posterior glottic web detected; lysed and injected with steroids    Electronically signed by Candance Hailey, MD on 9/17/2021 at 11:55 AM

## 2021-09-17 NOTE — DISCHARGE SUMMARY
Physician Discharge Summary     Patient ID:  Yordan Grayson  [id number]  75 y.o.  1960    Admit date: 9/17/2021    Discharge date and time: No discharge date for patient encounter. Admitting Physician: Marie Miller MD     Discharge Physician: Same    Admission Diagnoses: INVASIVE FUNGAL INFECTION, INFECTION DO TO BRADLEY GLABRATA DYSPHONIA, TRACHEAL STENOSIS, AIRWAY OBSTRUCTION, RADIATION INDUCED FIBROSIS OF SOF TISSUES    Discharge Diagnoses: Same    Admission Condition: good    Discharged Condition: good    Indication for Admission: IV hydration and pain control    Hospital Course: Uneventful    Consults: none    Significant Diagnostic Studies: Stat portable chest x-ray that was negative for pneumothorax    Treatments: surgery: Suspension laryngoscopy, bronchoscopy, and esophagoscopy with dilation debridement and steroid injections    Discharge Exam:  As per routine    Disposition: home    In process/preliminary results:  Outstanding Order Results     Date and Time Order Name Status Description    9/17/2021 11:18 AM Culture, Anaerobic and Aerobic In process     9/17/2021 11:18 AM Culture, Fungus In process           Patient Instructions:   Current Discharge Medication List      CONTINUE these medications which have NOT CHANGED    Details   amoxicillin-clavulanate (AUGMENTIN) 875-125 MG per tablet Take 1 tablet by mouth 2 times daily for 10 days  Qty: 20 tablet, Refills: 0    Associated Diagnoses: Dysphonia; Hoarseness; Radiation-induced fibrosis of soft tissue from therapeutic procedure      budesonide-formoterol (SYMBICORT) 160-4.5 MCG/ACT AERO Inhale 2 puffs into the lungs 2 times daily Rinse mouth after its use. Qty: 3 Inhaler, Refills: 3    Associated Diagnoses: Mucopurulent chronic bronchitis ([address]  Pulmonary emphysema, unspecified emphysema type (HCC)      voriconazole (VFEND) 200 MG tablet Take 200 mg by mouth 2 times daily      tiotropium (SPIRIVA RESPIMAT) 2.5 MCG/ACT AERS inhaler Inhale 2 puffs into the lungs daily  Qty: 1 Inhaler, Refills: 11    Associated Diagnoses: Chronic bronchitis, mucopurulent (HCC)      Ascorbic Acid (VITAMIN C PO) Take 500 mg by mouth daily       Cholecalciferol (VITAMIN D3 PO) Take by mouth      Acetylcysteine (NAC) 500 MG CAPS Take by mouth 2 times daily      guaiFENesin (ROBITUSSIN) 100 MG/5ML syrup Take 200 mg by mouth 3 times daily as needed for Cough      guaiFENesin (MUCINEX) 600 MG extended release tablet Take 1,200 mg by mouth 2 times daily as needed for Congestion      zinc 50 MG CAPS Take by mouth Twice weekly      acetylcysteine (MUCOMYST) 20 % nebulizer solution 4 mL via nebulizer 3 times daily  Qty: 360 mL, Refills: 5    Associated Diagnoses: Airway obstruction; Tracheal stenosis; Mucopurulent chronic bronchitis (HCC)      albuterol sulfate HFA (VENTOLIN HFA) 108 (90 Base) MCG/ACT inhaler Inhale 2 puffs into the lungs every 6 hours as needed for Wheezing or Shortness of Breath  Qty: 3 Inhaler, Refills: 3    Associated Diagnoses: Mucopurulent chronic bronchitis (HCC)      albuterol (PROVENTIL) (2.5 MG/3ML) 0.083% nebulizer solution Take 3 mLs by nebulization every 6 hours as needed for Wheezing or Shortness of Breath  Qty: 360 vial, Refills: 3    Associated Diagnoses: Mucopurulent chronic bronchitis (HCC)      sodium chloride, Inhalant, 3 % nebulizer solution Take 3 mLs by nebulization as needed (Throat dryness, cough, wheezing)  Qty: 500 mL, Refills: 3      pravastatin (PRAVACHOL) 40 MG tablet Take 40 mg by mouth nightly       acetaminophen (TYLENOL) 650 MG extended release tablet Take 1 tablet by mouth as needed for Pain Do not take with hydrocodone/acetominophen  Qty: 60 tablet, Refills: 3      loperamide (IMODIUM) 2 MG capsule Take 2 mg by mouth daily            Activity: activity as tolerated  Diet: regular diet  Wound Care: as directed    Follow-up with Dr. Javier Alvarenga in 1 week for awake while bronchoscopy and lavage if needed.     Signed:  Randi Shaffer. Rosiat Cabrera MD  9/17/2021  12:04 PM

## 2021-09-17 NOTE — H&P
Ralf Christian is an 63 y.o.  male with a history of laryngotracheal stenosis status post radiation therapy. Patient comes in the operating room today for adjunctive measures to keep his airway open in the context of his invasive fungal disease and obstructive catarrh. Past Medical History:   Diagnosis Date    Arthritis     [facility]) [facility] [address]     PAD (peripheral artery disease) ([facility]     bilateral lower legs    Pneumonia 01/2017 1/2017 and 2/2017    Rectal cancer ([facility]     Squamous cell carcinoma of vocal cord (HCC)        Allergies: Allergies   Allergen Reactions    Povidone Iodine Rash     Surgery prep       Active Problems:    * No active hospital problems. *  Resolved Problems:    * No resolved hospital problems. *    Blood pressure (!) 161/88, pulse 69, temperature 97.5 °F (36.4 °C), temperature source Temporal, resp. rate 16, height 6' 2\" (1.88 m), weight 214 lb 9.6 oz (97.3 kg), SpO2 92 %. Review of Systems   The patient reports photosensitive problems and believes he needs another \"cleanout even though his last one was just 2 days ago. 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 See the attached note below for additional information.     Nabeel Thomas MD  9/17/2021    Nabeel Thomas MD   Physician   Specialty:  Otolaryngology   Progress Notes       Sign when Signing Visit   Encounter Date:  9/14/2021                    Show:Clear all  [x]Manual[x]Template[]Copied    Added by:  Lashawn Muller MD    []Noe for details  Bronchoscopy Procedure Note        Rossy GraciaNova Grande MD, [facility]     Date of Operation: 9/14/2021     Pre-op Diagnosis: Airway obstruction  Radiation fibrosis  Invasive fungal laryngotracheitis  Glottic subglottic and proximal tracheal stenosis  Laryngotracheal catarrh     Post-op Diagnosis: Same     Surgeon: Valerie Booth MD     Anesthesia: Topical lidocaine; surgeon instilled     Operation: Flexible fiberoptic bronchoscopy, therapeutic with removal of obstructing secretions     Estimated Blood Loss: Minimal     Complications: None     Indications and History:  The patient is a 63 y.o. male with a history of laryngotracheal stenosis related to radiation fibrosis and locally invasive fungal disease. The patient recently started getting progressively air hungry with increased secretion thickness and inability to exert himself without severe dyspnea and noisy breathing. He contacted me for an earlier visit and I gave him a same day slot to get into see me this afternoon. The risks, benefits, complications, treatment options and expected outcomes were discussed with the patient. The possibilities of reaction to medication, pulmonary aspiration, perforation of a viscus, bleeding, failure to diagnose a condition and creating a complication requiring transfusion or operation were discussed with the patient who freely signed the consent.       Description of Procedure: The patient was taken to endoscopy suite, identified as Libra Diggs and the procedure verified as Flexible Fiberoptic Bronchoscopy. A Time Out was held and the above information confirmed.      After the induction of topical nasopharyngeal anesthesia, the patient was placed in appropriate position and the bronchoscope was passed through the right nasal airway. The vocal cords were visualized and total of 10 ml of 2% Lidocaine was topically placed onto the cords. The cords were coated with thick secretions that were partially obstructing the airway.  I instilled sterile saline into the larynx in order to enable the suctioning away of the obstructing secretions from the glottis subglottis and proximal trachea. Gradually patient's breathing improved demonstrating clinical benefit.        Findings:   1. 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 Obstructive catarrh of the larynx subglottis and proximal trachea likely is a manifestation of locally invasive microbial disease  3. Stenosis of the glottis and subglottis secondary to radiation fibrosis        The Patient tolerated endoscopy acceptably well. No complications were detected.     Recommendation:     1. Follow-up endoscopy plans: To the operating room on Friday 2 days from today for more definitive airway widening including balloon dilation of the laryngotracheal complex and esophageal inlet dilation given the fibrosis in that region as well  2. I will get biopsies for culture on his next surgical treatment to rule in or out residual invasive fungal disease. 3.  I will also inject steroids into the any aspect of the laryngotracheal complex that she hears from the balloon dilation to reduce the rate and extent of cicatricial scar recurrence.     I explained all this in detail to the patient to his satisfaction. He reported being pleased with the outcome of his care today and pleased with the near future plan.        Aditya Cam MD             Procedure visit on 9/14/2021     Procedure visit on 9/14/2021        Detailed Report     Progress Notes Info    Author Note Status Last Update User   Aditya Cam MD Sign when Signing Visit Aditya Cam MD   Last Update Date/Time: 9/14/2021  4:05 PM   Chart Review Routing History    No routing history on file.

## 2021-09-17 NOTE — PROGRESS NOTES
Pt admitted to [facility] room 10 and oriented to unit. SCD sleeves applied. Nares swabbed. Pt verbalized permission for first name, last initial and physicians name on white board. SDS board and discharge criteria explained, pt and family verbalized understanding. Pt denies thoughts of harming self or others. Call light in reach. Family at the bedside.

## 2021-09-17 NOTE — ANESTHESIA POSTPROCEDURE EVALUATION
Department of Anesthesiology  Postprocedure Note    Patient: Chano Kwon  MRN: [id number]  YOB: 1960  Date of evaluation: 9/17/2021  Time:  5:30 PM     Procedure Summary     Date: 09/17/21 Room / Location: [address] [place]    Anesthesia Start: 1028 Anesthesia Stop: 9564    Procedure: LARYNGOSCOPY AND ESOPHAGOSCOPY WITH BIOPSY AND DILATION WITH JET VENTILATION (N/A ) Diagnosis: (INVASIVE FUNGAL INFECTION, INFECTION DO TO BRADLEY GLABRATA DYSPHONIA, TRACHEAL STENOSIS, AIRWAY OBSTRUCTION, RADIATION INDUCED FIBROSIS OF SOF TISSUES)    Surgeons: Dionne Perkins MD Responsible Provider: Dante Walton MD    Anesthesia Type: general ASA Status: 4          Anesthesia Type: general    David Phase I: David Score: 9    David Phase II: David Score: 9    Last vitals: Reviewed and per EMR flowsheets. Anesthesia Post Evaluation    Patient location during evaluation: PACU  Patient participation: complete - patient participated  Level of consciousness: awake and alert  Airway patency: patent  Nausea & Vomiting: no nausea and no vomiting  Complications: no  Cardiovascular status: hemodynamically stable  Respiratory status: acceptable  Hydration status: euvolemic    [address]  POST-ANESTHESIA NOTE       Name:  Chano Kwon                                         Age:  63 y.o. MRN:  [id number]      Last Vitals:  BP (!) 128/59   Pulse 84   Temp 98.4 °F (36.9 °C)   Resp 20   Ht 6' 2\" (1.88 m)   Wt 214 lb 9.6 oz (97.3 kg)   SpO2 91%   BMI 27.55 kg/m²   No data found.     Level of Consciousness:  Awake    Respiratory:  Stable    Oxygen Saturation:  Stable    Cardiovascular:  Stable    Hydration:  Adequate    PONV:  Stable    Post-op Pain:  Adequate analgesia    Post-op Assessment:  No apparent anesthetic complications    Additional Follow-Up / Treatment / Comment:  None    Kannan Palomares MD  September 17, 2021   5:30 PM

## 2021-09-20 NOTE — OP NOTE
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, TRACHEAL STENOSIS, AIRWAY OBSTRUCTION, RADIATION INDUCED FIBROSIS OF SOF TISSUES    Post-Op Diagnosis: Same and posterior glottic web       Procedure(s):  LARYNGOSCOPY AND ESOPHAGOSCOPY WITH BIOPSY AND DILATION WITH JET VENTILATION laryngotracheal debridement and steroid injections    Surgeon(s):  Alexandr Garcia MD    Assistant:   * No surgical staff found *    Anesthesia: General    Estimated Blood Loss (mL): Minimal    Complications: None    Specimens:   ID Type Source Tests Collected by Time Destination   A : Anterior subglottis Tissue Mouth SURGICAL PATHOLOGY, CULTURE, FUNGUS, GRAM STAIN (Canceled), CULTURE, ANAEROBIC AND AEROBIC Alexandr Garcia MD 9/17/2021 1118        Implants:  * No implants in log *      Drains:   Colostomy LLQ (Active)       Findings: 1. Moderate accumulation of fibrinous debris and signs of local infection  2. Biopsies taken of the worst area anterior inferiorly to the right  3. Definite posterior glottic web detected; lysed and injected with steroids       Detailed Description of Procedure: The patient was taken to the operating room awake and placed in the supine position. General anesthesia was induced and the patient was intubated with an LMA appliance to secure his airway without disturbing the soft tissues of the glottis and subglottis. I protected his maxillary mucosa with gauze sponges and turned the table 90 degrees for the procedure. I performed a timeout verifying the patient's identity and planned procedure. With the patient asleep I placed a Mj in his vallecula to extend the airway anteriorly and provide direct line of sight view the airway after removing the LMA.   This allowed a quick institution of jet ventilation through the side-port adapter which remained throughout the case. I used a 30 degree optical telescope to carefully examine the patient's larynx through his subglottis and the proximal trachea. I found his tissues to be abnormal for the presence of granular friable tissues of the endolarynx extending into the proximal trachea. He had a crowded supraglottic airway consistent with soft tissue thickening versus cicatricial stenosis. Laryngotracheal dilation with 20 mm balloon: Continuing the jet ventilator placed a 20 mm Bard balloon into the patient's airway and block ventilation for approximately 30 seconds while I dilated him to full 20 mm. This resulted in a significant widening of the patient's airway particularly at the level of the posterior commissure and posterior supraglottis. This produced bleeding that required a modest degree of suction cautery and topical epinephrine for hemostasis. There was clearly a sheared scar in the region consistent with a posterior glottic and supraglottic web. Endolaryngeal debridement with biopsies and tissue taken for cultures: Once the hemorrhaging was controlled, jet ventilation was further enabled by the dilation of the supraglottis. I then used the optical telescope and up-biting cup forceps to remove some of the granular tissue which was particularly distinct along the anterior right aspect of the subglottic airway extending towards the trachea. I handed off tissue from this region to be placed in saline and then transferred to either cultured tubes or formalin for permanent section given that fungal cultures and bacterial cultures will be sent off from these tissues. Bleeding responded well to topical epinephrine. I then brought on the field a 2.6 mm laryngeal debrider system with a  blade to remove the raised soft tissues taking care to avoid making confluent wounds that might produce a cicatricial narrowing. Bleeding responded well to topical epinephrine.     Following the removal of the Endo glottic and subglottic abnormal soft tissues, I turned my attention to the dilated posterior glottic web. I used a 23-gauge 8 inch spinal needle to instill 1 cc of 80 mg/cc of Depo-Medrol into the soft tissues most of which entered the soft tissue space. I suctioned away the extravasated steroid. I checked to make sure bleeding in controlled. It had. I cleansed the distal airway with experienced saline and suctioning to remove thick mucus. I reintubated the patient with a 7.0 endotracheal tube without difficulty or vital sign instability. I removed the transoral hardware and turned the patient back to anesthesia for reversal and extubation in the operating room. This was carried out without incident and the patient was taken to recovery in satisfactory condition. Estimated blood loss in the case was minimal and the patient received less than a liter of intravenous lactated Ringer's intraoperatively. No blood products were transfused. No intraoperative complications were detected. Counts were considered accurate x3. I was present for performed the patient's entire operation myself.         Electronically signed by Marie Miller MD on 9/20/2021 at 10:05 AM

## 2021-09-21 NOTE — TELEPHONE ENCOUNTER
I contacted the patient to review with him his microbiology results and to inform him of my contact with the histopathologist to his describing severe dysplasia in the soft tissues where the cultures were taken. Given the fact that there is an active local infection in that area and that it has been radiated, I think most likely the dysplasia is reactive in nature rather than a new neoplasm forming. Nonetheless I told him that we would watch this area very closely and if necessary more aggressive surgical resection would be offered to him. I will postpone his planned bronc for this week to the next week to give him time to respond with a new antibiotic hopefully allowing these tissues to settle down and no longer pain histopathologically is concerning. He reported understanding the basis of my recommendations and being willing to proceed as such.     PFC

## 2021-09-21 NOTE — TELEPHONE ENCOUNTER
David Castillo would like a return call with the culture results from 9/17/21. Please advise patient.

## 2021-09-23 NOTE — TELEPHONE ENCOUNTER
Can you speak with Dr. Emily Seaman about this Crystal? Gram stain and fungus culture are both showing rare budding yeast, but no isolated fungus identified on preliminary fungal culture results.

## 2021-09-29 NOTE — PROCEDURES
Bronchoscopy Procedure Note      [place] [facility]. 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 The risks, benefits, complications, treatment options and expected outcomes were discussed with the patient. The possibilities of reaction to medication, pulmonary aspiration, perforation of a viscus, bleeding, failure to diagnose a condition and creating a complication requiring transfusion or operation were discussed with the patient who freely signed the consent. Description of Procedure: The patient was taken to endoscopy suite, identified as Westley Brandt and the procedure verified as Flexible Fiberoptic Bronchoscopy. A Time Out was held and the above information confirmed. After the induction of topical nasopharyngeal anesthesia, the patient was placed in appropriate position and the bronchoscope was passed through the right nasal airway. The vocal cords were visualized and total of 10 ml of 2% Lidocaine was topically placed onto the cords. The cords were anesthetized. Careful inspection of the tracheal lumen was accomplished. I used the vacuum system to clear away obstructing secretions and soft tissue debris over the true vocal folds, the subglottis, and the proximal trachea.   The scope needed to be withdrawn multiple times to clear the secretions from the suction port but ultimately I was able to clear away all of the obstructing secretions down to soft tissue. There was modest bleeding. The patient tolerated the maneuvers well. No complications were detected. Recommendation:    1. Follow-up endoscopy plans: In 1 month unless he needs endoscopic treatment prior to that point. Attestation: I was present for the entire procedure.     Neda Arenas MD

## 2021-09-29 NOTE — PROGRESS NOTES
Patient arrived in Outpatient Procedure Department for flexible laryngoscopy and bronchoscopy. This procedure has been fully reviewed with the patient and written informed consent has been obtained. 1323 Procedure started with Dr. Rafa Tripathi. 1336 Procedure completed; patient tolerated well. Dr. Rafa Tripathi talked to patient in length about procedure findings. Patient discharged. PLAN  Schedule follow up laryngoscopyand awake bronchoscopy for 4 weeks in outpatient services office.

## 2021-09-30 NOTE — TELEPHONE ENCOUNTER
Annmarie Joelle was seen for a procedure in Out Patient Services on 9/29/21. It looks like he needs scheduled for another scope in a month. (Diagnostic Laryngoscopy and Bronchoscopy?)    Please place orders. Thank you!

## 2021-10-07 NOTE — TELEPHONE ENCOUNTER
Based on the patient's recent biopsy results of a very tiny specimen taken on awake endoscopy, I recommended to the patient that we proceed sometime in the near future with additional biopsies and perhaps more aggressive debridement then can be done awake to serve 2 purposes. My hope is that we can also definitively include or exclude residual invasive fungemia of the upper airway. I explained that to the patient to his satisfaction. I will place the orders for surgery now. Inocencio Barlow.  Annmarie Pathak MD

## 2021-10-11 PROBLEM — C32.9 LARYNGEAL CARCINOMA (HCC): Status: ACTIVE | Noted: 2021-01-01

## 2021-10-11 NOTE — Clinical Note
Please let the patient know that I've started him on Bactrim for the Staph in his larynx. It is not a replacement for the antifungal. This is still pending. Thank you.   pfc

## 2021-10-11 NOTE — PROGRESS NOTES
daily as needed for Cough      guaiFENesin (MUCINEX) 600 MG extended release tablet Take 1,200 mg by mouth 2 times daily as needed for Congestion      zinc 50 MG CAPS Take by mouth Twice weekly      acetylcysteine (MUCOMYST) 20 % nebulizer solution 4 mL via nebulizer 3 times daily 360 mL 5    albuterol sulfate HFA (VENTOLIN HFA) 108 (90 Base) MCG/ACT inhaler Inhale 2 puffs into the lungs every 6 hours as needed for Wheezing or Shortness of Breath 3 Inhaler 3    albuterol (PROVENTIL) (2.5 MG/3ML) 0.083% nebulizer solution Take 3 mLs by nebulization every 6 hours as needed for Wheezing or Shortness of Breath 360 vial 3    sodium chloride, Inhalant, 3 % nebulizer solution Take 3 mLs by nebulization as needed (Throat dryness, cough, wheezing) 500 mL 3    pravastatin (PRAVACHOL) 40 MG tablet Take 40 mg by mouth nightly       acetaminophen (TYLENOL) 650 MG extended release tablet Take 1 tablet by mouth as needed for Pain Do not take with hydrocodone/acetominophen 60 tablet 3    loperamide (IMODIUM) 2 MG capsule Take 2 mg by mouth daily        No current facility-administered medications for this visit.      Past Medical History:   Diagnosis Date    Arthritis     [facility] obstructive [facility]     PAD (peripheral artery disease) ([facility] [address] [facility]     bilateral lower legs    Pneumonia 01/2017 1/2017 and 2/2017    Rectal cancer ([facility]     Squamous cell carcinoma of vocal cord [facility])       Past Surgical History:   Procedure Laterality Date    BRONCHOSCOPY N/A 11/18/2020    BRONCHOSCOPY performed by Jimi Bell MD at [address]  2016    COLOSTOMY  2016   [address] [place] [address] EYE    HAND SURGERY Bilateral 1995    KNEE ARTHROSCOPY Right 1996    LARYNGOSCOPY  07/12/2017    Biopsy    LARYNGOSCOPY N/A 7/12/2019    MICRO LARYNGOSCOPY W/ BIOPSY performed by Kaleigh Hernandez MD at [address] [place] [address] 12/30/2019    DIRECT LARYNGOSCOPY WITH BIOPSY performed by Kaleigh Hernandez, MD at [facility] N/A 10/16/2020    BRONCHOSCOPY, MICRO LARYNGOSCOPY WITH REMOVAL OF SUBMUCCOSAL NON NOEPLASTIC LESION JET VENTILATION performed by Kofi Leavitt MD at [facility] N/A 11/18/2020    MICROLARYNGOSCOPY WITH BX & RESECTION OF OBSTRUCTING SOFT TISSUES WITH LASER & AIRWAY DEBRIDER, MICROLARYNGOPLASTY WITH RESECTION OF OBSTRUCTING SOFT TISSUE performed by Kofi Leavitt MD at [facility] N/A 2/5/2021    MICROLARYNGOSCOPY WITH DEBRIDEMENT OF SUBGLOTTIC OBSTRUCTING TISSUES, BRONCHOSCOPY WITH SAME OF PROXIMAL TRACHEA, UPPER GI ENDOSCOPY FOR BALLOON DILATION performed by Kofi Leavitt MD at [facility] N/A 6/11/2021    SUSPENSION MICROLARYNGOSCOPY WITH ABLATIONOF OBSTRUCTING SOFT TISSUES, DEBRIDER RESECTION OF OBSTRUCTING SUBGLOTTIC PROXIMAL TRACHEAL SOFT TISSUES WITH JET VENTILATION performed by Kofi Leavitt MD at [facility] N/A 9/17/2021    LARYNGOSCOPY AND ESOPHAGOSCOPY WITH BIOPSY AND DILATION WITH JET VENTILATION performed by Kofi Leavitt MD at [address] [place] [address] 2050 RECTAL SURGERY  08/29/2016    [facility] [place]    ROTATOR CUFF REPAIR Left 1990'S    TONSILLECTOMY AND ADENOIDECTOMY N/A 2/5/2021    ADVANCEMENT FLAP RECONSTRUCTION BILATERAL RESECTION OF POSTERIOR SUPRAGLOTTIS performed by Kofi Leavitt MD at [facility] History   Problem Relation Age of Onset    Prostate Cancer Father 48    Cancer Father     Heart Disease Father     Diabetes Mother     Heart Disease Mother     Stroke Mother     Heart Disease Brother     High Blood Pressure Other     Cancer Other         LUNG,PROSTATE    Hearing Loss Other      Social History     Tobacco Use    Smoking status: Former Smoker     Packs/day: 2.25     Years: 31.00     Pack years: 69.75     Start date: 1975     Quit date: 2/5/2006     Years since quitting: 15.6    Smokeless tobacco: Never Used   Substance Use Topics    6.6 06/07/2021    PROT 7.2 01/29/2021    PROT 6.6 10/17/2020    LABALBU 4.2 06/07/2021    LABALBU 4.7 01/29/2021    LABALBU 4.0 10/17/2020    LABALBU 4.6 12/06/2011    BILITOT 0.5 06/07/2021    BILITOT 0.4 01/29/2021    BILITOT 0.2 10/17/2020    ALKPHOS 170 06/07/2021    ALKPHOS 165 01/29/2021    ALKPHOS 159 10/17/2020    AST 24 09/17/2021    AST 23 06/07/2021    AST 20 01/29/2021    ALT 23 09/17/2021    ALT 32 06/07/2021    ALT 28 01/29/2021       All of the past medical history, past surgical history, family history,social history, allergies and current medications were reviewed with the patient. Assessment & Plan   Diagnoses and all orders for this visit:     Diagnosis Orders   1. Invasive fungal infection  RI LARYNGOSCOPY,DIRCT,OP SCOP,EXC TUMR    BRONCHOSCOPY   2. Tracheal stenosis  BRONCHOSCOPY   3. Radiation adverse effect, subsequent encounter  RI LARYNGOSCOPY,DIRCT,OP SCOP,EXC TUMR    BRONCHOSCOPY   4. Laryngeal carcinoma ([facility] [address]  RI LARYNGOSCOPY,DIRCT,OP SCOP,EXC TUMR    Based on microcup biopsy taken on his last bronchoscopy         Based on his history and physical findings, he warrants a suspension laryngoscopy and bronchoscopy with debridement of larynx and proximal trachea as well as biopsies any abnormal looking tissues with particular attention to his anterior right subglottis when this prior biopsy was taken. I counseled him as to the indications benefits limitations and risks of proceeding in this manner showing him that it was a low likelihood that a second biopsy would again be read as positive for cancer. Given his history of course it is possible that it is a true positive only repeat biopsies would be able to confirm that. He reported understanding these and other risks as well as these issues and was eager to proceed. Informed consent was based on this conversation. Return in about 4 weeks (around 11/8/2021) for Biopsy review and to plan further care as indicated. **This report has been created using voice recognition software. It may contain minor errors which are inherent in voice recognition technology. **             Addendum 10/25/2021  Based on the micro findings of the bronch IN THE OR I'm starting his on Bactrim DS to treat the MSSA with the subglottic tissues. His need for Antifungals is still in question.    pfc

## 2021-10-15 NOTE — TELEPHONE ENCOUNTER
Received call from patient regarding need to continue Voriconazole. Reviewed most recent fungal culture results, revealing Zygomycetes . This case was reviewed with Dr. Rosemary Abdi who recommends discontinuation of Voriconazole, start Cresimba. Patient states that he has repeat biopsy scheduled next week. Would like to wait for results of this prior to changing/starting new medications. I am agreeable to this, will continue to monitor for further culture reports and ability to stop Voriconazole as well as Dr. Deborah Castillo progress notes regarding his findings and plan for patient. All questions and concerns addressed during call today.

## 2021-10-20 NOTE — ANESTHESIA PRE PROCEDURE
Department of Anesthesiology  Preprocedure Note       Name:  Jose Luis Godoy   Age:  63 y.o.  :  1960                                          MRN:  [id number]         Date:  10/20/2021      Surgeon: Chalo Muller):  Macrina Sesay MD    Procedure: Procedure(s):  LARYNGOSCOPY MICRO WITH BIOPSY AND DEBRIDEMENT, JET VENTILATION    Medications prior to admission:   Prior to Admission medications    Medication Sig Start Date End Date Taking?  Authorizing Provider   budesonide-formoterol (SYMBICORT) 160-4.5 MCG/ACT AERO Inhale 2 puffs into the lungs 2 times daily Rinse mouth after its use. 21  JOSH Colon CNP   voriconazole (VFEND) 200 MG tablet Take 200 mg by mouth 2 times daily 19   Historical Provider, MD   tiotropium (SPIRIVA RESPIMAT) 2.5 MCG/ACT AERS inhaler Inhale 2 puffs into the lungs daily 21   JOSH Colon CNP   Ascorbic Acid (VITAMIN C PO) Take 500 mg by mouth daily     Historical Provider, MD   Cholecalciferol (VITAMIN D3 PO) Take by mouth    Historical Provider, MD   Acetylcysteine (NAC) 500 MG CAPS Take by mouth 2 times daily    Historical Provider, MD   guaiFENesin (ROBITUSSIN) 100 MG/5ML syrup Take 200 mg by mouth 3 times daily as needed for Cough    Historical Provider, MD mendozaFENesin (MUCINEX) 600 MG extended release tablet Take 1,200 mg by mouth 2 times daily as needed for Congestion    Historical Provider, MD   zinc 50 MG CAPS Take by mouth Twice weekly    Historical Provider, MD   acetylcysteine (MUCOMYST) 20 % nebulizer solution 4 mL via nebulizer 3 times daily 20   ASHWIN Ramirez   albuterol sulfate HFA (VENTOLIN HFA) 108 (90 Base) MCG/ACT inhaler Inhale 2 puffs into the lungs every 6 hours as needed for Wheezing or Shortness of Breath 20   JOSH Colon CNP   albuterol (PROVENTIL) (2.5 MG/3ML) 0.083% nebulizer solution Take 3 mLs by nebulization every 6 hours as needed for Wheezing or Shortness of Breath 20 Vidya Reid, APRN - CNP   sodium chloride, Inhalant, 3 % nebulizer solution Take 3 mLs by nebulization as needed (Throat dryness, cough, wheezing) 10/19/20   ASHWIN Quiros Mai   pravastatin (PRAVACHOL) 40 MG tablet Take 40 mg by mouth nightly  7/23/20   Historical Provider, MD   acetaminophen (TYLENOL) 650 MG extended release tablet Take 1 tablet by mouth as needed for Pain Do not take with hydrocodone/acetominophen 12/30/19   Jay Jay Marie MD   loperamide (IMODIUM) 2 MG capsule Take 2 mg by mouth daily     Historical Provider, MD       Current medications:    No current facility-administered medications for this visit. No current outpatient medications on file. Facility-Administered Medications Ordered in Other Visits   Medication Dose Route Frequency Provider Last Rate Last Admin    0.9 % sodium chloride infusion   IntraVENous Continuous Lacretia MD Juan 100 mL/hr at 10/20/21 1028 New Bag at 10/20/21 1028       Allergies: Allergies   Allergen Reactions    Povidone Iodine Rash     Surgery prep       Problem List:    Patient Active Problem List   Diagnosis Code    Dysphonia R49.0    Alcohol abuse F10.10    FHx: smoking Z81.2    Deviated septum J34.2    Hypertrophy of nasal turbinates J34.3    Rhinitis J31.0    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    Laryngeal stenosis J38.6    Airway obstruction J98.8    Airway stricture J98.8    Bullous emphysema ([facility]) J43.9    Refractory obstruction of nasal airway J34.89    Dependence on continuous supplemental oxygen Z99.81    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 L59.8, Y84.2    Laryngeal carcinoma ([facility]) C32.9       Past Medical History:        Diagnosis Date    Arthritis     COPD (chronic obstructive pulmonary disease) (HCC)     PAD (peripheral artery disease) ([facility])     bilateral lower legs    Pneumonia 01/2017 1/2017 and 2/2017    Rectal cancer ([facility])     Squamous cell carcinoma of vocal cord [facility])        Past Surgical History:        Procedure Laterality Date    BRONCHOSCOPY N/A 11/18/2020    BRONCHOSCOPY performed by Annabella Habermann, MD at [facility]  2016    COLOSTOMY  2016    EYE SURGERY      CROSS EYED    HAND SURGERY Bilateral 1995    KNEE ARTHROSCOPY Right 1996    LARYNGOSCOPY  07/12/2017    Biopsy    LARYNGOSCOPY N/A 7/12/2019    MICRO LARYNGOSCOPY W/ BIOPSY performed by Acosta Cardona MD at [facility] N/A 12/30/2019    DIRECT LARYNGOSCOPY WITH BIOPSY performed by Acosta Cardona MD at [facility] N/A 10/16/2020    BRONCHOSCOPY, MICRO LARYNGOSCOPY WITH REMOVAL OF SUBMUCCOSAL NON NOEPLASTIC LESION JET VENTILATION performed by Annabella Habermann, MD at [facility] N/A 11/18/2020    MICROLARYNGOSCOPY WITH BX & RESECTION OF OBSTRUCTING SOFT TISSUES WITH LASER & AIRWAY DEBRIDER, MICROLARYNGOPLASTY WITH RESECTION OF OBSTRUCTING SOFT TISSUE performed by Annabella Habermann, MD at [facility] N/A 2/5/2021    MICROLARYNGOSCOPY WITH DEBRIDEMENT OF SUBGLOTTIC OBSTRUCTING TISSUES, BRONCHOSCOPY WITH SAME OF PROXIMAL TRACHEA, UPPER GI ENDOSCOPY FOR BALLOON DILATION performed by Griselda Amin MD at [facility] [employer] N/A 6/11/2021    SUSPENSION MICROLARYNGOSCOPY WITH ABLATIONOF OBSTRUCTING SOFT TISSUES, DEBRIDER RESECTION OF OBSTRUCTING SUBGLOTTIC PROXIMAL TRACHEAL SOFT TISSUES WITH JET VENTILATION performed by Griselda Amin MD at [facility] [employer] N/A 9/17/2021    LARYNGOSCOPY AND ESOPHAGOSCOPY WITH BIOPSY AND DILATION WITH JET VENTILATION performed by Griselda Amin MD at [facility] [address] RECTAL SURGERY  08/29/2016    [facility] [place]    ROTATOR CUFF REPAIR Left 1990'S    TONSILLECTOMY AND ADENOIDECTOMY N/A 2/5/2021    ADVANCEMENT FLAP RECONSTRUCTION BILATERAL RESECTION OF POSTERIOR SUPRAGLOTTIS performed by Griselda Amni MD at [place] [address] History:    Social History     Tobacco Use    Smoking status: Former Smoker     Packs/day: 2.25     Years: 31.00     Pack years: 69.75     Start date: 1975     Quit date: 2/5/2006     Years since quitting: 15.7    Smokeless tobacco: Never Used   Substance Use Topics    Alcohol use: No     Alcohol/week: 0.0 standard drinks                                Counseling given: Not Answered      Vital Signs (Current): There were no vitals filed for this visit.                                            BP Readings from Last 3 Encounters:   10/20/21 (!) 161/95   10/11/21 (!) 146/86   09/29/21 (!) 145/85       NPO Status:                                                                                 BMI:   Wt Readings from Last 3 Encounters:   10/20/21 212 lb (96.2 kg)   10/11/21 220 lb 12.8 oz (100.2 kg)   09/29/21 213 lb (96.6 kg)     There is no height or weight on file to calculate BMI.    CBC:   Lab Results   Component Value Date    WBC 9.4 06/07/2021    RBC 4.84 06/07/2021    RBC 4.28 12/06/2011    HGB 14.8 06/07/2021    HCT 44.1 06/07/2021    MCV 91.1 06/07/2021    RDW 14.4 06/07/2021     06/07/2021       CMP:   Lab Results   Component Value Date     06/07/2021    K 4.0 06/07/2021     06/07/2021    CO2 29 06/07/2021    BUN 17 06/07/2021    CREATININE 0.78 06/07/2021    AGRATIO 1.8 06/07/2021    LABGLOM >90 01/29/2021    GLUCOSE 108 06/07/2021    PROT 6.6 06/07/2021    CALCIUM 8.60 06/07/2021    BILITOT 0.5 06/07/2021    ALKPHOS 170 06/07/2021    AST 24 09/17/2021    ALT 23 09/17/2021       POC Tests: No results for input(s): POCGLU, POCNA, POCK, POCCL, POCBUN, POCHEMO, POCHCT in the last 72 hours. Coags: No results found for: PROTIME, INR, APTT    HCG (If Applicable): No results found for: PREGTESTUR, PREGSERUM, HCG, HCGQUANT     ABGs: No results found for: PHART, PO2ART, NLP4UNP, MKK6NLA, BEART, J3HBDJSP     Type & Screen (If Applicable):  No results found for: LABABO, LABRH    Drug/Infectious Status (If Applicable):  No results found for: HIV, HEPCAB    COVID-19 Screening (If Applicable):   Lab Results   Component Value Date    COVID19 Not Detected 01/29/2021         Anesthesia Evaluation  Patient summary reviewed and Nursing notes reviewed no history of anesthetic complications:   Airway: Mallampati: III  TM distance: >3 FB   Neck ROM: full  Mouth opening: > = 3 FB Dental:    (+) upper dentures and edentulous      Pulmonary:   (+) pneumonia: resolved,  COPD: severe,  shortness of breath: chronic,  decreased breath sounds,                            ROS comment: Former smoker     Cardiovascular:  Exercise tolerance: poor (<4 METS),       (-) hypertension, past MI and CAD      Rhythm: regular  Rate: normal                    Neuro/Psych:   (+) psychiatric history:   (-) seizures and CVA           GI/Hepatic/Renal:   (+) GERD: well controlled,      (-) liver disease and no renal disease       Endo/Other:    (+) malignancy/cancer. (-) diabetes mellitus, hypothyroidism, hyperthyroidism               Abdominal:             Vascular:     - DVT and PE.       Other Findings:               Anesthesia Plan      general ASA 4     (Jet Ventilation  PIV. Additional access can be obtained after induction if needed. Standard ASA monitors. IV/PO opioids and other adjuncts as needed for pain control. PACU post op for recovery. Possible anesthetics complications were discussed with the patient, including but not limited to: PONV, damage to the airway and surrounding structures (teeth, lips, gums, tongue, etc.), adverse reactions to medicine, cardiac complications (MI, CHF, arrhythmias, etc.), respiratory complications (post-op ventilation, respiratory failure, etc.), neurologic complications (nerve damage, stroke, seizure), and death. The patient was given the opportunity to ask questions and all questions were answered to the patient's satisfaction. The patient is in agreement with the anesthetic plan.  )  Induction: intravenous. MIPS: Postoperative opioids intended and Prophylactic antiemetics administered. Anesthetic plan and risks discussed with patient. Plan discussed with CRNA.                   Ciarra Valdes DO   10/20/2021

## 2021-10-20 NOTE — ANESTHESIA POSTPROCEDURE EVALUATION
Department of Anesthesiology  Postprocedure Note    Patient: Janet Phan  MRN: [id number]  YOB: 1960  Date of evaluation: 10/20/2021  Time:  3:43 PM     Procedure Summary     Date: 10/20/21 Room / Location: [facility]  / [facility]    Anesthesia Start: 1143 Anesthesia Stop: 9414    Procedure: LARYNGOSCOPY MICRO WITH BIOPSY AND DEBRIDEMENT, JET VENTILATION (N/A Mouth) Diagnosis: (LARYNGEAL CARCINOMA, INVASIVE FUNGAL INFECTION, RADIATION ADVERSE EFFECT, TRACHEAL STENOSIS)    Surgeons: Dionna Barrios MD Responsible Provider: Eve Raymundo DO    Anesthesia Type: general ASA Status: 4          Anesthesia Type: general    David Phase I: David Score: 10    David Phase II: David Score: 10    Last vitals: Reviewed and per EMR flowsheets.        Anesthesia Post Evaluation    Patient location during evaluation: PACU  Patient participation: complete - patient participated  Level of consciousness: awake and alert  Airway patency: patent  Nausea & Vomiting: no vomiting and no nausea  Complications: no  Cardiovascular status: hemodynamically stable  Respiratory status: acceptable  Hydration status: stable

## 2021-10-20 NOTE — BRIEF OP NOTE
Brief Postoperative Note      Patient: Ralph Reynoso  YOB: 1960  MRN: [id number]    Date of Procedure: 10/20/2021    Pre-Op Diagnosis: LARYNGEAL CARCINOMA, INVASIVE FUNGAL INFECTION, RADIATION ADVERSE EFFECT, TRACHEAL STENOSIS    Post-Op Diagnosis: Same       Procedure(s):  LARYNGOSCOPY MICRO WITH BIOPSY AND DEBRIDEMENT, JET VENTILATION    Surgeon(s):  Geovanna Dodd MD    Assistant:  * No surgical staff found *    Anesthesia: General    Estimated Blood Loss (mL): less than 50     Complications: None    Specimens:   ID Type Source Tests Collected by Time Destination   1 : Right Subglottis  Tissue Larynx CULTURE, FUNGUS, CULTURE, AEROBIC Geovanna Dodd MD 10/20/2021 1211    2 : Laryngotracheal Lavage Tissue Larynx CULTURE, FUNGUS, GRAM STAIN (Canceled), CULTURE, AEROBIC Geovanna Dodd MD 10/20/2021 1233    A : Right Subglottis Tissue Larynx SURGICAL PATHOLOGY Geovanna Dodd MD 10/20/2021 1223    B : Anterior Subglottis Tissue Larynx SURGICAL PATHOLOGY Geovanna Dodd MD 10/20/2021 1227        Implants:  * No implants in log *      Drains:   Colostomy LLQ (Active)   Mucocutaneous Junction Intact 10/20/21 1032       Findings: 1. Diffuse catarrh of the laryngotracheal complex  2. Granular soft tissue surfaces about the majority of the subglottis with the exception of the left lateral and left anterolateral subglottis  3. Anterior subglottis previously biopsied was positive for carcinoma, we biopsied multiple superficial and deep specimens sent for histopathology  4.   Cultures taken for aerobic and fungal organisms with soft tissue biopsies and with a lavage of the laryngotracheal complex trapped in a Lukens tube    Electronically signed by Geovanna Dodd MD on 10/20/2021 at 1:10 PM

## 2021-10-20 NOTE — OP NOTE
Operative Note      Patient: Alpesh Douglas  YOB: 1960  MRN: [id number]    Date of Procedure: 10/20/2021    Pre-Op Diagnosis: LARYNGEAL CARCINOMA, INVASIVE FUNGAL INFECTION, RADIATION ADVERSE EFFECT, TRACHEAL STENOSIS    Post-Op Diagnosis: Same       Procedure(s):  LARYNGOSCOPY MICRO WITH BIOPSY AND DEBRIDEMENT, JET VENTILATION    Surgeon(s):  Stuart Diehl MD    Assistant:   * No surgical staff found *    Anesthesia: General    Estimated Blood Loss (mL): less than 50     Complications: None    Specimens:   ID Type Source Tests Collected by Time Destination   1 : Right Subglottis  Tissue Larynx CULTURE, FUNGUS, CULTURE, AEROBIC Stuart Diehl MD 10/20/2021 1211    2 : Laryngotracheal Lavage Tissue Larynx CULTURE, FUNGUS, GRAM STAIN (Canceled), CULTURE, AEROBIC Stuart Diehl MD 10/20/2021 1233    A : Right Subglottis Tissue Larynx SURGICAL PATHOLOGY Stuart Diehl MD 10/20/2021 1223    B : Anterior Subglottis Tissue Larynx SURGICAL PATHOLOGY Stuart Diehl MD 10/20/2021 1227        Implants:  * No implants in log *      Drains:   Colostomy LLQ (Active)   Mucocutaneous Junction Intact 10/20/21 1032       Findings: 1. Diffuse catarrh of the laryngotracheal complex  2. Granular soft tissue surfaces about the majority of the subglottis with the exception of the left lateral and left anterolateral subglottis  3. Anterior subglottis previously biopsied was positive for carcinoma, we biopsied multiple superficial and deep specimens sent for histopathology  4. Cultures taken for aerobic and fungal organisms with soft tissue biopsies and with a lavage of the laryngotracheal complex trapped in a Lukens tube    Detailed Description of Procedure: The patient was taken the operating room awake and placed in the supine position.   General anesthesia was induced and the patient was intubated with an LMA as per prior procedures to allow for spontaneous ventilation until just prior to beginning his surgery when neuromuscular blockade was added. I turned the table 90 degrees for the procedure and draped him in usual fashion for transoral surgery. I removed the LMA and placed to moist gauze on the maxilla followed by a Mj laryngoscope into the vallecula. I commenced jet ventilation straightaway and had a difficult time sustaining his saturations above the mid 80s so I picked up the epiglottis and increase the driving pressure as well as his jet frequency enabling him to stay in the low 90s throughout the case. Beginning with a 30 degree optical telescope and a suction catheter, I began to cleanse the larynx in order to remove thickest layer of the catarrh that was covering the majority of the supraglottis and part of the subglottis. The left anterior subglottis was remarkably healthy looking. The central anterior subglottis was also relatively healthy but was the region that had been biopsied for cancer. I used up-biting cup forceps to get multiple representative samples of the area that had been read as cancer that I placed in saline to be transferred to formalin for permanent section to see if that diagnosis is borne out on a more substantial piece of tissue. I used topical epinephrine and suction cautery for hemostasis. Using a Tricut 27 cm debrider blade and telescopic guidance I used the debrider to remove the worst of the hypertrophic subglottic tissue along the posterior and posterior right soft tissue spaces. I also used up-biting cup forceps to take soft tissue specimens for histopathology in that region along with tissue specimens for fungal and aerobic cultures. Additionally irrigated the laryngotracheal complex and suctioned the irrigant into a Luken's trap for an additional culture specimen to look for aerobic and fungal elements embedded within the surface coating of the proteinaceous debris that he develops.   I then continue to use the Tricut debrider to resect away the worst of the hypertrophic soft tissue of the glottis and supraglottis as well trying to limit the number of wounds like gave him that might ooze blood and challenges respiratory clearance capacity. I used topical epinephrine multiple times on 1/2 x 3 inch cottonoid and alligator forceps to promote hemostasis. Suction cautery was still necessary for hemostasis. I suctioned out the airway free of this material and assessed its aperture noted that it was wider throughout the endolarynx and the proximal trachea then at the start of the procedure. I therefore consider the operation concluded. I removed the transoral hardware after intubating him with a 7 oh tube over a telescope for guidance. I turned the patient back to anesthesia for reversal next patient in the operating room. This was carried out without incident and the patient was taken recovery in satisfactory condition. Estimated blood loss in the case was less than 30 cc and the patient received approximately a liter of intravenous lactated Ringer's intraoperatively. No blood pressure transfused. Counts were considered accurate x3. No intraoperative complications were detected. I was present for and performed the patient's entire operation myself. Disposition: Patient will be discharged home assuming he does well in phase 1 and phase 2 of recovery. I will send him home with his prior nebulizer treatments well deferred to infectious disease to determine what antimicrobial agents will need.     Electronically signed by Kelsie Mack MD on 10/20/2021 at 1:13 PM

## 2021-10-20 NOTE — H&P
Danae Cranker is an 63 y.o.  male with history of severe COPD and emphysema as well as laryngeal cancer status post radiation and chemotherapy. 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 He comes to the operating room today for a rigid airway instrumentation, repeat biopsies in the region of the cancer diagnosis and to debride the superficially invasive fungal disease and get fresh soft tissue cultures in the process. .    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 *  Resolved Problems:    * No resolved hospital problems. *    Blood pressure (!) 161/95, pulse 74, temperature 97.1 °F (36.2 °C), temperature source Temporal, resp. rate 18, height 6' 2\" (1.88 m), weight 212 lb (96.2 kg), SpO2 92 %. 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  Hoarseness  Radiation fibrosis  History of stage III laryngeal cancer treated with radiation and chemotherapy    Plan:   To the operating room for a suspension laryngoscopy with biopsies and debridement of obstructive invasive fungal disease of the larynx and trachea complex    See note below for additional information    Shirlene Esteban MD  10/20/2021      Shirlene Esteban MD   Physician   Specialty:  Otolaryngology   Progress Notes       Signed   Encounter Date:  10/11/2021               Signed        Expand AllCollapse All      Show:Clear all  [x]Manual[x]Template[]Copied    Added by:  Valarie Ty MD    []Noe for details    [place], NOSE AND THROAT  [facility] [address] [place] [address]  Dept: [phone]  Dept Fax: [phone]  Loc: [phone]     Jenny Hackett is a 63 y.o. male who was referred by No ref. provider found for:       Chief Complaint   Patient presents with    Post-Op Check       Post-Op laryngoscopy/esophagoscopy 9/17         HPI:      Jenny Hackett is a 63 y.o. 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 He is status post multiple transoral laser procedures and recently just deep suctioning and surface debridement using a bronchoscope under local anesthesia. He comes today to review surgical plans to take him to the operating room for his next cleanout that will include biopsies in the region of his anterior right subglottis where a biopsy using a microcup forceps through the biopsy port of the rock was returned as invasive squamous cell carcinoma.   Given the size of the specimen and the numerous other specimens the previously been looked at without the diagnosis of so much as advanced precancer, I recommended we repeat the biopsies in the region                History:      Allergies   Allergen Reactions    Povidone Iodine Rash       Surgery prep      Current Facility-Administered Medications          Current Outpatient Medications   Medication Sig Dispense Refill    budesonide-formoterol (SYMBICORT) 160-4.5 MCG/ACT AERO Inhale 2 puffs into the lungs 2 times daily Rinse mouth after its use.  3 Inhaler 3    voriconazole (VFEND) 200 MG tablet Take 200 mg by mouth 2 times daily        tiotropium (SPIRIVA RESPIMAT) 2.5 MCG/ACT AERS inhaler Inhale 2 puffs into the lungs daily 1 Inhaler 11    Ascorbic Acid (VITAMIN C PO) Take 500 mg by mouth daily         Cholecalciferol (VITAMIN D3 PO) Take by mouth        Acetylcysteine (NAC) 500 MG CAPS Take by mouth 2 times daily        guaiFENesin (ROBITUSSIN) 100 MG/5ML syrup Take 200 mg by mouth 3 times daily as needed for Cough        guaiFENesin (MUCINEX) 600 MG extended release tablet Take 1,200 mg by mouth 2 times daily as needed for Congestion        zinc 50 MG CAPS Take by mouth Twice weekly        acetylcysteine (MUCOMYST) 20 % nebulizer solution 4 mL via nebulizer 3 times daily 360 mL 5    albuterol sulfate HFA (VENTOLIN HFA) 108 (90 Base) MCG/ACT inhaler Inhale 2 puffs into the lungs every 6 hours as needed for Wheezing or Shortness of Breath 3 Inhaler 3    albuterol (PROVENTIL) (2.5 MG/3ML) 0.083% nebulizer solution Take 3 mLs by nebulization every 6 hours as needed for Wheezing or Shortness of Breath 360 vial 3    sodium chloride, Inhalant, 3 % nebulizer solution Take 3 mLs by nebulization as needed (Throat dryness, cough, wheezing) 500 mL 3    pravastatin (PRAVACHOL) 40 MG tablet Take 40 mg by mouth nightly         acetaminophen (TYLENOL) 650 MG extended release tablet Take 1 tablet by mouth as needed for Pain Do not take with hydrocodone/acetominophen 60 tablet 3    loperamide (IMODIUM) 2 MG capsule Take 2 mg by mouth daily           No current facility-administered medications for this visit.         Past Medical History        Past Medical History:   Diagnosis Date    Arthritis      [facility]      PAD (peripheral artery disease) ([facility]       bilateral lower legs    Pneumonia 01/2017 1/2017 and 2/2017    Rectal cancer ([address]      Squamous cell carcinoma of vocal cord [facility])           Past Surgical History         Past Surgical History:   Procedure Laterality Date    BRONCHOSCOPY N/A 11/18/2020     BRONCHOSCOPY performed by Sherial Mortimer, MD at [facility]   2016    COLOSTOMY   2016    EYE SURGERY         CROSS EYED    HAND SURGERY Bilateral 1995    KNEE ARTHROSCOPY Right 1996    LARYNGOSCOPY   07/12/2017     Biopsy    LARYNGOSCOPY N/A 7/12/2019     MICRO LARYNGOSCOPY W/ BIOPSY performed by Keren Gibbs MD at [address] [place] [address] E 12/30/2019     DIRECT LARYNGOSCOPY WITH BIOPSY performed by Keren Gibbs MD at [address] [place] [address] E 10/16/2020     BRONCHOSCOPY, MICRO LARYNGOSCOPY WITH REMOVAL OF SUBMUCCOSAL NON NOEPLASTIC LESION JET VENTILATION performed by Sherial Mortimer, MD at [address] N/A 11/18/2020     MICROLARYNGOSCOPY WITH BX & RESECTION OF OBSTRUCTING SOFT TISSUES WITH LASER & AIRWAY DEBRIDER, MICROLARYNGOPLASTY WITH RESECTION OF OBSTRUCTING SOFT TISSUE performed by Sherial Mortimer, MD at [address] [place] [address] E 2/5/2021     MICROLARYNGOSCOPY WITH DEBRIDEMENT OF SUBGLOTTIC OBSTRUCTING TISSUES, BRONCHOSCOPY WITH SAME OF PROXIMAL TRACHEA, UPPER GI ENDOSCOPY FOR BALLOON DILATION performed by Sherial Mortimer, MD at [address] [place] [address] E 6/11/2021     SUSPENSION MICROLARYNGOSCOPY WITH ABLATIONOF OBSTRUCTING SOFT TISSUES, DEBRIDER RESECTION OF OBSTRUCTING SUBGLOTTIC PROXIMAL TRACHEAL SOFT TISSUES WITH JET VENTILATION performed by Sherial Mortimer, MD at [address] [place] [address] E 9/17/2021     LARYNGOSCOPY AND ESOPHAGOSCOPY WITH BIOPSY AND DILATION WITH JET VENTILATION performed by Sherial Mortimer, MD at [facility]        RECTAL SURGERY   08/29/2016     colostemy bag[place]    ROTATOR CUFF REPAIR Left 1990'S    TONSILLECTOMY AND ADENOIDECTOMY the aortic arch. 5. Old right-sided rib fractures. . **This report has been created using voice recognition software. It may contain minor errors which are inherent in voice recognition technology. ** Final report electronically signed by DR Cleo Velasquez on 9/17/2021 12:18 PM           Lab Results   Component Value Date      06/07/2021      01/29/2021      10/17/2020     K 4.0 06/07/2021     K 5.2 01/29/2021     K 4.9 10/17/2020      06/07/2021      01/29/2021     CL 96 10/17/2020     CO2 29 06/07/2021     CO2 28 01/29/2021     CO2 25 10/17/2020     BUN 17 06/07/2021     BUN 19 01/29/2021     BUN 15 10/17/2020     CREATININE 0.78 06/07/2021     CREATININE 0.6 01/29/2021     CREATININE 0.6 10/17/2020     CALCIUM 8.60 06/07/2021     CALCIUM 9.1 01/29/2021     CALCIUM 8.6 10/17/2020     PROT 6.6 06/07/2021     PROT 7.2 01/29/2021     PROT 6.6 10/17/2020     LABALBU 4.2 06/07/2021     LABALBU 4.7 01/29/2021     LABALBU 4.0 10/17/2020     LABALBU 4.6 12/06/2011     BILITOT 0.5 06/07/2021     BILITOT 0.4 01/29/2021     BILITOT 0.2 10/17/2020     ALKPHOS 170 06/07/2021     ALKPHOS 165 01/29/2021     ALKPHOS 159 10/17/2020     AST 24 09/17/2021     AST 23 06/07/2021     AST 20 01/29/2021     ALT 23 09/17/2021     ALT 32 06/07/2021     ALT 28 01/29/2021         All of the past medical history, past surgical history, family history,social history, allergies and current medications were reviewed with the patient. Assessment & Plan   Diagnoses and all orders for this visit:       Diagnosis Orders   1. Invasive fungal infection  HI LARYNGOSCOPY,DIRCT,OP SCOP,EXC TUMR     BRONCHOSCOPY   2.  Tracheal stenosis  BRONCHOSCOPY   3. Radiation adverse effect, subsequent encounter  HI LARYNGOSCOPY,DIRCT,OP SCOP,EXC TUMR     BRONCHOSCOPY   4. Laryngeal carcinoma ([facility] [address]  HI LARYNGOSCOPY,DIRCT,OP SCOP,EXC TUMR     Based on microcup biopsy taken on his last bronchoscopy            Based on his history and physical findings, he warrants a suspension laryngoscopy and bronchoscopy with debridement of larynx and proximal trachea as well as biopsies any abnormal looking tissues with particular attention to his anterior right subglottis when this prior biopsy was taken. I counseled him as to the indications benefits limitations and risks of proceeding in this manner showing him that it was a low likelihood that a second biopsy would again be read as positive for cancer. Given his history of course it is possible that it is a true positive only repeat biopsies would be able to confirm that. He reported understanding these and other risks as well as these issues and was eager to proceed. Informed consent was based on this conversation.              Return in about 4 weeks (around 11/8/2021) for Biopsy review and to plan further care as indicated.           **This report has been created using voice recognition software. It may contain minor errors which are inherent in voice recognition technology. **                                     Office Visit on 10/11/2021     Office Visit on 10/11/2021        Detailed Report      Note shared with patient  Progress Notes Info    Author Note Status Last Update User   Jimi Bell MD Signed Jimi Bell MD   Last Update Date/Time: 10/11/2021 12:26 PM   Chart Review Routing History    No routing history on file.

## 2021-10-26 NOTE — TELEPHONE ENCOUNTER
----- Message from Kofi Leavitt MD sent at 10/25/2021 10:53 PM EDT -----  Please let the patient know that I've started him on Bactrim for the Staph in his larynx. It is not a replacement for the antifungal. This is still pending. Thank you. pfc

## 2021-11-02 NOTE — PROGRESS NOTES
[address] EAR, NOSE AND THROAT  [facility]  Dept: [phone]  Dept Fax: [phone]  Loc: [phone]    Naty Encarnacion is a 63 y.o. male who was referred by No ref. provider found for:  Chief Complaint   Patient presents with    Post-Op Check     Post-Op microlayryngoscopy/bronc W/Bx and debridement 10/20 ( 0 Global days)   . HPI:     Patient presents for postop examination and discussion of pathology and culture results. Patient is status post microlaryngoscopy with biopsy and debridement with jet ventilation on 10/20/2021 with Dr. Joseline De Paz. Patient states that he has been doing well since he was last seen. He states his breathing is markedly improved and he denies any recent or active shortness of breath at this time. He denies any noisy breathing as well. He states that \"I feel like I do not need my airway cleaned out. \"  Patient was started on Bactrim DS for staph aureus that was obtained from a culture and surgery. Message was sent to infectious disease regarding the culture results and discussion of fungal management. Awaiting a message back from infectious disease at the time of dictation of this note. Subjective:      REVIEW OF SYSTEMS:    A complete multi-organ review of systems was performed using a new patient questionnaire, and reviewed by me.   ENT:  negative except as noted in HPI  CONSTITUTIONAL:  negative except as noted in HPI  EYES:  negative except as noted in HPI  RESPIRATORY:  negative except as noted in HPI  CARDIOVASCULAR:  negative except as noted in HPI  GASTROINTESTINAL:  negative except as noted in HPI  GENITOURINARY:  negative except as noted in HPI  MUSCULOSKELETAL:  negative except as noted in HPI  SKIN:  negative except as noted in HPI  ENDOCRINE/METABOLIC: negative except as noted in HPI  HEMATOLOGIC/LYMPHATIC:  negative except as noted in HPI  ALLERGY/IMMUN: negative except as noted in HPI  NEUROLOGICAL:  negative except as noted in HPI  BEHAVIOR/PSYCH:  negative except as noted in HPI    Past Medical History:  Past Medical History:   Diagnosis Date    Arthritis     [facility]) [facility]     PAD (peripheral artery disease) ([facility] [facility]     bilateral lower legs    Pneumonia 01/2017 1/2017 and 2/2017    Rectal cancer ([facility]     Squamous cell carcinoma of vocal cord ([facility]        Social History:    TOBACCO:   reports that he quit smoking about 15 years ago. He started smoking about 46 years ago. He has a 69.75 pack-year smoking history. He has never used smokeless tobacco.  ETOH:   reports no history of alcohol use. DRUGS:   reports no history of drug use.     Family History:       Problem Relation Age of Onset    Prostate Cancer Father 48    Cancer Father     Heart Disease Father     Diabetes Mother     Heart Disease Mother     Stroke Mother     Heart Disease Brother     High Blood Pressure Other     Cancer Other         LUNG,PROSTATE    Hearing Loss Other        Surgical History:  Past Surgical History:   Procedure Laterality Date    BRONCHOSCOPY N/A 11/18/2020    BRONCHOSCOPY performed by Kofi Leavitt MD at [address] [place] [address]  2016    COLOSTOMY  2016    EYE SURGERY      CROSS EYED    HAND SURGERY Bilateral 1995    KNEE ARTHROSCOPY Right 1996    LARYNGOSCOPY  07/12/2017    Biopsy    LARYNGOSCOPY N/A 7/12/2019    MICRO LARYNGOSCOPY W/ BIOPSY performed by Debby Obrien MD at [address] [place] [address] E 12/30/2019    DIRECT LARYNGOSCOPY WITH BIOPSY performed by Debby Obrien MD at [address] [place] [address] E 10/16/2020    BRONCHOSCOPY, MICRO LARYNGOSCOPY WITH REMOVAL OF SUBMUCCOSAL NON NOEPLASTIC LESION JET VENTILATION performed by Kofi Leavitt MD at [address] [place] [address] E 11/18/2020    MICROLARYNGOSCOPY WITH BX & RESECTION OF OBSTRUCTING SOFT TISSUES WITH LASER & AIRWAY DEBRIDER, MICROLARYNGOPLASTY WITH RESECTION OF OBSTRUCTING SOFT TISSUE performed by Kofi Leavitt MD at [address] N/A 2/5/2021    MICROLARYNGOSCOPY WITH DEBRIDEMENT OF SUBGLOTTIC OBSTRUCTING TISSUES, BRONCHOSCOPY WITH SAME OF PROXIMAL TRACHEA, UPPER GI ENDOSCOPY FOR BALLOON DILATION performed by Kofi Leavitt MD at [address] N/A 6/11/2021    SUSPENSION MICROLARYNGOSCOPY WITH ABLATIONOF OBSTRUCTING SOFT TISSUES, DEBRIDER RESECTION OF OBSTRUCTING SUBGLOTTIC PROXIMAL TRACHEAL SOFT TISSUES WITH JET VENTILATION performed by Kofi Leavitt MD at [address] [place] [address] E 9/17/2021    LARYNGOSCOPY AND ESOPHAGOSCOPY WITH BIOPSY AND DILATION WITH JET VENTILATION performed by Kofi Leavitt MD at [address] [place] [address] E 10/20/2021    LARYNGOSCOPY MICRO WITH BIOPSY AND DEBRIDEMENT, JET VENTILATION performed by Kofi Leavitt MD at [facility] [address] RECTAL SURGERY  08/29/2016    colostemy bag[place]    ROTATOR CUFF REPAIR Left 1990'S    TONSILLECTOMY AND ADENOIDECTOMY N/A 2/5/2021    ADVANCEMENT FLAP RECONSTRUCTION BILATERAL RESECTION OF POSTERIOR SUPRAGLOTTIS performed by Kofi Leavitt MD at [place] [facility]        Objective:     BP (!) 145/85 (Site: Right Wrist, Position: Sitting)   Pulse 80   Temp 96.7 °F (35.9 °C) (Infrared)   Resp 13   Ht 6' 2\" (1.88 m)   Wt 220 lb (99.8 kg)   SpO2 90%   BMI 28.25 kg/m²      This is a 63 y.o. male. Patient is alert and oriented to person, place and time. Patient appears well developed, well nourished. Mood is happy with normal affect. Not obviously hearing impaired. Patient speech is somewhat weak, but is relatively stable compared to previous examinations. The patient's breathing is unlabored at this time without evidence of respiratory distress. No cyanosis is noted. There are diminished lung sounds bilaterally, but no wheezing, rhonchi, or stridor noted. Patient is barrel chested. No masses of the neck are noted with palpation.   Oral cavity is relatively normal without obvious ulcers or lesions. Patient has upper dentures and at the time of my exam and edentulous mandible. Data:    Other diagnostic test:      Culture from surgery 10/20/21-  Gram Stain Result 10/20/2021 12:33  [facility]   Rare segmented neutrophils observed. Rare epithelial cells observed. Many gram positive cocci occurring singly and in pairs. Many gram positive bacilli. Few budding yeast. Rare gram negative bacilli. Organism Abnormal  10/20/2021 12:33  [facility]   Staphylococcus aureus    Aerobic Culture 10/20/2021 12:33  [facility] [address] [facility]   heavy growth In the treatment of gram positive infections, GENTAMICIN should be CONSIDERED a SYNERGYSTIC agent ONLY. Ciprofloxacin and Levofloxacin, regardless of in vitro sensitivity, should not be used for staphylococcal infections other than uncomplicated lower UTIs. Moxifloxacin, regardless of in vitro sensitivity, should not be used for staphylococcal infections. Fungal culture from 10/20/21 (preliminary)-  Component Collected Lab   Fungus Smear 10/20/2021 12:33  [facility]   Few budding yeast observed. Organism Abnormal  10/20/2021 12:33  [facility]   Candida glabrata      Surgical pathology 10/20/21-  FINAL DIAGNOSIS:   A. Right subglottis, biopsy:    Detached atypical squamous epithelium.    Numerous yeast present. B.  Anterior subglottis, biopsy:   Focally invasive squamous cell carcinoma arising in a background of       dysplastic squamous epithelium.    Numerous yeast present. Surgical pathology 9/17/21-  FINAL DIAGNOSIS:   Anterior subglottis lesion, biopsy:       Well-differentiated keratinizing squamous cell carcinoma with at       least superficial invasion.      Please see consultant's report from [address] [place] [address],[address] located   under Media tab,     Assessment/Plan:     Diagnosis Orders   1.  Laryngeal carcinoma [facility])  Miscellaneous Surgery   2. Dysphonia     3. Hoarseness     4. 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 Tracheal stenosis     6. Invasive fungal infection     7. Radiation adverse effect, subsequent encounter         The patient is a 63 y.o. male that presents for follow-up. Dr. Yosef Lopez and I discussed the patient's pathology and culture results. Dr. Yosef Lopez discussed with the patient that he does not feel there has been a recurrence of his cancer. He feels that the reported dysplasia is more likely secondary to ulcer in the area that was in the process of healing causing some dysplastic appearance with invasion. Dr. Yosef Lopez recommended returning to the OR in approximately 1 to 2 weeks for suspension laryngoscopy and subglottic excision of concerning area. Dr. Yosef Lopez plans to obtain a larger sample, which would hopefully give a more definitive result on pathology. Dr. Yosef Lopez discussed that if this was a recurrence patient may likely be recommended a laryngectomy, but will await further planning until more information is obtained. Dr. Yosef Lopez and I discussed the potential risk/benefits of the procedure with the patient. He expresses understanding and would like to proceed. Patient denies any other symptoms or concerns at this time, but will contact the office in the meantime if he develops any of these.     (Please note that portions of this note may have been completed with a voice recognition program.  Efforts were made to edit the dictation but occasionally words are mis-transcribed.)    Electronically signed by ASHWIN Moreno on 11/2/2021 at 12:50 PM

## 2021-11-08 NOTE — OP NOTE
Operative Note      Patient: Marie Hoffman  YOB: 1960  MRN: [id number]    Date of Procedure: 11/8/2021    Pre-Op Diagnosis: LARYNGEAL CARCINOMA; laryngotracheal stenosis; radiation fibrosis post laryngeal XRT; chronic severe hoarseness    Post-Op Diagnosis: Same       Procedure(s):  SUSPENSION MICROLARYNGOSCOPY AND SUBLOTTIC EXCISION WITH DEBRIEDMENT WITH JET VENTILATION    Surgeon(s):  Stacey Parr MD    Assistant:   Surgical Assistant: Kostas Lee    Anesthesia: General    Estimated Blood Loss (mL): less than 50     Complications: None    Specimens:   ID Type Source Tests Collected by Time Destination   1 :  Swab Larynx CULTURE, AEROBIC Stacey Parr MD 11/8/2021 0635    A : suglottic tissue Tissue Larynx SURGICAL PATHOLOGY Stacey Parr MD 11/8/2021 0805    B : right anteriolateral subglottis Tissue Larynx SURGICAL PATHOLOGY Stacey Parr MD 11/8/2021 6148        Implants:  * No implants in log *      Drains:   Colostomy LLQ (Active)   Mucocutaneous Junction Intact 10/20/21 1032       Findings: 1. Extensive laryngeal catarrh  2.  Granular hypertrophic subglottic tissues along the undersurface of the right true vocal fold and posterior lateral subglottis  3. Right subglottic hemilarynx biopsied/debrided    Initial view of laryngotracheal catarrh               Subglottic view of same prior to resection             Isolated right anterior and anterior lateral subglottic granulation tissue and tissue suspicious for neoplasia           Tissue undermined in preparation for excision                 Same as slide above post excision for biopsy               Detailed Description of Procedure: The patient was taken to the operating room awake and placed in supine position. General anesthesia was induced and the patient was intubated with an LMA with excellent securing of the patient's airway in preparation for neuromuscular blockade. This did not impede his ventilation.   The table was turned 90 degrees for the procedure. I performed a timeout verifying the patient's identity and planned procedure. He was draped in usual fashion for transoral surgery. I removed the LMA and substituted it with a Mj laryngoscope placed into the endolarynx commencing jet ventilation. This was effectively performed for the remainder of the procedure. I examined his airway with a 30 degree optical telescope and found to be abnormal for the presence of extensive thick secretion catarrh coating the entire endolarynx and subglottis down to the upper trachea. I suctioned away this material and sent a representative portion for culture and for histopathology. After cleansing the airway off I carefully examined the soft tissues and found an area of particular thick granularity along the anterior and anterolateral subglottis just under the cord which was particularly thick and concerning for malignancy. I used a double-edged laryngeal knife on an instrument taylor to incise the superior edge of this tissue followed by a laryngeal spatula to dissected off of the underlying perichondrium without exposing the cartilage fully. I took this dissection distally through the majority of the length of the subglottis and posteriorly to the undersurface of the arytenoid cartilage. I then used a large cup forceps to remove the entirety of this tissue and 3 large pieces to be placed in formalin for permanent section. I then brought on a laryngeal microdebrider device using a 5 mm laryngeal Tricut blade. I used it to further debride additional amounts of this abnormal tissue along the undersurface of the cord going into the posterior subglottis as well as across the midline to the right side of the anterior subglottis removing virtually all of the granular raised tissue in the process. I used suction cautery and topical full-strength epinephrine to achieve hemostasis.   I then cleansed the distal airway with atomized saline and respiratory suction catheter. As such I consider the operation concluded once hemostasis had been achieved. I intubated the patient with a 7 oh endotracheal tube without difficulty and then removed the transoral hardware. I turned the patient back to anesthesia for reversal extubation in the operating room. This was carried out without incident and the patient was taken recovery in satisfactory condition. Estimated blood loss in the case was less than 15 cc and the patient received approximately a liter of intravenous lactated Ringer's intraoperatively. No blood products were transfused. Counts were considered accurate x3. No intraoperative complications were detected. I was present for performed the patient's entire operation myself. Disposition: The patient be discharged home following his first and second phases of recovery assuming he has no problems. I will await these biopsies possibly having them sent out to the [address] [place] [address] clinic for second opinion evaluation if necessary. If the consensus is that the patient has laryngeal carcinoma, I will recommend a total laryngectomy since superficial debridement is unlikely to produce sufficient protection from this becoming more invasive and ultimately metastatic. I had preliminary discussions with the patient and his mother about this possibility preoperatively.     Electronically signed by Jesús Estrada MD on 11/8/2021 at 9:54 AM

## 2021-11-08 NOTE — ANESTHESIA PRE PROCEDURE
Department of Anesthesiology  Preprocedure Note       Name:  Juaquin Higuera   Age:  63 y.o.  :  1960                                          MRN:  [id number]         Date:  2021      Surgeon: Jessie Mohs):  Kofi Leavitt MD    Procedure: Procedure(s):  SUSPENSION MICROLARYNGOSCOPY AND SUBLOTTIC EXCISION WITH JET VENTILATION    Medications prior to admission:   Prior to Admission medications    Medication Sig Start Date End Date Taking?  Authorizing Provider   sulfamethoxazole-trimethoprim (BACTRIM DS;SEPTRA DS) 800-160 MG per tablet Take 1 tablet by mouth 2 times daily for 28 days 10/25/21 11/22/21  Kofi Leavitt MD   budesonide-formoterol [facility]) 160-4.5 MCG/ACT AERO Inhale 2 puffs into the lungs 2 times daily Rinse mouth after its use. 21  JOSH Grewal CNP   voriconazole (VFEND) 200 MG tablet Take 200 mg by mouth 2 times daily 19   Historical Provider, MD   tiotropium (SPIRIVA RESPIMAT) 2.5 MCG/ACT AERS inhaler Inhale 2 puffs into the lungs daily 21   JOSH Grewal CNP   Ascorbic Acid (VITAMIN C PO) Take 500 mg by mouth daily     Historical Provider, MD   Cholecalciferol (VITAMIN D3 PO) Take by mouth    Historical Provider, MD   Acetylcysteine (NAC) 500 MG CAPS Take by mouth 2 times daily    Historical Provider, MD ramirezaiFENesin (ROBITUSSIN) 100 MG/5ML syrup Take 200 mg by mouth 3 times daily as needed for Cough    Historical Provider, MD mendozaFENesin (MUCINEX) 600 MG extended release tablet Take 1,200 mg by mouth 2 times daily as needed for Congestion    Historical Provider, MD   zinc 50 MG CAPS Take by mouth Twice weekly    Historical Provider, MD   acetylcysteine (MUCOMYST) 20 % nebulizer solution 4 mL via nebulizer 3 times daily 20   ASHWIN [place]   albuterol sulfate HFA (VENTOLIN HFA) 108 (90 Base) MCG/ACT inhaler Inhale 2 puffs into the lungs every 6 hours as needed for Wheezing or Shortness of Breath 20   Hudson Lind APRN - CNP   albuterol (PROVENTIL) (2.5 MG/3ML) 0.083% nebulizer solution Take 3 mLs by nebulization every 6 hours as needed for Wheezing or Shortness of Breath 12/7/20 12/7/21  Aly Reid, APRN - CNP   sodium chloride, Inhalant, 3 % nebulizer solution Take 3 mLs by nebulization as needed (Throat dryness, cough, wheezing) 10/19/20   ASHWIN [place]   pravastatin (PRAVACHOL) 40 MG tablet Take 40 mg by mouth nightly  7/23/20   Historical Provider, MD   acetaminophen (TYLENOL) 650 MG extended release tablet Take 1 tablet by mouth as needed for Pain Do not take with hydrocodone/acetominophen 12/30/19   Debby Obrien MD   loperamide (IMODIUM) 2 MG capsule Take 2 mg by mouth daily     Historical Provider, MD       Current medications:    No current facility-administered medications for this visit. No current outpatient medications on file. Facility-Administered Medications Ordered in Other Visits   Medication Dose Route Frequency Provider Last Rate Last Admin    0.9 % sodium chloride infusion   IntraVENous Continuous Kofi Leavitt MD           Allergies: Allergies   Allergen Reactions    Povidone Iodine Rash     Surgery prep       Problem List:    Patient Active Problem List   Diagnosis Code    Dysphonia R49.0    Alcohol abuse F10.10    FHx: smoking Z81.2    Deviated septum J34.2    Hypertrophy of nasal turbinates J34.3    Rhinitis J31.0    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[address] right side C32.8    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 D38.0    Infection due to Candida glabrata B37.9    Subglottic stenosis not due to surgery J38.6    Invasive fungal infection B49    Stage 3 severe COPD by GOLD classification ([facility]) J44.9    Hoarseness R49.0    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    Laryngeal stenosis J38.6    Airway obstruction J98.8    Airway stricture J98.8    Bullous emphysema (HCC) J43.9    Refractory obstruction of nasal airway J34.89    Dependence on continuous supplemental oxygen Z99.81    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 L59.8, Y84.2    Laryngeal carcinoma (HCC) C32.9       Past Medical History:        Diagnosis Date    Arthritis     COPD (chronic obstructive pulmonary disease) (HCC)     PAD (peripheral artery disease) (HCC)     bilateral lower legs    Pneumonia 01/2017 1/2017 and 2/2017    Rectal cancer ([facility])     Squamous cell carcinoma of vocal cord [facility])        Past Surgical History:        Procedure Laterality Date    BRONCHOSCOPY N/A 11/18/2020    BRONCHOSCOPY performed by Leander Saint, MD at [address] [place] [address] Way  2016    COLOSTOMY  2016    EYE SURGERY      CROSS EYED    HAND SURGERY Bilateral 1995    KNEE ARTHROSCOPY Right 1996    LARYNGOSCOPY  07/12/2017    Biopsy    LARYNGOSCOPY N/A 7/12/2019    [facility]/30/[facility]    DIRECT LARYNGOSCOPY WITH BIOPSY performed by Benigno Burns MD at [facility] N/A 10/16/2020    BRONCHOSCOPY, MICRO LARYNGOSCOPY WITH REMOVAL OF SUBMUCCOSAL NON NOEPLASTIC LESION JET VENTILATION performed by Leander Saint, MD at [facility] N/A 11/18/2020    MICROLARYNGOSCOPY WITH BX & RESECTION OF OBSTRUCTING SOFT TISSUES WITH LASER & AIRWAY DEBRIDER, MICROLARYNGOPLASTY WITH RESECTION OF OBSTRUCTING SOFT TISSUE [facility] 2/5/2021    MICROLARYNGOSCOPY WITH DEBRIDEMENT OF SUBGLOTTIC OBSTRUCTING TISSUES, BRONCHOSCOPY WITH SAME OF PROXIMAL TRACHEA, UPPER GI ENDOSCOPY FOR BALLOON DILATION performed by Mis Dyson MD at [address] N/A 6/11/2021    SUSPENSION MICROLARYNGOSCOPY WITH ABLATIONOF OBSTRUCTING SOFT TISSUES, DEBRIDER RESECTION OF OBSTRUCTING SUBGLOTTIC PROXIMAL TRACHEAL SOFT TISSUES WITH JET VENTILATION performed by Mis Dyson MD at [address] [place] [address] E 9/17/2021    LARYNGOSCOPY AND ESOPHAGOSCOPY WITH BIOPSY AND DILATION WITH JET VENTILATION performed by Mis Dyson MD at [address] [place] [address] E 10/20/2021    LARYNGOSCOPY MICRO WITH BIOPSY AND DEBRIDEMENT, JET VENTILATION performed by Mis Dyson MD at [facility] [address] RECTAL SURGERY  08/29/2016    colostemy bag[place]    ROTATOR CUFF REPAIR Left 1990'S    TONSILLECTOMY AND ADENOIDECTOMY N/A 2/5/2021    ADVANCEMENT FLAP RECONSTRUCTION BILATERAL RESECTION OF POSTERIOR SUPRAGLOTTIS performed by Mis Dyson MD at [facility] History:    Social History     Tobacco Use    Smoking status: Former Smoker     Packs/day: 2.25     Years: 31.00     Pack years: 69.75     Start date: 1975     Quit date: 2/5/2006     Years since quitting: 15.7    Smokeless tobacco: Never Used   Substance Use Topics    Alcohol use: No     Alcohol/week: 0.0 standard drinks                                Counseling given: Not Answered      Vital Signs (Current): There were no vitals filed for this visit.                                            BP Readings from Last 3 Encounters:   11/08/21 (!) 147/79   11/02/21 (!) 145/85   10/20/21 131/69       NPO Status:                                                                                 BMI:   Wt Readings from Last 3 Encounters:   11/08/21 214 lb (97.1 kg)   11/02/21 220 lb (99.8 kg)   10/20/21 212 lb (96.2 kg)     There is no height or weight on file to calculate BMI.    CBC:   Lab Results   Component Value Date    WBC 9.4 06/07/2021    RBC 4.84 06/07/2021    RBC 4.28 12/06/2011    HGB 14.8 06/07/2021    HCT 44.1 06/07/2021    MCV 91.1 06/07/2021    RDW 14.4 06/07/2021     06/07/2021       CMP:   Lab Results   Component Value Date     06/07/2021    K 4.0 06/07/2021     06/07/2021    CO2 29 06/07/2021    BUN 17 06/07/2021    CREATININE 0.78 06/07/2021    AGRATIO 1.8 06/07/2021    LABGLOM >90 01/29/2021    GLUCOSE 108 06/07/2021    PROT 6.6 06/07/2021    CALCIUM 8.60 06/07/2021    BILITOT 0.5 06/07/2021    ALKPHOS 170 06/07/2021    AST 24 09/17/2021    ALT 23 09/17/2021       POC Tests: No results for input(s): POCGLU, POCNA, POCK, POCCL, POCBUN, POCHEMO, POCHCT in the last 72 hours. Coags: No results found for: PROTIME, INR, APTT    HCG (If Applicable): No results found for: PREGTESTUR, PREGSERUM, HCG, HCGQUANT     ABGs: No results found for: PHART, PO2ART, NFO6TII, YFI1PPN, BEART, X8GQQBYC     Type & Screen (If Applicable):  No results found for: LABABO, LABRH    Drug/Infectious Status (If Applicable):  No results found for: HIV, HEPCAB    COVID-19 Screening (If Applicable):   Lab Results   Component Value Date    COVID19 Not Detected 01/29/2021         Anesthesia Evaluation  Patient summary reviewed and Nursing notes reviewed no history of anesthetic complications:   Airway: Mallampati: III  TM distance: >3 FB   Neck ROM: full  Mouth opening: > = 3 FB Dental:    (+) upper dentures      Pulmonary:   (+) pneumonia:  COPD: severe,  shortness of breath: chronic,  recent URI:  rhonchi,  decreased breath sounds,                            ROS comment: Former smoker     Cardiovascular:  Exercise tolerance: poor (<4 METS),           Rhythm: regular  Rate: normal                    Neuro/Psych:   (+) psychiatric history:            GI/Hepatic/Renal:   (+) GERD:,           Endo/Other:    (+) malignancy/cancer.                  Abdominal: Vascular: Other Findings:               Anesthesia Plan      general     ASA 4     (Jet Ventilation)  Induction: intravenous. MIPS: Postoperative opioids intended and Prophylactic antiemetics administered. Anesthetic plan and risks discussed with patient and spouse.       Plan discussed with CRNA and surgical team.                  Mitzi Parra MD   11/8/2021

## 2021-11-08 NOTE — ANESTHESIA POSTPROCEDURE EVALUATION
95 %   11/08/21 0935 124/73   94 21 100 %   11/08/21 0930 132/84   104  97 %       Level of Consciousness:  Awake    Respiratory:  Stable    Oxygen Saturation:  Stable    Cardiovascular:  Stable    Hydration:  Adequate    PONV:  Stable    Post-op Pain:  Adequate analgesia    Post-op Assessment:  No apparent anesthetic complications    Additional Follow-Up / Treatment / Comment:  Nancy Grimes MD  November 8, 2021   1:26 PM

## 2021-11-08 NOTE — PROGRESS NOTES
ADMITTED TO [facility] AND ORIENTED TO UNIT. SCDS ON. FALL AND ALLERGY BANDS ON. PT VERBALIZED APPROVAL FOR FIRST NAME, LAST INITIAL AND PHYSICIAN NAME ON UNIT WHITEBOARD. Don[place]on with the patient. 60-75 yrs

## 2021-11-08 NOTE — TELEPHONE ENCOUNTER
[facility] Called wanting to confirm if the Rx Dr. Layla Lynn sent in for the Patient is to last 3 days or 28days? oxyCODONE-acetaminophen (PERCOCET) 7.5-325 MG per tablet [[id number]]     Order Details  Dose: 1 tablet Route: Oral Frequency: EVERY 6 HOURS PRN for Pain   Dispense Quantity: 12 tablet Refills: 0    Note to Pharmacy: Reduce doses taken as pain becomes manageable         Sig: Take 1 tablet by mouth every 6 hours as needed for Pain for up to 3 days. Intended supply: 28 days     Also, Chela requests a more specific Dx for pain. Please advise.       [address] [facility] # [phone]

## 2021-11-08 NOTE — BRIEF OP NOTE
Brief Postoperative Note      Patient: Juaquin Higuera  YOB: 1960  MRN: [id number]    Date of Procedure: 11/8/2021    Pre-Op Diagnosis: LARYNGEAL CARCINOMA; laryngotracheal stenosis; radiation fibrosis post laryngeal XRT; chronic severe hoarseness    Post-Op Diagnosis: Same       Procedure(s):  SUSPENSION MICROLARYNGOSCOPY AND SUBLOTTIC EXCISION WITH DEBRIEDMENT WITH JET VENTILATION    Surgeon(s):  Kofi Leavitt MD    Assistant:  Surgical Assistant: Nedra Jett    Anesthesia: General    Estimated Blood Loss (mL): less than 50     Complications: None    Specimens:   ID Type Source Tests Collected by Time Destination   1 :  Swab Larynx CULTURE, AEROBIC Kofi Leavitt MD 11/8/2021 1125    A : suglottic tissue Tissue Larynx SURGICAL PATHOLOGY Kofi Leavitt MD 11/8/2021 0805    B : right anteriolateral subglottis Tissue Larynx SURGICAL PATHOLOGY Kofi Leavitt MD 11/8/2021 8391        Implants:  * No implants in log *      Drains:   Colostomy LLQ (Active)   Mucocutaneous Junction Intact 10/20/21 1032       Findings: 1. Extensive laryngeal catarrh  2.  Granular hypertrophic subglottic tissues along the undersurface of the right true vocal fold and posterior lateral subglottis  3.   Right subglottic hemilarynx biopsied/debrided    Electronically signed by Kofi Leavitt MD on 11/8/2021 at 9:45 AM

## 2021-11-08 NOTE — H&P
Jose Luis Godoy is an 63 y.o.  male with history of severe COPD and emphysema as well as laryngeal cancer status post radiation and chemotherapy. He returns to the operating room 3 weeks after his last procedure in order to obtain a larger biopsy of his right anterior subglottis to rule in or out invasive squamous cell carcinoma. His cup forcep biopsies were again read as positive as they looked similar enough to his prior biopsies that were read as positive by the [facility] pathology department. HPI from 10/18/2021  He has had invasive fungal laryngotracheitis and secondary stenosis for over a year and returns to the operating room because of a biopsy taken with microcup forceps that was positive for squamous cell carcinoma according to [facility] pathology second opinion consultation. He comes to the operating room today for a rigid airway instrumentation, repeat biopsies in the region of the cancer diagnosis and to debride the superficially invasive fungal disease and get fresh soft tissue cultures in the process. .    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 *  Resolved Problems:    * No resolved hospital problems. *    Blood pressure (!) 147/79, pulse 75, temperature 97.4 °F (36.3 °C), temperature source Temporal, resp. rate 18, height 6' 2\" (1.88 m), weight 214 lb (97.1 kg), SpO2 92 %.     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 I reviewed the indications benefits limitations and risks of proceeding in this manner with the patient and his mother at the bedside to their satisfaction. They report understanding the basis of my recommendations and being willing to proceed as such.         Michelle Duff MD  11/8/2021

## 2021-11-08 NOTE — PROGRESS NOTES
Patient is resting comfortably. O2 sats are 86% on room air and 90% on O2 6L. Patient is normally 90-91%. Patient has unlabored breathing and has portable oxygen with him and oxygen at home.

## 2021-11-08 NOTE — DISCHARGE SUMMARY
unspecified emphysema type (HCC)      voriconazole (VFEND) 200 MG tablet Take 200 mg by mouth 2 times daily      tiotropium (SPIRIVA RESPIMAT) 2.5 MCG/ACT AERS inhaler Inhale 2 puffs into the lungs daily  Qty: 1 Inhaler, Refills: 11    Associated Diagnoses: Chronic bronchitis, mucopurulent (HCC)      Ascorbic Acid (VITAMIN C PO) Take 500 mg by mouth daily       Cholecalciferol (VITAMIN D3 PO) Take by mouth      Acetylcysteine (NAC) 500 MG CAPS Take by mouth 2 times daily      guaiFENesin (ROBITUSSIN) 100 MG/5ML syrup Take 200 mg by mouth 3 times daily as needed for Cough      guaiFENesin (MUCINEX) 600 MG extended release tablet Take 1,200 mg by mouth 2 times daily as needed for Congestion      zinc 50 MG CAPS Take by mouth Twice weekly      loperamide (IMODIUM) 2 MG capsule Take 2 mg by mouth daily       acetylcysteine (MUCOMYST) 20 % nebulizer solution 4 mL via nebulizer 3 times daily  Qty: 360 mL, Refills: 5    Associated Diagnoses: Airway obstruction; Tracheal stenosis;  Mucopurulent chronic bronchitis (HCC)      albuterol sulfate HFA (VENTOLIN HFA) 108 (90 Base) MCG/ACT inhaler Inhale 2 puffs into the lungs every 6 hours as needed for Wheezing or Shortness of Breath  Qty: 3 Inhaler, Refills: 3    Associated Diagnoses: Mucopurulent chronic bronchitis (HCC)      albuterol (PROVENTIL) (2.5 MG/3ML) 0.083% nebulizer solution Take 3 mLs by nebulization every 6 hours as needed for Wheezing or Shortness of Breath  Qty: 360 vial, Refills: 3    Associated Diagnoses: Mucopurulent chronic bronchitis (HCC)      sodium chloride, Inhalant, 3 % nebulizer solution Take 3 mLs by nebulization as needed (Throat dryness, cough, wheezing)  Qty: 500 mL, Refills: 3      pravastatin (PRAVACHOL) 40 MG tablet Take 40 mg by mouth nightly          STOP taking these medications       acetaminophen (TYLENOL) 650 MG extended release tablet Comments:   Reason for Stopping:             Activity: no lifting, or Strenuous exercise for a week  Diet: regular diet  Wound Care: as directed    Follow-up with Dr. Layla Lynn in 2 weeks. Signed:  Emilia Gardner.  Layla Lynn MD  11/8/2021  10:24 AM

## 2021-11-08 NOTE — PROGRESS NOTES
Pt has met discharge criteria and states he is ready for discharge to home. IV removed, gauze and tape applied. Dressed in own clothes and personal belongings gathered. Discharge instructions (with opioid medication education information) given to pt and family; pt and family verbalized understanding of discharge instructions, prescriptions and follow up appointments. Pt transported to discharge lobby by [facility] staff.

## 2021-11-12 NOTE — ED NOTES
PT states my breathing is better today. I am having body aches.   Denies needs at this time      Ugo Whitfield RN  11/12/21 1673

## 2021-11-12 NOTE — ED NOTES
Denies pain or needs at this time.   Respirations equal & unlabored, no s/sx of distress     Debbie Harkins RN  11/12/21 2184

## 2021-11-12 NOTE — ED TRIAGE NOTES
Pt presents to ED with positive COVID yesterday. His ENT states that he should come here to get checked out. Pt has history of COPD. Pt only complaint of muscle aches.

## 2021-11-12 NOTE — ED NOTES
Patient resting in bed. Respirations easy and unlabored. No distress noted. Call light within reach.        Shelton Orellana RN  11/12/21 9026

## 2021-11-12 NOTE — ED PROVIDER NOTES
[place] ENCOUNTER          Pt Name: Zulema Buckley  MRN: [id number]  [place] 1960  Date of evaluation: 11/12/2021  Treating Resident Physician: Fabián Frausto MD  Supervising Physician: Dr. Ciara Crabtree   Patient presents with    Positive For Covid-19     History obtained from the patient. HISTORY OF PRESENT ILLNESS    HPI  Zulema Buckley is a 63 y.o. 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 He reports he tested positive for Covid 11/11/2021 with a home testing kit and then had another test that was run by his sister at [address] [place] [address]. He does report increased shortness of breath and cough from his baseline last evening, but this am states he was no longer short of breath and only complains of mild mylagias. Otherwise he denies fevers, chills, dizziness, chest pain, palpitations, nausea/vomiting, constipation, diarrhea, urinary symptoms, leg pain or swelling. He does have home oxygen, but does not use it and has not used it in months. Patient previously vaccinated for Covid-19 with [employer] [employer]. The patient has no other acute complaints at this time. REVIEW OF SYSTEMS   Review of Systems   Constitutional: Negative for chills, fatigue and fever. HENT: Negative for congestion and rhinorrhea. Eyes: Negative for visual disturbance. Respiratory: Positive for cough and shortness of breath. Cardiovascular: Negative for chest pain, palpitations and leg swelling. Gastrointestinal: Negative for abdominal pain, constipation, diarrhea, nausea and vomiting. Genitourinary: Negative for difficulty urinating, dysuria, frequency and urgency. Musculoskeletal: Positive for back pain and myalgias. Negative for arthralgias. Skin: Negative for rash. Neurological: Negative for dizziness, weakness, light-headedness, numbness and headaches. Psychiatric/Behavioral: Negative for confusion.          PAST MEDICAL AND SURGICAL HISTORY     Past Medical History:   Diagnosis Date    Arthritis     [facility]     PAD (peripheral artery disease) ([facility]     bilateral lower legs    Pneumonia 01/2017 1/2017 and 2/2017    Rectal cancer ([facility]     Squamous cell carcinoma of vocal cord [facility])      Past Surgical History:   Procedure Laterality Date    BRONCHOSCOPY N/A 11/18/2020    BRONCHOSCOPY performed by Leander Saint, MD at [place] [address]  2016    COLOSTOMY  2016   [address] [place] [address] EYE    HAND SURGERY Bilateral 1995    KNEE ARTHROSCOPY Right 1996    LARYNGOSCOPY  07/12/2017    Biopsy    LARYNGOSCOPY N/A 7/12/2019    MICRO LARYNGOSCOPY W/ BIOPSY performed by Benigno Burns MD at [facility] [employer] N/A 12/30/2019    DIRECT LARYNGOSCOPY WITH BIOPSY performed by Benigno Burns MD at [facility] [employer] N/A 10/16/2020    BRONCHOSCOPY, MICRO LARYNGOSCOPY WITH REMOVAL OF SUBMUCCOSAL NON NOEPLASTIC LESION JET VENTILATION performed by Leander Saint, MD at [facility] N/A 11/18/2020    MICROLARYNGOSCOPY WITH BX & RESECTION OF OBSTRUCTING SOFT TISSUES WITH LASER & AIRWAY DEBRIDER, MICROLARYNGOPLASTY WITH RESECTION OF OBSTRUCTING SOFT TISSUE performed by Leander Saint, MD at [facility] N/A 2/5/2021    MICROLARYNGOSCOPY WITH DEBRIDEMENT OF SUBGLOTTIC OBSTRUCTING TISSUES, BRONCHOSCOPY WITH SAME OF PROXIMAL TRACHEA, UPPER GI ENDOSCOPY FOR BALLOON DILATION performed by Leander Saint, MD at [facility] N/A 6/11/2021    SUSPENSION MICROLARYNGOSCOPY WITH ABLATIONOF OBSTRUCTING SOFT TISSUES, DEBRIDER RESECTION OF OBSTRUCTING SUBGLOTTIC PROXIMAL TRACHEAL SOFT TISSUES WITH JET VENTILATION performed by Leander Saint, MD at [facility] N/A 9/17/2021 LARYNGOSCOPY AND ESOPHAGOSCOPY WITH BIOPSY AND DILATION WITH JET VENTILATION performed by Danica Ponce MD at [address] [place] [address] E 10/20/2021    LARYNGOSCOPY MICRO WITH BIOPSY AND DEBRIDEMENT, JET VENTILATION performed by Danica Ponce MD at [address] [place] [address] E 11/8/2021    SUSPENSION MICROLARYNGOSCOPY AND SUBLOTTIC EXCISION WITH DEBRIEDMENT WITH JET VENTILATION performed by Danica Ponce MD at [address] [facility] [address] RECTAL SURGERY  08/29/2016    [employer] [place]    ROTATOR CUFF REPAIR Left 1990'S    TONSILLECTOMY AND ADENOIDECTOMY N/A 2/5/2021    ADVANCEMENT FLAP RECONSTRUCTION BILATERAL RESECTION OF POSTERIOR SUPRAGLOTTIS performed by Danica Ponce MD at [address]   No current facility-administered medications for this encounter.     Current Outpatient Medications:     sulfamethoxazole-trimethoprim (BACTRIM DS;SEPTRA DS) 800-160 MG per tablet, Take 1 tablet by mouth 2 times daily for 28 days, Disp: 56 tablet, Rfl: 0    budesonide-formoterol (SYMBICORT) 160-4.5 MCG/ACT AERO, Inhale 2 puffs into the lungs 2 times daily Rinse mouth after its use., Disp: 3 Inhaler, Rfl: 3    voriconazole (VFEND) 200 MG tablet, Take 200 mg by mouth 2 times daily, Disp: , Rfl:     tiotropium (SPIRIVA RESPIMAT) 2.5 MCG/ACT AERS inhaler, Inhale 2 puffs into the lungs daily, Disp: 1 Inhaler, Rfl: 11    Ascorbic Acid (VITAMIN C PO), Take 500 mg by mouth daily , Disp: , Rfl:     Cholecalciferol (VITAMIN D3 PO), Take by mouth, Disp: , Rfl:     Acetylcysteine (NAC) 500 MG CAPS, Take by mouth 2 times daily, Disp: , Rfl:     guaiFENesin (ROBITUSSIN) 100 MG/5ML syrup, Take 200 mg by mouth 3 times daily as needed for Cough, Disp: , Rfl:     guaiFENesin (MUCINEX) 600 MG extended release tablet, Take 1,200 mg by mouth 2 times daily as needed for Congestion, Disp: , Rfl:     zinc 50 MG CAPS, Take by mouth Twice weekly, Disp: , Rfl:     acetylcysteine (MUCOMYST) 20 % nebulizer solution, 4 mL via nebulizer 3 times daily, Disp: 360 mL, Rfl: 5    albuterol sulfate HFA (VENTOLIN HFA) 108 (90 Base) MCG/ACT inhaler, Inhale 2 puffs into the lungs every 6 hours as needed for Wheezing or Shortness of Breath, Disp: 3 Inhaler, Rfl: 3    albuterol (PROVENTIL) (2.5 MG/3ML) 0.083% nebulizer solution, Take 3 mLs by nebulization every 6 hours as needed for Wheezing or Shortness of Breath, Disp: 360 vial, Rfl: 3    sodium chloride, Inhalant, 3 % nebulizer solution, Take 3 mLs by nebulization as needed (Throat dryness, cough, wheezing), Disp: 500 mL, Rfl: 3    pravastatin (PRAVACHOL) 40 MG tablet, Take 40 mg by mouth nightly , Disp: , Rfl:     loperamide (IMODIUM) 2 MG capsule, Take 2 mg by mouth daily , Disp: , Rfl:       SOCIAL HISTORY     Social History     Social History Narrative    Not on file     Social History     Tobacco Use    Smoking status: Former Smoker     Packs/day: 2.25     Years: 31.00     Pack years: 69.75     Start date: 1975     Quit date: 2/5/2006     Years since quitting: 15.7    Smokeless tobacco: Never Used   Vaping Use    Vaping Use: Never used   Substance Use Topics    Alcohol use: No     Alcohol/week: 0.0 standard drinks    Drug use: No         ALLERGIES     Allergies   Allergen Reactions    Povidone Iodine Rash     Surgery prep         FAMILY HISTORY     Family History   Problem Relation Age of Onset    Prostate Cancer Father 48    Cancer Father     Heart Disease Father     Diabetes Mother     Heart Disease Mother     Stroke Mother     Heart Disease Brother     High Blood Pressure Other     Cancer Other         LUNG,PROSTATE    Hearing Loss Other        PHYSICAL EXAM     ED Triage Vitals [11/12/21 1100]   BP Temp Temp Source Pulse Resp SpO2 Height Weight   (!) 124/96 98 °F (36.7 °C) Oral 80 20 92 % 6' 2\" (1.88 m) 215 lb (97.5 kg)     Initial vital signs and nursing assessment reviewed and normal. Body mass index is 27.6 kg/m². Pulsoximetry is abnormal per my interpretation, but this is the patient's baseline pulse ox with his history of COPD. Additional Vital Signs:  Vitals:    11/12/21 1601   BP: 129/81   Pulse: 82   Resp: 20   Temp:    SpO2: 90%       Physical Exam  Vitals and nursing note reviewed. Constitutional:       General: He is not in acute distress. Appearance: Normal appearance. He is not ill-appearing. HENT:      Head: Normocephalic and atraumatic. Right Ear: External ear normal.      Left Ear: External ear normal.      Nose: Nose normal. No congestion or rhinorrhea. Mouth/Throat:      Mouth: Mucous membranes are moist.   Eyes:      General:         Right eye: No discharge. Left eye: No discharge. Conjunctiva/sclera: Conjunctivae normal.   Cardiovascular:      Rate and Rhythm: Normal rate and regular rhythm. Pulses: Normal pulses. Heart sounds: Normal heart sounds. No murmur heard. Pulmonary:      Effort: Pulmonary effort is normal. No respiratory distress. Breath sounds: Normal breath sounds. No wheezing, rhonchi or rales. Abdominal:      General: Abdomen is flat. Bowel sounds are normal. There is no distension. Palpations: Abdomen is soft. Tenderness: There is no abdominal tenderness. Musculoskeletal:         General: Normal range of motion. Cervical back: Normal range of motion and neck supple. Right lower leg: No edema. Left lower leg: No edema. Skin:     General: Skin is warm and dry. Capillary Refill: Capillary refill takes less than 2 seconds. Findings: No rash. Neurological:      General: No focal deficit present. Mental Status: He is alert and oriented to person, place, and time. Mental status is at baseline. Psychiatric:         Mood and Affect: Mood normal.         Behavior: Behavior normal.         Thought Content:  Thought content normal.         Judgment: Judgment normal.             MEDICAL DECISION MAKING Devika Jacobson is a 24-year-old male with history of laryngeal cancer s/p multiple laryngoscopies, rectal cancer s/p colostomy, COPD who presents for further evaluation after he tested positive for Covid-19. He was short of breath yesterday, but feels that it is much improved today and only complaint is some myalgias. His vitals are stable and SpO2 is at his baseline 92-94%. He's afebrile. Labs unremarkable - no white count, procal negative. CXR with bilateral lower lobe consolidative opacities and emphysema. EKG NSR. Contacted Dr. Alcon Odom with ENT to discuss lab and imaging findings and he would like Ct chest to further evaluate bilateral lower lobe consolidations. CT chest revealed COPD, few patchy ground glass opacities at bases. Sputum sent for culture. Discussed this further with Dr. Alcon Odom and will send patient home with close outpatient follow up. No indication to treat with antibiotics or steroids at this time. Will follow up on respiratory culture. Due to medical history and risk of clinical deterioration, discussed risks/benefits of monoclonal antibody treatment with patient. Patient scheduled for Regeneron 11/18/2021 at 0900.       ED RESULTS   Laboratory results:  Labs Reviewed   CBC WITH AUTO DIFFERENTIAL - Abnormal; Notable for the following components:       Result Value    MCV 96.4 (*)     MCHC 31.5 (*)     RDW-SD 50.4 (*)     Lymphocytes Absolute 0.5 (*)     Immature Grans (Abs) 0.10 (*)     All other components within normal limits   COMPREHENSIVE METABOLIC PANEL W/ REFLEX TO MG FOR LOW K - Abnormal; Notable for the following components:    CO2 22 (*)     Alkaline Phosphatase 162 (*)     Total Bilirubin 0.2 (*)     All other components within normal limits   D-DIMER, QUANTITATIVE - Abnormal; Notable for the following components:    D-Dimer, Quant 543.00 (*)     All other components within normal limits   OSMOLALITY - Abnormal; Notable for the following components:    Osmolality Calc 273.6 (*)     All other components within normal limits   CULTURE, RESPIRATORY    Narrative:     Source: Expectorated sputum       Site:           Current Antibiotics: not stated   PROCALCITONIN   ANION GAP   GLOMERULAR FILTRATION RATE, ESTIMATED   TROPONIN       Radiologic studies results:  CT CHEST WO CONTRAST   Final Result      COPD. A few patchy groundglass opacities at the lung bases can indicate pneumonia the appropriate clinical setting. Follow-up to ensure resolution is advised. **This report has been created using voice recognition software. It may contain minor errors which are inherent in voice recognition technology. **      Final report electronically signed by Dr Lazarus Silversmith on 11/12/2021 3:05 PM      XR CHEST PORTABLE   Final Result   1. Bilateral lower lobe consolidative opacities are seen. Findings likely relate to an infectious etiology. 2. Emphysema. 3. Other findings as described above. **This report has been created using voice recognition software. It may contain minor errors which are inherent in voice recognition technology. **      Final report electronically signed by Dr Nithya Mosley on 11/12/2021 12:15 PM          ED Medications administered this visit: Medications - No data to display      ED COURSE     ED Course as of 11/12/21 1846   Fri Nov 12, 2021   1225 EKG 12 lead [LT]   1225 XR CHEST PORTABLE [LT]   1225 CBC Auto Differential [LT]   1225 Comprehensive Metabolic Panel w/ Reflex to MG [LT]   1225 Troponin [LT]   1225 Procalcitonin [LT]   1225 D-Dimer, Quantitative [LT]   1246 Troponin [LT]   1515 CT CHEST WO CONTRAST [LT]   1516 Dr. Reanna Luciano notified [LT]   [id number] Culture, Respiratory [LT]      ED Course User Index  [LT] Vivek Moody MD     Strict return precautions and follow up instructions were discussed with the patient prior to discharge, with which the patient agrees.       MEDICATION CHANGES     Discharge Medication List as of 11/12/2021  3:57

## 2021-11-13 NOTE — ED PROVIDER NOTES
[address] [facility]  EMERGENCY MEDICINE ATTENDING ATTESTATION      Evaluation of Jef Darden. Case discussed and care plan developed with resident physician. I agree with the resident physician documentation and plan as documented by her, except if my documentation differs. Patient seen, interviewed and examined by me. I reviewed the medical, surgical, family and social history, medications and allergies. I have reviewed the nursing documentation. I have reviewed the patient's vital signs and are normal per my interpretation. Body mass index is 27.6 kg/m². Pulsoxymetry is low normal oxygen saturations, at baseline per patient per my interpretation. Brief H&P   Patient c/o myalgias, COVID+ outpatient. Patient instructed to come to ED by ENT for evaluation given medical comorbidities      Physical exam is notable for well appearing, lungs clear bilaterally, no distress, no stridor      Medical Decision Making   MDM:   Patient is a 63year old male with a history of rectal cancer, laryngeal cancer with laryngeal stenosis, COPD who presents with known covid infection for evaluation. Patient with low normal O2 saturations which he states is his baseline. He states that he has O2 at home from the past but he has not needed it in a long time. He denies feeling SOB at this time and states that he overall feels well. Laboratory work-up shows no leukocytosis, hemoglobin at baseline, mild elevation of alk phos but otherwise unremarkable LFTs, negative troponin, electrolytes and renal function within normal limits. CXR showed bilateral lower lobe consolidative opacities. Plan was discussed with patient ENT physician who told him to come to the emergency department, Dr. Kati Alan, who recommended CT chest without contrast to evaluate for infectious pathology. CT showed patchy groundglass opacities at the lung bases that could potentially be pneumonia.      Patient states he feels well and is comfortable with discharge. Return precautions discussed. Plan:    Discharge   Outpatient follow up   Regeneron infusion scheduled    Return precautions    Please see the resident physician completed note for final disposition except as documented on this attestation. I have reviewed and interpreted all available lab, radiology and ekg results available at the moment. Diagnosis, treatment and disposition plans were discussed and agreed upon by patient. This transcription was electronically signed. It was dictated by use of voice recognition software and electronically transcribed. The transcription may contain errors not detected in proofreading.      I performed direct supervision and was present for the critical portion following procedures: None  Critical care time on this case: None    Electronically signed by Josue Morales MD on 11/12/21 at 7:03 PM EST        Josue Morales MD  11/12/21 7303

## 2021-11-15 PROBLEM — U07.1 COVID: Status: ACTIVE | Noted: 2021-01-01

## 2021-11-15 NOTE — CARE COORDINATION
Patient contacted regarding COVID-19 diagnosis and monoclonal antibody infusion follow up. Discussed COVID-19 related testing which was available at this time. Test results were positive. Patient informed of results, if available? Yes. Ambulatory Care Manager contacted the patient by telephone to perform post discharge assessment. Call within 2 business days of discharge: Yes. Verified name and  with patient as identifiers. Provided introduction to self, and explanation of the CTN/ACM role, and reason for call due to risk factors for infection and/or exposure to COVID-19. Symptoms reviewed with patient who verbalized the following symptoms: no new symptoms and no worsening symptoms. Due to no new or worsening symptoms encounter was not routed to provider for escalation. Discussed follow-up appointments. If no appointment was previously scheduled, appointment scheduling offered: No.  [facility] follow up appointment(s):   Future Appointments   Date Time Provider Vik Rhodes   2021  9:00 AM [facility] EXAM ROOM 2 [address] [place] [address]   2021  8:30 AM JOSH Salazar CNP N [facility]   12/15/2021  9:30 AM JOSH Andre CNP N Pulm Med [address]     [facility] follow up appointment(s):     Non-face-to-face services provided:  Obtained and reviewed discharge summary and/or continuity of care documents     Advance Care Planning:   Does patient have an Advance Directive:  not able to review during call. Educated patient about risk for severe COVID-19 due to risk factors according to CDC guidelines. ACM reviewed discharge instructions, medical action plan and red flag symptoms with the patient who verbalized understanding. Discussed COVID vaccination status: Yes. Education provided on COVID-19 vaccination as appropriate. Discussed exposure protocols and quarantine with CDC Guidelines.  Patient was given an opportunity to verbalize any questions and concerns and agrees to contact ACM or health care provider for questions related to their healthcare. Reviewed and educated patient on any new and changed medications related to discharge diagnosis     Was patient discharged with a pulse oximeter? No Discussed and confirmed pulse oximeter discharge instructions and when to notify provider or seek emergency care. ACM provided contact information. Plan for follow-up call in 5-7 days based on severity of symptoms and risk factors.

## 2021-11-16 NOTE — PROGRESS NOTES
Pharmacy Note  ED Culture Follow-up    Jez Marroquin is a 57 y.o. male. Allergies: Povidone iodine     Labs:  Lab Results   Component Value Date    BUN 19 11/12/2021    CREATININE 0.8 11/12/2021    WBC 5.1 11/12/2021     Estimated Creatinine Clearance: 111 mL/min (based on SCr of 0.8 mg/dL). Current antimicrobials:   None    ASSESSMENT:  Micro results:   Sputum culture: positive for mixed growth ; see culture     PLAN:  Need for intervention: No 2/2 most likely normal mragarita  Discussed with: Jessica Wayne PA-C  Chosen treatment:    No treatment indicated    Patient response:   No need to contact patient    Called/sent in prescription to: Not applicable    Please call with any questions.  Maxwell Hansen, [address] [place] [address], PharmD 9:55 PM 11/15/2021

## 2021-11-17 PROBLEM — U07.1 COVID-19: Status: ACTIVE | Noted: 2021-01-01

## 2021-11-17 NOTE — TELEPHONE ENCOUNTER
Pt sister Lazarus Chino is calling and said she just called an ambulance and they are taking pt to [facility] due to his pulse ox has dropped into the 80's. She said she wants everything done that can be done for him.   Please advise María at [phone]

## 2021-11-17 NOTE — ED NOTES
Bed: 019A  Expected date:   Expected time:   Means of arrival: [facility]  Comments:     Reid Smith RN  11/17/21 5453

## 2021-11-17 NOTE — H&P
History & Physical      Patient: Rylee Damian  YOB: 1960    MRN: [id number]  Acct: [de-identified]    PCP: JOSH Garcia CNP    Date of Admission: 11/17/2021    Date of Service: Patient seen / examined on 11/17/21 and admitted to Inpatient with expected LOS greater than two midnights due to medical therapy. ASSESSMENT / PLAN:  COVID 19 infection - Diagnosed 11/11/2021 at outside facility. He was planned for Regenero treatment outpatient but never received. Repeat COVID-19 positive on 11/17/2021 by NAAT. Patient denies loss of taste or smell. CTA chest 11/17/2021 showing evidence of superimposed viral pneumonia on top of severe emphysematous changes. CRP 20.20 and ferritin 1,448.   - LDH lab pending  - Pharmacy to dose Baricitinb  - 6mg dexamethasone 5 days  - Ferritin, LDH, CRP every other day  - Maintain head of bed elevation to limit aspiration risk  - Continuous telemetry    Acute hypoxic respiratory failure - Secondary to COVID-19 infection. 6LPM on arrival with escalation to HFNC. Patient does not use supplemental oxygen at baseline.  - Wean supplemental oxygen as tolerated maintain SPO2 >92%. - Pulmonary hygiene, incentive spirometry, encourage deep breathing and cough. COPD GOLD 3- Without exacerbation. 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  - Resume home Voriconazole and bactrim    HLD - Noted  - Continue statin    PAD - Noted. Bilateral lower extremity legs. Hx of laryngeal cancer - status post radiation (2017). Follows with Dr. James Asencio. Of note last admission 11/8/2021 for biopsy of right anterior subglottis to rule out invasive squamous cell carcinoma. Hx of rectal cancer - s/p chemotherapy.       Chief Complaint: Cough, IRELAND, SOB    History of Present Illness:  Rylee Damian is a 57 y.o. male with PMHx of COPD, PAD, rectal cancer, and laryngeal cancer who presented to [facility] with complaint of worsening shortness of breath that started on 11/11/21. He was diagnosed with COVID-19 at outpatient facility and was planned for Regeneron treatment 11/18/21. The patient stated that he has history of laryngeal cancer for which he received chemotherapy for and subsequently has chronic fungal infection. He stated he does not want intubation. The patient stated his shortness of breath is worse with exertion. He did not try anything for his shortness of breath except for his prescribed home inhalers of mucomyst, Symbicort, Spiriva, proventil, and duoneb. He denies fever, chills, headache, lightheadedness, change in vision, rhinorrhea, congestion, sore throat, hemoptysis, chest pain, palpitations, PND, abdominal pain, nausea, vomiting, hematemesis, change in bowel/bladder habits, hematochezia, hematuria, weakness, difficulty with ambulation, numbness/tingling, or known exposure to sick contacts. ED course: Patient presented with above complaints. COVID-19 Positive via NAAT. Given one dose Albuterol, 8mg decadron, and 1L normal saline. Workup revealed: Sodium 135, potassium 5, chloride 102, CO2 20, BUN 23, creatinine 0.9, anion gap 13, EGFR 85, lactic acid 0.9, glucose 116, CRP 20.20, proBNP 153, troponin <0.010 WBC 9.4, hemoglobin 13.8, hematocrit 43.3, MCV 96, platelet 236. CTA chest: No evidence of pulmonary embolism, interstitial prominence and groundglass opacities along with developing consolidations at the lung base with groundglass opacities in the upper lung. There is evidence of viral pneumonia superimposed on chronic emphysematous changes. The patient was admitted to the hospital service for further care and management. Please see A&P above for additional information. The patient stated that he would like to try Ivermectin instead.  The patient was educated that he is welcome to request any medication however as shared decision this is not an option at this time. The patient was in agreement with the plan for decadron and Baricitinib.     Past Medical History:  Past Medical History:   Diagnosis Date    Arthritis     COPD (chronic obstructive pulmonary disease) ([address]     PAD [facility]peripheral artery disease) [facility]     bilateral lower legs    Pneumonia 01/2017 1/2017 and 2/2017    Rectal cancer ([facility]     Squamous cell carcinoma of vocal cord ([facility]        Past Surgical History:  Past Surgical History:   Procedure Laterality Date    BRONCHOSCOPY N/A 11/18/2020    BRONCHOSCOPY performed by Greg Shaw MD at [place] [address]  2016    COLOSTOMY  2016   [address] [place] [address] EYE    HAND SURGERY Bilateral 1995    KNEE ARTHROSCOPY Right 1996    LARYNGOSCOPY  07/12/2017    Biopsy    LARYNGOSCOPY N/A 7/12/2019    MICRO LARYNGOSCOPY W/ BIOPSY performed by Lizette Horton MD at [facility] N/A 12/30/2019    DIRECT LARYNGOSCOPY WITH BIOPSY performed by Lizette Horton MD at [facility] N/A 10/16/2020    BRONCHOSCOPY, MICRO LARYNGOSCOPY WITH REMOVAL OF SUBMUCCOSAL NON NOEPLASTIC LESION JET VENTILATION [facility] N/A 11/18/2020    MICROLARYNGOSCOPY WITH BX & RESECTION OF OBSTRUCTING SOFT TISSUES WITH LASER & AIRWAY DEBRIDER, MICROLARYNGOPLASTY WITH RESECTION OF OBSTRUCTING SOFT TISSUE [facility] N/A 2/5/2021    MICROLARYNGOSCOPY WITH DEBRIDEMENT OF SUBGLOTTIC OBSTRUCTING TISSUES, BRONCHOSCOPY WITH SAME OF PROXIMAL TRACHEA, UPPER GI ENDOSCOPY FOR BALLOON DILATION performed by Greg Shaw MD at [facility] N/A 6/11/2021    SUSPENSION MICROLARYNGOSCOPY WITH ABLATIONOF OBSTRUCTING SOFT TISSUES, DEBRIDER RESECTION OF OBSTRUCTING SUBGLOTTIC PROXIMAL TRACHEAL SOFT TISSUES WITH JET VENTILATION performed by Greg Shaw MD at [facility]    LARYNGOSCOPY N/A 9/17/2021    LARYNGOSCOPY AND ESOPHAGOSCOPY WITH BIOPSY AND DILATION WITH JET VENTILATION performed by Chelly Reddy MD at [address] [place] [address] E 10/20/2021    LARYNGOSCOPY MICRO WITH BIOPSY AND DEBRIDEMENT, JET VENTILATION performed by Chelly Reddy MD at [address] [place] [address] E 11/8/2021    SUSPENSION MICROLARYNGOSCOPY AND SUBLOTTIC EXCISION WITH DEBRIEDMENT WITH JET VENTILATION performed by Chelly Reddy MD at [facility] RECTAL SURGERY  08/29/2016    colostemy [place]    ROTATOR CUFF REPAIR Left 1990'S    TONSILLECTOMY AND ADENOIDECTOMY N/A 2/5/2021    ADVANCEMENT FLAP RECONSTRUCTION BILATERAL RESECTION OF POSTERIOR SUPRAGLOTTIS [facility]       Medications Prior to Admission:  No current facility-administered medications on file prior to encounter. Current Outpatient Medications on File Prior to Encounter   Medication Sig Dispense Refill    sulfamethoxazole-trimethoprim (BACTRIM DS;SEPTRA DS) 800-160 MG per tablet Take 1 tablet by mouth 2 times daily for 28 days 56 tablet 0    budesonide-formoterol (SYMBICORT) 160-4.5 MCG/ACT AERO Inhale 2 puffs into the lungs 2 times daily Rinse mouth after its use.  3 Inhaler 3    voriconazole (VFEND) 200 MG tablet Take 200 mg by mouth 2 times daily      tiotropium (SPIRIVA RESPIMAT) 2.5 MCG/ACT AERS inhaler Inhale 2 puffs into the lungs daily 1 Inhaler 11    Ascorbic Acid (VITAMIN C PO) Take 500 mg by mouth daily       Cholecalciferol (VITAMIN D3 PO) Take by mouth      Acetylcysteine (NAC) 500 MG CAPS Take by mouth 2 times daily      guaiFENesin (ROBITUSSIN) 100 MG/5ML syrup Take 200 mg by mouth 3 times daily as needed for Cough      guaiFENesin (MUCINEX) 600 MG extended release tablet Take 1,200 mg by mouth 2 times daily as needed for Congestion      zinc 50 MG CAPS Take by mouth Twice weekly      acetylcysteine (MUCOMYST) 20 % nebulizer solution 4 mL via nebulizer 3 times daily 360 mL 5    albuterol sulfate HFA (VENTOLIN HFA) 108 (90 Base) MCG/ACT inhaler Inhale 2 puffs into the lungs every 6 hours as needed for Wheezing or Shortness of Breath 3 Inhaler 3    albuterol (PROVENTIL) (2.5 MG/3ML) 0.083% nebulizer solution Take 3 mLs by nebulization every 6 hours as needed for Wheezing or Shortness of Breath 360 vial 3    sodium chloride, Inhalant, 3 % nebulizer solution Take 3 mLs by nebulization as needed (Throat dryness, cough, wheezing) 500 mL 3    pravastatin (PRAVACHOL) 40 MG tablet Take 40 mg by mouth nightly       loperamide (IMODIUM) 2 MG capsule Take 2 mg by mouth daily          Allergies:  Povidone iodine    Social History:  Social History     Socioeconomic History    Marital status:      Spouse name: Not on file    Number of children: Not on file    Years of education: Not on file    Highest education level: Not on file   Occupational History    Not on file   Tobacco Use    Smoking status: Former Smoker     Packs/day: 2.25     Years: 31.00     Pack years: 69.75     Start date: 1975     Quit date: 2/5/2006     Years since quitting: 15.7    Smokeless tobacco: Never Used   Vaping Use    Vaping Use: Never used   Substance and Sexual Activity    Alcohol use: No     Alcohol/week: 0.0 standard drinks    Drug use: No    Sexual activity: Not Currently   Other Topics Concern    Not on file   Social History Narrative    Not on file     Social Determinants of Health     Financial Resource Strain:     Difficulty of Paying Living Expenses: Not on file   Food Insecurity:     Worried About Running Out of Food in the Last Year: Not on file    Jama of Food in the Last Year: Not on file   Transportation Needs:     Lack of Transportation (Medical): Not on file    Lack of Transportation (Non-Medical):  Not on file   Physical Activity:     Days of Exercise per Week: Not on file    Minutes of Exercise per Session: Not on file   Stress:     Feeling of Stress : Not on file   Social Connections:     Frequency of Communication with Friends and Family: Not on file    Frequency of Social Gatherings with Friends and Family: Not on file    Attends [employer] Services: Not on file    Active Member of [address] [place] [address] [employer] or Organizations: Not on file    Attends Club or Organization Meetings: Not on file    Marital Status: Not on file   Intimate Partner Violence:     Fear of Current or Ex-Partner: Not on file    Emotionally Abused: Not on file    Physically Abused: Not on file    Sexually Abused: Not on file   Housing Stability:     Unable to Pay for Housing in the Last Year: Not on file    Number of [place] in the Last Year: Not on file    Unstable Housing in the Last Year: Not on file       Family History:   Family History   Problem Relation Age of Onset    Prostate Cancer Father 48    Cancer Father     Heart Disease Father     Diabetes Mother     Heart Disease Mother     Stroke Mother     Heart Disease Brother     High Blood Pressure Other     Cancer Other         LUNG,PROSTATE    Hearing Loss Other        REVIEW OF SYSTEMS:  A 14-point ROS was obtained and negative, with the exception of pertinent positives as listed below:    Cough, IRELAND, SOB    PHYSICAL EXAM:  Vitals:    11/17/21 1008 11/17/21 1121 11/17/21 1307 11/17/21 1357   BP: (!) 153/78 (!) 105/92 (!) 151/85 (!) 164/93   Pulse: 80 76 83 80   Resp: 18 26 26 25   Temp:       TempSrc:       SpO2: 94% 95% 91% 93%   Weight:       Height:         Physical Exam  Vitals and nursing note reviewed. Constitutional:       General: He is awake. He is in acute distress. Appearance: Normal appearance. He is overweight. He is ill-appearing. Interventions: He is not intubated. Nasal cannula in place. HENT:      Head: Normocephalic and atraumatic.       Right Ear: External ear normal.      Left Ear: External ear normal.      Nose: Nose normal.      Mouth/Throat: Mouth: Mucous membranes are moist.      Pharynx: Oropharynx is clear. Eyes:      Conjunctiva/sclera: Conjunctivae normal.      Pupils: Pupils are equal, round, and reactive to light. Cardiovascular:      Rate and Rhythm: Normal rate and regular rhythm. Pulses: Normal pulses. Dorsalis pedis pulses are 2+ on the right side and 2+ on the left side. Posterior tibial pulses are 2+ on the right side and 2+ on the left side. Heart sounds: Normal heart sounds. Pulmonary:      Effort: Tachypnea, accessory muscle usage and respiratory distress present. No retractions. He is not intubated. Breath sounds: Decreased air movement present. Decreased breath sounds and wheezing present. Chest:       Abdominal:      General: Bowel sounds are normal.      Palpations: Abdomen is soft. Musculoskeletal:         General: Normal range of motion. Cervical back: Normal range of motion and neck supple. Right lower leg: No edema. Left lower leg: No edema. Skin:     General: Skin is warm and dry. Capillary Refill: Capillary refill takes less than 2 seconds. Neurological:      General: No focal deficit present. Mental Status: He is alert and oriented to person, place, and time. Mental status is at baseline. GCS: GCS eye subscore is 4. GCS verbal subscore is 5. GCS motor subscore is 6. Cranial Nerves: Cranial nerves are intact. Sensory: Sensation is intact. Motor: Motor function is intact. Coordination: Coordination is intact. Psychiatric:         Attention and Perception: Attention and perception normal.         Mood and Affect: Mood and affect normal.         Speech: Speech normal.         Behavior: Behavior normal. Behavior is cooperative. Thought Content:  Thought content normal.         Cognition and Memory: Cognition and memory normal.         Judgment: Judgment normal.           Labs:  Results for orders placed or performed during the hospital encounter of 11/17/21   COVID-19, Rapid    Specimen: Nasopharyngeal Swab   Result Value Ref Range    SARS-CoV-2, NAAT DETECTED (AA) NOT DETECTED   Blood Gas, Arterial   Result Value Ref Range    pH, Blood Gas 7.39 7.35 - 7.45    PCO2 35 35 - 45 mmhg    PO2 65 (L) 71 - 104 mmhg    HCO3 21 (L) 23 - 28 mmol/l    Base Excess (Calculated) -3.6 (L) -2.5 - 2.5 mmol/l    O2 Sat 92 %    IFIO2 100     DEVICE HFNC     Mo Test Positive     Source: R Radial     COLLECTED BY: [id number]     Comment 40L    CBC Auto Differential   Result Value Ref Range    WBC 9.4 4.8 - 10.8 thou/mm3    RBC 4.51 (L) 4.70 - 6.10 mill/mm3    Hemoglobin 13.8 (L) 14.0 - 18.0 gm/dl    Hematocrit 43.3 42.0 - 52.0 %    MCV 96.0 (H) 80.0 - 94.0 fL    MCH 30.6 26.0 - 33.0 pg    MCHC 31.9 (L) 32.2 - 35.5 gm/dl    RDW-CV 15.0 (H) 11.5 - 14.5 %    RDW-SD 52.9 (H) 35.0 - 45.0 fL    Platelets 122 319 - 196 thou/mm3    MPV 10.4 9.4 - 12.4 fL    Seg Neutrophils 87.3 %    Lymphocytes 4.3 %    Monocytes 7.6 %    Eosinophils 0.2 %    Basophils 0.1 %    Immature Granulocytes 0.5 %    Segs Absolute 8.2 (H) 1.8 - 7.7 thou/mm3    Lymphocytes Absolute 0.4 (L) 1.0 - 4.8 thou/mm3    Monocytes Absolute 0.7 0.4 - 1.3 thou/mm3    Eosinophils Absolute 0.0 0.0 - 0.4 thou/mm3    Basophils Absolute 0.0 0.0 - 0.1 thou/mm3    Immature Grans (Abs) 0.05 0.00 - 0.07 thou/mm3    nRBC 0 /100 wbc   Comprehensive Metabolic Panel   Result Value Ref Range    Glucose 116 (H) 70 - 108 mg/dL    CREATININE 0.9 0.4 - 1.2 mg/dL    BUN 23 (H) 7 - 22 mg/dL    Sodium 135 135 - 145 meq/L    Potassium 5.0 3.5 - 5.2 meq/L    Chloride 102 98 - 111 meq/L    CO2 20 (L) 23 - 33 meq/L    Calcium 8.5 8.5 - 10.5 mg/dL    AST 55 (H) 5 - 40 U/L    Alkaline Phosphatase 147 (H) 38 - 126 U/L    Total Protein 6.5 6.1 - 8.0 g/dL    Albumin 3.6 3.5 - 5.1 g/dL    Total Bilirubin 0.2 (L) 0.3 - 1.2 mg/dL    ALT 41 11 - 66 U/L   Brain Natriuretic Peptide   Result Value Ref Range    Pro-.0 0.0 - 900.0 pg/mL Troponin   Result Value Ref Range    Troponin T < 0.010 ng/ml   C-reactive protein   Result Value Ref Range    CRP 20.20 (H) 0.00 - 1.00 mg/dl   Ferritin   Result Value Ref Range    Ferritin 1,448 (H) 22 - 322 ng/mL   Procalcitonin   Result Value Ref Range    Procalcitonin 0.09 0.01 - 0.09 ng/mL   Lactic acid, plasma   Result Value Ref Range    Lactic Acid 0.9 0.5 - 2.0 mmol/L   Anion Gap   Result Value Ref Range    Anion Gap 13.0 8.0 - 16.0 meq/L   Glomerular Filtration Rate, Estimated   Result Value Ref Range    Est, Glom Filt Rate 85 (A) ml/min/1.73m2   Osmolality   Result Value Ref Range    Osmolality Calc 274.8 (L) 275.0 - 300.0 mOsmol/kg       EKG / Radiology:    EKG:  Reviewed by me --    CXR:  Reviewed by me --    CT CHEST WO CONTRAST    Result Date: 11/12/2021  PROCEDURE: CT CHEST WO CONTRAST CLINICAL INFORMATION: Abnormal chest x-ray. COMPARISON: Chest x-ray 11/12/2021. CT chest 8/9/2021. TECHNIQUE: 5 mm axial imaging through the chest without contrast. Coronal and sagittal reconstructions were performed. All CT scans at this facility use dose modulation, iterative reconstruction, and/or weight based dosing when appropriate to reduce the radiation dose to as low as reasonably achievable. FINDINGS: Heart/mediastinum: The heart size is normal. Coronary calcifications are visualized. Pericardial fluid is observed. The aorta is not dilated. No mediastinal, hilar, or axillary is visualized. Lungs: Centrilobular nodules present. A few patchy groundglass opacities are noted at the lung bases (series 2, image 53). No pleural effusion or pneumothorax is identified. Scarring in the right middle lobe and lingula is unchanged. Scattered benign calcified granulomas are stable. Upper abdomen:  No acute findings are noted in the limited images through the upper abdomen. Musculoskeletal:  Diffuse osteopenia is present. Multilevel degenerative disc disease is noted in the thoracic spine.  The visualized skeletal structures appear intact. COPD. A few patchy groundglass opacities at the lung bases can indicate pneumonia the appropriate clinical setting. Follow-up to ensure resolution is advised. **This report has been created using voice recognition software. It may contain minor errors which are inherent in voice recognition technology. ** Final report electronically signed by Dr Tushar Osman on 11/12/2021 3:05 PM    CTA CHEST W WO CONTRAST    Result Date: 11/17/2021  PROCEDURE: CTA CHEST W WO CONTRAST CLINICAL INFORMATION: Covid-19 pneumonia. Increasing shortness of breath. Tested positive for Covid 19 8 days ago. COMPARISON: CT chest dated 11/12/2021. TECHNIQUE: 3 mm axial images were obtained through the chest during fast bolus administration of 80 mL [id number]-[id number] injected in the left AC. A non-contrast localizer was obtained. 3D reconstructions were created on the scanner to include MIP coronal and sagittal images through the chest. All CT scans at this facility use dose modulation, iterative reconstruction, and/or weight-based dosing when appropriate to reduce radiation dose to as low as reasonably achievable. FINDINGS:  Contrast bolus is adequate for evaluation to the subsegmental level. No focal filling defects are identified within the pulmonary arteries to suggest pulmonary embolus. There is mild mural calcification in the aortic arch without aneurysm or dissection. There is a mildly prominent pretracheal lymph node with fatty hilum measuring 11 mm in greatest short axis diameter, stable compared to prior CT. There is also mildly enlarged subcarinal lymph node measuring 12 mm in greatest short axis diameter, stable  compared to prior exam. There is enlarged left hilar lymph node measuring 11 mm in greatest short axis diameter. Other scattered right hilar and mediastinal lymph nodes are present. There is coronary artery calcification. The heart is otherwise unremarkable.  There are severe emphysematous changes throughout both lungs. There is interstitial prominence at the bilateral lung bases with diffuse groundglass opacities in the remaining parenchyma. There is developing consolidation at the lung bases as well. There is milder interstitial prominence and groundglass opacities at the left upper lung. Visualized upper abdominal solid organs are unremarkable. There are stable compression deformities of the T6-T8 vertebral bodies with exaggerated thoracic kyphosis. 1. No evidence of pulmonary embolus. 2. Interstitial prominence and groundglass opacities along with developing consolidations at the lung bases with groundglass opacities of the left upper lung as evidence for viral pneumonia superimposed on severe emphysematous changes. **This report has been created using voice recognition software. It may contain minor errors which are inherent in voice recognition technology. ** Final report electronically signed by Dr. Shamika Baum MD on 11/17/2021 11:50 AM    XR CHEST PORTABLE    Result Date: 11/12/2021  PROCEDURE: XR CHEST PORTABLE CLINICAL INFORMATION: shortness of breath COMPARISON: Chest radiograph 11/8/2021 TECHNIQUE: AP portable chest radiograph performed. FINDINGS: Bilateral lower lobe consolidative opacities are seen. Cardiac silhouette is not enlarged. No pleural effusion. No pneumothorax. No acute bony abnormality. Old right-sided rib fractures are seen. Enchondroma is present in the proximal right humerus. Emphysematous changes are noted. A surgical screw is seen within the left glenoid. 1. Bilateral lower lobe consolidative opacities are seen. Findings likely relate to an infectious etiology. 2. Emphysema. 3. Other findings as described above. **This report has been created using voice recognition software. It may contain minor errors which are inherent in voice recognition technology. ** Final report electronically signed by Dr Julieta Moise on 11/12/2021 12:15 PM    XR CHEST PORTABLE    Result Date: 11/8/2021  PROCEDURE: XR CHEST PORTABLE CLINICAL INFORMATION: post mirco laryngoscopy . COMPARISON: Chest radiograph dated 10/20/2021. TECHNIQUE: AP upright view of the chest. FINDINGS: There is stable hyperaeration as evidence for COPD. There is interstitial prominence at the lung bases perhaps mildly decreased compared to prior radiographs. The cardiac mediastinal silhouette is stable. No acute abnormality identified. **This report has been created using voice recognition software. It may contain minor errors which are inherent in voice recognition technology. ** Final report electronically signed by Dr. Cristiane Barnett MD on 11/8/2021 4:46 PM    XR CHEST PORTABLE    Result Date: 10/20/2021  PROCEDURE: XR CHEST PORTABLE CLINICAL INFORMATION: R/o pneumothorax; Immediate post-op jet ventilation . TECHNIQUE: Portable upright COMPARISON: 9/17/2021 FINDINGS: A pneumothorax is not identified. Heart size normal. Central pulmonary vascular congestion is present. Subsegmental atelectasis right lower lobe. No effusions are seen. EKG leads overlie the chest.     No pneumothorax is identified. Consider PA chest x-ray in the radiology department when the patient's condition permits. **This report has been created using voice recognition software. It may contain minor errors which are inherent in voice recognition technology. ** Final report electronically signed by Dr. Rylee Arnold on 10/20/2021 2:03 PM      FEN/GI/DVT:  IVF: NS @ 100  Electrolytes: Monitor and replace per protocols  Diet: General  GI PPX: No  DVT Prophylaxis: Lovenox    CODE STATUS:  Limited - No intubation    Active Hospital Problems    Diagnosis Date Noted    COVID-19 [U07.1] 11/17/2021     Thank you JOSH Driscoll - KACIE for the opportunity to be involved in this patient's care.     Electronically signed by Yudy Alfred DO on 11/17/2021 at 2:13 PM

## 2021-11-17 NOTE — ED NOTES
Pt vitals remain stable. Pt provided with ice chips and ginger ale. Voices no other concerns at this time. Vitals stable.  Call light in reach     Brisa Landeros RN  11/17/21 2991

## 2021-11-17 NOTE — TELEPHONE ENCOUNTER
I contacted the patient's sister to see if she had any questions/concerns. She told me she just wanted to make sure Dr Jana Nava was aware that Alondra Byrne had low oxygen and was taken to the ER. They are planning to admit him per María. I informed her that we would be available if they were to need us while he is admitted, but most of his current issues are likely due to COVID-19 along with his chronic lung issues. She expressed understanding and thanked me for the call. I will update Dr Jana Nava.

## 2021-11-17 NOTE — ED NOTES
Pt states he has devloped a headache and some throat pain. Pt rates pain 8/10 at this time and is requesting medication for pain. Provider notified.       Chris Billy RN  11/17/21 1982

## 2021-11-17 NOTE — ED TRIAGE NOTES
Pt to the ED via [place] with c/o SOB. Pt tested positive for COVID 8 days ago. States last night he became increasingly SOB. Pt states this morning he woke up very SOB and put himself on 3LNC. EMS put pt on [facility]. Upon arrival the pt is 77% on [facility]. Applied 15LNRB. O2 sat increased to 92%. Pt breathing labored.

## 2021-11-17 NOTE — ED PROVIDER NOTES
[address] ENCOUNTER      PATIENT NAME: Pauly Kc  MRN: [id number]  : 1960  [place]: 2021  PROVIDER: Destiney Castillo MD      [address] [place] [address]       Chief Complaint   Patient presents with    Shortness of Breath    Positive For Covid-19       Patient is seen and evaluated in a timely fashion. Nurses Notes are reviewed and I agree except as noted in the HPI. HISTORY OF PRESENT ILLNESS    Pauly Kc is a 57 y.o. male who presents to Emergency Department with Shortness of Breath and Positive For Covid-19     This patient 69-year-old male presented to ED for evaluation of shortness of breath and a positive COVID-19 test.  Patient had a positive COVID-19 test on 2021. 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 Last radiation was in 2017. Patient normally does not use home oxygen. Today when he woke up, he felt shortness of breath, he put himself on 3 L/min nasal cannula. EMS put him on 6 LPM nasal cannula, on arrival, SPO2 was 77% on 6 LPM nasal cannula. Patient has no fever or chills. No chest pain. No abdominal pain. No nausea or vomiting. This HPI was provided by patient.      REVIEW OF SYSTEMS   Ten-point review of systems is negative except those documented in above HPI including constitutional, HEENT, respiratory, cardiovascular, gastrointestinal, genitourinary, musculoskeletal, skin, neurological, hematological and behavioral.      PAST MEDICAL HISTORY     Past Medical History:   Diagnosis Date    Arthritis     COPD (chronic obstructive pulmonary disease) ([address]     PAD (peripheral artery disease) ([address]     bilateral lower legs    Pneumonia 2017 and 2017    Rectal cancer ([address]     Squamous cell carcinoma of vocal cord ([address]        SURGICAL HISTORY       Past Surgical History:   Procedure Laterality Date    BRONCHOSCOPY N/A 11/18/2020    BRONCHOSCOPY performed by Dread Ross MD at [facility] 80  2016    COLOSTOMY  2016    EYE SURGERY      CROSS EYED    HAND SURGERY Bilateral 1995    KNEE ARTHROSCOPY Right 1996    LARYNGOSCOPY  07/12/2017    Biopsy    LARYNGOSCOPY N/A 7/12/2019    MICRO LARYNGOSCOPY W/ BIOPSY performed by Neal Bunch MD at [address] [place] [address] E 12/30/2019    DIRECT LARYNGOSCOPY WITH BIOPSY performed by Neal Bunch MD at [address] [place] [address] E 10/16/2020    BRONCHOSCOPY, MICRO LARYNGOSCOPY WITH REMOVAL OF SUBMUCCOSAL NON NOEPLASTIC LESION JET VENTILATION performed by Dread Ross MD at [facility] N/A 11/18/2020    MICROLARYNGOSCOPY WITH BX & RESECTION OF OBSTRUCTING SOFT TISSUES WITH LASER & AIRWAY DEBRIDER, MICROLARYNGOPLASTY WITH RESECTION OF OBSTRUCTING SOFT TISSUE performed by Dread Ross MD at [facility] N/A 2/5/2021    MICROLARYNGOSCOPY WITH DEBRIDEMENT OF SUBGLOTTIC OBSTRUCTING TISSUES, BRONCHOSCOPY WITH SAME OF PROXIMAL TRACHEA, UPPER GI ENDOSCOPY FOR BALLOON DILATION performed by Dread Ross MD at [facility] N/A 6/11/2021    SUSPENSION MICROLARYNGOSCOPY WITH ABLATIONOF OBSTRUCTING SOFT TISSUES, DEBRIDER RESECTION OF OBSTRUCTING SUBGLOTTIC PROXIMAL TRACHEAL SOFT TISSUES WITH JET VENTILATION performed by Dread Ross MD at [address] [place] [address] E 9/17/2021    LARYNGOSCOPY AND ESOPHAGOSCOPY WITH BIOPSY AND DILATION WITH JET VENTILATION performed by Dread Ross MD at [address] [place] [address] E 10/20/2021    LARYNGOSCOPY MICRO WITH BIOPSY AND DEBRIDEMENT, JET VENTILATION performed by Dread Ross MD at [address] [place] [address] E 11/8/2021    SUSPENSION MICROLARYNGOSCOPY AND SUBLOTTIC EXCISION WITH DEBRIEDMENT WITH JET VENTILATION performed by Dread Ross MD at [address] [place] [address] 2050 RECTAL SURGERY  08/29/2016    [place]    ROTATOR CUFF REPAIR Left     TONSILLECTOMY AND ADENOIDECTOMY N/A 2021    ADVANCEMENT FLAP RECONSTRUCTION BILATERAL RESECTION OF POSTERIOR SUPRAGLOTTIS performed by Esthela Colvin MD at [address] [place] [address]       Previous Medications    ACETYLCYSTEINE (MUCOMYST) 20 % NEBULIZER SOLUTION    4 mL via nebulizer 3 times daily    ACETYLCYSTEINE (NAC) 500 MG CAPS    Take by mouth 2 times daily    ALBUTEROL (PROVENTIL) (2.5 MG/3ML) 0.083% NEBULIZER SOLUTION    Take 3 mLs by nebulization every 6 hours as needed for Wheezing or Shortness of Breath    ALBUTEROL SULFATE HFA (VENTOLIN HFA) 108 (90 BASE) MCG/ACT INHALER    Inhale 2 puffs into the lungs every 6 hours as needed for Wheezing or Shortness of Breath    ASCORBIC ACID (VITAMIN C PO)    Take 500 mg by mouth daily     BUDESONIDE-FORMOTEROL (SYMBICORT) 160-4.5 MCG/ACT AERO    Inhale 2 puffs into the lungs 2 times daily Rinse mouth after its use. CHOLECALCIFEROL (VITAMIN D3 PO)    Take by mouth    GUAIFENESIN (MUCINEX) 600 MG EXTENDED RELEASE TABLET    Take 1,200 mg by mouth 2 times daily as needed for Congestion    GUAIFENESIN (ROBITUSSIN) 100 MG/5ML SYRUP    Take 200 mg by mouth 3 times daily as needed for Cough    LOPERAMIDE (IMODIUM) 2 MG CAPSULE    Take 2 mg by mouth daily     PRAVASTATIN (PRAVACHOL) 40 MG TABLET    Take 40 mg by mouth nightly     SODIUM CHLORIDE, INHALANT, 3 % NEBULIZER SOLUTION    Take 3 mLs by nebulization as needed (Throat dryness, cough, wheezing)    SULFAMETHOXAZOLE-TRIMETHOPRIM (BACTRIM DS;SEPTRA DS) 800-160 MG PER TABLET    Take 1 tablet by mouth 2 times daily for 28 days    TIOTROPIUM (SPIRIVA RESPIMAT) 2.5 MCG/ACT AERS INHALER    Inhale 2 puffs into the lungs daily    VORICONAZOLE (VFEND) 200 MG TABLET    Take 200 mg by mouth 2 times daily    ZINC 50 MG CAPS    Take by mouth Twice weekly       ALLERGIES     Povidone iodine    FAMILY HISTORY     He indicated that his mother is . He indicated that his father is .  He indicated that his sister is alive. He indicated that only one of his two brothers is alive. He indicated that the status of his other is unknown.   family history includes Cancer in his father and another family member; Diabetes in his mother; Hearing Loss in an other family member; Heart Disease in his brother, father, and mother; High Blood Pressure in an other family member; Prostate Cancer (age of onset: 48) in his father; Stroke in his mother. SOCIAL HISTORY      reports that he quit smoking about 15 years ago. He started smoking about 46 years ago. He has a 69.75 pack-year smoking history. He has never used smokeless tobacco. He reports that he does not drink alcohol and does not use drugs. PHYSICAL EXAM      height is 6' 2\" (1.88 m) and weight is 220 lb (99.8 kg). His oral temperature is 98.3 °F (36.8 °C). His blood pressure is 105/92 (abnormal) and his pulse is 76. His respiration is 26 and oxygen saturation is 95%. Physical Exam  Vitals and nursing note reviewed. Constitutional:       Appearance: He is well-developed. He is ill-appearing. He is not diaphoretic. HENT:      Head: Normocephalic and atraumatic. Nose: Nose normal.      Mouth/Throat:      Comments: He has hoarseness. Eyes:      General: No scleral icterus. Right eye: No discharge. Left eye: No discharge. Conjunctiva/sclera: Conjunctivae normal.      Pupils: Pupils are equal, round, and reactive to light. Neck:      Vascular: No JVD. Trachea: No tracheal deviation. Cardiovascular:      Rate and Rhythm: Normal rate and regular rhythm. Heart sounds: Normal heart sounds. No murmur heard. No friction rub. No gallop. Pulmonary:      [place]: Pulmonary effort is normal. No respiratory distress. Breath sounds: No stridor. Examination of the right-middle field reveals rhonchi and rales. Examination of the left-middle field reveals rhonchi and rales.  Examination of the right-lower field reveals wheezing, rhonchi and rales. Examination of the left-lower field reveals wheezing, rhonchi and rales. Wheezing, rhonchi and rales present. Chest:      Chest wall: No tenderness. Abdominal:      General: Bowel sounds are normal. There is no distension. Palpations: Abdomen is soft. There is no mass. Tenderness: There is no abdominal tenderness. There is no guarding or rebound. Hernia: No hernia is present. Musculoskeletal:         General: No tenderness or deformity. Cervical back: Normal range of motion and neck supple. Lymphadenopathy:      Cervical: No cervical adenopathy. Skin:     General: Skin is warm and dry. Capillary Refill: Capillary refill takes less than 2 seconds. Coloration: Skin is not pale. Findings: No erythema or rash. Neurological:      Mental Status: He is alert and oriented to person, place, and time. Cranial Nerves: No cranial nerve deficit. Sensory: No sensory deficit. Motor: No abnormal muscle tone. Coordination: Coordination normal.      Deep Tendon Reflexes: Reflexes normal.   Psychiatric:         Behavior: Behavior normal.         Thought Content: Thought content normal.         Judgment: Judgment normal.       ANCILLARY TEST RESULTS   EKG:    Interpreted by me  Sinus rhythm, short MT interval, ventricular rate 91 bpm, MT interval 80 ms, QRS duration 80 ms,  ms, no ST elevation or acute T wave    LAB RESULTS:  Results for orders placed or performed during the hospital encounter of 11/17/21   COVID-19, Rapid    Specimen: Nasopharyngeal Swab   Result Value Ref Range    SARS-CoV-2, NAAT DETECTED (AA) NOT DETECTED   Blood Gas, Arterial   Result Value Ref Range    pH, Blood Gas 7.39 7.35 - 7.45    PCO2 35 35 - 45 mmhg    PO2 65 (L) 71 - 104 mmhg    HCO3 21 (L) 23 - 28 mmol/l    Base Excess (Calculated) -3.6 (L) -2.5 - 2.5 mmol/l    O2 Sat 92 %    IFIO2 100     DEVICE HFNC     Mo Test Positive     Source: R Radial COLLECTED BY: [id number]     Comment 40L    CBC Auto Differential   Result Value Ref Range    WBC 9.4 4.8 - 10.8 thou/mm3    RBC 4.51 (L) 4.70 - 6.10 mill/mm3    Hemoglobin 13.8 (L) 14.0 - 18.0 gm/dl    Hematocrit 43.3 42.0 - 52.0 %    MCV 96.0 (H) 80.0 - 94.0 fL    MCH 30.6 26.0 - 33.0 pg    MCHC 31.9 (L) 32.2 - 35.5 gm/dl    RDW-CV 15.0 (H) 11.5 - 14.5 %    RDW-SD 52.9 (H) 35.0 - 45.0 fL    Platelets 817 161 - 744 thou/mm3    MPV 10.4 9.4 - 12.4 fL    Seg Neutrophils 87.3 %    Lymphocytes 4.3 %    Monocytes 7.6 %    Eosinophils 0.2 %    Basophils 0.1 %    Immature Granulocytes 0.5 %    Segs Absolute 8.2 (H) 1.8 - 7.7 thou/mm3    Lymphocytes Absolute 0.4 (L) 1.0 - 4.8 thou/mm3    Monocytes Absolute 0.7 0.4 - 1.3 thou/mm3    Eosinophils Absolute 0.0 0.0 - 0.4 thou/mm3    Basophils Absolute 0.0 0.0 - 0.1 thou/mm3    Immature Grans (Abs) 0.05 0.00 - 0.07 thou/mm3    nRBC 0 /100 wbc   Comprehensive Metabolic Panel   Result Value Ref Range    Glucose 116 (H) 70 - 108 mg/dL    CREATININE 0.9 0.4 - 1.2 mg/dL    BUN 23 (H) 7 - 22 mg/dL    Sodium 135 135 - 145 meq/L    Potassium 5.0 3.5 - 5.2 meq/L    Chloride 102 98 - 111 meq/L    CO2 20 (L) 23 - 33 meq/L    Calcium 8.5 8.5 - 10.5 mg/dL    AST 55 (H) 5 - 40 U/L    Alkaline Phosphatase 147 (H) 38 - 126 U/L    Total Protein 6.5 6.1 - 8.0 g/dL    Albumin 3.6 3.5 - 5.1 g/dL    Total Bilirubin 0.2 (L) 0.3 - 1.2 mg/dL    ALT 41 11 - 66 U/L   Brain Natriuretic Peptide   Result Value Ref Range    Pro-.0 0.0 - 900.0 pg/mL   Troponin   Result Value Ref Range    Troponin T < 0.010 ng/ml   C-reactive protein   Result Value Ref Range    CRP 20.20 (H) 0.00 - 1.00 mg/dl   Ferritin   Result Value Ref Range    Ferritin 1,448 (H) 22 - 322 ng/mL   Procalcitonin   Result Value Ref Range    Procalcitonin 0.09 0.01 - 0.09 ng/mL   Lactic acid, plasma   Result Value Ref Range    Lactic Acid 0.9 0.5 - 2.0 mmol/L   Anion Gap   Result Value Ref Range    Anion Gap 13.0 8.0 - 16.0 meq/L service. CRITICAL CARE   CRITICAL CARE: There was a high probability of clinically significant/life threatening deterioration in this patient's condition which required my urgent intervention. Total critical care time was 30 minutes. This excludes any time for separately reportable procedures. CONSULTS   None    PROCEDURES   None    FINAL IMPRESSION AND DISPOSITION      1. Acute hypoxemic respiratory failure due to COVID-19 ([facility]    2. Carcinoma of vocal cord ([facility]        Admitted    PATIENT REFERRED TO:  No follow-up provider specified.     DISCHARGE MEDICATIONS:  New Prescriptions    No medications on file       (Please note that portions of this note were completed with a voice recognition program.  Efforts were made to edit the dictations but occasionally words aremis-transcribed.)    MD George Ornelas MD  11/17/21 1238       George Henriquez MD  11/18/21 1100

## 2021-11-18 PROBLEM — J96.01 ACUTE HYPOXEMIC RESPIRATORY FAILURE DUE TO COVID-19 (HCC): Status: ACTIVE | Noted: 2021-01-01

## 2021-11-18 NOTE — ED NOTES
Pt resting in bed with family at bedside, no distress noted at this time. Pt call light in reach, no further needs stated at this time.        [facility]  11/18/21 9891

## 2021-11-18 NOTE — PROGRESS NOTES
Spoke to sister Brittany Patterson, pt gave ok to update. Updated sister. Advised pt asked us to contact Chi daughter for updates.

## 2021-11-18 NOTE — PROGRESS NOTES
Hospitalist Progress Note    Patient: Brandyn Trejo  Unit/Bed: 5B-33/354-J  YOB: 1960  MRN: [id number]  Acct: [de-identified]    PCP: JOSH Trimble CNP    Date of Admission: 11/17/2021    Assessment/Plan:    COVID 19 infection - Diagnosed 11/11/2021 at outside facility. He was planned for Regeneron treatment outpatient but never received. Repeat COVID-19 positive on 11/17/2021 by NAAT. Patient denies loss of taste or smell. CTA chest 11/17/2021 showing evidence of superimposed viral pneumonia on top of severe emphysematous changes. CRP 20.20 and ferritin 1,448.  - LDH lab pending  - Pharmacy to dose Baricitinb  - 20 mg dexamethasone 5 days  - Ferritin, LDH, CRP every other day  - Maintain head of bed elevation to limit aspiration risk  - Continuous telemetry     Acute hypoxic respiratory failure - Secondary to COVID-19 infection. 6LPM on arrival with escalation to HFNC. Patient does not use supplemental oxygen at baseline. He stated he does not want intubation if that were needed. - Wean supplemental oxygen as tolerated maintain SPO2 >92%. - Pulmonary hygiene, incentive spirometry, encourage deep breathing and cough. COPD GOLD 3 - Without exacerbation. 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  - Resume home Voriconazole and bactrim    HLD - Noted  - Continue statin    PAD - Noted. Bilateral lower extremity legs. Hx of laryngeal cancer - status post radiation (2017). Follows with Dr. Sheldon Luciano. Of note last admission 11/8/2021 for biopsy of right anterior subglottis to rule out invasive squamous cell carcinoma. Hx of rectal cancer - s/p chemotherapy.     Expected discharge date:  TBD    Disposition:    [x] Home       [] TCU       [] Rehab       [] Psych       [] SNF       [] [place]       [] Other -    Chief Complaint: Shortness of breath, Cough, [place]    Hospital Course:  Yael Clinton is a 57 y.o. male with PMHx of COPD, PAD, rectal cancer, and laryngeal cancer who presented to [address] [place] [address] with complaint of worsening shortness of breath that started on 11/11/21. He was diagnosed with COVID-19 at outpatient facility and was planned for Regeneron treatment 11/18/21. The patient stated that he has history of laryngeal cancer for which he received chemotherapy for and subsequently has chronic fungal infection. He stated he does not want intubation. The patient stated his shortness of breath is worse with exertion. He did not try anything for his shortness of breath except for his prescribed home inhalers of mucomyst, Symbicort, Spiriva, proventil, and duoneb. Subjective (past 24 hours): Per nursing, the patient is needing more oxygen support. The patient stated that his respiratory efforts are worsening and that he has changed his mind and would now like to be a FULL CODE and intubated if required. Review of Systems: 12 point review of systems completed and pertinent positives are noted in the HPI. All other systems reviewed and negative.     Medications:  Reviewed    Infusion Medications    sodium chloride      sodium chloride 100 mL/hr at 11/18/21 6181     Scheduled Medications    tiotropium  2 puff Inhalation Daily    pravastatin  40 mg Oral Nightly    budesonide-formoterol  2 puff Inhalation BID    voriconazole  200 mg Oral BID    sulfamethoxazole-trimethoprim  1 tablet Oral BID    acetylcysteine  600 mg Inhalation BID    sodium chloride flush  5-40 mL IntraVENous 2 times per day    enoxaparin  40 mg SubCUTAneous Nightly    dexamethasone  20 mg Oral Daily    lactobacillus  1 capsule Oral Daily with breakfast    baricitinib  4 mg Oral Daily    ibuprofen         PRN Meds: guaiFENesin-dextromethorphan, sodium chloride flush, sodium chloride, polyethylene glycol, acetaminophen **OR** acetaminophen, ipratropium-albuterol      Intake/Output Summary (Last 24 hours) at 11/18/2021 0942  Last data filed at 11/18/2021 0916  Gross per 24 hour   Intake 100 ml   Output 600 ml   Net -500 ml       Diet:  ADULT DIET; Regular; Low Sodium (2 gm)    Exam:  BP (!) 155/113   Pulse 84   Temp 98 °F (36.7 °C) (Oral)   Resp 18   Ht 6' 2\" (1.88 m)   Wt 216 lb 12.8 oz (98.3 kg)   SpO2 91%   BMI 27.84 kg/m²     Physical Exam  Vitals and nursing note reviewed. Constitutional:       General: He is awake. He is in acute distress. Appearance: Normal appearance. He is obese. He is ill-appearing and toxic-appearing. Interventions: Nasal cannula in place. HENT:      Head: Normocephalic and atraumatic. Right Ear: External ear normal.      Left Ear: External ear normal.      Nose: Nose normal.      Mouth/Throat:      Mouth: Mucous membranes are moist.      Pharynx: Oropharynx is clear. Eyes:      Conjunctiva/sclera: Conjunctivae normal.      Pupils: Pupils are equal, round, and reactive to light. Cardiovascular:      Rate and Rhythm: Normal rate and regular rhythm. Pulses: Normal pulses. Dorsalis pedis pulses are 2+ on the right side and 2+ on the left side. Posterior tibial pulses are 2+ on the right side and 2+ on the left side. Heart sounds: Normal heart sounds. Pulmonary:      [place]: Tachypnea, accessory muscle usage and respiratory distress present. Breath sounds: Decreased air movement present. Decreased breath sounds and wheezing present. Abdominal:      General: Bowel sounds are normal.      Palpations: Abdomen is soft. Musculoskeletal:         General: Normal range of motion. Cervical back: Normal range of motion and neck supple. Skin:     General: Skin is warm and dry. Capillary Refill: Capillary refill takes 2 to 3 seconds. Coloration: Skin is cyanotic and mottled.    Neurological:      General: No focal deficit present. Mental Status: He is alert and oriented to person, place, and time. Mental status is at baseline. Psychiatric:         Attention and Perception: Attention and perception normal.         Mood and Affect: Mood and affect normal.         Speech: Speech normal.         Behavior: Behavior normal. Behavior is cooperative. Thought Content: Thought content normal.         Cognition and Memory: Cognition and memory normal.         Judgment: Judgment normal.       Labs:  Recent Labs     11/17/21  0904 11/18/21  0420   WBC 9.4 4.9   HGB 13.8* 13.4*   HCT 43.3 42.4    197     Recent Labs     11/17/21  0904 11/18/21  0630    139   K 5.0 5.4*    103   CO2 20* 20*   BUN 23* 20   CREATININE 0.9 0.8   CALCIUM 8.5 8.4*     Recent Labs     11/17/21  0904   AST 55*   ALT 41   BILITOT 0.2*   ALKPHOS 147*     No results for input(s): INR in the last 72 hours. No results for input(s): Earnie Lent in the last 72 hours. Microbiology:    Urinalysis:   Lab Results   Component Value Date    NITRU Negative 08/20/2018    BLOODU Negative 08/20/2018    GLUCOSEU Negative 08/20/2018       Radiology:  CTA CHEST W WO CONTRAST   Final Result       1. No evidence of pulmonary embolus. 2. Interstitial prominence and groundglass opacities along with developing consolidations at the lung bases with groundglass opacities of the left upper lung as evidence for viral pneumonia superimposed on severe emphysematous changes. **This report has been created using voice recognition software. It may contain minor errors which are inherent in voice recognition technology. **      Final report electronically signed by Dr. Loren Pearl MD on 11/17/2021 11:50 AM          DVT prophylaxis:  [x] Lovenox  [] SCDs  [] SQ Heparin  [] Encourage ambulation  [] Already on anticoagulation  [] Other -      Code Status: Full Code    PT/OT Evaluation Status: future order    Telemetry:  [x] Yes  [] No    Electronically signed by Lon Gitelman Masi, DO,MBA on 11/18/2021 at 9:42 AM

## 2021-11-18 NOTE — CARE COORDINATION
11/18/21, 7:10 AM EST  DISCHARGE PLANNING EVALUATION:    Robson Raphael       Admitted: 11/17/2021/ [facility] day: 1   Location: -11/011-A Reason for admit: Carcinoma of vocal cord ([address] [C32.0]  Acute hypoxemic respiratory failure due to COVID-19 ([address] [U07.1, J96.01]  COVID-19 [U07.1]   PMH:  has a past medical history of Arthritis, COPD (chronic obstructive pulmonary disease) ([address] PAD (peripheral artery disease) ([address] Pneumonia, Rectal cancer ([address] and Squamous cell carcinoma of vocal cord ([address] Procedure:   CTA Cest W WO Contrast    Impression:           1. No evidence of pulmonary embolus. 2. Interstitial prominence and groundglass opacities along with developing consolidations at the lung bases with groundglass opacities of the left upper lung as evidence for viral pneumonia superimposed on severe emphysematous changes. Barriers to Discharge:  From ED. Covid (+), telemetry, Afebrile, high flow oxygen at 95% FiO2, 40 liters oxygen with saturations at 91%. Baricitinib, Decadron, Lovenox, Duonebs, Bactrim. PCP: JOSH Peralta CNP  Readmission Risk Score: 13 ( )%    Patient Goals/Plan/Treatment Preferences: Met with Michelle Vazquez. He currently lives at home alone with family support. He has home oxygen that he does not use supplied by [facility]. Michelle Vazquez would like to return home at discharge. Will follow for potential needs. Transportation/Food Security/Housekeeping Addressed:  No issues identified.

## 2021-11-18 NOTE — CONSULTS
CRITICAL CARE CONSULT NOTE      Patient:  Rylee Damian    Unit/Bed:4B-11/011-A  YOB: 1960  MRN: [id number]   PCP: JOSH Garcia CNP  Date of Admission: 11/17/2021  Chief Complaint:- Respiratory failure    Assessment and Plan:    Acute hypoxic respiratory failure - secondary to COVID-19        - Presented requiring 6L NC. Escalated to HFNC. Continue worsening respiratory status. Intubated 11/18. Continue with lung protective strategies. Maintain pPeak <35 and pPlateau <65. Wean as tolerated for SpO2 >92%. Start proning therapy q12 hours. COVID-19         - Covid positive 11/11. Started on high-dose Decadron 20 mg p.o. qDay x5 and baricitnib 4 mg po qDay. Continue Decadron and baricitinib day 2 per protocol     COPD - stage III        - No home O2. On Spiriva 2 puff inhalation qDay, Symbicort 2 puff inhalation BID, Mucomyst    Viral Pneumonia        - 11/18 CXR worsening diffuse bilateral multifocal interstitial opacities. Will check pneumonia panel and respiratory culture to rule out superimposed bacterial infection. Continue with pulmonary hygiene. Laryngotracheitis        - Invasive fungal laryngotracheitis and secondary stenosis. This secondary to radiation for laryngeal cancer. On home voriconazole 200 mg po BID and Bactrim 800-160 mg po BID. History of laryngeal cancer        - s/p radiation 2017. Follows with Dr. James Asencio. Hyperkalemia- mild        - 11/18 potassium 5.4. Will continue to monitor potassium with daily BMP. PAD        - Per chart review. HLD        - On home pravastatin 40 mg p.o. qHS     History of rectal cancer        -Per chart review.   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 ED 11/17 for worsening shortness of breath. He previously tested COVID-19 positive at outside facility 11/11. Per report patient felt short of breath and put himself on 3 L NC. Transition to 6 L by time EMS had arrived with SPO2 77%. In ED labs nonsignificant, CTA revealed groundglass opacities along with developing consolidation at lung base and groundglass opacities in upper lung. He was given albuterol, Decadron 8 mg once. The patient was admitted for further evaluation and management. He quickly escalated to HFNC.  11/18 respiratory status continued to worsen, HFNC was maxed out. Discussed with the patient the need for intubation and CODE STATUS. The patient was agreeable and was intubated 11/18. Of note, the patient has chronic fungal infection of vocal cords. This is secondary to chemotherapy for his laryngeal cancer. He takes voriconazole and Bactrim at home for this. Additionally due to history of multiple laryngoscopies the patient has developed laryngeal stenosis. Past Medical History: Per HPI  Family History: Mother - diabetes, heart disease, stroke                            Father - prostate cancer, cancer, heart disease                            Brother - heart disease  Social History:  Former smoker quit 2006 69 PPY, denies EtOH and illicit drug use    ROS   Unable to obtain secondary to sedation and intubation    Scheduled Meds:   tiotropium  2 puff Inhalation Daily    pravastatin  40 mg Oral Nightly    budesonide-formoterol  2 puff Inhalation BID    voriconazole  200 mg Oral BID    sulfamethoxazole-trimethoprim  1 tablet Oral BID    acetylcysteine  600 mg Inhalation BID    ketamine  100 mg IntraVENous Once    midazolam  4 mg IntraVENous Once    morphine        LORazepam        LORazepam        LORazepam        sodium chloride flush  5-40 mL IntraVENous 2 times per day    enoxaparin  40 mg SubCUTAneous Nightly    dexamethasone  20 mg Oral Daily    lactobacillus  1 capsule Oral Daily with breakfast    baricitinib  4 mg Oral Daily     Continuous Infusions:   propofol      fentaNYL 500 mcg in sodium chloride 0.9% 100 ml infusion      midazolam      sodium chloride      sodium chloride 100 mL/hr at 11/18/21 0538       PHYSICAL EXAMINATION:  T: 98.  P: 89. RR: 18 . B/P:  155/113. FiO2: 1.0, PEEP 20, Pcontrol 20. O2 Sat:  90% on ventilator. I/O: Intake 100, output 600  Body mass index is 27.84 kg/m². GCS:   3    General:   Ill-appearing, intubated and sedated  HEENT:  normocephalic and atraumatic. No scleral icterus. PERR  Neck: supple. No Thyromegaly. Irregular mass noted on vocal cords during the patient  Lungs: Diminished breath sounds bilaterally. No retractions  Cardiac: RRR. No JVD. Abdomen: soft. Nontender. Extremities:  No clubbing, cyanosis, or edema x 4. Vasculature: capillary refill < 3 seconds. Palpable dorsalis pedis pulses. Skin:  warm and dry. Psych: Unable to obtain due to intubation and sedation  Lymph:  No supraclavicular adenopathy. Neurologic:  No apparent focal deficit. No seizures. Data: (All radiographs, tracings, PFTs, and imaging are personally viewed and interpreted unless otherwise noted). 11/18 BMP - Sodium 139, potassium 5.4, chloride 103, CO2 20, BUN 20, creatinine 0.8, glucose 114, anion gap 16.0  11/18 CBC - WBC 4.49, Hgb 13.4, HCT 42.4,   11/18 CXR - worsening diffuse bilateral multifocal interstitial opacities  11/17 blood cultures - pending  Telemetry shows NSR        Seen with multidisciplinary ICU team no. Meets Continued ICU Level Care Criteria:    [x] Yes   [] No - Transfer Planned to listed location:  [] HOSPITALIST CONTACTED-      Case and plan discussed with Dr. Karan Esqueda. Electronically signed by Nafisa Mcdonald [address] [place] [address] Isaías  Patient seen by me. Case discussed with resident physician.   Patient critically ill with a very poor prognosis. Patient has advanced lung disease with extensive barrel chest and pulmonary injury. He also has known malignancy of the trachea/vocal cords. Patient intubated 11/18/2021 secondary to progression of Covid pneumonia into ARDS. .  Italicized font represents changes to the note made by me. CC time 35 minutes. Time was discontiguous. Time does not include procedures. Time does include my direct assessment of the patient and coordination of care.   Electronically signed by Carren Landau, MD on 11/19/2021 at 6:10 AM

## 2021-11-18 NOTE — PROCEDURES
ICU PROCEDURE - ENDOTRACHEAL INTUBATION    Brandyn Trejo     MRN#: [id number]  21      Delfina Sen@"[email or web address]     : 1960      INDICATION: Acute hypoxic respiratory failure secondary to COVID-19    TIME OUT: taken    Permission obtained, risks/benefits reviewed:    ANESTHESIA:   []Ketamine  [x]Ativan  [x] Morphine  [x]Propofol  []Other medications:      ESTIMATED BLOOD LOSS:  None. COMPLICATIONS:  [x]N/A  [] Other:    LARYNGOSCOPIC AIRWAY GRADE (CORMACK-LEHANE):[]1  []2a  [x]2b []3  []4        INTUBATION EQUIPMENT USED:  [x] Direct laryngoscope only    OUTCOME: Successful placement of #  7.5  Taperguard Evac endotracheal via   [x]Oral route    INSERTION DEPTH:  23    cm from   [x]lip           CONFIRMATION OF TUBE POSITION:   [x]Capnography - Strong & repeatable exhaled CO2 detection   [x]Multiple point auscultation   [x]SpO2 response   [x]STAT X-ray   []Bronchoscopic assessment    UNUSUAL FINDINGS:    PROCEDURE:     Using direct laryngoscopy, the vocal cords were visualized and the endotracheal tube was placed through the cords under direct vision. Good breath sounds were auscultated bilaterally without sounds over abdomen. Appropriate strong & repeatable exhaled CO2 detection was confirmed. Intubation was completed by Janie Shine DO PGY-1 under direction and guidance of Popeye Knight CNP who was present at bedside throughout the procedure. Electronically signed by Janie Shine DO on 2021 at 2:25 PM      It was at the bedside during the entire procedure. I personally assisted Dr. Loi Erickson with intubation. Electronically signed by Andrei Estrada.  Alem Rodriguez - CNP on 2021 at 3:46 PM

## 2021-11-18 NOTE — ED NOTES
Pt states his headache is becoming intolerable. Provider notified and 600mg ibuprofen ordered verbally over the phone.       Blas Garrett RN  11/17/21 3143

## 2021-11-18 NOTE — ED NOTES
ED to inpatient nurses report    Chief Complaint   Patient presents with    Shortness of Breath    Positive For Covid-19      Present to ED from home  LOC: alert and orientated to name, place, date  Vital signs   Vitals:    11/17/21 2239 11/18/21 0033 11/18/21 0049 11/18/21 0206   BP:   (!) 162/91 (!) 142/86   Pulse: 82   88   Resp: 20 22 24 18   Temp:       TempSrc:       SpO2: 93% 92% 93% 93%   Weight:       Height:          Oxygen Baseline High flow nasal cannula     Current needs required none Bipap/Cpap No  LDAs:   Peripheral IV 11/17/21 Left Antecubital (Active)   Site Assessment Clean; Dry; Intact 11/17/21 1121   Line Status Normal saline locked 11/17/21 1121   Dressing Status Clean; Dry; Intact 11/17/21 1121   Dressing Intervention New 11/17/21 0833     Mobility: Requires assistance * 2  Pending ED orders: None  Present condition: Stable     Electronically signed by Chloe Velazquez RN on 11/18/2021 at 2:33 AM       Chloe Velazquez, [address] [place] [address]  11/18/21 2084

## 2021-11-18 NOTE — PROGRESS NOTES
Ativan 4 mg iv given x2   Morphine 4 mg iv given   Diprovan given   Meds given prior to intubation an during   per Popeye Knight CNP crtical care

## 2021-11-18 NOTE — ED NOTES
Patient transferred to 4B room 011 nurse informed prior to leaving the floor.       Janet Fisher  11/18/21 1447

## 2021-11-19 NOTE — CARE COORDINATION
11/19/21, 12:27 PM EST    DISCHARGE ON [facility] [place] day: 2  Location: -11/011-A Reason for admit: Carcinoma of vocal cord ([facility] [address] [C32.0]  Acute hypoxemic respiratory failure due to COVID-19 ([facility] [address] [U07.1, J96.01]  COVID-19 [U07.1]   Procedure:   11/18 Intubated  Barriers to Discharge: Intubated on ventilator. 100% FiO2, 40 liters oxygen with saturations at 95%. Afebrile. IV fluids, Fentanyl, Versed, Propofol drips. Baricitinib, Decadron, Lovenox, Duonebs, Bactrim, IV Rocephin, VFEND. TF via OG tube. Dietitian to follow. PCP: Abbie Jeans, APRN - CNP  Readmission Risk Score: 15.3 ( )%  Patient Goals/Plan/Treatment Preferences: From home alone. Will follow for needs.

## 2021-11-19 NOTE — PROGRESS NOTES
CRITICAL CARE PROGRESS NOTE      Patient:  Vianney Mason    Unit/Bed:4B-11/011-A  YOB: 1960  MRN: [id number]   PCP: JOSH Loredo CNP  Date of Admission: 11/17/2021  Chief Complaint:- Respiratory failure    Assessment and Plan:    Acute hypoxic respiratory failure - secondary to COVID-19        - Presented requiring 6L NC. Escalated to HFNC. Continue worsening respiratory status. Intubated 11/18.         - 11/19 Vent settings FiO2 1.0, PEEP 20, Pcontrol 16. continue with lung protective strategies. Maintain pPeak <35 and pPlateau <39. Wean as tolerated for SpO2 >92%. Start proning therapy q12 hours. COVID-19         - Covid positive 11/11. Started on high-dose Decadron 20 mg p.o. qDay x5 and baricitnib 4 mg po qDay. Continue Decadron and baricitinib day 3 per protocol     COPD - stage III        - No home O2. On Spiriva 2 puff inhalation qDay, Symbicort 2 puff inhalation BID, Mucomyst    Bacterial Pneumonia        - 11/18 CXR worsening diffuse bilateral multifocal interstitial opacities. Continue with pulmonary hygiene. - 11/19 Pneumonia panel positive for MSSA. Respiratory culture grew many segmented neutrophils, many gram-positive bacilli, many gram-positive cocci singly and in pairs, few gram-positive cocci in chains and rare budding yeast.  Since developed MSSA despite being on Bactrim, will start Rocephin, day 1     Laryngotracheitis        - Invasive fungal laryngotracheitis and secondary stenosis. This secondary to radiation for laryngeal cancer. On home voriconazole 200 mg po BID and Bactrim 800-160 mg po BID. History of laryngeal cancer        - s/p radiation 2017. Follows with Dr. Sachin Galvan. Hyperkalemia- mild        - 11/19 potassium 5.8. Given Lasix 40 mg IV x1. Will continue to monitor potassium with daily BMP. PAD        - Per chart review. HLD        - On home pravastatin 40 mg p.o.  qHS     History of rectal cancer        -Per chart review. 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 He previously tested COVID-19 positive at outside facility 11/11. Per report patient felt short of breath and put himself on 3 L NC. Transition to 6 L by time EMS had arrived with SPO2 77%. In ED labs nonsignificant, CTA revealed groundglass opacities along with developing consolidation at lung base and groundglass opacities in upper lung. He was given albuterol, Decadron 8 mg once. The patient was admitted for further evaluation and management. He quickly escalated to HFNC.  11/18 respiratory status continued to worsen, HFNC was maxed out. Discussed with the patient the need for intubation and CODE STATUS. The patient was agreeable and was intubated 11/18. Of note, the patient has chronic fungal infection of vocal cords. This is secondary to chemotherapy for his laryngeal cancer. He takes voriconazole and Bactrim at home for this. Additionally due to history of multiple laryngoscopies the patient has developed laryngeal stenosis. 11/19 current vent settings FiO2 1.0, PEEP 20, PEEP control 16. Hemodynamically stable, vitals WNL. Pneumonia panel grew MSSA. Respiratory culture pending. Started on Rocephin. Past Medical History: Per HPI  Family History: Mother - diabetes, heart disease, stroke                            Father - prostate cancer, cancer, heart disease                            Brother - heart disease  Social History:  Former smoker quit 2006 69 PPY, denies EtOH and illicit drug use    ROS   Unable to obtain secondary to sedation and intubation    Scheduled Meds:   furosemide  40 mg IntraVENous Once    tiotropium  2 puff Inhalation Daily    pravastatin  40 mg Oral Nightly    budesonide-formoterol  2 puff Inhalation BID    voriconazole  200 mg Oral BID    sulfamethoxazole-trimethoprim  1 tablet Oral BID    acetylcysteine  600 mg Inhalation BID    ketamine  100 mg IntraVENous Once    midazolam  4 mg IntraVENous Once    sodium chloride flush  5-40 mL IntraVENous 2 times per day    enoxaparin  40 mg SubCUTAneous Nightly    dexamethasone  20 mg Oral Daily    lactobacillus  1 capsule Oral Daily with breakfast    baricitinib  4 mg Oral Daily     Continuous Infusions:   propofol 45 mcg/kg/min (11/19/21 0836)    fentaNYL 500 mcg in sodium chloride 0.9% 100 ml infusion 25 mcg/hr (11/19/21 0827)    midazolam 2 mg/hr (11/19/21 0651)    sodium chloride      sodium chloride 100 mL/hr at 11/19/21 0651       PHYSICAL EXAMINATION:  T: --.  P: 65. RR: 16. B/P:  1 120/82. FiO2: 1.0, PEEP 20, Pcontrol 16. O2 Sat:  100% on ventilator. I/O: Intake 2100, output 600  Body mass index is 27.84 kg/m². GCS:   3  Pain/sedation: Propofol 45, Versed 2, fentanyl 25      General:   Ill-appearing, intubated and sedated  HEENT:  normocephalic and atraumatic. No scleral icterus. PERR  Neck: supple. No Thyromegaly. Irregular mass noted on vocal cords during the patient  Lungs: Diminished breath sounds bilaterally. No retractions  Cardiac: RRR. No JVD. Abdomen: soft. Nontender. Extremities:  No clubbing, cyanosis, or edema x 4. Vasculature: capillary refill < 3 seconds. Palpable dorsalis pedis pulses. Skin:  warm and dry. Psych: Unable to obtain due to intubation and sedation  Lymph:  No supraclavicular adenopathy. Neurologic:  No apparent focal deficit. No seizures. Data: (All radiographs, tracings, PFTs, and imaging are personally viewed and interpreted unless otherwise noted).    11/19 BMP - Sodium 137, potassium 5.8, chloride 105, CO2 20, BUN 32, creatinine 1.1, glucose 134  11/18 CXR - worsening diffuse bilateral multifocal interstitial opacities  11/18 Pneumonia panel - staph aureus  11/18 respiratory culture - pending  11/17 blood cultures - pending  Telemetry shows NSR        Seen with multidisciplinary ICU team no. Meets Continued ICU Level Care Criteria:    [x] Yes   [] No - Transfer Planned to listed location:  [] HOSPITALIST CONTACTED-      Case and plan discussed with Dr. Jimbo Torres. Electronically signed by Juaquin Parry DO on 11/19/2021 at 8:58 AM  Patient seen by me. Case discussed with resident physician. Continue with mechanical ventilator support. Initiate Rocephin for MSSA coinfection. .  Italicized font represents changes to the note made by me. CC time 35 minutes. Time was discontiguous. Time does not include procedures. Time does include my direct assessment of the patient and coordination of care.   Electronically signed by Lesly Salazar MD on 11/19/2021 at 11:17 AM

## 2021-11-19 NOTE — TELEPHONE ENCOUNTER
The Patient's sister, Melisa Lowery called in wanting to left Dr. Nakia Perrin know that the Patient has been intubated & put on a vent and is in [address] down with COVID. María wanted Dr. Nakia Perrin to be aware of this and to thank him for everything he has done to help the Patient and to let him know that she really appreciates everything.

## 2021-11-19 NOTE — PROGRESS NOTES
Comprehensive Nutrition Assessment    Type and Reason for Visit:  Initial (TF initiation)    Nutrition Recommendations/Plan:   Start TF Vital 1.2 at 10 ml/hr; increase by 10 ml every 12 hours, as tolerated, to goal rate of 60 ml/hr. Free water flush per MD.  Will monitor Potassium level vs. Need to change to Nepro TF formula (lower Potassium). Nutrition Assessment:    Pt. nutritionally compromised AEB NPO status d/t intubation. At risk for further nutrition compromise r/t COVID, respiratory failure, pneumonia, Chronic fungal infection of vocal cords and underlying medical condition (hx COPD, laryngeal cancer, rectal cancer, chemotherapy). Nutrition recommendations/interventions as per above. Malnutrition Assessment:  Malnutrition Status:  Insufficient data    Context:  Acute Illness     Findings of the 6 clinical characteristics of malnutrition:  Energy Intake:  Unable to assess  Weight Loss:  Unable to assess     Body Fat Loss:  Unable to assess     Muscle Mass Loss:  Unable to assess    Fluid Accumulation:  Unable to assess     Strength:  Not Performed    Estimated Daily Nutrient Needs:  Energy (kcal):  1217-9857 kcals (25-30); Weight Used for Energy Requirements:   (98 kgm)     Protein (g):  103-172 gm (1.2-2); Weight Used for Protein Requirements:  Ideal (86 kgm)        Fluid (ml/day):  per MD; Method Used for Fluid Requirements:  Other (Comment)      Nutrition Related Findings:   Admit 11/17; diagnosed with COVID 11/11; intubated 11/18; proning; Diprovan infusing; belly soft per RN; 11/19: Potassium 5.8, Glucose 134, BUN 32, Cr 1.1, Sodium 137, 11/17: CRP 20.20, ; Rx includes ATB, Lasix, Versed, Culturelle, Glycolax, Decadron    Wounds:  None       Current Nutrition Therapies:    ADULT DIET; Regular;  Low Sodium (2 gm)  ADULT TUBE FEEDING; Orogastric; Peptide Based; Continuous; 10; Yes; 10; Q 12 hours; 60; 30; Q 4 hours  Current Tube Feeding (TF) Orders:  · Feeding Route: Orogastric  · Formula: Peptide Based (Vital 1.2)  · Schedule: Continuous (10 ml/hr; 60 ml/hr)  · Additives/Modulars:  (none)  · Water Flushes: per MD  · Current TF & Flush Orders Provides: to start  · Goal TF & Flush Orders Provides: Vital 1.2 at 60 ml/hr providing pt. with 2428 kcals (1728 TF, 700 Diprivan), 108 gm protein, 159 gm CHO, 7 gm fiber, 1168 ml free water in 1440 ml volume/24 hours    Additional Calorie Sources:   Diprovan at 26.5 ml/hr providing pt. with 700 kcals from IV lipid emulsion/24 hours    Anthropometric Measures:  · Height: 6' 2\" (188 cm)  · Current Body Weight: 216 lb 12.8 oz (98.3 kg) (11/18 no edema)   · Admission Body Weight: 216 lb 12.8 oz (98.3 kg) (11/18 no edema)    · Usual Body Weight:  (Per EMR: 12/2/20: 223# 9.6 oz, 10/11/21: 220# 12.8 oz)     · Ideal Body Weight: 190 lbs;      · BMI: 27.8  · BMI Categories: Overweight (BMI 25.0-29. 9)       Nutrition Diagnosis:   · Inadequate protein-energy intake related to impaired respiratory function as evidenced by NPO or clear liquid status due to medical condition, intubation      Nutrition Interventions:   Food and/or Nutrient Delivery:  Start Tube Feeding  Nutrition Education/Counseling:  Education not appropriate   Coordination of Nutrition Care:  Continue to monitor while inpatient    Goals:  EN to provide 80% or more of nutrient needs for COVID recovery while intubated. Nutrition Monitoring and Evaluation:   Behavioral-Environmental Outcomes:  None Identified   Food/Nutrient Intake Outcomes:  Enteral Nutrition Intake/Tolerance  Physical Signs/Symptoms Outcomes:  Biochemical Data, GI Status, Fluid Status or Edema, Hemodynamic Status, Nutrition Focused Physical Findings, Skin, Weight     Discharge Planning:     Too soon to determine     Electronically signed by Vannesa Posada RD, LD on 11/19/21 at 12:28 PM EST    Contact: [phone]

## 2021-11-19 NOTE — SIGNIFICANT EVENT
Spoke with the patient's Bala Quevedo. Updated her on the patient's current condition and plan of care. All questions and concerns were addressed.

## 2021-11-20 NOTE — PROGRESS NOTES
CRITICAL CARE PROGRESS NOTE      Patient:  Shaq Urbano    Unit/Bed:4B-11/011-A  YOB: 1960  MRN: [id number]   PCP: JOSH Fox CNP  Date of Admission: 11/17/2021  Chief Complaint:- Respiratory failure    Assessment and Plan:    1. Acute hypoxic respiratory failure - secondary to COVID-19. intubated 11/18.         - 11/20 Current vent settings: fio2 80% AC/PC: rate 24, PIP 36, PEEP 16. continue with lung protective strategies. Maintain pPeak <35 and pPlateau <28. Wean as tolerated for SpO2 >92%. Start proning therapy q12 hours. 2. COVID-19         - Covid positive 11/11. Started on high-dose Decadron 20 mg p.o. qDay x5 and baricitnib 4 mg po qDay. Continue Decadron and baricitinib day 4 per protocol     3. COPD - stage III        - No home O2. On Spiriva 2 puff inhalation qDay, Symbicort 2 puff inhalation BID, Mucomyst    4. Bacterial Pneumonia        - 11/18 CXR worsening diffuse bilateral multifocal interstitial opacities. Continue with pulmonary hygiene. - 11/19 Pneumonia panel positive for MSSA. Respiratory culture grew many segmented neutrophils, many gram-positive bacilli, many gram-positive cocci singly and in pairs, few gram-positive cocci in chains and rare budding yeast.  Since developed MSSA despite being on Bactrim, on Rocephin, day 2     5. Laryngotracheitis        - Invasive fungal laryngotracheitis and secondary stenosis. This secondary to radiation for laryngeal cancer. On home voriconazole 200 mg po BID and Bactrim 800-160 mg po BID. 6. History of laryngeal cancer        - s/p radiation 2017. Follows with Dr. Sandi Ruiz. 7. Hyperkalemia -         - 11/20 - Potassium 6.0 , kayexalate and calcium gluconate. Recheck K+ levels    8. PAD        - Per chart review. 9. HLD        - On home pravastatin 40 mg p.o. qHS     10. History of rectal cancer        -Per chart review. S/p chemotherapy    11.  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 He previously tested COVID-19 positive at outside facility 11/11. Per report patient felt short of breath and put himself on 3 L NC. Transition to 6 L by time EMS had arrived with SPO2 77%. In ED labs nonsignificant, CTA revealed groundglass opacities along with developing consolidation at lung base and groundglass opacities in upper lung. He was given albuterol, Decadron 8 mg once. The patient was admitted for further evaluation and management. He quickly escalated to HFNC.  11/18 respiratory status continued to worsen, HFNC was maxed out. Discussed with the patient the need for intubation and CODE STATUS. The patient was agreeable and was intubated 11/18. Of note, the patient has chronic fungal infection of vocal cords. This is secondary to chemotherapy for his laryngeal cancer. He takes voriconazole and Bactrim at home for this. Additionally due to history of multiple laryngoscopies the patient has developed laryngeal stenosis. 11/19 current vent settings FiO2 1.0, PEEP 20, PEEP control 16. Hemodynamically stable, vitals WNL. Pneumonia panel grew MSSA. Respiratory culture pending. Started on Rocephin.    11/20 - Potassium 6.0 , kayexalate and calcium gluconate. Recheck K+ levels. Ordered ekg. Continue abx for MSSA. Wean off vent as patient tolerates. Past Medical History: Per HPI  Family History: Mother - diabetes, heart disease, stroke                            Father - prostate cancer, cancer, heart disease                            Brother - heart disease  Social History:  Former smoker quit 2006 69 PPY, denies EtOH and illicit drug use    ROS   Unable to obtain secondary to sedation and intubation    Scheduled Meds:   cefTRIAXone (ROCEPHIN) IV  1,000 mg IntraVENous Q24H    tiotropium  2 puff Inhalation Daily    pravastatin  40 mg Oral Nightly    budesonide-formoterol  2 puff Inhalation BID    voriconazole  200 mg Oral BID    sulfamethoxazole-trimethoprim  1 tablet Oral BID    acetylcysteine  600 mg Inhalation BID    ketamine  100 mg IntraVENous Once    midazolam  4 mg IntraVENous Once    sodium chloride flush  5-40 mL IntraVENous 2 times per day    enoxaparin  40 mg SubCUTAneous Nightly    dexamethasone  20 mg Oral Daily    lactobacillus  1 capsule Oral Daily with breakfast    baricitinib  4 mg Oral Daily     Continuous Infusions:   dextrose      propofol 45 mcg/kg/min (11/20/21 1020)    fentaNYL 500 mcg in sodium chloride 0.9% 100 ml infusion 75 mcg/hr (11/20/21 1016)    midazolam 2 mg/hr (11/20/21 1017)    sodium chloride      sodium chloride 100 mL/hr at 11/20/21 1240       PHYSICAL EXAMINATION:  T: 97.5  P: 71 RR: 20. B/P:  145/85. FiO2:  80 O2 Sat:  98%. I/O: 3500.6/2375 net +1125.6  PCV: rate 24, PIP 36, PEEP 16  Body mass index is 26.22 kg/m². GCS:   3  Pain/sedation: Propofol 45, Versed 2, fentanyl 75    General:   Ill-appearing, intubated and sedated, proned  HEENT:  normocephalic and atraumatic. No scleral icterus. PERR  Neck: supple. No Thyromegaly. Irregular mass noted on vocal cords during intubation  Lungs: Diminished breath sounds bilaterally. No retractions  Cardiac: RRR. No JVD. Abdomen: soft. Nontender. Extremities:  No clubbing, cyanosis, or edema x 4. Vasculature: capillary refill < 3 seconds. Palpable dorsalis pedis pulses. Skin:  warm and dry. Psych: Unable to obtain due to intubation and sedation  Lymph:  No supraclavicular adenopathy. Neurologic:  No apparent focal deficit. No seizures.     Data: (All radiographs, tracings, PFTs, and imaging are personally viewed and interpreted unless otherwise noted).  11/20   o BMP - sodium 137, potassium 6.0, chloride 105, CO2 22, BUN 45, creatinine 1  o Glucose 119  o CBC - WBC 15.2, hemoglobin 13, hematocrit 43.3, platelets 579  o ABG - pH 7.22, PCO2 62, PO2 75, HCO3 25     11/18 CXR - worsening diffuse bilateral multifocal interstitial opacities   11/18 Pneumonia panel - staph aureus   11/18 respiratory culture - pending   11/17 blood cultures - pending   Telemetry shows NSR    CXR 11/20:    Impression   1. Normal heart size. Lungs are fibroadenomas in appearance. NG tube passes into stomach. The ET tube present on the prior study has been removed. 2. There appears be mild shift of heart and mediastinal structures to the left. Uncertain if this is real or simply projectional. There appears be moderate gaseous distention of the esophagus. 3. Moderate bibasilar atelectasis/pneumonia. Overall appearance of chest slightly improved from prior. Seen with multidisciplinary ICU team no. Meets Continued ICU Level Care Criteria:    [x] Yes   [] No - Transfer Planned to listed location:  [] HOSPITALIST CONTACTED-      Case and plan discussed with Dr. Lizzy Maurer.

## 2021-11-20 NOTE — FLOWSHEET NOTE
11/20/21 0514   Provider Notification   Reason for Communication Evaluate   Provider Name Kenisha Harper   Provider Notification Advance Practice Clinician (CNS/NP/CNM/CRNA/PA)   Method of Communication Call   Response See orders   Notification Time 0510     Updated [place] on abnormal labs, orders being placed, advised to have RT increase respiratory rate to 20 on vent settings.

## 2021-11-21 NOTE — PROGRESS NOTES
Tammy Nugent is a 57 y.o. male patient. 1. Acute hypoxemic respiratory failure due to COVID-19 [facility])    2. Carcinoma of vocal cord [facility])        Mr. Heather Altamirano is well-known to me and has a history of laryngeal cancer treated with radiation chemotherapy over 3 years ago. He has been under my care for laryngotracheal stenosis secondary to invasive laryngotracheal fungal infection with pachydermia and airway obstruction. In the recent past he is also been diagnosed with invasive laryngeal cancer of the glottic and subglottic airway. Past Medical History:   Diagnosis Date    Arthritis     [facility] [facility]     PAD (peripheral artery disease) ([facility] [address] [facility]     bilateral lower legs    Pneumonia 01/2017 1/2017 and 2/2017    Rectal cancer ([facility] [address] [facility]     Squamous cell carcinoma of vocal cord (HCC)      No past surgical history pertinent negatives on file.   Scheduled Meds:   bumetanide  0.5 mg IntraVENous BID    famotidine (PEPCID) injection  20 mg IntraVENous BID    cefTRIAXone (ROCEPHIN) IV  1,000 mg IntraVENous Q24H    tiotropium  2 puff Inhalation Daily    pravastatin  40 mg Oral Nightly    budesonide-formoterol  2 puff Inhalation BID    voriconazole  200 mg Oral BID    sulfamethoxazole-trimethoprim  1 tablet Oral BID    acetylcysteine  600 mg Inhalation BID    ketamine  100 mg IntraVENous Once    midazolam  4 mg IntraVENous Once    sodium chloride flush  5-40 mL IntraVENous 2 times per day    enoxaparin  40 mg SubCUTAneous Nightly    dexamethasone  20 mg Oral Daily    lactobacillus  1 capsule Oral Daily with breakfast    baricitinib  4 mg Oral Daily     Continuous Infusions:   dextrose      propofol 50 mcg/kg/min (11/21/21 1502)    fentaNYL 500 mcg in sodium chloride 0.9% 100 ml infusion 140 mcg/hr (11/21/21 1412)    midazolam 6 mg/hr (11/21/21 1034)    sodium chloride      sodium chloride 100 mL/hr at 11/21/21 0822     PRN Meds:glucose, dextrose, glucagon (rDNA), dextrose, sodium chloride flush, sodium chloride, polyethylene glycol, acetaminophen **OR** acetaminophen, ipratropium-albuterol    Allergies   Allergen Reactions    Povidone Iodine Rash     Surgery prep     Principal Problem:    Acute hypoxemic respiratory failure due to COVID-19 [facility])  Active Problems:    COVID-19    Centrilobular emphysema (HCC)  Resolved Problems:    * No resolved hospital problems. *    Blood pressure (!) 148/80, pulse 62, temperature 98 °F (36.7 °C), temperature source Axillary, resp. rate 24, height 6' 2\" (1.88 m), weight 208 lb 3.2 oz (94.4 kg), SpO2 98 %. Subjective   Patient intubated and mechanically ventilated under deep sedation    Objective     The patient is currently prone with orotracheal intubation  He has a modest apparent air leak as evidenced by bubbles coming out of his nose on insufflation on the ventilator    Assessment & Plan     I appreciate very much the critical care that Mr. Sheri Olson is receiving with arguably very challenging lungs to ventilate in the context of his bullous emphysema. Unfortunately if he cannot be weaned from the ventilator and extubated, he is physically a very unlikely patient to be able to undergo tracheostomy. His larynx is relatively low in his neck given his barrel chested COPD deformity and the fact that his thyroid notch is near his sternal notch. He may be able to undergo a cricothyrotomy but this would be near if not into the laryngeal cancer that he is currently found to have. He is currently being considered for for a total laryngectomy so issues related to laryngotracheal disease from a lung intubation are relatively mitigated by the fact that he is not going to be keeping his larynx, assuming he survives this disease. Do not hesitate to contact me to discuss his upper respiratory airway needs. Josephine Pickett.  Lisa Ellis MD  Cell: [phone]    Veronica Fonseca MD  11/21/2021

## 2021-11-21 NOTE — PROGRESS NOTES
CRITICAL CARE PROGRESS NOTE      Patient:  Masood Cutter    Unit/Bed:4B-11/011-A  YOB: 1960  MRN: [id number]   PCP: JOSH Godinez CNP  Date of Admission: 11/17/2021  Chief Complaint:- Respiratory failure    Assessment and Plan:    1. Acute hypoxic respiratory failure: 2/2 COVID-19 PNA. Failed HFNC. Intubated 11/18/21. Continue with lung protective strategies pPeak <35 and pPlateau <73. Wean as tolerated for SpO2 >92%. Start proning therapy q12 hours. 2. Severe ARDS: Meets 3/3 [place] criteria. P/F ratio <100. Continue ASA for microthrombus prophylaxis. Continue pronation therapy  3. COVID-19 PNA: Covid positive 11/11/21. Started on high-dose Decadron 20 mg p.o. qDay x5 and baricitnib 4 mg po qDay. Continue supportive care  4. COPD: PFT 11/19/21 FEV1 45% consistent w/ GOLD 3 COPD. Not on home O2. Continue home medications includi Spiriva 2 puff inhalation qDay, Symbicort 2 puff inhalation BID, Mucomyst  5. Bacterial Pneumonia: 11/19/21 PNA Panel and culture +MSSA. On Rocephin started 11/19/21 Day 3   6. Laryngotracheitis: Invasive fungal laryngotracheitis and secondary stenosis. This secondary to radiation for laryngeal cancer. On home voriconazole 200 mg po BID and Bactrim 800-160 mg po BID. 7. History of laryngeal cancer: s/p radiation 2017. Follows with Dr. Benitez Mills. Not amenable to tracheostomy per Dr Benitez Mills. Recommends slightly deflating cuff if tolerated  8. Hyperkalemia: suspect 2/2 KAELA and bactrim. Will check AM cortisol to assess for adrenal insufficiency. Start gentle bumex. S/p, kayexalate and calcium gluconate. Recheck K+ levels tonight  9. PAD: noted  10. HLD: continue statin   11. History of rectal cancer: noted S/p chemotherapy   12.  DVT prophylaxis: On Lovenox 40 mg subQ qHS  13. 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 He previously tested COVID-19 positive at outside facility 11/11. Per report patient felt short of breath and put himself on 3 L NC. Transition to 6 L by time EMS had arrived with SPO2 77%. In ED labs nonsignificant, CTA revealed groundglass opacities along with developing consolidation at lung base and groundglass opacities in upper lung. He was given albuterol, Decadron 8 mg once. The patient was admitted for further evaluation and management. He quickly escalated to HFNC.  11/18 respiratory status continued to worsen, HFNC was maxed out. Discussed with the patient the need for intubation and CODE STATUS. The patient was agreeable and was intubated 11/18. Of note, the patient has chronic fungal infection of vocal cords. This is secondary to chemotherapy for his laryngeal cancer. He takes voriconazole and Bactrim at home for this. Additionally due to history of multiple laryngoscopies the patient has developed laryngeal stenosis. 11/19 current vent settings FiO2 1.0, PEEP 20, PEEP control 16. Hemodynamically stable, vitals WNL. Pneumonia panel grew MSSA. Respiratory culture pending. Started on Rocephin.    11/20 - Potassium 6.0 , kayexalate and calcium gluconate. Recheck K+ levels. Ordered ekg. Continue abx for MSSA. Wean off vent as patient tolerates. 11/21/21 Elevated potassium. Start low dose Bumex. Recheck BMP tonight. AM cortisol to evaluate for hypoaldosteronism. Past Medical History: Per HPI  Family History: Mother - diabetes, heart disease, stroke                            Father - prostate cancer, cancer, heart disease                            Brother - heart disease  Social History:  Former smoker quit 2006 69 PPY, denies EtOH and illicit drug use    ROS   Unable to obtain 21 BUN/creatinine 39/0.9 anion gap 10 GFR 85 blood glucose 173 calcium 7.8  o CRP 1.96  triglycerides 455  o CBC WBC 15.6 H&H 12.9/42.9 platelets 184   o ABG - pH 7.22, PCO2 62, PO2 75, HCO3 25   11/18 CXR - worsening diffuse bilateral multifocal interstitial opacities   11/18 Pneumonia panel - MSSA   11/18 respiratory culture - MSSA   11/17 blood cultures - Neg   Telemetry shows NSR   CTA 11/21/21 Shows no evidence of PE with ground glass opacities   CXR 11/20/21 shows persistent mediastinal mass, hyperinfalated lungs with plate atelectasis. Stable placement of ETT    CXR 11/20:    Impression   1. Normal heart size. Lungs are fibroadenomas in appearance. NG tube passes into stomach. The ET tube present on the prior study has been removed. 2. There appears be mild shift of heart and mediastinal structures to the left. Uncertain if this is real or simply projectional. There appears be moderate gaseous distention of the esophagus. 3. Moderate bibasilar atelectasis/pneumonia. Overall appearance of chest slightly improved from prior. Meets Continued ICU Level Care Criteria:    [x] Yes   [] No - Transfer Planned to listed location:  [] HOSPITALIST CONTACTED-      Case and plan discussed with Dr. Gomes Poster.

## 2021-11-22 NOTE — PROGRESS NOTES
CRITICAL CARE PROGRESS NOTE      Patient:  Sienna Pichardo    Unit/Bed:4B-11/011-A  YOB: 1960  MRN: [id number]   PCP: JOSH Lanza CNP  Date of Admission: 11/17/2021  Chief Complaint:- Respiratory failure    Assessment and Plan:    Acute hypoxic respiratory failure - secondary to COVID-19. intubated 11/18.         - 11/22 Current vent settings FiO2 0.9, PEEP 14, Pcontrol 14. continue with lung protective strategies. Maintain pPeak <35 and pPlateau <60. Wean as tolerated for SpO2 >92%. Start proning therapy q12 hours. Severe ARDS        - Meets 3/3 [place] criteria. P/F ratio <100. Continue ASA for microthrombus prophylaxis. Continue pronation therapy    COVID-19 PNA        - Covid positive 11/11. Started on high-dose Decadron 20 mg p.o. qDay x5 and baricitnib 4 mg po qDay. COPD         - 11/19 FEV1 45% consistent w/ GOLD 3         - No home O2. On Spiriva 2 puff inhalation qDay, Symbicort 2 puff inhalation BID, Mucomyst    Bacterial Pneumonia        - 11/19 Pneumonia panel positive for MSSA. Respiratory culture grew Staph aureus. On Rocephin, day 4     Adrenal insufficiency - etiology unclear        - Possibly secondary to history of prolonged azole use vs metastatic malignancy given history laryngeal cancer. 11/22 Morning cortisol 1.06. We will check cosyntropin test tomorrow and start Decadron 10 mg IV qDay. Laryngotracheitis        - Invasive fungal laryngotracheitis and secondary stenosis. This secondary to radiation for laryngeal cancer. On home voriconazole 200 mg po BID and Bactrim 800-160 mg po BID. History of laryngeal cancer        - s/p radiation 2017. Follows with Dr. [facility]. Not amenable to tracheostomy per Dr [facility]. Recommends slightly deflating cuff if tolerated    Hyperkalemia - likely secondary to KAELA and Bactrim        - 11/22 Potassium 5.7. On low-dose Bumex. PAD        - Per chart review.      HLD        - On home pravastatin 40 mg p. o. qHS     History of rectal cancer        -Per chart review. 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 He previously tested COVID-19 positive at outside facility 11/11. Per report patient felt short of breath and put himself on 3 L NC. Transition to 6 L by time EMS had arrived with SPO2 77%. In ED labs nonsignificant, CTA revealed groundglass opacities along with developing consolidation at lung base and groundglass opacities in upper lung. He was given albuterol, Decadron 8 mg once. The patient was admitted for further evaluation and management. He quickly escalated to HFNC.  11/18 respiratory status continued to worsen, HFNC was maxed out. Discussed with the patient the need for intubation and CODE STATUS. The patient was agreeable and was intubated 11/18. Of note, the patient has chronic fungal infection of vocal cords. This is secondary to chemotherapy for his laryngeal cancer. He takes voriconazole and Bactrim at home for this. Additionally due to history of multiple laryngoscopies the patient has developed laryngeal stenosis. 11/19 current vent settings FiO2 1.0, PEEP 20, PEEP control 16. Hemodynamically stable, vitals WNL. Pneumonia panel grew MSSA. Respiratory culture pending. Started on Rocephin.    11/20 - Potassium 6.0 , kayexalate and calcium gluconate. Recheck K+ levels. Ordered ekg. Continue abx for MSSA. Wean off vent as patient tolerates. 11/21/21 Elevated potassium. Start low dose Bumex. Recheck BMP tonight. AM cortisol to evaluate for hypoaldosteronism.     Past Medical History: Per HPI  Family History: Mother - diabetes, heart disease, stroke                            Father - prostate cancer, cancer, heart disease                            Brother - heart disease  Social History: Former smoker quit 2006 69 PPY, denies EtOH and illicit drug use    ROS   Unable to obtain secondary to sedation and intubation    Scheduled Meds:   bumetanide  0.5 mg IntraVENous BID    famotidine (PEPCID) injection  20 mg IntraVENous BID    cefTRIAXone (ROCEPHIN) IV  1,000 mg IntraVENous Q24H    tiotropium  2 puff Inhalation Daily    pravastatin  40 mg Oral Nightly    budesonide-formoterol  2 puff Inhalation BID    voriconazole  200 mg Oral BID    sulfamethoxazole-trimethoprim  1 tablet Oral BID    acetylcysteine  600 mg Inhalation BID    ketamine  100 mg IntraVENous Once    midazolam  4 mg IntraVENous Once    sodium chloride flush  5-40 mL IntraVENous 2 times per day    enoxaparin  40 mg SubCUTAneous Nightly    dexamethasone  20 mg Oral Daily    lactobacillus  1 capsule Oral Daily with breakfast    baricitinib  4 mg Oral Daily     Continuous Infusions:   dextrose      propofol 20 mcg/kg/min (11/22/21 0543)    fentaNYL 500 mcg in sodium chloride 0.9% 100 ml infusion 200 mcg/hr (11/22/21 0556)    midazolam 2 mg/hr (11/22/21 0543)    sodium chloride      sodium chloride 100 mL/hr at 11/22/21 0558       PHYSICAL EXAMINATION:  T: 97.8 P: 76 RR: 24. B/P: 146/80. FiO2: 0.9, PEEP 14, Pcontrol 14 O2 Sat: 97 %. I/O: 5247/1200  Body mass index is 26.73 kg/m². GCS:   3  Pain/sedation: Propofol 45, Versed 2, fentanyl 75    General:   Ill-appearing, intubated and sedated, proned  HEENT:  normocephalic and atraumatic. No scleral icterus. PERR  Neck: supple. No Thyromegaly. Irregular mass noted on vocal cords during intubation  Lungs: Diminished breath sounds bilaterally. No retractions  Cardiac: RRR. No JVD. Abdomen: soft. Nontender. Extremities:  No clubbing, cyanosis, or edema x 4.     Vasculature: capillary refill < 3 seconds. Palpable dorsalis pedis pulses. Skin:  warm and dry. Psych: Unable to obtain due to intubation and sedation  Lymph:  No supraclavicular adenopathy. Neurologic:  No apparent focal deficit. No seizures. Data: (All radiographs, tracings, PFTs, and imaging are personally viewed and interpreted unless otherwise noted). 11/22 BMP - sodium 140, potassium 5.7, chloride 109, CO2 23, BUN 30, creatinine 0.7, glucose 185  11/22 CBC - WBC 13.3, Hgb 13.1, HCT 42.6,   11/22 CXR - infiltrates in bilateral mid and lower lung fields. 11/18 Pneumonia panel - staph aureus  11/18 respiratory culture - staph aureis  11/17 blood cultures - pending  Telemetry shows NSR    Seen with multidisciplinary ICU team no. Meets Continued ICU Level Care Criteria:    [x] Yes   [] No - Transfer Planned to listed location:  [] HOSPITALIST CONTACTED- DR   Patient seen by me. Case discussed with resident physician. Persistently critically ill. Still requiring mechanical ventilator support. Tinea with pronation therapy. Continue with LAMA/LABA. Continue with antibiotics. .  Italicized font represents changes to the note made by me. CC time 35 minutes. Time was discontiguous. Time does not include procedures. Time does include my direct assessment of the patient and coordination of care.   Electronically signed by Heike Cummins MD on 11/22/2021 at 6:38 PM

## 2021-11-22 NOTE — CARE COORDINATION
11/22/21, 1:52 PM EST    DISCHARGE ON GOING EVALUATION    Kylee Talavera day: 5  Location: -11/011-A Reason for admit: Carcinoma of vocal cord ([facility] [C32.0]  Acute hypoxemic respiratory failure due to COVID-19 ([facility] [U07.1, J96.01]  COVID-19 [U07.1]   Procedure:   11/22 CXR    Impression:       Infiltrates are again seen in the bilateral mid and lower lung fields. Overall similar appearance of the chest when compared to the prior examination. Barriers to Discharge: Intubated on ventilator. 90% FiO2, 40 liters oxygen with saturations at 97%. Afebrile. IV fluids, Fentanyl, Versed, Propofol drips. Baricitinib, Decadron, Lovenox, Duonebs, Bactrim, IV Rocephin,diabetes management, Pepcid, VFEND. TF via OG tube. Dietitian to follow. ENT following. PCP: JOSH Driscoll - CNP  Readmission Risk Score: 14.4 ( )%  Patient Goals/Plan/Treatment Preferences: from home alone. Will follow for potential needs.

## 2021-11-22 NOTE — PROGRESS NOTES
2020 - Call placed to patient's daughter, Rui Andrea. Updates given. All questions answered. States she will be back to visit patient on Wednesday and requests to speak with patient's physician when she comes. Informed patient's daughter that this information will be passed along to the oncoming shift in the morning.

## 2021-11-23 NOTE — PROGRESS NOTES
Comprehensive Nutrition Assessment    Type and Reason for Visit:  Reassess    Nutrition Recommendations/Plan:   Change TF to Nepro carb steady at goal rate of 50 ml/hr (lower Potassium formula). Bolus 2.5 oz. Proteinex daily. Free water flush per MD.  Consider additional bowel meds to promote BM. Nutrition Assessment:    Pt. Improving from a nutritional standpoint AEB tolerating TF at goal however Potassium remains elevated. At risk for further nutrition compromise r/t COVID, respiratory failure, pneumonia, severe ARDS, Chronic fungal infection of vocal cords and underlying medical condition (hx COPD, laryngeal cancer, rectal cancer, chemotherapy). Nutrition recommendations/interventions as per above. Malnutrition Assessment:  Malnutrition Status:  Insufficient data    Context:  Acute Illness     Findings of the 6 clinical characteristics of malnutrition:  Energy Intake:  Unable to assess  Weight Loss:  Unable to assess     Body Fat Loss:  Unable to assess     Muscle Mass Loss:  Unable to assess    Fluid Accumulation:  Unable to assess     Strength:  Not Performed    Estimated Daily Nutrient Needs:  Energy (kcal):  1447-3657 kcals (25-30); Weight Used for Energy Requirements:   (98 kgm)     Protein (g):  103-172 gm (1.2-2); Weight Used for Protein Requirements:  Ideal (86 kgm)        Fluid (ml/day):  per MD; Method Used for Fluid Requirements:  Other (Comment)      Nutrition Related Findings:    Admit 11/17; diagnosed with COVID 11/11; intubated 11/18; proning; Diprovan infusing; belly soft per RN; no BM since admit; Rx includes Nimbex, Dexamethasone, Fentanyl, Bumex, ATB, Versed, Culturelle, Glycolax; 11/23: Potassium 5.8, Glucose 162, BUN 47, Cr 0.6, Sodium 140, Triglycerides 257; (11/21 Triglycerides 455) - plan recheck level in 3-4 days unless Diprivan increases , MAP 95; spoke with Todd Hawley, pharmacist regarding bowel meds.     Wounds:   (incision - throat)       Current Nutrition Therapies: Diet NPO  ADULT TUBE FEEDING; Orogastric; Renal Formula; Continuous; 50; No; 30; Q 4 hours; Protein; 2.5 oz. Proteinex daily  Current Tube Feeding (TF) Orders:  · Feeding Route: Orogastric  · Formula: Peptide Based (Vital 1.2)  · Schedule: Continuous (Vital 1.2 at 60 ml/hr; changing to Nepro carb steady at goal of 50 ml/hr (lower Potassium formula))  · Additives/Modulars: Protein (2.5 oz daily)  · Water Flushes: per MD  · Current TF & Flush Orders Provides: Vital 1.2 at 60 ml/hr providing pt. with 2039 kcals (1728 TF, 311 Diprivan), 108 gm protein, 159 gm CHO, 7 gm fiber, 2369 mg Potassium, 1168 ml free water in 1440 ml volume/24 hours  · Goal TF & Flush Orders Provides: Nepro carb steady at 50 ml/hr with 2.5 oz. Proteinex daily provides pt. with 2539 kcals (2124 TF, 104 modular, 311 Diprivan), 123 gm protein (7 TF, 26 modular), 192 gm CHO, 30 grams fiber, 1159 mg Potassium (1139 TF, 20 modular), 872 ml free water in 1275 ml volume(1200 TF, 75 modular)/24 hours    Additional Calorie Sources:   Diprivan at 11.8 ml/hr providing pt. with 311 kcals from IV lipid emulsion/24 hours    Anthropometric Measures:  · Height: 6' 2\" (188 cm)  · Current Body Weight: 228 lb 13.4 oz (103.8 kg) (11/23 +1 edema)   · Admission Body Weight: 216 lb 12.8 oz (98.3 kg) (11/18 no edema)    · Usual Body Weight:  (Per EMR: 12/2/20: 223# 9.6 oz, 10/11/21: 220# 12.8 oz)     · Ideal Body Weight: 190 lbs;      · BMI: 29.4  · BMI Categories: Overweight (BMI 25.0-29. 9)       Nutrition Diagnosis:   · Inadequate protein-energy intake related to impaired respiratory function as evidenced by NPO or clear liquid status due to medical condition, intubation, nutrition support - enteral nutrition      Nutrition Interventions:   Food and/or Nutrient Delivery:  Modify Tube Feeding  Nutrition Education/Counseling:  Education not appropriate   Coordination of Nutrition Care:  Continue to monitor while inpatient    Goals:  EN to provide 80% or more of nutrient needs for COVID recovery while intubated. Nutrition Monitoring and Evaluation:   Behavioral-Environmental Outcomes:  None Identified   Food/Nutrient Intake Outcomes:  Enteral Nutrition Intake/Tolerance  Physical Signs/Symptoms Outcomes:  Biochemical Data, GI Status, Fluid Status or Edema, Hemodynamic Status, Nutrition Focused Physical Findings, Skin, Weight     Discharge Planning:     Too soon to determine     Electronically signed by Nayla Pichardo RD, LD on 11/23/21 at 9:58 AM EST    Contact: [phone]

## 2021-11-23 NOTE — FLOWSHEET NOTE
Since Raiza Souza is on the COVID floor of 4B, this  called his daughter Chi to let her know we are available for spiritual and emotional support. She answered the phone and stated there are many people praying for her dad. Words of encouragement made. 11/23/21 2441   Encounter Summary   Services provided to: Family   Referral/Consult From: Celi   Continue Visiting Yes  (11/23 F)   Complexity of Encounter Moderate   Length of Encounter 15 minutes   Spiritual/Voodoo   Type Spiritual support   follow up needed. Care Plan:  Continue spiritual and emotional care for patient and family. Including prayers.

## 2021-11-23 NOTE — PROGRESS NOTES
CRITICAL CARE PROGRESS NOTE      Patient:  Eboni Diane    Unit/Bed:4B-03/003-A  YOB: 1960  MRN: [id number]   PCP: JOSH Bunch CNP  Date of Admission: 11/17/2021  Chief Complaint:- Respiratory failure    Assessment and Plan:    1. Acute hypoxic respiratory failure - secondary to COVID-19.        - Intubated 11/18. Continue with lung protective strategies. Maintain pPeak <35 and pPlateau <31. Wean as tolerated for SpO2 >92%. Start proning therapy q12 hours. - 11/23 Paralyzed, day 1 due to breath stacking every 2 to 3 breaths. Current vent settings FiO2 0.70, PEEP 10, Pcontrol 14.    2. Severe ARDS        - Meets 3/3 [place] criteria. P/F ratio <100. Continue ASA for microthrombus prophylaxis. Continue pronation therapy    3. COVID-19 PNA        - Covid positive 11/11. Started on high-dose Decadron 20 mg p.o. qDay x5 and baricitnib 4 mg po qDay. 11/22 Completed high-dose Decadron course. 4. COPD         - 11/19 FEV1 45% consistent w/ GOLD 3         - No home O2. Stiolto 2 puff inhalation qDay, Mucomyst    5. Bacterial Pneumonia        - 11/19 Pneumonia panel positive for MSSA. Respiratory culture grew Staph aureus. On Rocephin, day 5     6. Adrenal insufficiency - etiology unclear        - Possibly secondary to history of prolonged azole use vs metastatic malignancy given history laryngeal cancer. 11/22 Morning cortisol 1.06. Pending cosyntropin test today and start Decadron 10 mg IV qDay. 7. Laryngotracheitis        - Invasive fungal laryngotracheitis and secondary stenosis. This secondary to radiation for laryngeal cancer. On home voriconazole 200 mg po BID and Bactrim 800-160 mg po BID. 8. History of laryngeal cancer        - s/p radiation 2017. Follows with Dr. Stephen Devi. Not amenable to tracheostomy per Dr Stephen Devi. Recommends slightly deflating cuff if tolerated    9.  Hyperkalemia - likely secondary to KAELA and Bactrim        - 11/23 Potassium 5.8 s/p insulin 10 units with D50. Given lokelma 10 g x1 this AM and on Bumex 1 mg BID. Awaiting cosyntropin test. Will continue to monitor potassium q4hrs. 10. PAD        - Per chart review. 11. HLD        - On home pravastatin 40 mg p.o. qHS     12. History of rectal cancer        - Per chart review. S/p chemotherapy    13. DVT prophylaxis        - On Lovenox 40 mg subQ qHS    14. Nutrition        - Tolerating tube feeds at 60. 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 He previously tested COVID-19 positive at outside facility 11/11. Per report patient felt short of breath and put himself on 3 L NC. Transition to 6 L by time EMS had arrived with SPO2 77%. In ED labs nonsignificant, CTA revealed groundglass opacities along with developing consolidation at lung base and groundglass opacities in upper lung. He was given albuterol, Decadron 8 mg once. The patient was admitted for further evaluation and management. He quickly escalated to HFNC.  11/18 respiratory status continued to worsen, HFNC was maxed out. Discussed with the patient the need for intubation and CODE STATUS. The patient was agreeable and was intubated 11/18. Of note, the patient has chronic fungal infection of vocal cords. This is secondary to chemotherapy for his laryngeal cancer. He takes voriconazole and Bactrim at home for this. Additionally due to history of multiple laryngoscopies the patient has developed laryngeal stenosis. 11/19 current vent settings FiO2 1.0, PEEP 20, PEEP control 16. Hemodynamically stable, vitals WNL. Pneumonia panel grew MSSA. Respiratory culture pending.   Started on Rocephin.    11/20 Potassium 6.0 , kayexalate and calcium gluconate. Recheck K+ levels. Ordered ekg. Continue abx for MSSA. Wean off vent as patient tolerates. 11/21 Elevated potassium. Start low dose Bumex. Recheck BMP tonight. AM cortisol to evaluate for hypoaldosteronism. 11/22 overnight given insulin with D50 for continued elevated potassium. This morning repeat potassium high. Given Lokelma once and continue with Bumex till cosyntropin test results completed. Additionally the patient was paralyzed overnight for breath stacking every 2-3 breaths. Otherwise hemodynamically stable, vitals WNL. Past Medical History: Per HPI  Family History: Mother - diabetes, heart disease, stroke                            Father - prostate cancer, cancer, heart disease                            Brother - heart disease  Social History:  Former smoker quit 2006 69 PPY, denies EtOH and illicit drug use    ROS   Unable to obtain secondary to sedation and intubation    Scheduled Meds:   acetylcysteine  600 mg Oral TID    sodium zirconium cyclosilicate  10 g Oral Once    bumetanide  1 mg IntraVENous BID    insulin lispro  0-12 Units SubCUTAneous Q6H    sodium chloride flush  5-40 mL IntraVENous 2 times per day    cosyntropin  250 mcg IntraVENous Once    dexamethasone  10 mg IntraVENous Q24H    tiotropium-olodaterol  2 puff Inhalation Daily    famotidine (PEPCID) injection  20 mg IntraVENous BID    cefTRIAXone (ROCEPHIN) IV  1,000 mg IntraVENous Q24H    pravastatin  40 mg Oral Nightly    voriconazole  200 mg Oral BID    [Held by provider] sulfamethoxazole-trimethoprim  1 tablet Oral BID    [Held by provider] acetylcysteine  600 mg Inhalation BID    enoxaparin  40 mg SubCUTAneous Nightly    lactobacillus  1 capsule Oral Daily with breakfast    baricitinib  4 mg Oral Daily     Continuous Infusions:   fentaNYL (SUBLIMAZE) 1250 mcg in sodium chloride 0.9 % 250 mL 200 mcg/hr (11/23/21 0528)    sodium chloride      cisatracurium (NIMBEX) infusion 2 mcg/kg/min (11/23/21 0528)    dextrose      propofol 20 mcg/kg/min (11/23/21 0528)    midazolam 7 mg/hr (11/23/21 0528)    sodium chloride 100 mL/hr at 11/23/21 0528       PHYSICAL EXAMINATION:  T: 97.7 P: 65 RR: 20. B/P: 131/86. FiO2: 0.70, PEEP 10, Pcontrol 14 O2 Sat: 98% on ventilator. I/O: 5082.5/5000  Body mass index is 29.38 kg/m². GCS:   3  Pain/sedation: Propofol 45, Versed 7, fentanyl 200    General:   Ill-appearing, intubated and sedated, proned  HEENT:  normocephalic and atraumatic. No scleral icterus. PERR  Neck: supple. No Thyromegaly. Irregular mass noted on vocal cords during intubation  Lungs: Diminished breath sounds bilaterally. No retractions  Cardiac: RRR. No JVD. Abdomen: soft. Nontender. Extremities:  No clubbing, cyanosis, or edema x 4. Vasculature: capillary refill < 3 seconds. Palpable dorsalis pedis pulses. Skin:  warm and dry. Psych: Unable to obtain due to intubation and sedation  Lymph:  No supraclavicular adenopathy. Neurologic:  No apparent focal deficit. No seizures. Data: (All radiographs, tracings, PFTs, and imaging are personally viewed and interpreted unless otherwise noted).  11/23 BMP - sodium 140, potassium 5.8, chloride 107, CO2 24, BUN 47, creatinine 0.6, glucose 162, anion gap 9.0   11/23 CBC - WBC 9.3, Hgb 12.5, HCT 41.5,    11/22 CXR - infiltrates in bilateral mid and lower lung fields.  11/18 Pneumonia panel - staph aureus   11/18 respiratory culture - staph aureis   11/17 blood cultures - pending   Telemetry shows NSR    Seen with multidisciplinary ICU team no. Meets Continued ICU Level Care Criteria:    [x] Yes   [] No - Transfer Planned to listed location:  [] HOSPITALIST CONTACTED-      Electronically signed by Alpesh Rasheed DO on 11/23/2021 at 7:38 AM    Patient seen by me. Case discussed with physician.   Patient with adrenal insufficiency and will undergo steroid replacement and mineralocorticoid steroid replacement. Continues to require antibiotics. Mechanically ventilator dependent. Patient with ventilator dyssynchrony requiring paralytic. .  Italicized font represents changes to the note made by me. CC time 35 minutes. Time was discontiguous. Time does not include procedures. Time does include my direct assessment of the patient and coordination of care.   Electronically signed by Lesly Salazar MD on 11/23/2021 at 2:32 PM

## 2021-11-23 NOTE — PROGRESS NOTES
Spoke with primary RN regarding PICC line order. States patient will be available for insertion 11/24 as he is in prone position at this time.

## 2021-11-23 NOTE — PROGRESS NOTES
0100 Nursing staff x4 and RTT at bedside. Patient turned to prone position at this time. All lines and tubes remain in place. Patient tolerating well at this time. Will continue to monitor.

## 2021-11-23 NOTE — SIGNIFICANT EVENT
Spoke with the patient's daughter, Alayna Cornelius. Updated her on the patient's current condition and plan of care. All questions and concerns were addressed.

## 2021-11-23 NOTE — CARE COORDINATION
11/23/21, 11:51 AM EST    DISCHARGE ON GOING EVALUATION    Greg Aguiar day: 6  Location: -03/003-A Reason for admit: Carcinoma of vocal cord ([facility] [C32.0]  Acute hypoxemic respiratory failure due to COVID-19 ([facility] [U07.1, J96.01]  COVID-19 [U07.1]   Procedure:   11/23 CXR  Impression:        Stable appearance of the chest with infiltrates in the mid and lower lung fields on both sides. Barriers to Discharge:  Intubated on ventilator. 70% FiO2, 40 liters oxygen with saturations at 95%. Afebrile. IV fluids, Fentanyl, Versed, Nimbex, Propofol drips. Baricitinib, Decadron, Lovenox, Duonebs, Bactrim, IV Rocephin,diabetes management, Pepcid, IV Bumex, VFEND. TF via OG tube. Dietitian to follow.  ENT following for hx of laryngeal cancer. Pronation therapy. PCP: JOSH Chow CNP  Readmission Risk Score: 14.7 ( )%  Patient Goals/Plan/Treatment Preferences: From home. Will follow for potential needs.

## 2021-11-24 NOTE — PROGRESS NOTES
CRITICAL CARE PROGRESS NOTE      Patient:  Masood Bautistater    Unit/Bed:4B-03/003-A  YOB: 1960  MRN: [id number]   PCP: JOSH Godinez - KACIE  Date of Admission: 11/17/2021  Chief Complaint:- Respiratory failure    Assessment and Plan:    1. Acute hypoxic respiratory failure - secondary to COVID-19.        - Intubated 11/18. Continue with lung protective strategies. Maintain pPeak <35 and pPlateau <27. Wean as tolerated for SpO2 >92%. Start proning therapy q12 hours. - 11/23 Paralyzed, day 2 due to breath stacking every 2 to 3 breaths. - 11/24 Increased respiratory demand with supine. Current vent settings FiO2 1.0, PEEP 16, Pcontrol 20.    2. Severe ARDS        - Meets 3/3 Wolcott criteria. P/F ratio <100. Continue ASA for microthrombus prophylaxis. 11/24 PF ratio 61. Increase proning time to 16/8    3. COVID-19 PNA        - Covid positive 11/11. Started on high-dose Decadron 20 mg p.o. qDay x5 and baricitnib 4 mg po qDay. 11/22 Completed high-dose Decadron course. 4. COPD         - 11/19 FEV1 45% consistent w/ GOLD 3         - No home O2. Stiolto 2 puff inhalation qDay, Mucomyst    5. Bacterial Pneumonia        - 11/19 Pneumonia panel positive for MSSA. Respiratory culture grew Staph aureus. On Rocephin, day 6     6. Laryngotracheitis        - Invasive fungal laryngotracheitis and secondary stenosis. This secondary to radiation for laryngeal cancer. On home voriconazole 200 mg po BID and Bactrim 800-160 mg po BID. 7. History of laryngeal cancer        - s/p radiation 2017. Follows with Dr. Benitez Mills. Not amenable to tracheostomy per Dr Benitez Mills. Recommends slightly deflating cuff if tolerated    8. Hyperkalemia - likely secondary to acute adrenal insufficiency        - 11/23 Potassium 5.8 s/p insulin 10 units with D50. Given lokelma 10 g x1 this AM and on Bumex 1 mg BID.   Cosyntropin test indicative of adrenal insufficiency, started on steroid replacement therapy, see below        - 11/24 potassium 6.0. Given Lokelma, insulin with D50, continue with Bumex 1 mg BID. Nephrology consulted for continued refractory hyperkalemia. Will continue to monitor potassium with daily BMP. 9. Adrenal insufficiency - etiology unclear, likely multifactorial        - Per morning a.m. cortisol and cosyntropin test.  Started on Solu-Cortef 100 mg IV q8hrs and fludrocortisone 0.1 mg p.o. qDay. 10. PAD        - Per chart review. 11. HLD        - On home pravastatin 40 mg p.o. qHS     12. History of rectal cancer        - Per chart review. S/p chemotherapy    13. DVT prophylaxis        - On Lovenox 40 mg subQ qHS    14. Nutrition        - Tolerating tube feeds at 60. 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 He previously tested COVID-19 positive at outside facility 11/11. Per report patient felt short of breath and put himself on 3 L NC. Transition to 6 L by time EMS had arrived with SPO2 77%. In ED labs nonsignificant, CTA revealed groundglass opacities along with developing consolidation at lung base and groundglass opacities in upper lung. He was given albuterol, Decadron 8 mg once. The patient was admitted for further evaluation and management. He quickly escalated to HFNC.  11/18 respiratory status continued to worsen, HFNC was maxed out. Discussed with the patient the need for intubation and CODE STATUS. The patient was agreeable and was intubated 11/18. Of note, the patient has chronic fungal infection of vocal cords. This is secondary to chemotherapy for his laryngeal cancer. He takes voriconazole and Bactrim at home for this.  Additionally due to history of multiple laryngoscopies the patient has developed laryngeal stenosis. 11/19 current vent settings FiO2 1.0, PEEP 20, PEEP control 16. Hemodynamically stable, vitals WNL. Pneumonia panel grew MSSA. Respiratory culture pending. Started on Rocephin.    11/20  Potassium 6.0 , kayexalate and calcium gluconate. Recheck K+ levels. Ordered ekg. Continue abx for MSSA. Wean off vent as patient tolerates. 11/21 Elevated potassium. Start low dose Bumex. Recheck BMP tonight. AM cortisol to evaluate for hypoaldosteronism. 11/23 overnight given insulin with D50 for continued elevated potassium. This morning repeat potassium high. Given Lokelma once and continue with Bumex till cosyntropin test results completed. Additionally the patient was paralyzed overnight for breath stacking every 2-3 breaths. Otherwise hemodynamically stable, vitals WNL. 11/24 no significant events overnight. This morning while repositioning the patient in supine he was noted to desat. Vent settings were increased. SPO2 has slowly improved. Current vent settings FiO2 1.0, PEEP 16, P control 20. Will increase proning time. Potassium continues to remain elevated despite having started fludrocortisone and hydrocortisone for adrenal insufficiency. Will give insulin with D50, and Lokelma. Nephrology consulted for refractory hyperkalemia. Past Medical History: Per HPI  Family History: Mother - diabetes, heart disease, stroke                            Father - prostate cancer, cancer, heart disease                            Brother - heart disease  Social History:  Former smoker quit 2006 69 PPY, denies EtOH and illicit drug use    ROS   Unable to obtain secondary to sedation and intubation    Scheduled Meds:   hydrocortisone sodium succinate PF  100 mg IntraVENous Q8H    fludrocortisone  0.1 mg Oral Daily    senna  1 tablet Oral Nightly    bumetanide  1 mg IntraVENous BID    insulin lispro  0-12 Units SubCUTAneous Q6H    sodium chloride flush  5-40 mL IntraVENous 2 times per day    tiotropium-olodaterol  2 puff Inhalation Daily    famotidine (PEPCID) injection  20 mg IntraVENous BID    cefTRIAXone (ROCEPHIN) IV  1,000 mg IntraVENous Q24H    pravastatin  40 mg Oral Nightly    voriconazole  200 mg Oral BID    [Held by provider] sulfamethoxazole-trimethoprim  1 tablet Oral BID    enoxaparin  40 mg SubCUTAneous Nightly    lactobacillus  1 capsule Oral Daily with breakfast    baricitinib  4 mg Oral Daily     Continuous Infusions:   fentaNYL (SUBLIMAZE) 1250 mcg in sodium chloride 0.9 % 250 mL 200 mcg/hr (11/24/21 0434)    sodium chloride      cisatracurium (NIMBEX) infusion 2 mcg/kg/min (11/24/21 0434)    dextrose      propofol 25 mcg/kg/min (11/24/21 0556)    midazolam 8 mg/hr (11/24/21 0551)    sodium chloride 100 mL/hr at 11/24/21 0434       PHYSICAL EXAMINATION:  T: 98.1 P: 83 RR: 20. B/P: 133/74. FiO2: 1.0, PEEP 16, Pcontrol 20 O2 Sat: 92% on ventilator. I/O: [id number]. 1/5650  Body mass index is 29.38 kg/m². GCS:   3  Pain/sedation/paralyzed: Propofol 30, Versed 8, fentanyl 200    General:   Ill-appearing, intubated and sedated, proned  HEENT:  normocephalic and atraumatic. No scleral icterus. PERR  Neck: supple. No Thyromegaly. Irregular mass noted on vocal cords during intubation  Lungs: Diminished breath sounds bilaterally. No retractions  Cardiac: RRR. No JVD. Abdomen: soft. Nontender. Extremities:  No clubbing, cyanosis, or edema x 4. Vasculature: capillary refill < 3 seconds. Palpable dorsalis pedis pulses. Skin:  warm and dry. Psych: Unable to obtain due to intubation and sedation  Lymph:  No supraclavicular adenopathy. Neurologic:  No apparent focal deficit. No seizures. Data: (All radiographs, tracings, PFTs, and imaging are personally viewed and interpreted unless otherwise noted).     11/24 BMP - sodium 142, potassium 5.4, chloride 105, CO2 30, BUN 46, creatinine 0.6, glucose 167   11/24 CBC - WBC 15.9, Hgb 13.0, HCT 43.3,    11/24 ABG - pH 7.27, PCO2 76, HCO3 35, PO2 61   11/24 CXR - persistent moderate-severe bibasilar opacities   11/18 Pneumonia panel - staph aureus   11/18 respiratory culture - staph aureus   11/17 blood cultures - pending   Telemetry shows NSR    Seen with multidisciplinary ICU team yes. Meets Continued ICU Level Care Criteria:    [x] Yes   [] No - Transfer Planned to listed location:  [] HOSPITALIST CONTACTED- DR       Electronically signed by Macy Box DO on 11/24/2021 at 5:56 AM    Patient seen and evaluated independently discussed with resident physician. Patient inocente critically ill, family had a discussion today with resident physician did not want to pursue any compressions would not want tracheostomy. Continue proning and ventilatory support  CC time 35 minutes. Time was discontiguous. Time does not include procedures. Time does include my direct assessment of the patient and coordination of care.     Electronically signed by Medardo Flores MD on 11/24/2021 at 3:07 PM

## 2021-11-24 NOTE — SIGNIFICANT EVENT
Spoke with the patient's daughter in detail regarding the patient's current status, plan of care and prognosis. The daughter would like to honor the patient's prior request and not pursue CPR or shocks if indicated. She is okay with resuscitative medications and intubation/reintubation. She would also appreciate a more candid discussion if time were to arise to withdraw care. She understands that her father is not a great candidate for tracheostomy in the future. All questions and answers were addressed.

## 2021-11-24 NOTE — CONSULTS
[facility]    [address], [address]  [facility]  11/24/2021 12:26 PM    Pt Name:    Tejinder Harrison  MRN:     [id number]   [id number]  YOB: 1960  Admit Date:    11/17/2021  8:20 AM  Primary Care Physician:  JOSH Brar CNP        Reason for Consult:  hyperkalemia    History:   The patient is a 57 y.o. male seen in nephrology consult for hyperkalemia. Patient has history of COPD, PAD, arthritis, pneumonia, rectal CA, laryngeal CA, and invasive fungal  And staph laryngotracheitis, on Voriconzaole and Bactrim. He presented on 11/17 with worsening shortness of breath. He had been diagnosed with COVID at outlying facility and received Regeneron. He was hypoxic and admitted, start on HFNC and respiratory status continued to worsen and he was subsequently intubated. Patient's potassium has been persistently elevated despite medical treatment. Ranging 5.2-6. He had been getting Bactrim which was stopped yesterday. Serum creatinine is normal. He has good urine output. Blood gases show some mild respiratory acidosis. Cortisol level low and did not improve after cosyntropin stim test , yesterday he was started on Solucortef as well as Florinef.       Past Medical History:  Past Medical History:   Diagnosis Date    Arthritis     [facility]     PAD (peripheral artery disease) ([facility]     bilateral lower legs    Pneumonia 01/2017 1/2017 and 2/2017    Rectal cancer ([facility]     Squamous cell carcinoma of vocal cord [facility])        Past Surgical History:  Past Surgical History:   Procedure Laterality Date    BRONCHOSCOPY N/A 11/18/2020    BRONCHOSCOPY performed by Surya Mcwilliams MD at [place] [address]  2016    COLOSTOMY  2016    EYE SURGERY      CROSS EYED    HAND SURGERY Bilateral 1995    KNEE ARTHROSCOPY Right 1996    LARYNGOSCOPY  07/12/2017    Biopsy    LARYNGOSCOPY N/A Cancer Father 48    Cancer Father     Heart Disease Father     Diabetes Mother     Heart Disease Mother     Stroke Mother     Heart Disease Brother     High Blood Pressure Other     Cancer Other         LUNG,PROSTATE    Hearing Loss Other        Social History:  Social History     Socioeconomic History    Marital status:      Spouse name: Not on file    Number of children: Not on file    Years of education: Not on file    Highest education level: Not on file   Occupational History    Not on file   Tobacco Use    Smoking status: Former Smoker     Packs/day: 2.25     Years: 31.00     Pack years: 69.75     Start date: 1975     Quit date: 2/5/2006     Years since quitting: 15.8    Smokeless tobacco: Never Used   Vaping Use    Vaping Use: Never used   Substance and Sexual Activity    Alcohol use: No     Alcohol/week: 0.0 standard drinks    Drug use: No    Sexual activity: Not Currently   Other Topics Concern    Not on file   Social History Narrative    Not on file     Social Determinants of Health     Financial Resource Strain:     Difficulty of Paying Living Expenses: Not on file   Food Insecurity:     Worried About [email or web address] in the Last Year: Not on file    [address] in the Last Year: Not on file   Transportation Needs:     Lack of Transportation (Medical): Not on file    Lack of Transportation (Non-Medical):  Not on file   Physical Activity:     Days of Exercise per Week: Not on file    Minutes of Exercise per Session: Not on file   Stress:     Feeling of Stress : Not on file   Social Connections:     Frequency of Communication with Friends and Family: Not on file    Frequency of Social Gatherings with Friends and Family: Not on file    Attends Hindu Services: Not on file    Active Member of Clubs or Organizations: Not on file    Attends Club or Organization Meetings: Not on file    Marital Status: Not on file   Intimate Partner Violence:     Fear of Current or Ex-Partner: Not on file    Emotionally Abused: Not on file    Physically Abused: Not on file    Sexually Abused: Not on file   Housing Stability:     Unable to Pay for Housing in the Last Year: Not on file    Number of Places Lived in the Last Year: Not on file    Unstable Housing in the Last Year: Not on file       Home Meds:  Prior to Admission medications    Medication Sig Start Date End Date Taking?  Authorizing Provider   budesonide-formoterol (SYMBICORT) 160-4.5 MCG/ACT AERO Inhale 2 puffs into the lungs 2 times daily Rinse mouth after its use. 8/19/21 8/19/22  JOSH Patel CNP   voriconazole (VFEND) 200 MG tablet Take 200 mg by mouth 2 times daily 5/1/19   Historical Provider, MD   tiotropium (SPIRIVA RESPIMAT) 2.5 MCG/ACT AERS inhaler Inhale 2 puffs into the lungs daily 7/7/21   JOSH Patel CNP   Ascorbic Acid (VITAMIN C PO) Take 500 mg by mouth daily     Historical Provider, MD   Cholecalciferol (VITAMIN D3 PO) Take by mouth    Historical Provider, MD   Acetylcysteine (NAC) 500 MG CAPS Take by mouth 2 times daily    Historical Provider, MD   guaiFENesin (ROBITUSSIN) 100 MG/5ML syrup Take 200 mg by mouth 3 times daily as needed for Cough    Historical Provider, MD ramirezaiFENesin (MUCINEX) 600 MG extended release tablet Take 1,200 mg by mouth 2 times daily as needed for Congestion    Historical Provider, MD   zinc 50 MG CAPS Take by mouth Twice weekly    Historical Provider, MD   acetylcysteine (MUCOMYST) 20 % nebulizer solution 4 mL via nebulizer 3 times daily 12/8/20   ASHWIN Raymond   albuterol sulfate HFA (VENTOLIN HFA) 108 (90 Base) MCG/ACT inhaler Inhale 2 puffs into the lungs every 6 hours as needed for Wheezing or Shortness of Breath 12/7/20   JOSH Patel CNP   albuterol (PROVENTIL) (2.5 MG/3ML) 0.083% nebulizer solution Take 3 mLs by nebulization every 6 hours as needed for Wheezing or Shortness of Breath 12/7/20 12/7/21  JOSH Flood CNP   sodium chloride, Inhalant, 3 % nebulizer solution Take 3 mLs by nebulization as needed (Throat dryness, cough, wheezing) 10/19/20   ASHWIN Hall   pravastatin (PRAVACHOL) 40 MG tablet Take 40 mg by mouth nightly  7/23/20   Historical Provider, MD   loperamide (IMODIUM) 2 MG capsule Take 2 mg by mouth daily     Historical Provider, MD       Review of Systems:  Unable to obtain    Current Meds:  Infusion:    fentaNYL (SUBLIMAZE) 1250 mcg in sodium chloride 0.9 % 250 mL 200 mcg/hr (11/24/21 1979)    sodium chloride      cisatracurium (NIMBEX) infusion 2 mcg/kg/min (11/24/21 9372)    dextrose      propofol 30 mcg/kg/min (11/24/21 1004)    midazolam 8 mg/hr (11/24/21 0551)    sodium chloride 100 mL/hr at 11/24/21 0434     Meds:    hydrocortisone sodium succinate PF  100 mg IntraVENous Q8H    fludrocortisone  0.1 mg Oral Daily    senna  1 tablet Oral Nightly    bumetanide  1 mg IntraVENous BID    insulin lispro  0-12 Units SubCUTAneous Q6H    sodium chloride flush  5-40 mL IntraVENous 2 times per day    tiotropium-olodaterol  2 puff Inhalation Daily    famotidine (PEPCID) injection  20 mg IntraVENous BID    cefTRIAXone (ROCEPHIN) IV  1,000 mg IntraVENous Q24H    pravastatin  40 mg Oral Nightly    voriconazole  200 mg Oral BID    [Held by provider] sulfamethoxazole-trimethoprim  1 tablet Oral BID    enoxaparin  40 mg SubCUTAneous Nightly    lactobacillus  1 capsule Oral Daily with breakfast    baricitinib  4 mg Oral Daily     Meds prn: bisacodyl, sodium chloride flush, sodium chloride, glucose, dextrose, glucagon (rDNA), dextrose, polyethylene glycol, acetaminophen **OR** acetaminophen, ipratropium-albuterol     Allergies/Intolerances:   ALLERGIES: Povidone iodine    24HR INTAKE/OUTPUT:      Intake/Output Summary (Last 24 hours) at 11/24/2021 1226  Last data filed at 11/24/2021 0957  Gross per 24 hour   Intake 4970.72 ml   Output 4650 ml   Net 320.72 ml     I/O last 3 completed shifts: In: 4970.7 [I.V.:3631.7; NG/GT:1339]  Out: 1610 [NOXEW:2113]  I/O this shift:  In: -   Out: 1000 [Urine:1000]  Admission weight: 220 lb (99.8 kg)  Wt Readings from Last 3 Encounters:   11/23/21 228 lb 13.4 oz (103.8 kg)   11/12/21 215 lb (97.5 kg)   11/08/21 214 lb (97.1 kg)     Body mass index is 29.38 kg/m². Physical Examination:  VITALS:  /82   Pulse 95   Temp 97.7 °F (36.5 °C) (Esophageal)   Resp 22   Ht 6' 2\" (1.88 m)   Wt 228 lb 13.4 oz (103.8 kg)   SpO2 94%   BMI 29.38 kg/m²   Weight:   Wt Readings from Last 3 Encounters:   11/23/21 228 lb 13.4 oz (103.8 kg)   11/12/21 215 lb (97.5 kg)   11/08/21 214 lb (97.1 kg)     Constitutional and General Appearance: patient is prone  Ears and Nose:   Oral: +endotracheal tube  Lungs: diminished breath sounds  Heart: regular rate, S1, S2  Extremities: 1+ LE edema noted  Skin: no rash seen on exposed extremities  Neuro: sedated      Lab Data  CBC:   Recent Labs     11/22/21 0358 11/23/21 0418 11/24/21 0354   WBC 13.3* 9.3 15.9*   HGB 13.1* 12.5* 13.0*   HCT 42.6 41.5* 43.3    371 413*     BMP:  Recent Labs     11/22/21 0358 11/22/21 2122 11/22/21 2122 11/23/21 0418 11/23/21 2144 11/24/21 0354 11/24/21  0612   NA   < > 142  --  140  --  142 143   K   < > 5.9*   < > 5.8* 5.2 5.4* 6.0*   CL  --  107  --  107  --  105  --    CO2  --  25  --  24  --  30  --    BUN  --  42*  --  47*  --  46*  --    CREATININE  --  0.7  --  0.6  --  0.6  --    GLUCOSE  --  179*  --  162*  --  167*  --    CALCIUM  --  7.4*  --  7.4*  --  8.0*  --     < > = values in this interval not displayed. PTH: @PTH@  TSH: No results for input(s): TSH in the last 72 hours. HgBa1c: No results for input(s): LABA1C in the last 72 hours. Hepatic: No results for input(s): LABALBU, AST, ALT, ALB, BILITOT, ALKPHOS in the last 72 hours. ABGs: No results found for: PHART, PO2ART, AMQ1MBL  Troponin: No results for input(s): TROPONINI in the last 72 hours.   BNP: No results for input(s): BNP in the last 72 hours. Old labs reviewed. Impression and Plan:  1. Hyperkalemia: multifactorial from Bactrim, acidosis, and suspected adrenal insufficiency. Agree with Lizeth. Agree with holding bactrim. Continue nepro tube feeds. Start Lokelma 10 g TID, will give IV insulin/D50 again. Recheck potassium at 5 PM.   Patient on bumex. In positive fluid balance. Will hold IV fluids  2. Elevated BUN from steroids  3. Suspected adrenal  Insufficiency, possibly from Voriconazole  4. Acute hypoxic respiratory failure  5. COVID-19 pneumonia  6. Leukocytosis  7. COPD  8. Fungal laryngotracheitis on voriconazole chronically and bactrim for staph  9. Hx laryngeal cancer  10. Hx rectal cancer    Thank you for allowing me to participate in the care of this patient. Please feel free to call me if you have any questions.      Electronically signed by Lazarus Armstrong DO on 11/24/21 at 12:26 PM EST    [facility]

## 2021-11-24 NOTE — PROGRESS NOTES
PICC Procedure Note    Mook Collazo   Admitted- 11/17/2021  8:20 AM  Admission diagnosis- Carcinoma of vocal cord ([facility] [C32.0]  Acute hypoxemic respiratory failure due to COVID-19 ([facility] [U07.1, J96.01]  COVID-19 [U07.1]      Attending Physician- Angelina Willis MD  Ordering Physician-VARUN Noriega DO  Indication for Insertion: Poor Vascular Access    Catheter Insertion Date- 11/24/2021   Lot Number- [id number]  Gauge-6  Lumen-triple    Insertion Site- ROLAND Basilic  Vein Diameter- 0.6 mm  Catheter Length- 48 cm  Internal Length- 0 cm  Exposed Catheter Length- 48cm   Upper Arm Circumference- 33cm  Tip Confirmation System Bundle met- Yes  If X-ray required, Tip Location- N/A  Radiologist- N/A    PICC insertion successful- Yes  Ultrasound- yes    Okay To Use PICC- Yes    Electronically signed by Sydni Gonzalez RN, RN on 11/24/2021 at 11:54 AM

## 2021-11-24 NOTE — PROGRESS NOTES
[address] staff x4 and RTT at bedside. Patient turned to supine position at this time. All lines and tubes remain in place. Patient requiring increase in ventilator settings after supination. 0072 Continued hypoxia noted. Dr. Justin Sandoval informed. 1811 Dr. Justin Sandoval to bedside.

## 2021-11-24 NOTE — CARE COORDINATION
11/24/21, 11:53 AM EST    DISCHARGE ON GOING EVALUATION    Aliza Mcgowan day: 7  Location: -03/003-A Reason for admit: Carcinoma of vocal cord ([facility] [C32.0]  Acute hypoxemic respiratory failure due to COVID-19 ([facility] [U07.1, J96.01]  COVID-19 [U07.1]   Procedure:   11/24 CXR    Impression:       Persistent moderate-severe bibasilar opacification. No new abnormalities. Barriers to Discharge: Intubated on ventilator. 100% FiO2, 40 liters oxygen with saturations at 94%. Afebrile. IV fluids, Fentanyl, Versed, Nimbex, Propofol drips. Baricitinib, Decadron, Lovenox, Duonebs, IV Rocephin,diabetes management, Pepcid, IV Bumex, VFEND, Solu Cortef IV, K+ 6.0 [facility] Nephrology consult, Willie Padilla to be initiated. TF via OG tube. Dietitian to follow.  ENT following for hx of laryngeal cancer. Pronation therapy. PCP: JOSH Crowder CNP  Readmission Risk Score: 15.1 ( )%  Patient Goals/Plan/Treatment Preferences: From home alone. Will follow.

## 2021-11-25 NOTE — PROGRESS NOTES
1950 [facility] staff x4 and RTT at bedside. Patient turned to supine position. All lines and tubes remain in place. Patient tolerating well. Will continue to monitor.

## 2021-11-25 NOTE — PROGRESS NOTES
Renal Progress Note    Assessment and Plan:   1. Hyperkalemia resolved  2. Prerenal azotemia from diuretic  3. Leukocytosis  4. Macrocytic anemia  5. SARS-CoV-2 pneumonia  6. Respiratory failure on mechanical support  7. Laryngeal cancer  8. Fungal laryngeal infection  9. Staph laryngeal infection  10. Adrenal insufficiency    PLAN:  1. Labs reviewed  2. Serum potassium remains normal  3. BUN is increased  4. Serum creatinine is normal  5. Medications reviewed  6. No changes  7. Continue bumetanide for now  8. We will consider decreasing the dose tomorrow however  9. Labs in the morning  10.  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)     Radiation adverse effect, subsequent encounter     Chronic laryngitis     Gastroesophageal reflux disease without esophagitis     Chronic bronchitis (HCC)     Proteus mirabilis infection     Transglottic malignant neoplasm of larynx ([address] right side     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     Infection due to Candida glabrata     Subglottic stenosis not due to surgery     Invasive fungal infection     Stage 3 severe COPD by GOLD classification ([address]     Hoarseness     Tracheal stenosis     Laryngeal stenosis     Airway obstruction     Airway stricture     Bullous emphysema (HCC)     Refractory obstruction of nasal airway     Dependence on continuous supplemental oxygen     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     Laryngeal carcinoma (HCC)     Squamous cell carcinoma of larynx ([address]     COVID-19     Acute hypoxemic respiratory failure due to COVID-19 ([address] Centrilobular emphysema ([facility]      Subjective:   Admit Date: 11/17/2021    Interval History:   Seen for hyperkalemia and acute kidney injury  Remains on mechanical support  Updated by the staff  No issues of concern      Medications:   Scheduled Meds:   polyethylene glycol  17 g Oral Daily    sodium zirconium cyclosilicate  10 g Oral TID    hydrocortisone sodium succinate PF  100 mg IntraVENous Q8H    fludrocortisone  0.1 mg Oral Daily    senna  1 tablet Oral Nightly    bumetanide  1 mg IntraVENous BID    insulin lispro  0-12 Units SubCUTAneous Q6H    sodium chloride flush  5-40 mL IntraVENous 2 times per day    tiotropium-olodaterol  2 puff Inhalation Daily    famotidine (PEPCID) injection  20 mg IntraVENous BID    cefTRIAXone (ROCEPHIN) IV  1,000 mg IntraVENous Q24H    pravastatin  40 mg Oral Nightly    voriconazole  200 mg Oral BID    [Held by provider] sulfamethoxazole-trimethoprim  1 tablet Oral BID    enoxaparin  40 mg SubCUTAneous Nightly    lactobacillus  1 capsule Oral Daily with breakfast    baricitinib  4 mg Oral Daily     Continuous Infusions:   fentaNYL (SUBLIMAZE) 1250 mcg in sodium chloride 0.9 % 250 mL 200 mcg/hr (11/25/21 0831)    sodium chloride      cisatracurium (NIMBEX) infusion 2 mcg/kg/min (11/25/21 0518)    dextrose      propofol 30 mcg/kg/min (11/25/21 1011)    midazolam 8 mg/hr (11/25/21 0518)       CBC:   Recent Labs     11/23/21  0418 11/24/21  0354 11/25/21  0320   WBC 9.3 15.9* 21.9*   HGB 12.5* 13.0* 12.8*    413* 413*     CMP:    Recent Labs     11/23/21  0418 11/23/21  2144 11/24/21  0354 11/24/21  0612 11/24/21  1156 11/24/21  1645 11/25/21  0320     --  142 143  --   --  142   K 5.8*   < > 5.4* 6.0* 5.7* 5.2 4.5     --  105  --   --   --  102   CO2 24  --  30  --   --   --  31   BUN 47*  --  46*  --   --   --  52*   CREATININE 0.6  --  0.6  --   --   --  0.5   GLUCOSE 162*  --  167*  --   --   --  157*   CALCIUM 7.4*  --  8.0*  --   --   -- 8.1*   LABGLOM >90  --  >90  --   --   --  >90    < > = values in this interval not displayed. Troponin: No results for input(s): TROPONINI in the last 72 hours. BNP: No results for input(s): BNP in the last 72 hours. INR: No results for input(s): INR in the last 72 hours. Lipids:   Recent Labs     11/23/21  0418   TRIG 257*     Liver: No results for input(s): AST, ALT, ALKPHOS, PROT, LABALBU, BILITOT in the last 72 hours. Invalid input(s): BILDIR  Iron:    Recent Labs     11/23/21  1145   FERRITIN 869*     XR CHEST PORTABLE   Final Result   1.. No change in the endotracheal tube. There is an enteric tube the tip of which is not clearly seen. 2. Diffuse COPD and thickening of the interstitial lung markings especially at both lung bases, approximately unchanged. 3. Old right-sided rib fractures. **This report has been created using voice recognition software. It may contain minor errors which are inherent in voice recognition technology. **      Final report electronically signed by DR Jayshree Munoz on 11/25/2021 7:33 AM      XR CHEST PORTABLE   Final Result   Persistent moderate-severe bibasilar opacification. No new abnormalities. **This report has been created using voice recognition software. It may contain minor errors which are inherent in voice recognition technology. **      Final report electronically signed by Dr. Calixto Hook on 11/24/2021 7:52 AM      XR CHEST PORTABLE   Final Result   Stable appearance of the chest with infiltrates in the mid and lower lung fields on both sides. **This report has been created using voice recognition software. It may contain minor errors which are inherent in voice recognition technology. **      Final report electronically signed by Dr Jacob Zamora on 11/23/2021 1:00 AM      XR CHEST PORTABLE   Final Result   Infiltrates are again seen in the bilateral mid and lower lung fields.  Overall similar appearance of the chest when compared to the prior examination. **This report has been created using voice recognition software. It may contain minor errors which are inherent in voice recognition technology. **      Final report electronically signed by Dr Cristina Orourke on 11/22/2021 1:34 AM      XR CHEST PORTABLE   Final Result   1. Normal heart size. Lungs are fibroadenomas in appearance. NG tube passes into stomach. The ET tube present on the prior study has been removed. 2. There appears be mild shift of heart and mediastinal structures to the left. Uncertain if this is real or simply projectional. There appears be moderate gaseous distention of the esophagus. 3. Moderate bibasilar atelectasis/pneumonia. Overall appearance of chest slightly improved from prior. **This report has been created using voice recognition software. It may contain minor errors which are inherent in voice recognition technology. **      Final report electronically signed by Dr. Deepa Pineda on 11/20/2021 12:41 AM      XR CHEST PORTABLE   Final Result      The endotracheal tube terminates approximately 3 cm above the level of the kaela. The nasogastric catheter extends below the hemidiaphragms its tip in the projection of the left upper quadrant. Worsening diffuse bilateral multifocal interstitial    opacities. **This report has been created using voice recognition software. It may contain minor errors which are inherent in voice recognition technology. **      Final report electronically signed by Dr Rajesh Bennett on 11/18/2021 2:57 PM      CTA CHEST W WO CONTRAST   Final Result       1. No evidence of pulmonary embolus. 2. Interstitial prominence and groundglass opacities along with developing consolidations at the lung bases with groundglass opacities of the left upper lung as evidence for viral pneumonia superimposed on severe emphysematous changes.                **This report has been created using voice recognition software. It may contain minor errors which are inherent in voice recognition technology. **      Final report electronically signed by Dr. Shaka Doran MD on 11/17/2021 11:50 AM      XR ABDOMEN (KUB) (SINGLE AP VIEW)    (Results Pending)         Objective:   Vitals: /74   Pulse 92   Temp 97.9 °F (36.6 °C) (Esophageal)   Resp 22   Ht 6' 2\" (1.88 m)   Wt 228 lb 6.3 oz (103.6 kg)   SpO2 92%   BMI 29.32 kg/m²    Wt Readings from Last 3 Encounters:   11/25/21 228 lb 6.3 oz (103.6 kg)   11/12/21 215 lb (97.5 kg)   11/08/21 214 lb (97.1 kg)      24HR INTAKE/OUTPUT:      Intake/Output Summary (Last 24 hours) at 11/25/2021 1215  Last data filed at 11/25/2021 0518  Gross per 24 hour   Intake 3979.98 ml   Output 2900 ml   Net 1079.98 ml       Constitutional: Well-developed middle-age gentleman on mechanical support , no apparent distress   Skin:normal with no rash or lesions. HEENT:Pupils are reactive . Throat is clear . Oral mucosa is moist.  Endotracheal tube is noted. Orogastric tube is noted. Neck:supple with no thyromegaly or carotid bruit  Cardiovascular:  S1, S2 without murmur or rubs   Respiratory:  Clear to ausculation without wheezes, rhonchi or rales in the anterior  Abdomen: Soft. Good bowel sounds. Ext: 1+ bilateral LE edema  Musculoskeletal:Intact  Neuro: On mechanical support      Electronically signed by Chava Pelayo MD on 11/25/2021 at 12:15 PM  **This report has been created using voice recognition software. It maycontain minor  errors which are inherent in voice recognition technology. **

## 2021-11-25 NOTE — PROGRESS NOTES
CRITICAL CARE PROGRESS NOTE      Patient:  Clovis Mcdonald    Unit/Bed:4B-03/003-A  YOB: 1960  MRN: [id number]   PCP: JOSH Ahuja CNP  Date of Admission: 11/17/2021  Chief Complaint:- Respiratory failure    Assessment and Plan:    1. Acute hypoxic respiratory failure - secondary to COVID-19.        - Intubated 11/18. Continue with lung protective strategies. Maintain pPeak <35 and pPlateau <35. Wean as tolerated for SpO2 >92%. Start proning therapy q12 hours. - 11/23 Paralyzed due to breath stacking every 2 to 3 breaths. - 11/25 Vent settings FiO2 0.80, PEEP 16, Pcontrol 18. Paralyzed day 3.    2. Severe ARDS        - Meets 3/3 [place] criteria. P/F ratio <100. Continue ASA for microthrombus prophylaxis. 11/24 PF ratio 61. Increase proning time to 16/8    3. COVID-19 PNA        - Covid positive 11/11. Started on high-dose Decadron 20 mg p.o. qDay x5 and baricitnib 4 mg po qDay. 11/22 Completed high-dose Decadron course. 4. COPD         - 11/19 FEV1 45% consistent w/ GOLD 3         - No home O2. Stiolto 2 puff inhalation qDay, Mucomyst    5. Bacterial Pneumonia        - 11/19 Pneumonia panel positive for MSSA. Respiratory culture grew Staph aureus. On Rocephin, day 7     6. Leukocytosis - likely reactive vs infectious        - 11/25 WBC 21.9 from 15.9. Will continue to monitor WBC with daily CBC. 7. Laryngotracheitis        - Invasive fungal laryngotracheitis and secondary stenosis. This secondary to radiation for laryngeal cancer. On home voriconazole 200 mg po BID and Bactrim 800-160 mg po BID - held. 8. History of laryngeal cancer        - s/p radiation 2017. Follows with Dr. Maria Luz Finch. Not amenable to tracheostomy per Dr Maria Luz Finch. Recommends slightly deflating cuff if tolerated    9. Hyperkalemia - likely secondary to acute adrenal insufficiency        - 11/23 Potassium 5.8 s/p insulin 10 units with D50.  Given lokelma 10 g x1 this AM and on Bumex 1 mg BID. Cosyntropin test indicative of adrenal insufficiency, started on steroid replacement therapy, see below        - 11/24 potassium 6.0. Given Lokelma, insulin with D50, continue with Bumex 1 mg BID. Nephrology consulted for continued refractory hyperkalemia. Per nephrology started Lokelma 10 g 3 times daily and given IV insulin/D50 again. - 11/25 improving, potassium 4.5. Will continue to monitor potassium with daily BMP. 10. Adrenal insufficiency - etiology unclear, likely multifactorial        - Per morning a.m. cortisol and cosyntropin test.  Started on Solu-Cortef 100 mg IV q8hrs and fludrocortisone 0.1 mg p.o. qDay. 11. PAD        - Per chart review. 12. HLD        - On home pravastatin 40 mg p.o. qHS     13. History of rectal cancer        - Per chart review. S/p chemotherapy    14. DVT prophylaxis        - On Lovenox 40 mg subQ qHS    15. Nutrition        - Tolerating tube feeds at 50. 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 He previously tested COVID-19 positive at outside facility 11/11. Per report patient felt short of breath and put himself on 3 L NC. Transition to 6 L by time EMS had arrived with SPO2 77%. In ED labs nonsignificant, CTA revealed groundglass opacities along with developing consolidation at lung base and groundglass opacities in upper lung. He was given albuterol, Decadron 8 mg once. The patient was admitted for further evaluation and management. He quickly escalated to HFNC.  11/18 respiratory status continued to worsen, HFNC was maxed out. Discussed with the patient the need for intubation and CODE STATUS.   The patient was agreeable and was intubated 11/18. Of note, the patient has chronic fungal infection of vocal cords. This is secondary to chemotherapy for his laryngeal cancer. He takes voriconazole and Bactrim at home for this. Additionally due to history of multiple laryngoscopies the patient has developed laryngeal stenosis. 11/19 current vent settings FiO2 1.0, PEEP 20, PEEP control 16. Hemodynamically stable, vitals WNL. Pneumonia panel grew MSSA. Respiratory culture pending. Started on Rocephin.    11/20  Potassium 6.0 , kayexalate and calcium gluconate. Recheck K+ levels. Ordered ekg. Continue abx for MSSA. Wean off vent as patient tolerates. 11/21 Elevated potassium. Start low dose Bumex. Recheck BMP tonight. AM cortisol to evaluate for hypoaldosteronism. 11/23 overnight given insulin with D50 for continued elevated potassium. This morning repeat potassium high. Given Lokelma once and continue with Bumex till cosyntropin test results completed. Additionally the patient was paralyzed overnight for breath stacking every 2-3 breaths. Otherwise hemodynamically stable, vitals WNL. 11/24 no significant events overnight. This morning while repositioning the patient in supine he was noted to desat. Vent settings were increased. SPO2 has slowly improved. Current vent settings FiO2 1.0, PEEP 16, Pcontrol 20. Will increase proning time. Potassium continues to remain elevated despite having started fludrocortisone and hydrocortisone for adrenal insufficiency. Will give insulin with D50, and Lokelma. Nephrology consulted for refractory hyperkalemia. 11/25 no significant events overnight. Tolerating proning well. Current vent settings FiO2 0.80, PEEP 16, Pcontrol 18. Potassium has improved, nephrology following. Hemodynamically stable, vitals WNL. Past Medical History: Per HPI  Family History:  Mother - diabetes, heart disease, stroke                            Father - prostate cancer, cancer, heart disease Brother - heart disease  Social History: Former smoker quit 2006 69 PPY, denies EtOH and illicit drug use    ROS   Unable to obtain secondary to sedation and intubation    Scheduled Meds:   sodium zirconium cyclosilicate  10 g Oral TID    hydrocortisone sodium succinate PF  100 mg IntraVENous Q8H    fludrocortisone  0.1 mg Oral Daily    senna  1 tablet Oral Nightly    bumetanide  1 mg IntraVENous BID    insulin lispro  0-12 Units SubCUTAneous Q6H    sodium chloride flush  5-40 mL IntraVENous 2 times per day    tiotropium-olodaterol  2 puff Inhalation Daily    famotidine (PEPCID) injection  20 mg IntraVENous BID    cefTRIAXone (ROCEPHIN) IV  1,000 mg IntraVENous Q24H    pravastatin  40 mg Oral Nightly    voriconazole  200 mg Oral BID    [Held by provider] sulfamethoxazole-trimethoprim  1 tablet Oral BID    enoxaparin  40 mg SubCUTAneous Nightly    lactobacillus  1 capsule Oral Daily with breakfast    baricitinib  4 mg Oral Daily     Continuous Infusions:   fentaNYL (SUBLIMAZE) 1250 mcg in sodium chloride 0.9 % 250 mL 200 mcg/hr (11/25/21 0831)    sodium chloride      cisatracurium (NIMBEX) infusion 2 mcg/kg/min (11/25/21 0518)    dextrose      propofol 30 mcg/kg/min (11/25/21 0847)    midazolam 8 mg/hr (11/25/21 0518)       PHYSICAL EXAMINATION:  T: 97.9 P: 64  RR: 22. B/P: 117/74. FiO2: 0.80, PEEP 16, Pcontrol 18 O2 Sat: 96% on ventilator. I/O: [id number]. 1/5650  Body mass index is 29.32 kg/m². GCS:   3  Pain/sedation/paralyzed: Propofol 85, Versed 8, fentanyl 200, Nimbex day 3    General:   Ill-appearing, intubated and sedated, proned  HEENT:  normocephalic and atraumatic. No scleral icterus. PERR  Neck: supple. No Thyromegaly. Irregular mass noted on vocal cords during intubation  Lungs: Diminished breath sounds bilaterally. No retractions  Cardiac: RRR. No JVD. Abdomen: soft. Nontender. Distended  Extremities:  No clubbing, cyanosis, or edema x 4.     Vasculature: capillary refill < 3 seconds. Palpable dorsalis pedis pulses. Skin:  warm and dry. Psych: Unable to obtain due to intubation and sedation  Lymph:  No supraclavicular adenopathy. Neurologic:  No apparent focal deficit. No seizures. Data: (All radiographs, tracings, PFTs, and imaging are personally viewed and interpreted unless otherwise noted).  11/25 BMP -sodium 142, potassium 4.5, chloride 102, CO2 31, BUN 52, creatinine 0.5, glucose 157   11/25 CBC - WBC 21.9, Hgb 12.8, HCT 41.9,    11/25 CXR - diffuse COPD and thickening of interstitial lung markings especially at both lung bases, unchanged   11/24 ABG - pH 7.27, PCO2 76, HCO3 35, PO2 61   11/18 Pneumonia panel - staph aureus   11/18 respiratory culture - staph aureus   11/17 blood cultures - pending   Telemetry shows NSR    Seen with multidisciplinary ICU team yes.   Meets Continued ICU Level Care Criteria:    [x] Yes   [] No - Transfer Planned to listed location:  [] HOSPITALIST CONTACTED-          Electronically signed by Kelly Bryson DO on 11/25/2021 at 8:59 AM

## 2021-11-25 NOTE — PROGRESS NOTES
Friend of Eve's called requesting an update. She was able to provide the HIPAA code. Update provided. No further questions voiced at this time.

## 2021-11-26 NOTE — PROGRESS NOTES
OB LVMM to call back for shift update. Advised Dr. Huam Silva of critical lab values K-6.2 & Creat-3.2. He will let covering doctor know.

## 2021-11-26 NOTE — CARE COORDINATION
11/26/21, 10:41 AM EST    DISCHARGE ON [facility] day: 9  Location: -03/003-A Reason for admit: Carcinoma of vocal cord ([facility] [address] [C32.0]  Acute hypoxemic respiratory failure due to COVID-19 ([facility] [address] [U07.1, J96.01]  COVID-19 [U07.1]   Procedure:   11/25 CXR    Impression:       1. . No change in the endotracheal tube. There is an enteric tube the tip of which is not clearly seen. 2. Diffuse COPD and thickening of the interstitial lung markings especially at both lung bases, approximately unchanged. 3. Old right-sided rib fractures. 11/25 KUB   Impression:       1. Enteric tube which appears be at the level of the gastroesophageal junction. 2. Moderate amount of stool in the right colon. 3. Otherwise nonspecific abdomen. Barriers to Discharge:    Intubated on ventilator. 80% FiO2, 40 liters oxygen with saturations at 94%. Afebrile. WBC 25.4, K+ 6.2, creatinine 3.2 with Nephrology following. Fentanyl, Versed, Nimbex, Propofol drips. Baricitinib, Decadron, Lovenox, Duonebs, IV Rocephin,diabetes management, Pepcid, IV Bumex, VFEND, Florinef, Lokelma . TF via OG tube. Dietitian to follow.  ENT following for hx of laryngeal cancer. PCP: JOSH Ahuja CNP  Readmission Risk Score: 18.7 ( )%  Patient Goals/Plan/Treatment Preferences: From home alone. Will follow clinical course for potential needs.

## 2021-11-26 NOTE — PROGRESS NOTES
Renal Progress Note    Assessment and Plan:   1. 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 Acute kidney injury predominantly prerenal azotemia from diuretic  3. Leukocytosis  4. Macrocytic anemia  5. SARS-CoV-2 pneumonia  6. Respiratory failure on mechanical support  7. Laryngeal cancer  8. Fungal laryngeal infection  9. Staph laryngeal infection  10. Adrenal insufficiency  11. Deconditioned state    PLAN:  1. Labs reviewed  2. Serum potassium is high this morning  3. BUN is increased  4. Serum creatinine is profoundly increased today compared to yesterday  5. Medications reviewed  6. Decrease bumetanide from 1 mg every 12 hours to once a day. 7. Sodium zirconium cyclosilicate 5 g 3 times a day  8. 50% dextrose IV once  9. Regular insulin 10 units IV once given immediately after a 50% dextrose  10. Increase  fludrocortisone to 0.2 mg a day from 0.1 mg a day  11. Repeat potassium level at 1500 hrs. today  12. Labs in the morning  13.  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)     Radiation adverse effect, subsequent encounter     Chronic laryngitis     Gastroesophageal reflux disease without esophagitis     Chronic bronchitis (HCC)     Proteus mirabilis infection     Transglottic malignant neoplasm of larynx ([address] right side     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     Infection due to Candida glabrata     Subglottic stenosis not due to surgery     Invasive fungal infection     Stage 3 severe COPD by GOLD classification ([address]     Hoarseness     Tracheal stenosis     Laryngeal stenosis     Airway obstruction     Airway stricture     Bullous emphysema (HCC)     Refractory obstruction of nasal airway     Dependence on continuous supplemental oxygen     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     Laryngeal carcinoma (HCC)     Squamous cell carcinoma of larynx (HCC)     COVID-19     Acute hypoxemic respiratory failure due to COVID-19 ([facility] [address]     Centrilobular emphysema (HCC)      Subjective:   Admit Date: 11/17/2021    Interval History:   Seen for hyperkalemia and acute kidney injury  Remains on mechanical support  Updated by the staff  No issues of concern  Blood pressure is stable  Urine output is good      Medications:   Scheduled Meds:   [START ON 11/27/2021] bumetanide  1 mg IntraVENous Daily    sodium zirconium cyclosilicate  5 g Oral TID    dextrose  25 g IntraVENous Once    insulin lispro  10 Units IntraVENous Once    polyethylene glycol  17 g Oral Daily    metoclopramide  10 mg Oral 4x Daily AC & HS    hydrocortisone sodium succinate PF  100 mg IntraVENous Q8H    fludrocortisone  0.1 mg Oral Daily    senna  1 tablet Oral Nightly    insulin lispro  0-12 Units SubCUTAneous Q6H    sodium chloride flush  5-40 mL IntraVENous 2 times per day    tiotropium-olodaterol  2 puff Inhalation Daily    famotidine (PEPCID) injection  20 mg IntraVENous BID    cefTRIAXone (ROCEPHIN) IV  1,000 mg IntraVENous Q24H    pravastatin  40 mg Oral Nightly    voriconazole  200 mg Oral BID    [Held by provider] sulfamethoxazole-trimethoprim  1 tablet Oral BID    enoxaparin  40 mg SubCUTAneous Nightly    lactobacillus  1 capsule Oral Daily with breakfast    baricitinib  4 mg Oral Daily     Continuous Infusions:   fentaNYL (SUBLIMAZE) 1250 mcg in sodium chloride 0.9 % 250 mL 200 mcg/hr (11/26/21 0420)    sodium chloride      cisatracurium (NIMBEX) infusion 2 mcg/kg/min (11/26/21 0420)    dextrose      propofol 30 mcg/kg/min (11/26/21 0907)    midazolam 8 mg/hr (11/26/21 0420)       CBC: Recent Labs     11/24/21  0354 11/25/21  0320 11/26/21  0435   WBC 15.9* 21.9* 25.4*   HGB 13.0* 12.8* 12.7*   * 413* 426*     CMP:    Recent Labs       0000 11/24/21  0354 11/24/21  0612 11/24/21  1156 11/25/21  0320 11/26/21  0435   NA  --  142 143  --  142 145   K   < > 5.4* 6.0*   < > 4.5 6.2*  6.2*   CL  --  105  --   --  102 110   CO2  --  30  --   --  31 25   BUN  --  46*  --   --  52* 86*   CREATININE  --  0.6  --   --  0.5 3.2*   GLUCOSE  --  167*  --   --  157* 107   CALCIUM  --  8.0*  --   --  8.1* 8.4*   LABGLOM  --  >90  --   --  >90 20*    < > = values in this interval not displayed. Troponin: No results for input(s): TROPONINI in the last 72 hours. BNP: No results for input(s): BNP in the last 72 hours. INR: No results for input(s): INR in the last 72 hours. Lipids:   No results for input(s): CHOL, LDLDIRECT, TRIG, HDL, AMYLASE, LIPASE in the last 72 hours. Liver:   Recent Labs     11/26/21  0435   AST 59*   ALT 60   ALKPHOS 67   PROT 6.2   LABALBU 2.6*   BILITOT 0.5     Iron:    Recent Labs     11/25/21  1435   FERRITIN 1,243*     XR ABDOMEN (KUB) (SINGLE AP VIEW)   Final Result   1. Enteric tube which appears be at the level of the gastroesophageal junction. 2. Moderate amount of stool in the right colon. 3. Otherwise nonspecific abdomen. **This report has been created using voice recognition software. It may contain minor errors which are inherent in voice recognition technology. **      Final report electronically signed by DR Sherren Crosser on 11/25/2021 12:20 PM      XR CHEST PORTABLE   Final Result   1.. No change in the endotracheal tube. There is an enteric tube the tip of which is not clearly seen. 2. Diffuse COPD and thickening of the interstitial lung markings especially at both lung bases, approximately unchanged. 3. Old right-sided rib fractures. **This report has been created using voice recognition software.  It may contain minor errors which are inherent in voice recognition technology. **      Final report electronically signed by DR Tom Deluna on 11/25/2021 7:33 AM      XR CHEST PORTABLE   Final Result   Persistent moderate-severe bibasilar opacification. No new abnormalities. **This report has been created using voice recognition software. It may contain minor errors which are inherent in voice recognition technology. **      Final report electronically signed by Dr. Sola Pina on 11/24/2021 7:52 AM      XR CHEST PORTABLE   Final Result   Stable appearance of the chest with infiltrates in the mid and lower lung fields on both sides. **This report has been created using voice recognition software. It may contain minor errors which are inherent in voice recognition technology. **      Final report electronically signed by Dr Angélica Rodriguez on 11/23/2021 1:00 AM      XR CHEST PORTABLE   Final Result   Infiltrates are again seen in the bilateral mid and lower lung fields. Overall similar appearance of the chest when compared to the prior examination. **This report has been created using voice recognition software. It may contain minor errors which are inherent in voice recognition technology. **      Final report electronically signed by Dr Angélica Rodriguez on 11/22/2021 1:34 AM      XR CHEST PORTABLE   Final Result   1. Normal heart size. Lungs are fibroadenomas in appearance. NG tube passes into stomach. The ET tube present on the prior study has been removed. 2. There appears be mild shift of heart and mediastinal structures to the left. Uncertain if this is real or simply projectional. There appears be moderate gaseous distention of the esophagus. 3. Moderate bibasilar atelectasis/pneumonia. Overall appearance of chest slightly improved from prior. **This report has been created using voice recognition software.   It may contain minor errors which are inherent in voice recognition technology. **      Final report electronically signed by Dr. Leo Schwab on 11/20/2021 12:41 AM      XR CHEST PORTABLE   Final Result      The endotracheal tube terminates approximately 3 cm above the level of the kaela. The nasogastric catheter extends below the hemidiaphragms its tip in the projection of the left upper quadrant. Worsening diffuse bilateral multifocal interstitial    opacities. **This report has been created using voice recognition software. It may contain minor errors which are inherent in voice recognition technology. **      Final report electronically signed by Dr Silva Arriaza on 11/18/2021 2:57 PM      CTA CHEST W WO CONTRAST   Final Result       1. No evidence of pulmonary embolus. 2. Interstitial prominence and groundglass opacities along with developing consolidations at the lung bases with groundglass opacities of the left upper lung as evidence for viral pneumonia superimposed on severe emphysematous changes. **This report has been created using voice recognition software. It may contain minor errors which are inherent in voice recognition technology. **      Final report electronically signed by Dr. Salvador Boo MD on 11/17/2021 11:50 AM      XR CHEST PORTABLE    (Results Pending)         Objective:   Vitals: /69   Pulse 78   Temp 97.3 °F (36.3 °C) (Esophageal)   Resp 26   Ht 6' 2\" (1.88 m)   Wt 228 lb 6.3 oz (103.6 kg)   SpO2 94%   BMI 29.32 kg/m²    Wt Readings from Last 3 Encounters:   11/25/21 228 lb 6.3 oz (103.6 kg)   11/12/21 215 lb (97.5 kg)   11/08/21 214 lb (97.1 kg)      24HR INTAKE/OUTPUT:      Intake/Output Summary (Last 24 hours) at 11/26/2021 0944  Last data filed at 11/26/2021 0420  Gross per 24 hour   Intake 1574.43 ml   Output 2275 ml   Net -700.57 ml       Constitutional: Well-developed middle-age gentleman on mechanical support , no apparent distress   Skin:normal with no rash or lesions.   HEENT:Pupils are reactive .Throat is clear . Oral mucosa is moist.  Endotracheal tube is noted. Orogastric tube is noted. Neck:supple with no thyromegaly or carotid bruit  Cardiovascular:  S1, S2 without murmur or rubs   Respiratory:  Clear to ausculation without wheezes, rhonchi or rales in the anterior  Abdomen: Soft. Good bowel sounds. Ext: 2+ bilateral LE edema  Musculoskeletal:Intact  Neuro: On mechanical support      Electronically signed by Pamela Slaughter MD on 11/26/2021 at 9:44 AM  **This report has been created using voice recognition software. It maycontain minor  errors which are inherent in voice recognition technology. **

## 2021-11-26 NOTE — PROGRESS NOTES
insufficiency), BUN 86, Creatinine 3.2, Glucose 107, Chemstick 151. MAP 79. Wounds:   (incision - throat)       Current Nutrition Therapies:    Diet NPO  ADULT TUBE FEEDING; Orogastric; Renal Formula; Continuous; 50; No; 30; Q 4 hours; Protein; 2.5 oz. Proteinex daily  Current Tube Feeding (TF) Orders:  · Feeding Route: Orogastric  · Formula:  (Nepro (11/23/21))  · Schedule: Continuous  · Additives/Modulars: Protein (2.5 oz daily)  · Water Flushes: per MD  · Current/Goal TF & Flush Orders Provides: Nepro carb steady at 50 ml/hr provides pt. with  2695 kcals (2124 TF, 104 protein modular, 467 propofol), 123 grams protein (97 TF, 26 protein modular), 192 gm CHO, 30 grams fiber, 1159 mg potassium (1139 mg TF, 20 mg protein modular), 872 ml free water in 1275 mls volume (1200 ml TF, 75 protein modular)/24 hours    Additional Calorie Sources:   Propofol at 17.7 ml/hr~ 467 lipid kcals/24 hours    Anthropometric Measures:  · Height: 6' 2\" (188 cm)  · Current Body Weight: 228 lb 6.3 oz (103.6 kg) (11/25/21 +1 UE and LE edema)   · Admission Body Weight: 216 lb 12.8 oz (98.3 kg) (11/18 no edema)    · Usual Body Weight:  (Per EMR: 12/2/20: 223# 9.6 oz, 10/11/21: 220# 12.8 oz)     · Ideal Body Weight: 190 lbs;  · BMI: 29.3  · Adjusted Body Weight:  ; No Adjustment   · BMI Categories: Overweight (BMI 25.0-29. 9)       Nutrition Diagnosis:   · Inadequate protein-energy intake related to impaired respiratory function as evidenced by NPO or clear liquid status due to medical condition, intubation, nutrition support - enteral nutrition      Nutrition Interventions:   Food and/or Nutrient Delivery:  Continue NPO, Continue Current Tube Feeding  Nutrition Education/Counseling:  Education not appropriate   Coordination of Nutrition Care:  Continue to monitor while inpatient    Goals:  EN to provide 80% or more of nutrient needs for COVID recovery while intubated.        Nutrition Monitoring and Evaluation: Behavioral-Environmental Outcomes:  None Identified   Food/Nutrient Intake Outcomes:  Enteral Nutrition Intake/Tolerance  Physical Signs/Symptoms Outcomes:  Biochemical Data, GI Status, Fluid Status or Edema, Hemodynamic Status, Nutrition Focused Physical Findings, Skin, Weight     Discharge Planning:     Too soon to determine     Electronically signed by Fish Zavala RD, LD on 11/26/21 at 11:48 AM EST    Contact: [phone]

## 2021-11-26 NOTE — PROGRESS NOTES
CRITICAL CARE PROGRESS NOTE      Patient:  Rylee Damian    Unit/Bed:4B-03/003-A  YOB: 1960  MRN: [id number]   PCP: JOSH Garcia - CNP  Date of Admission: 11/17/2021  Chief Complaint:- Respiratory failure    Assessment and Plan:    1. Acute hypoxic respiratory failure - secondary to COVID-19.        - Intubated 11/18. Continue with lung protective strategies. Maintain pPeak <35 and pPlateau <60. Wean as tolerated for SpO2 >92%. Start proning therapy q12 hours. - 11/23 Paralyzed due to breath stacking every 2 to 3 breaths. - 11/26 Vent settings FiO2 0.80, PEEP 16, Pcontrol 18. Will trial patient off paralytic. 2. Severe ARDS        - Meets 3/3 [place] criteria. P/F ratio <100. Continue ASA for microthrombus prophylaxis. 11/24 PF ratio 61. Increase proning time to 16/8. 11/26 P/F ratio 75.    3. COVID-19 PNA        - Covid positive 11/11. S/p baricitinib and decadron    4. COPD         - 11/19 FEV1 45% consistent w/ GOLD 3         - No home O2. Stiolto 2 puff inhalation qDay, Mucomyst    5. Bacterial Pneumonia        - 11/19 Pneumonia panel positive for MSSA. Respiratory culture grew Staph aureus. On Rocephin, day 8     6. Leukocytosis - likely reactive vs infectious        - 11/26 WBC 25. 4. most likely 2/2 current tx with hydrocortisone 100mg IV q8 along with Florinef started on 11/23. Will continue to monitor WBC with daily CBC. 7. Laryngotracheitis        - Invasive fungal laryngotracheitis and secondary stenosis. This secondary to radiation for laryngeal cancer. On home voriconazole 200 mg po BID and Bactrim 800-160 mg po BID - held. 8. History of laryngeal cancer        - s/p radiation 2017. Follows with Dr. James Asencio. Not amenable to tracheostomy per Dr James Asencio. Recommends slightly deflating cuff if tolerated    9. Hyperkalemia - likely secondary to acute adrenal insufficiency        - 11/23 Potassium 5.8 s/p insulin 10 units with D50.  Given lokelma 10 g x1 this AM and on Bumex 1 mg BID. Cosyntropin test indicative of adrenal insufficiency, started on steroid replacement therapy, see below        - 11/24 potassium 6.0. Given Lokelma, insulin with D50, continue with Bumex 1 mg BID. Nephrology consulted for continued refractory hyperkalemia. Per nephrology started Lokelma 10 g 3 times daily and given IV insulin/D50 again. - 11/25 improving, potassium 4.5. Will continue to monitor potassium with daily BMP. - 11/26 K+ 6.2. nephro following    10. Adrenal insufficiency - etiology unclear, likely multifactorial        - Per morning a.m. cortisol and cosyntropin test.  Started on Solu-Cortef 100 mg IV q8hrs and fludrocortisone 0.1 mg p.o. qDay. 11. PAD        - Per chart review. 12. HLD        - On home pravastatin 40 mg p.o. qHS     13. History of rectal cancer        - Per chart review. S/p chemotherapy    14. DVT prophylaxis        - On Lovenox 40 mg subQ qHS    15. Nutrition        - Tolerating tube feeds at 50. 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 He previously tested COVID-19 positive at outside facility 11/11. Per report patient felt short of breath and put himself on 3 L NC. Transition to 6 L by time EMS had arrived with SPO2 77%. In ED labs nonsignificant, CTA revealed groundglass opacities along with developing consolidation at lung base and groundglass opacities in upper lung. He was given albuterol, Decadron 8 mg once. The patient was admitted for further evaluation and management. He quickly escalated to HFNC.  11/18 respiratory status continued to worsen, HFNC was maxed out.   Discussed with the patient the need for intubation and CODE STATUS. The patient was agreeable and was intubated 11/18. Of note, the patient has chronic fungal infection of vocal cords. This is secondary to chemotherapy for his laryngeal cancer. He takes voriconazole and Bactrim at home for this. Additionally due to history of multiple laryngoscopies the patient has developed laryngeal stenosis. 11/19 current vent settings FiO2 1.0, PEEP 20, PEEP control 16. Hemodynamically stable, vitals WNL. Pneumonia panel grew MSSA. Respiratory culture pending. Started on Rocephin.    11/20  Potassium 6.0 , kayexalate and calcium gluconate. Recheck K+ levels. Ordered ekg. Continue abx for MSSA. Wean off vent as patient tolerates. 11/21 Elevated potassium. Start low dose Bumex. Recheck BMP tonight. AM cortisol to evaluate for hypoaldosteronism. 11/23 overnight given insulin with D50 for continued elevated potassium. This morning repeat potassium high. Given Lokelma once and continue with Bumex till cosyntropin test results completed. Additionally the patient was paralyzed overnight for breath stacking every 2-3 breaths. Otherwise hemodynamically stable, vitals WNL. 11/24 no significant events overnight. This morning while repositioning the patient in supine he was noted to desat. Vent settings were increased. SPO2 has slowly improved. Current vent settings FiO2 1.0, PEEP 16, Pcontrol 20. Will increase proning time. Potassium continues to remain elevated despite having started fludrocortisone and hydrocortisone for adrenal insufficiency. Will give insulin with D50, and Lokelma. Nephrology consulted for refractory hyperkalemia. 11/25 no significant events overnight. Tolerating proning well. Current vent settings FiO2 0.80, PEEP 16, Pcontrol 18. Potassium has improved, nephrology following. Hemodynamically stable, vitals WNL. 11/26 - dc nimbex as patient tolerates without ventilator dyssynchrony.  Nephro following for electrolyte abnormalities. WBC 25.4 possibly 2/2 steroids. Patient currently on abx therapy for mssa. Past Medical History: Per HPI  Family History: Mother - diabetes, heart disease, stroke                            Father - prostate cancer, cancer, heart disease                            Brother - heart disease  Social History: Former smoker quit 2006 69 PPY, denies EtOH and illicit drug use    ROS   Unable to obtain secondary to sedation and intubation    Scheduled Meds:   [START ON 11/27/2021] bumetanide  1 mg IntraVENous Daily    sodium zirconium cyclosilicate  5 g Oral TID    [START ON 11/27/2021] fludrocortisone  0.2 mg Oral Daily    [START ON 11/27/2021] baricitinib  1 mg Oral Daily    polyethylene glycol  17 g Oral Daily    metoclopramide  10 mg Oral 4x Daily AC & HS    hydrocortisone sodium succinate PF  100 mg IntraVENous Q8H    senna  1 tablet Oral Nightly    insulin lispro  0-12 Units SubCUTAneous Q6H    sodium chloride flush  5-40 mL IntraVENous 2 times per day    tiotropium-olodaterol  2 puff Inhalation Daily    famotidine (PEPCID) injection  20 mg IntraVENous BID    cefTRIAXone (ROCEPHIN) IV  1,000 mg IntraVENous Q24H    pravastatin  40 mg Oral Nightly    voriconazole  200 mg Oral BID    [Held by provider] sulfamethoxazole-trimethoprim  1 tablet Oral BID    enoxaparin  40 mg SubCUTAneous Nightly    lactobacillus  1 capsule Oral Daily with breakfast     Continuous Infusions:   fentaNYL (SUBLIMAZE) 1250 mcg in sodium chloride 0.9 % 250 mL 200 mcg/hr (11/26/21 1012)    sodium chloride      cisatracurium (NIMBEX) infusion 2 mcg/kg/min (11/26/21 0420)    dextrose      propofol 30 mcg/kg/min (11/26/21 0907)    midazolam 8 mg/hr (11/26/21 0420)       PHYSICAL EXAMINATION:  T: 97.3 P: 78  RR: 26. B/P: 100/69. FiO2: 0.80, PEEP 16, Pcontrol 18 O2 Sat: 94% on ventilator. I/O: 1574.4/2275 net -700.6  Body mass index is 29.32 kg/m².    GCS:   3  Pain/sedation: 30 Propofol, Versed 8, fentanyl 200    General:   Ill-appearing, intubated and sedated, proned  HEENT:  normocephalic and atraumatic. No scleral icterus. PERR  Neck: supple. No Thyromegaly. Irregular mass noted on vocal cords during intubation  Lungs: Diminished breath sounds bilaterally. No retractions  Cardiac: RRR. No JVD. Abdomen: soft. Nontender. Distended  Extremities:  No clubbing, cyanosis, or edema x 4. Vasculature: capillary refill < 3 seconds. Palpable dorsalis pedis pulses. Skin:  warm and dry. Psych: Unable to obtain due to intubation and sedation  Lymph:  No supraclavicular adenopathy. Neurologic:  No apparent focal deficit. No seizures. Data: (All radiographs, tracings, PFTs, and imaging are personally viewed and interpreted unless otherwise noted).  11/26 BMP -sodium 145, potassium 6.2, chloride 110, CO2 25, BUN 86, creatinine 3.2   11/26 CBC -WBC 25.4, hemoglobin 12.7, hematocrit 41.2. Platelets 327    16/06 - albumin 2.6, alk phos 67, ALT 60, AST 59, Bilirubin 0.5, Direct Bilirubin 0.4, total protein 6.2     11/25 CXR - diffuse COPD and thickening of interstitial lung markings especially at both lung bases, unchanged   11/24 ABG - pH 7.27, PCO2 76, HCO3 35, PO2 61   11/18 Pneumonia panel - staph aureus   11/18 respiratory culture - staph aureus   11/17 blood cultures - pending   Telemetry shows NSR    Seen with multidisciplinary ICU team yes.   Meets Continued ICU Level Care Criteria:    [x] Yes   [] No - Transfer Planned to listed location:  [] HOSPITALIST CONTACTED-      Case discussed with Dr. Rg Liriano     Electronically signed by Jonathan Short MD on 11/26/2021 at 10:16 AM

## 2021-11-27 NOTE — PROGRESS NOTES
CRITICAL CARE PROGRESS NOTE      Patient:  Estelle Sorto    Unit/Bed:4B-03/003-A  YOB: 1960  MRN: [id number]   PCP: JOSH Driscoll - CNP  Date of Admission: 11/17/2021  Chief Complaint:- Respiratory failure    Assessment and Plan:    1. Acute hypoxic respiratory failure - secondary to COVID-19.        - Intubated 11/18. 11/23 paralyzed for ventilator dyssynchrony. Trail off paralytics 11/26  Continue with lung protective strategies. Maintain pPeak <35 and pPlateau <44. Wean as tolerated for SpO2 >92%. Start proning therapy q12 hours. 2. Severe ARDS        - Meets 3/3 [place] criteria. P/F ratio <100. Continue ASA for microthrombus prophylaxis. 11/24 PF ratio 61. Increase proning time to 16/8. 11/26 P/F ratio 75.    3. COVID-19 PNA        - Covid positive 11/11. S/p baricitinib and decadron    4. COPD         - 11/19 FEV1 45% consistent w/ GOLD 3         - No home O2. Stiolto 2 puff inhalation qDay, Mucomyst    5. Bacterial Pneumonia        - 11/19 Pneumonia panel positive for MSSA. Respiratory culture grew Staph aureus. Completed Rocephin course. Will repeat respiratory culture tomorrow. 6. Eye deviation, left - etiology unclear        - Left eye noted to deviate to the left. Possible etiology includes sedation. Weaned off propofol secondary to this and high triglycerides. Will check CT head without contrast to rule out acute intracranial process    7. Possible arterial insufficiency, right foot        - Right foot noted to be pale and cool to touch. Venous Doppler revealed abnormal flow in right anterior and posterior tibial veins, suspicious for thrombosis. Will follow up with lower extremity arterial Doppler. Hold heparin drip until CT head is clear, see above. 8. Leukocytosis - likely reactive vs infectious        - 11/26 WBC 25. 4. most likely 2/2 current tx with hydrocortisone 100mg IV q8 along with Florinef started on 11/23.  Will continue to monitor WBC with daily CBC. 9. Laryngotracheitis        - Invasive fungal laryngotracheitis and secondary stenosis. This secondary to radiation for laryngeal cancer. On home voriconazole 200 mg po BID and Bactrim 800-160 mg po BID - held. 10. History of laryngeal cancer        - s/p radiation 2017. Follows with Dr. Lianet Rosas. Not amenable to tracheostomy per Dr Lianet Rosas. Recommends slightly deflating cuff if tolerated    11. Hyperkalemia - likely secondary to acute adrenal insufficiency        - 11/23 Potassium 5.8 s/p insulin 10 units with D50. Given lokelma 10 g x1 this AM and on Bumex 1 mg BID. Cosyntropin test indicative of adrenal insufficiency, started on steroid replacement therapy, see below        - 11/24 potassium 6.0. Given Lokelma, insulin with D50, continue with Bumex 1 mg BID. Nephrology consulted for continued refractory hyperkalemia. Per nephrology started Lokelma 10 g 3 times daily and given IV insulin/D50 again. - 11/26 K+ 6.2. nephro following. Increased Lokelema 5 g TID, and fludrocortisone to 0.2 mg        - 11/27 improved, potassium 3.7. Will continue to monitor potassium with daily BMP. 12. Adrenal insufficiency - etiology unclear, likely multifactorial        - Per morning a.m. cortisol and cosyntropin test.  On Solu-Cortef 100 mg IV q8hrs and fludrocortisone 0.2 mg p.o. qDay. 13. PAD        - Per chart review. 14. HLD        - On home pravastatin 40 mg p.o. qHS     15. History of rectal cancer        - Per chart review. S/p chemotherapy    16. DVT prophylaxis        - On Lovenox 40 mg subQ qHS    17. Nutrition        - Tolerating tube feeds at 50.        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 He previously tested COVID-19 positive at outside facility 11/11. Per report patient felt short of breath and put himself on 3 L NC. Transition to 6 L by time EMS had arrived with SPO2 77%. In ED labs nonsignificant, CTA revealed groundglass opacities along with developing consolidation at lung base and groundglass opacities in upper lung. He was given albuterol, Decadron 8 mg once. The patient was admitted for further evaluation and management. He quickly escalated to HFNC.  11/18 respiratory status continued to worsen, HFNC was maxed out. Discussed with the patient the need for intubation and CODE STATUS. The patient was agreeable and was intubated 11/18. Of note, the patient has chronic fungal infection of vocal cords. This is secondary to chemotherapy for his laryngeal cancer. He takes voriconazole and Bactrim at home for this. Additionally due to history of multiple laryngoscopies the patient has developed laryngeal stenosis. 11/19 current vent settings FiO2 1.0, PEEP 20, PEEP control 16. Hemodynamically stable, vitals WNL. Pneumonia panel grew MSSA. Respiratory culture pending. Started on Rocephin.    11/20  Potassium 6.0 , kayexalate and calcium gluconate. Recheck K+ levels. Ordered ekg. Continue abx for MSSA. Wean off vent as patient tolerates. 11/21 Elevated potassium. Start low dose Bumex. Recheck BMP tonight. AM cortisol to evaluate for hypoaldosteronism. 11/23 overnight given insulin with D50 for continued elevated potassium. This morning repeat potassium high. Given Lokelma once and continue with Bumex till cosyntropin test results completed. Additionally the patient was paralyzed overnight for breath stacking every 2-3 breaths. Otherwise hemodynamically stable, vitals WNL. 11/24 no significant events overnight. This morning while repositioning the patient in supine he was noted to desat. Vent settings were increased.   SPO2 has slowly improved. Current vent settings FiO2 1.0, PEEP 16, Pcontrol 20. Will increase proning time. Potassium continues to remain elevated despite having started fludrocortisone and hydrocortisone for adrenal insufficiency. Will give insulin with D50, and Lokelma. Nephrology consulted for refractory hyperkalemia. 11/25 no significant events overnight. Tolerating proning well. Current vent settings FiO2 0.80, PEEP 16, Pcontrol 18. Potassium has improved, nephrology following. Hemodynamically stable, vitals WNL. 11/26 - dc nimbex as patient tolerates without ventilator dyssynchrony. Nephro following for electrolyte abnormalities. WBC 25.4 possibly 2/2 steroids. Patient currently on abx therapy for mssa. 11/27 Overnight right foot was noted to be cool to touch. Pulses were present. Doppler reveals abnormal flow in right anterior and posterior tibial veins suspicious for thrombosis. Will follow up with arterial Doppler lower extremity. Additionally left eye deviation towards the left was noted. CT head without contrast ordered. Earlier this morning, with supination the patient desatted, SpO2 slowly recovering. Current vent settings FiO2 0.80, PEEP 12, Pcontrol 18. Hemodynamically stable, SPO2 88% otherwise vitals WNL. Past Medical History: Per HPI  Family History: Mother - diabetes, heart disease, stroke                            Father - prostate cancer, cancer, heart disease                            Brother - heart disease  Social History:  Former smoker quit 2006 69 PPY, denies EtOH and illicit drug use    ROS   Unable to obtain secondary to sedation and intubation    Scheduled Meds:   bumetanide  1 mg IntraVENous Daily    sodium zirconium cyclosilicate  5 g Oral TID    fludrocortisone  0.2 mg Oral Daily    baricitinib  1 mg Oral Daily    polyethylene glycol  17 g Oral Daily    metoclopramide  10 mg Oral 4x Daily AC & HS    hydrocortisone sodium succinate PF  100 mg IntraVENous Q8H    11.4, hematocrit 36.1. Platelets 405   59/25 Triglycerides 173   11/27 CXR - no interval change from previous image   11/18 Pneumonia panel - staph aureus   11/18 respiratory culture - staph aureus   11/17 blood cultures - pending   Telemetry shows NSR    Seen with multidisciplinary ICU team yes.   Meets Continued ICU Level Care Criteria:    [x] Yes   [] No - Transfer Planned to listed location:  [] HOSPITALIST CONTACTED- DR     Case discussed with Dr. Nikki Black     Electronically signed by Peter Burns DO on 11/27/2021 at 5:55 AM

## 2021-11-27 NOTE — PROGRESS NOTES
Renal Progress Note  [facility]    Patient :  Tejinder Harrison; 57 y.o. MRN# [id number]  Location:  4B-A  Attending:  Babar Crowe MD  Admit Date:  2021   Hospital Day: 10      Subjective:     Nephrology is following the patient for hyperkalemia. Patient seen and examined. Good urine output. 80 % FiO2 on vent. Not requiring any vasopressor support. I don't think yesterday's BMP was correct. Outpatient Medications:     Medications Prior to Admission: [] sulfamethoxazole-trimethoprim (BACTRIM DS;SEPTRA DS) 800-160 MG per tablet, Take 1 tablet by mouth 2 times daily for 28 days  budesonide-formoterol (SYMBICORT) 160-4.5 MCG/ACT AERO, Inhale 2 puffs into the lungs 2 times daily Rinse mouth after its use.   voriconazole (VFEND) 200 MG tablet, Take 200 mg by mouth 2 times daily  tiotropium (SPIRIVA RESPIMAT) 2.5 MCG/ACT AERS inhaler, Inhale 2 puffs into the lungs daily  Ascorbic Acid (VITAMIN C PO), Take 500 mg by mouth daily   Cholecalciferol (VITAMIN D3 PO), Take by mouth  Acetylcysteine (NAC) 500 MG CAPS, Take by mouth 2 times daily  guaiFENesin (ROBITUSSIN) 100 MG/5ML syrup, Take 200 mg by mouth 3 times daily as needed for Cough  guaiFENesin (MUCINEX) 600 MG extended release tablet, Take 1,200 mg by mouth 2 times daily as needed for Congestion  zinc 50 MG CAPS, Take by mouth Twice weekly  acetylcysteine (MUCOMYST) 20 % nebulizer solution, 4 mL via nebulizer 3 times daily  albuterol sulfate HFA (VENTOLIN HFA) 108 (90 Base) MCG/ACT inhaler, Inhale 2 puffs into the lungs every 6 hours as needed for Wheezing or Shortness of Breath  albuterol (PROVENTIL) (2.5 MG/3ML) 0.083% nebulizer solution, Take 3 mLs by nebulization every 6 hours as needed for Wheezing or Shortness of Breath  sodium chloride, Inhalant, 3 % nebulizer solution, Take 3 mLs by nebulization as needed (Throat dryness, cough, wheezing)  pravastatin (PRAVACHOL) 40 MG tablet, Take 40 mg by mouth nightly loperamide (IMODIUM) 2 MG capsule, Take 2 mg by mouth daily     Current Medications:     Scheduled Meds:    bumetanide  1 mg IntraVENous Daily    [Held by provider] sodium zirconium cyclosilicate  5 g Oral TID    fludrocortisone  0.2 mg Oral Daily    baricitinib  1 mg Oral Daily    polyethylene glycol  17 g Oral Daily    metoclopramide  10 mg Oral 4x Daily AC & HS    hydrocortisone sodium succinate PF  100 mg IntraVENous Q8H    senna  1 tablet Oral Nightly    insulin lispro  0-12 Units SubCUTAneous Q6H    sodium chloride flush  5-40 mL IntraVENous 2 times per day    tiotropium-olodaterol  2 puff Inhalation Daily    famotidine (PEPCID) injection  20 mg IntraVENous BID    pravastatin  40 mg Oral Nightly    voriconazole  200 mg Oral BID    [Held by provider] sulfamethoxazole-trimethoprim  1 tablet Oral BID    enoxaparin  40 mg SubCUTAneous Nightly    lactobacillus  1 capsule Oral Daily with breakfast     Continuous Infusions:    fentaNYL (SUBLIMAZE) 1250 mcg in sodium chloride 0.9 % 250 mL 200 mcg/hr (21 7011)    sodium chloride      cisatracurium (NIMBEX) infusion Stopped (21 1414)    dextrose      propofol 35 mcg/kg/min (21 06)    midazolam 8 mg/hr (21)     PRN Meds:  albuterol, bisacodyl, sodium chloride flush, sodium chloride, glucose, dextrose, glucagon (rDNA), dextrose, polyethylene glycol, acetaminophen **OR** acetaminophen    Input/Output:       I/O last 3 completed shifts: In: 4146.3 [I.V.:2792.3; NG/GT:1354]  Out: 1700 [Urine:1700].       Patient Vitals for the past 96 hrs (Last 3 readings):   Weight   21 0530 228 lb 6.3 oz (103.6 kg)   21 0600 224 lb 3.3 oz (101.7 kg)       Vital Signs:   Temperature:  Temp: 97.5 °F (36.4 °C)  TMax:   Temp (24hrs), Av °F (36.7 °C), Min:97.2 °F (36.2 °C), Max:99.7 °F (37.6 °C)    Respirations:  Resp: (!) 37  Pulse:   Pulse: 72  BP:    BP: 117/68  BP Range: Systolic (24RVW), TLH:107 , Min:106 , Max:120 Diastolic (83LPY), WVI:50, Min:64, Max:77      Physical Examination:     General:  Intubated, sedated  HEENT: NC/AT + ET tube  Chest:               diminished  Cardiac:  S1 S2   Abdomen: Soft  Neuro:  sedated  SKIN:  No rashes  Extremities:  1+ edema    Labs:       Recent Labs     11/25/21 0320 11/26/21  0435 11/27/21  0420   WBC 21.9* 25.4* 21.2*   RBC 4.24* 4.12* 3.66*   HGB 12.8* 12.7* 11.4*   HCT 41.9* 41.2* 36.1*   MCV 98.8* 100.0* 98.6*   MCH 30.2 30.8 31.1   MCHC 30.5* 30.8* 31.6*   * 426* 420*   MPV 10.4 11.4 11.4      BMP:   Recent Labs     11/25/21 0320 11/25/21  0320 11/26/21  0435 11/26/21  1450 11/27/21  0420     --  145  --  140   K 4.5   < > 6.2*  6.2* 4.2 3.7     --  110  --  102   CO2 31  --  25  --  30   BUN 52*  --  86*  --  46*   CREATININE 0.5  --  3.2*  --  0.5   GLUCOSE 157*  --  107  --  173*   CALCIUM 8.1*  --  8.4*  --  7.5*    < > = values in this interval not displayed. Phosphorus:   No results for input(s): PHOS in the last 72 hours. Magnesium:  No results for input(s): MG in the last 72 hours.   Albumin:    Recent Labs     11/26/21  0435   LABALBU 2.6*     BNP:    No results found for: BNP  MESFIN:    No results found for: MESFIN  SPEP:  Lab Results   Component Value Date    PROT 6.2 11/26/2021     UPEP:   No results found for: LABPE  C3:   No results found for: C3  C4:   No results found for: C4  MPO ANCA:   No results found for: MPO  PR3 ANCA:   No results found for: PR3  Anti-GBM:   No results found for: GBMABIGG  Hep BsAg:       No results found for: HEPBSAG  Hep C AB:        No results found for: HEPCAB    Urinalysis/Chemistries:      Lab Results   Component Value Date    NITRU NEGATIVE 11/25/2021    COLORU YELLOW 11/25/2021    PHUR 6.0 11/25/2021    WBCUA 2-4 11/25/2021    RBCUA 0-2 11/25/2021    YEAST NONE SEEN 11/25/2021    BACTERIA NONE SEEN 11/25/2021    LEUKOCYTESUR NEGATIVE 11/25/2021    UROBILINOGEN 1.0 11/25/2021    BILIRUBINUR NEGATIVE

## 2021-11-28 NOTE — PROGRESS NOTES
OBC to [facility], [facility]. Gave phone number to call back.

## 2021-11-28 NOTE — PROGRESS NOTES
Renal Progress Note  [facility]    Patient :  Pia Guerrero; 57 y.o. MRN# [id number]  Location:  A  Attending:  Prabha Ford MD  Admit Date:  2021   Hospital Day: 11      Subjective:     Nephrology is following the patient for hyperkalemia. Patient seen and examined. On heparin drip for possible DVT. Good urine output. On vent @ 60% FiO2. Outpatient Medications:     Medications Prior to Admission: [] sulfamethoxazole-trimethoprim (BACTRIM DS;SEPTRA DS) 800-160 MG per tablet, Take 1 tablet by mouth 2 times daily for 28 days  budesonide-formoterol (SYMBICORT) 160-4.5 MCG/ACT AERO, Inhale 2 puffs into the lungs 2 times daily Rinse mouth after its use.   voriconazole (VFEND) 200 MG tablet, Take 200 mg by mouth 2 times daily  tiotropium (SPIRIVA RESPIMAT) 2.5 MCG/ACT AERS inhaler, Inhale 2 puffs into the lungs daily  Ascorbic Acid (VITAMIN C PO), Take 500 mg by mouth daily   Cholecalciferol (VITAMIN D3 PO), Take by mouth  Acetylcysteine (NAC) 500 MG CAPS, Take by mouth 2 times daily  guaiFENesin (ROBITUSSIN) 100 MG/5ML syrup, Take 200 mg by mouth 3 times daily as needed for Cough  guaiFENesin (MUCINEX) 600 MG extended release tablet, Take 1,200 mg by mouth 2 times daily as needed for Congestion  zinc 50 MG CAPS, Take by mouth Twice weekly  acetylcysteine (MUCOMYST) 20 % nebulizer solution, 4 mL via nebulizer 3 times daily  albuterol sulfate HFA (VENTOLIN HFA) 108 (90 Base) MCG/ACT inhaler, Inhale 2 puffs into the lungs every 6 hours as needed for Wheezing or Shortness of Breath  albuterol (PROVENTIL) (2.5 MG/3ML) 0.083% nebulizer solution, Take 3 mLs by nebulization every 6 hours as needed for Wheezing or Shortness of Breath  sodium chloride, Inhalant, 3 % nebulizer solution, Take 3 mLs by nebulization as needed (Throat dryness, cough, wheezing)  pravastatin (PRAVACHOL) 40 MG tablet, Take 40 mg by mouth nightly   loperamide (IMODIUM) 2 MG capsule, Take 2 mg by mouth daily     Current Medications:     Scheduled Meds:    baricitinib  4 mg Oral Daily    bumetanide  1 mg IntraVENous Daily    fludrocortisone  0.2 mg Oral Daily    polyethylene glycol  17 g Oral Daily    metoclopramide  10 mg Oral 4x Daily AC & HS    hydrocortisone sodium succinate PF  100 mg IntraVENous Q8H    senna  1 tablet Oral Nightly    insulin lispro  0-12 Units SubCUTAneous Q6H    sodium chloride flush  5-40 mL IntraVENous 2 times per day    tiotropium-olodaterol  2 puff Inhalation Daily    famotidine (PEPCID) injection  20 mg IntraVENous BID    pravastatin  40 mg Oral Nightly    voriconazole  200 mg Oral BID    [Held by provider] sulfamethoxazole-trimethoprim  1 tablet Oral BID    lactobacillus  1 capsule Oral Daily with breakfast     Continuous Infusions:    heparin (PORCINE) Infusion 18 Units/kg/hr (21)    fentaNYL (SUBLIMAZE) 1250 mcg in sodium chloride 0.9 % 250 mL 200 mcg/hr (21 0754)    sodium chloride      dextrose      midazolam 10 mg/hr (21)     PRN Meds:  heparin (porcine), heparin (porcine), albuterol, bisacodyl, sodium chloride flush, sodium chloride, glucose, dextrose, glucagon (rDNA), dextrose, polyethylene glycol, acetaminophen **OR** acetaminophen    Input/Output:       I/O last 3 completed shifts: In: 3036.7 [I.V.:1449.3; NG/GT:1498; IV Piggyback:89.4]  Out: 2700 [Urine:2700].       Patient Vitals for the past 96 hrs (Last 3 readings):   Weight   21 0530 228 lb 6.3 oz (103.6 kg)       Vital Signs:   Temperature:  Temp: 99.5 °F (37.5 °C)  TMax:   Temp (24hrs), Av.7 °F (36.5 °C), Min:93.2 °F (34 °C), Max:99.5 °F (37.5 °C)    Respirations:  Resp: 26  Pulse:   Pulse: 96  BP:    BP: (!) 151/83  BP Range: Systolic (10TFP), AOK:400 , Min:103 , CSF:942       Diastolic (40ARF), PST:58, Min:62, Max:92      Physical Examination:     General:  Intubated, sedated  HEENT: NC/AT + ET tube  Chest:               diminished  Cardiac:  S1 S2 Abdomen: Soft  Neuro:  sedated  SKIN:  No rashes  Extremities:  1+ edema    Labs:       Recent Labs     11/27/21  0420 11/27/21  1450 11/28/21  0505   WBC 21.2* 25.8* 20.7*   RBC 3.66* 4.29* 3.54*   HGB 11.4* 13.0* 10.7*   HCT 36.1* 42.3 34.9*   MCV 98.6* 98.6* 98.6*   MCH 31.1 30.3 30.2   MCHC 31.6* 30.7* 30.7*   * 484* 422*   MPV 11.4 11.1 11.7      BMP:   Recent Labs     11/26/21  0435 11/26/21  1450 11/27/21  0420 11/27/21  1450 11/28/21  0505     --  140  --  143   K 6.2*  6.2*   < > 3.7 4.2 3.5     --  102  --  103   CO2 25  --  30  --  31   BUN 86*  --  46*  --  42*   CREATININE 3.2*  --  0.5  --  0.5   GLUCOSE 107  --  173*  --  243*   CALCIUM 8.4*  --  7.5*  --  8.0*    < > = values in this interval not displayed. Phosphorus:   No results for input(s): PHOS in the last 72 hours.   Magnesium:    Recent Labs     11/28/21  0505   MG 2.0     Albumin:    Recent Labs     11/26/21  0435   LABALBU 2.6*     BNP:    No results found for: BNP  MESFIN:    No results found for: MESFIN  SPEP:  Lab Results   Component Value Date    PROT 6.2 11/26/2021     UPEP:   No results found for: LABPE  C3:   No results found for: C3  C4:   No results found for: C4  MPO ANCA:   No results found for: MPO  PR3 ANCA:   No results found for: PR3  Anti-GBM:   No results found for: GBMABIGG  Hep BsAg:       No results found for: HEPBSAG  Hep C AB:        No results found for: HEPCAB    Urinalysis/Chemistries:      Lab Results   Component Value Date    NITRU NEGATIVE 11/25/2021    COLORU YELLOW 11/25/2021    PHUR 6.0 11/25/2021    WBCUA 2-4 11/25/2021    RBCUA 0-2 11/25/2021    YEAST NONE SEEN 11/25/2021    BACTERIA NONE SEEN 11/25/2021    LEUKOCYTESUR NEGATIVE 11/25/2021    UROBILINOGEN 1.0 11/25/2021    BILIRUBINUR NEGATIVE 11/25/2021    BLOODU NEGATIVE 11/25/2021    GLUCOSEU NEGATIVE 11/25/2021    KETUA NEGATIVE 11/25/2021     Urine Sodium:   No results found for: PRINCE  Urine Potassium:  No results found for: KUR  Urine Chloride:  No results found for: CLUR  Urine Osmolarity: No results found for: OSMOU  Urine Protein:   No components found for: TOTALPROTEIN, URINE   Urine Creatinine:   No results found for: LABCREA  Urine Eosinophils:  No components found for: UEOS        Impression and Plan:  1. Hyperkalemia due to adrenal insufficiency, recent Bactrim: improved. Continue Florinef 0.2 mg.   2. Prerenal azotemia: elevated BUN from steroids, diuretics. stable  3. Adrenal insufficiency  4. Acute hypoxic resp failure on vent  5. COVID-19 pneumonia  6. Leukocytosis  7. Laryngeal CA with fungal and staph infection  8. Possible DVT          Please don't hesitate to call with any questions.   Electronically signed by Pranay Rodgers DO on 11/28/2021 at 8:06 AM

## 2021-11-28 NOTE — PROGRESS NOTES
CRITICAL CARE PROGRESS NOTE      Patient:  Osiel Metcalf    Unit/Bed:4B-03/003-A  YOB: 1960  MRN: [id number]   PCP: JOSH Davis - CNP  Date of Admission: 11/17/2021  Chief Complaint:- Respiratory failure    Assessment and Plan:    1. Acute hypoxic respiratory failure - secondary to COVID-19.        - Intubated 11/18. 11/23 paralyzed for ventilator dyssynchrony. Trail off paralytics 11/26  Continue with lung protective strategies. Maintain pPeak <35 and pPlateau <59. Wean as tolerated for SpO2 >92%. He is currently on proning therapy q12 hours. 2. Severe ARDS        - Meets 3/3 [place] criteria. P/F ratio <100. Continue ASA for microthrombus prophylaxis. 11/24 PF ratio 61. Increase proning time to 16/8. 11/26 P/F ratio 75.    3. COVID-19 PNA        - Covid positive 11/11. S/p baricitinib and decadron    4. COPD         - 11/19 FEV1 45% consistent w/ GOLD 3         - No home O2. Stiolto 2 puff inhalation qDay, Mucomyst    5. Bacterial Pneumonia        - 11/19 Pneumonia panel positive for MSSA. Respiratory culture grew Staph aureus. Completed Rocephin course. 6. Eye deviation, left - etiology unclear        - Left eye noted to deviate to the left. Possible etiology includes sedation. Weaned off propofol secondary to this and high triglycerides. Patient underwent a CT scan of head and found to have a left globe towards left side. According to Dr. Aggie Valdez MD discussion with me,  he contacted patient's family member on 11/27/21. Patient had a chronic history of deviated left eye prior to the hospitalization. 7. Possible arterial insufficiency, right foot        - Right foot noted to be pale and cool to touch. Venous Doppler revealed abnormal flow in right anterior and posterior tibial veins, suspicious for thrombosis. Will follow up with lower extremity arterial Doppler. Hold heparin drip until CT head is clear, see above.     8. Leukocytosis - likely reactive vs infectious        - 11/26 WBC 25. 4. most likely 2/2 current tx with hydrocortisone 100mg IV q8 along with Florinef started on 11/23. Will continue to monitor WBC with daily CBC. 9. Laryngotracheitis        - Invasive fungal laryngotracheitis and secondary stenosis. This secondary to radiation for laryngeal cancer. On home voriconazole 200 mg po BID and Bactrim 800-160 mg po BID - held. 10. History of laryngeal cancer        - s/p radiation 2017. Follows with Dr. Becker Section. Not amenable to tracheostomy per Dr Becker Section. Recommends slightly deflating cuff if tolerated    11. Hyperkalemia - likely secondary to acute adrenal insufficiency        - 11/23 Potassium 5.8 s/p insulin 10 units with D50. Given lokelma 10 g x1 this AM and on Bumex 1 mg BID. Cosyntropin test indicative of adrenal insufficiency, started on steroid replacement therapy, see below        - 11/24 potassium 6.0. Given Lokelma, insulin with D50, continue with Bumex 1 mg BID. Nephrology consulted for continued refractory hyperkalemia. Per nephrology started Lokelma 10 g 3 times daily and given IV insulin/D50 again. - 11/26 K+ 6.2. nephro following. Increased Lokelema 5 g TID, and fludrocortisone to 0.2 mg        - 11/27 improved, potassium 3.7. Will continue to monitor potassium with daily BMP. 12. Adrenal insufficiency - etiology unclear, likely multifactorial        - Per morning a.m. cortisol and cosyntropin test.  On Solu-Cortef 100 mg IV q8hrs and fludrocortisone 0.2 mg p.o. qDay. 13. PAD        - Per chart review. Serial Doppler of the both lower extremities performed on 27 November 2021 revealed ankle brachial indices are within normal limits bilaterally. He is currently on heparin drip    14. HLD        - On home pravastatin 40 mg p.o. qHS     15. History of rectal cancer        - Per chart review. S/p chemotherapy    16. DVT prophylaxis        - On heparin drip    17.  Nutrition        - Tolerating tube feeds at 48. 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 He previously tested COVID-19 positive at outside facility 11/11. Per report patient felt short of breath and put himself on 3 L NC. Transition to 6 L by time EMS had arrived with SPO2 77%. In ED labs nonsignificant, CTA revealed groundglass opacities along with developing consolidation at lung base and groundglass opacities in upper lung. He was given albuterol, Decadron 8 mg once. The patient was admitted for further evaluation and management. He quickly escalated to HFNC.  11/18 respiratory status continued to worsen, HFNC was maxed out. Discussed with the patient the need for intubation and CODE STATUS. The patient was agreeable and was intubated 11/18. Of note, the patient has chronic fungal infection of vocal cords. This is secondary to chemotherapy for his laryngeal cancer. He takes voriconazole and Bactrim at home for this. Additionally due to history of multiple laryngoscopies the patient has developed laryngeal stenosis. 11/19 current vent settings FiO2 1.0, PEEP 20, PEEP control 16. Hemodynamically stable, vitals WNL. Pneumonia panel grew MSSA. Respiratory culture pending. Started on Rocephin.    11/20  Potassium 6.0 , kayexalate and calcium gluconate. Recheck K+ levels. Ordered ekg. Continue abx for MSSA. Wean off vent as patient tolerates. 11/21 Elevated potassium. Start low dose Bumex. Recheck BMP tonight. AM cortisol to evaluate for hypoaldosteronism. 11/23 overnight given insulin with D50 for continued elevated potassium. This morning repeat potassium high.   Given Lokelma once and continue with Bumex till cosyntropin test results completed. Additionally the patient was paralyzed overnight for breath stacking every 2-3 breaths. Otherwise hemodynamically stable, vitals WNL. 11/24 no significant events overnight. This morning while repositioning the patient in supine he was noted to desat. Vent settings were increased. SPO2 has slowly improved. Current vent settings FiO2 1.0, PEEP 16, Pcontrol 20. Will increase proning time. Potassium continues to remain elevated despite having started fludrocortisone and hydrocortisone for adrenal insufficiency. Will give insulin with D50, and Lokelma. Nephrology consulted for refractory hyperkalemia. 11/25 no significant events overnight. Tolerating proning well. Current vent settings FiO2 0.80, PEEP 16, Pcontrol 18. Potassium has improved, nephrology following. Hemodynamically stable, vitals WNL. 11/26 - dc nimbex as patient tolerates without ventilator dyssynchrony. Nephro following for electrolyte abnormalities. WBC 25.4 possibly 2/2 steroids. Patient currently on abx therapy for mssa. 11/27 Overnight right foot was noted to be cool to touch. Pulses were present. Doppler reveals abnormal flow in right anterior and posterior tibial veins suspicious for thrombosis. Will follow up with arterial Doppler lower extremity. Additionally left eye deviation towards the left was noted. CT head without contrast ordered. Earlier this morning, with supination the patient desatted, SpO2 slowly recovering. Current vent settings FiO2 0.80, PEEP 12, Pcontrol 18. Hemodynamically stable, SPO2 88% otherwise vitals WNL. Events Past 24 hours/ROS:  Patient remain critically ill  T-max: 99.5 °F  Not in respiratory distress  Remained on the ventilator. Sedated. Rest of the review of systems performed. Past Medical History: Per HPI  Family History:  Mother - diabetes, heart disease, stroke                            Father - prostate cancer, cancer, heart disease Brother - heart disease  Social History: Former smoker quit 2006 69 PPY, denies EtOH and illicit drug use    ROS   Unable to obtain secondary to sedation and intubation    Scheduled Meds:   baricitinib  4 mg Oral Daily    bumetanide  1 mg IntraVENous Daily    fludrocortisone  0.2 mg Oral Daily    polyethylene glycol  17 g Oral Daily    metoclopramide  10 mg Oral 4x Daily AC & HS    hydrocortisone sodium succinate PF  100 mg IntraVENous Q8H    senna  1 tablet Oral Nightly    insulin lispro  0-12 Units SubCUTAneous Q6H    sodium chloride flush  5-40 mL IntraVENous 2 times per day    tiotropium-olodaterol  2 puff Inhalation Daily    famotidine (PEPCID) injection  20 mg IntraVENous BID    pravastatin  40 mg Oral Nightly    voriconazole  200 mg Oral BID    [Held by provider] sulfamethoxazole-trimethoprim  1 tablet Oral BID    lactobacillus  1 capsule Oral Daily with breakfast     Continuous Infusions:   heparin (PORCINE) Infusion 18.6 Units/kg/hr (11/28/21 1225)    fentaNYL (SUBLIMAZE) 1250 mcg in sodium chloride 0.9 % 250 mL 200 mcg/hr (11/28/21 1012)    sodium chloride      dextrose      midazolam 10 mg/hr (11/28/21 0616)       PHYSICAL EXAMINATION:  VITALS:  /72   Pulse 88   Temp 99.1 °F (37.3 °C) (Esophageal)   Resp 26   Ht 6' 2\" (1.88 m)   Wt 228 lb 6.3 oz (103.6 kg)   SpO2 91%   BMI 29.32 kg/m²   24HR INTAKE/OUTPUT:    Intake/Output Summary (Last 24 hours) at 11/28/2021 1241  Last data filed at 11/28/2021 5712  Gross per 24 hour   Intake 2657.76 ml   Output 2700 ml   Net -42.24 ml     Stool Output: Stool (measured) : 0 mL  Diet: Diet NPO  ADULT TUBE FEEDING; Orogastric; Renal Formula; Continuous; 50; No; 30; Q 4 hours; Protein; 2.5 oz. Proteinex daily     Vent Settings:  Vent Mode:  (PCMV) Rate Set: 26 bmp/Vt Ordered: 0 mL/ /FiO2 : 80 %  No results for input(s): PHART, PCO2TC, PO2TC, HEC1LBS, O2SAT in the last 72 hours.       Nursing note and vitals reviewed. Constitutional:  Patient appears moderately built and moderately nourished. Patient remain on vent and sedated. Head: Normocephalic and atraumatic. Right Ear: External ear normal.   Left Ear: External ear normal.   Mouth/Throat: Exam is limited by endotracheal tube. Eyes: Pupils are equal, round, and sluggishly reacting to light. Neck: Neck supple. No JVD present. Cardiovascular: Normal rate, regular rhythm, normal heart sounds. Pulmonary/Chest: Bilateral air entry present with good breath sounds. Diminished at bases. no respiratory distress on vent. No wheezes. No rales. Abdominal: Soft. Patient exhibits no distension. No organomegaly appreciated. BS +ve sluggish. Musculoskeletal: Sedated and unresponsive. Extremities: No edema. Vasculature: capillary refill < 3 seconds. Neurological:Sedated and unresponsive. Skin: Skin is warm and dry. Data: (All radiographs, tracings, PFTs, and imaging are personally viewed and interpreted unless otherwise noted). Lab Results   Component Value Date    PH 7.27 11/24/2021    PCO2 76 11/24/2021    PO2 61 11/24/2021    HCO3 35 11/24/2021    O2SAT 86 11/24/2021     Lab Results   Component Value Date    IFIO2 100 11/24/2021    MODE PC 11/24/2021    SETPEEP 20.0 11/18/2021     CBC:   Recent Labs     11/27/21  0420 11/27/21  1450 11/28/21  0505   WBC 21.2* 25.8* 20.7*   HGB 11.4* 13.0* 10.7*   HCT 36.1* 42.3 34.9*   * 484* 422*     BMP:  Recent Labs     11/26/21  0435 11/26/21  1450 11/27/21  0420 11/27/21  1450 11/28/21  0505     --  140  --  143   K 6.2*  6.2*   < > 3.7 4.2 3.5     --  102  --  103   CO2 25  --  30  --  31   BUN 86*  --  46*  --  42*   CREATININE 3.2*  --  0.5  --  0.5   GLUCOSE 107  --  173*  --  243*   MG  --   --   --   --  2.0   CALCIUM 8.4*  --  7.5*  --  8.0*    < > = values in this interval not displayed.      Hepatic:   Recent Labs     11/26/21  0435   AST 59*   ALT 60   BILITOT 0.5   ALKPHOS 67

## 2021-11-28 NOTE — PROGRESS NOTES
1800 Patient placed in prone position. Tolerated well. 1900 Called and updated daughter. Answered all questions and concerns.

## 2021-11-28 NOTE — PROGRESS NOTES
Called Gaurang Herrera daughter to update. Would like to talk to attending tomorrow. Wants \"Host odds of dads chances of survival.\" Daughter plans on coming from [place] tomorrow to visit.

## 2021-11-29 NOTE — PROGRESS NOTES
Pt friend, Rito Jackson, updated at this time. No further questions for this RN. Rito Jackson encouraged to call back with any further questions.

## 2021-11-29 NOTE — PROGRESS NOTES
Renal Progress Note  [facility]    Patient :  Reinaldo Stacy; 57 y.o. MRN# [id number]  Location:  A  Attending:  Froy Torres MD  Admit Date:  2021   Hospital Day: 12      Subjective:     Nephrology is following the patient for hyperkalemia. Patient seen and examined. Patient more hypoxic and requiring 100 % fiO2. Good urine output. Bps stable. Outpatient Medications:     Medications Prior to Admission: [] sulfamethoxazole-trimethoprim (BACTRIM DS;SEPTRA DS) 800-160 MG per tablet, Take 1 tablet by mouth 2 times daily for 28 days  budesonide-formoterol (SYMBICORT) 160-4.5 MCG/ACT AERO, Inhale 2 puffs into the lungs 2 times daily Rinse mouth after its use.   voriconazole (VFEND) 200 MG tablet, Take 200 mg by mouth 2 times daily  tiotropium (SPIRIVA RESPIMAT) 2.5 MCG/ACT AERS inhaler, Inhale 2 puffs into the lungs daily  Ascorbic Acid (VITAMIN C PO), Take 500 mg by mouth daily   Cholecalciferol (VITAMIN D3 PO), Take by mouth  Acetylcysteine (NAC) 500 MG CAPS, Take by mouth 2 times daily  guaiFENesin (ROBITUSSIN) 100 MG/5ML syrup, Take 200 mg by mouth 3 times daily as needed for Cough  guaiFENesin (MUCINEX) 600 MG extended release tablet, Take 1,200 mg by mouth 2 times daily as needed for Congestion  zinc 50 MG CAPS, Take by mouth Twice weekly  acetylcysteine (MUCOMYST) 20 % nebulizer solution, 4 mL via nebulizer 3 times daily  albuterol sulfate HFA (VENTOLIN HFA) 108 (90 Base) MCG/ACT inhaler, Inhale 2 puffs into the lungs every 6 hours as needed for Wheezing or Shortness of Breath  albuterol (PROVENTIL) (2.5 MG/3ML) 0.083% nebulizer solution, Take 3 mLs by nebulization every 6 hours as needed for Wheezing or Shortness of Breath  sodium chloride, Inhalant, 3 % nebulizer solution, Take 3 mLs by nebulization as needed (Throat dryness, cough, wheezing)  pravastatin (PRAVACHOL) 40 MG tablet, Take 40 mg by mouth nightly   loperamide (IMODIUM) 2 MG capsule, Take 2 mg by mouth daily     Current Medications:     Scheduled Meds:    bumetanide  1 mg IntraVENous BID    baricitinib  4 mg Oral Daily    fludrocortisone  0.2 mg Oral Daily    polyethylene glycol  17 g Oral Daily    metoclopramide  10 mg Oral 4x Daily AC & HS    hydrocortisone sodium succinate PF  100 mg IntraVENous Q8H    senna  1 tablet Oral Nightly    insulin lispro  0-12 Units SubCUTAneous Q6H    sodium chloride flush  5-40 mL IntraVENous 2 times per day    tiotropium-olodaterol  2 puff Inhalation Daily    famotidine (PEPCID) injection  20 mg IntraVENous BID    pravastatin  40 mg Oral Nightly    voriconazole  200 mg Oral BID    [Held by provider] sulfamethoxazole-trimethoprim  1 tablet Oral BID    lactobacillus  1 capsule Oral Daily with breakfast     Continuous Infusions:    heparin (PORCINE) Infusion 16.6 Units/kg/hr (21)    fentaNYL (SUBLIMAZE) 1250 mcg in sodium chloride 0.9 % 250 mL 200 mcg/hr (21)    sodium chloride      dextrose      midazolam 10 mg/hr (21)     PRN Meds:  heparin (porcine), heparin (porcine), albuterol, bisacodyl, sodium chloride flush, sodium chloride, glucose, dextrose, glucagon (rDNA), dextrose, polyethylene glycol, acetaminophen **OR** acetaminophen    Input/Output:       I/O last 3 completed shifts: In: 5206.3 [I.V.:3338.3; NG/GT:1868]  Out: 2125 [Urine:5]. No data found.     Vital Signs:   Temperature:  Temp: 97.5 °F (36.4 °C)  TMax:   Temp (24hrs), Av.1 °F (37.3 °C), Min:97.3 °F (36.3 °C), Max:100 °F (37.8 °C)    Respirations:  Resp: 28  Pulse:   Pulse: 96  BP:    BP: 123/87  BP Range: Systolic (71NBN), ALB:557 , Min:88 , DANIAL:356       Diastolic (38SOS), XRQ:14, Min:63, Max:95      Physical Examination:     General:  Intubated, sedated  HEENT: NC/AT + ET tube  Chest:               diminished  Cardiac:  S1 S2   Abdomen: Soft  Neuro:  sedated  SKIN:  No rashes  Extremities:  2+ edema    Labs:       Recent Labs     21  1459 11/28/21  0505 11/29/21  0400   WBC 25.8* 20.7* 24.8*   RBC 4.29* 3.54* 3.59*   HGB 13.0* 10.7* 10.9*   HCT 42.3 34.9* 35.7*   MCV 98.6* 98.6* 99.4*   MCH 30.3 30.2 30.4   MCHC 30.7* 30.7* 30.5*   * 422* 532*   MPV 11.1 11.7 11.8      BMP:   Recent Labs     11/26/21  1450 11/27/21  0420 11/27/21  1450 11/28/21  0505 11/29/21  0400   NA  --  140  --  143 145   K   < > 3.7 4.2 3.5 3.9   CL  --  102  --  103 103   CO2  --  30  --  31 32   BUN  --  46*  --  42* 51*   CREATININE  --  0.5  --  0.5 0.5   GLUCOSE  --  173*  --  243* 180*   CALCIUM  --  7.5*  --  8.0* 8.3*    < > = values in this interval not displayed. Phosphorus:   No results for input(s): PHOS in the last 72 hours. Magnesium:    Recent Labs     11/28/21  0505   MG 2.0     Albumin:    No results for input(s): LABALBU in the last 72 hours.   BNP:    No results found for: BNP  MESFIN:    No results found for: MESFIN  SPEP:  Lab Results   Component Value Date    PROT 6.2 11/26/2021     UPEP:   No results found for: LABPE  C3:   No results found for: C3  C4:   No results found for: C4  MPO ANCA:   No results found for: MPO  PR3 ANCA:   No results found for: PR3  Anti-GBM:   No results found for: GBMABIGG  Hep BsAg:       No results found for: HEPBSAG  Hep C AB:        No results found for: HEPCAB    Urinalysis/Chemistries:      Lab Results   Component Value Date    NITRU NEGATIVE 11/25/2021    COLORU YELLOW 11/25/2021    PHUR 6.0 11/25/2021    WBCUA 2-4 11/25/2021    RBCUA 0-2 11/25/2021    YEAST NONE SEEN 11/25/2021    BACTERIA NONE SEEN 11/25/2021    LEUKOCYTESUR NEGATIVE 11/25/2021    UROBILINOGEN 1.0 11/25/2021    BILIRUBINUR NEGATIVE 11/25/2021    BLOODU NEGATIVE 11/25/2021    GLUCOSEU NEGATIVE 11/25/2021    KETUA NEGATIVE 11/25/2021     Urine Sodium:   No results found for: PRINCE  Urine Potassium:  No results found for: KUR  Urine Chloride:  No results found for: CLUR  Urine Osmolarity: No results found for: OSMOU  Urine Protein:   No components found for: TOTALPROTEIN, URINE   Urine Creatinine:   No results found for: LABCREA  Urine Eosinophils:  No components found for: UEOS        Impression and Plan:  1. Hyperkalemia due to adrenal insufficiency, recent Bactrim: resolved. Continue Florinef. Continue Nepro tube feeds  2. Prerenal azotemia: elevated BUN from steroids, diuretics. If continues to worsen consider reducing diuretics and weaning back solucortef dose  3. Adrenal insufficiency  4. Acute hypoxic resp failure on vent  5. COVID-19 pneumonia  6. Leukocytosis  7. Laryngeal CA with fungal and staph infection  8. Possible DVT    Hyperkalemia resolved. Will see on PRN basis. Please call if needed. Please don't hesitate to call with any questions.   Electronically signed by Sebastián Hyde DO on 11/29/2021 at 8:07 AM

## 2021-11-29 NOTE — PROGRESS NOTES
CRITICAL CARE PROGRESS NOTE      Patient:  Tejinder Harrison    Unit/Bed:4B-03/003-A  YOB: 1960  MRN: [id number]   PCP: JOSH Brar - CNP  Date of Admission: 11/17/2021  Chief Complaint:- Respiratory failure    Assessment and Plan:    1. Acute hypoxic respiratory failure - secondary to COVID-19.        - Intubated 11/18. 11/23 paralyzed for ventilator dyssynchrony. Off paralytics 11/26  Continue with lung protective strategies. Maintain pPeak <35 and pPlateau <50. Wean as tolerated for SpO2 >92%. He is currently on proning therapy q12 hours. 2. Severe ARDS        - Meets 3/3 [place] criteria. P/F ratio <100. Continue ASA for microthrombus prophylaxis. 11/24 PF ratio 61. Increase proning time to 16/8.     3. COVID-19 PNA        - Covid positive 11/11. S/p baricitinib and decadron    4. COPD         - 11/19 FEV1 45% consistent w/ GOLD 3         - No home O2. Stiolto 2 puff inhalation qDay, Mucomyst    5. Eye deviation, left - etiology unclear        - Left eye noted to deviate to the left. Possible etiology includes sedation. Weaned off propofol secondary to this and high triglycerides. CT scan of head revealed left globe towards left side. Per family this is chronic and prior to the hospitalization. 6. Possible arterial insufficiency, right foot        - Right foot noted to be pale and cool to touch. Venous Doppler revealed abnormal flow in right anterior and posterior tibial veins, suspicious for thrombosis. Lower extremity arterial Doppler negative. On heparin drip. 7. Leukocytosis - likely reactive vs steroid        - 11/29 WBC 24.8 from 20.7. Most likely 2/2 current tx with hydrocortisone 100mg IV q8 along with Florinef started on 11/23. Will continue to monitor WBC with daily CBC. 8. Laryngotracheitis        - Invasive fungal laryngotracheitis and secondary stenosis. This secondary to radiation for laryngeal cancer.   On home voriconazole 200 mg po BID and Bactrim 800-160 mg po BID - held. 9. History of laryngeal cancer        - s/p radiation 2017. Follows with Dr. Sandra Lazaro. Not amenable to tracheostomy per Dr Sandra Lazaro. Recommends slightly deflating cuff if tolerated    10. Adrenal insufficiency - etiology unclear, likely multifactorial        - Per morning a.m. cortisol and cosyntropin test.  On Solu-Cortef 100 mg IV q8hrs and fludrocortisone 0.2 mg p.o. qDay. 11. PAD        - Per chart review. 11/27 Serial Doppler of the both lower extremities revealed ankle brachial indices are within normal limits bilaterally. Currently on heparin drip    12. HLD        - On home pravastatin 40 mg p.o. qHS     13. History of rectal cancer        - Per chart review. S/p chemotherapy    14. DVT prophylaxis        - On heparin drip    15. Nutrition        - Tolerating tube feeds at 50. 16. Bacterial Pneumonia - resolved        - 11/19 Pneumonia panel positive for MSSA. Respiratory culture grew Staph aureus. Completed Rocephin course. 17. Hyperkalemia - likely secondary to acute adrenal insufficiency, resolved        - 11/23 Potassium 5.8 s/p insulin 10 units with D50. Given lokelma 10 g x1 this AM and on Bumex 1 mg BID. Cosyntropin test indicative of adrenal insufficiency, started on steroid replacement therapy, see below        - 11/24 potassium 6.0. Given Lokelma, insulin with D50, continue with Bumex 1 mg BID. Nephrology consulted for continued refractory hyperkalemia. Per nephrology started Lokelma 10 g 3 times daily and given IV insulin/D50 again. - 11/26 K+ 6.2. nephro following. Increased Lokelema 5 g TID, and fludrocortisone to 0.2 mg        - 11/27 improved, potassium 3.7. Will continue to monitor potassium with daily BMP.        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 He previously tested COVID-19 positive at outside facility 11/11. Per report patient felt short of breath and put himself on 3 L NC. Transition to 6 L by time EMS had arrived with SPO2 77%. In ED labs nonsignificant, CTA revealed groundglass opacities along with developing consolidation at lung base and groundglass opacities in upper lung. He was given albuterol, Decadron 8 mg once. The patient was admitted for further evaluation and management. He quickly escalated to HFNC.  11/18 respiratory status continued to worsen, HFNC was maxed out. Discussed with the patient the need for intubation and CODE STATUS. The patient was agreeable and was intubated 11/18. Of note, the patient has chronic fungal infection of vocal cords. This is secondary to chemotherapy for his laryngeal cancer. He takes voriconazole and Bactrim at home for this. Additionally due to history of multiple laryngoscopies the patient has developed laryngeal stenosis. 11/19 current vent settings FiO2 1.0, PEEP 20, PEEP control 16. Hemodynamically stable, vitals WNL. Pneumonia panel grew MSSA. Respiratory culture pending. Started on Rocephin.    11/20  Potassium 6.0 , kayexalate and calcium gluconate. Recheck K+ levels. Ordered ekg. Continue abx for MSSA. Wean off vent as patient tolerates. 11/21 Elevated potassium. Start low dose Bumex. Recheck BMP tonight. AM cortisol to evaluate for hypoaldosteronism. 11/23 overnight given insulin with D50 for continued elevated potassium. This morning repeat potassium high. Given Lokelma once and continue with Bumex till cosyntropin test results completed. Additionally the patient was paralyzed overnight for breath stacking every 2-3 breaths. Otherwise hemodynamically stable, vitals WNL. 11/24 no significant events overnight.   This morning while repositioning the patient in supine he was noted to desat. Vent settings were increased. SPO2 has slowly improved. Current vent settings FiO2 1.0, PEEP 16, Pcontrol 20. Will increase proning time. Potassium continues to remain elevated despite having started fludrocortisone and hydrocortisone for adrenal insufficiency. Will give insulin with D50, and Lokelma. Nephrology consulted for refractory hyperkalemia. 11/25 no significant events overnight. Tolerating proning well. Current vent settings FiO2 0.80, PEEP 16, Pcontrol 18. Potassium has improved, nephrology following. Hemodynamically stable, vitals WNL. 11/26 - dc nimbex as patient tolerates without ventilator dyssynchrony. Nephro following for electrolyte abnormalities. WBC 25.4 possibly 2/2 steroids. Patient currently on abx therapy for mssa. 11/27 Overnight right foot was noted to be cool to touch. Pulses were present. Doppler reveals abnormal flow in right anterior and posterior tibial veins suspicious for thrombosis. Will follow up with arterial Doppler lower extremity. Additionally left eye deviation towards the left was noted. CT head without contrast ordered. Earlier this morning, with supination the patient desatted, SpO2 slowly recovering. Current vent settings FiO2 0.80, PEEP 12, Pcontrol 18. Hemodynamically stable, SPO2 88% otherwise vitals WNL. 11/29 no significant events overnight. Continued high ventilatory settings. FiO2 0.90, PEEP 16, Pcontrol 18. Hemodynamically stable, vitals WNL. Past Medical History: Per HPI  Family History: Mother - diabetes, heart disease, stroke                            Father - prostate cancer, cancer, heart disease                            Brother - heart disease  Social History:  Former smoker quit 2006 69 PPY, denies EtOH and illicit drug use    ROS   Unable to obtain secondary to sedation and intubation    Scheduled Meds:   bumetanide  1 mg IntraVENous BID    baricitinib  4 mg Oral Daily    fludrocortisone  0.2 mg Oral Daily    polyethylene glycol  17 g Oral Daily    metoclopramide  10 mg Oral 4x Daily AC & HS    hydrocortisone sodium succinate PF  100 mg IntraVENous Q8H    senna  1 tablet Oral Nightly    insulin lispro  0-12 Units SubCUTAneous Q6H    sodium chloride flush  5-40 mL IntraVENous 2 times per day    tiotropium-olodaterol  2 puff Inhalation Daily    famotidine (PEPCID) injection  20 mg IntraVENous BID    pravastatin  40 mg Oral Nightly    voriconazole  200 mg Oral BID    [Held by provider] sulfamethoxazole-trimethoprim  1 tablet Oral BID    lactobacillus  1 capsule Oral Daily with breakfast     Continuous Infusions:   heparin (PORCINE) Infusion 16.6 Units/kg/hr (11/29/21 0630)    fentaNYL (SUBLIMAZE) 1250 mcg in sodium chloride 0.9 % 250 mL 200 mcg/hr (11/29/21 0630)    sodium chloride      dextrose      midazolam 10 mg/hr (11/29/21 0630)       PHYSICAL EXAMINATION:  T: 97.5 P: 79  RR: 26. B/P: 127/73. FiO2: 1.0, PEEP 16, Pcontrol 18 O2 Sat: 97% on ventilator. I/O:  4925.9/2125  Body mass index is 29.32 kg/m². GCS:   3  Pain/sedation: Versed 10, fentanyl 200     General:   Ill-appearing, intubated and sedated, proned  HEENT:  normocephalic and atraumatic. No scleral icterus. PERR  Neck: supple. No Thyromegaly. Irregular mass noted on vocal cords during intubation  Lungs: Diminished breath sounds bilaterally. No retractions  Cardiac: RRR. No JVD. Abdomen: soft. Nontender. Distended  Extremities:  No clubbing, cyanosis, or edema x 4. Vasculature: capillary refill < 3 seconds. Palpable dorsalis pedis pulses. Skin:  warm and dry. Psych: Unable to obtain due to intubation and sedation  Lymph:  No supraclavicular adenopathy. Neurologic:  No apparent focal deficit. No seizures. Data: (All radiographs, tracings, PFTs, and imaging are personally viewed and interpreted unless otherwise noted).    11/29 CBC -WBC 24.8, Hgb 10.9, HCT 35.7, PLT 513   11/29 BMP - sodium 145, potassium 3.9, chloride 103, CO2 32, BUN 51, creatinine 0.5, glucose 180   11/28 CXR -lungs hyperinflated and fibroemphysematous in appearance. Cardiac silhouette small in size. Moderate pneumonia/pulmonary edema both mid and lower lung fields. Overall appearance worsened since yesterday.  11/18 Pneumonia panel - staph aureus   11/18 respiratory culture - staph aureus   11/17 blood cultures - pending      Meets Continued ICU Level Care Criteria:    [x] Yes       Electronically signed by Steph Patel DO on 11/29/2021 at 7:07 AM   Patient seen by me. Case discussed with resident physician. Patient remains critically ill and unable to wean from mechanical ventilator. Survival is highly unlikely. Recommend hospice. Awaiting family arrival for further parameters of care conversation. Italicized font represents changes to the note made by me. CC time 35 minutes. Time was discontiguous. Time does not include procedures. Time does include my direct assessment of the patient and coordination of care.   Electronically signed by Fain Nissen, MD on 11/29/2021 at 6:03 PM

## 2021-11-29 NOTE — PROGRESS NOTES
Spoke with patient's daughter, Jessica Liter, and update was given. All questions answered with no further questions at this time. Update appreciated.

## 2021-11-29 NOTE — SIGNIFICANT EVENT
Spoke with the patient's daughter. Updated her on the patient's current status and plan of care. Discussed the patient's poor prognosis given his medical history and multiple comorbidities. The daughter is aware. She states she will contemplate on DNR CC later this week. All other questions and concerns were addressed.

## 2021-11-29 NOTE — CARE COORDINATION
Discharge planning update:    Intubated on ventilator. Nephrology following for hyperkalemia. K+ 3.9 today. PICC . Pronation therapy. 11/29 CXR   Impression:        1. Lungs hyperinflated and fibroemphysematous in appearance. Cardiac silhouette small in size. 2. ET tube and left PICC line are present. ET tube tip 10 cm proximal to kaela. An NG tube is present and passes into stomach. The pH probe is present with tip at the level of the aortic knob. 3. Moderate pneumonia/pulmonary edema both mid and lower lung fields. Overall appearance has worsened since yesterday. From home alone. Will follow for needs.   Electronically signed by Contreras Feliciano RN on 11/29/2021 at 1:03 PM

## 2021-11-30 NOTE — PROGRESS NOTES
CRITICAL CARE PROGRESS NOTE      Patient:  Jhonathan Hernandez    Unit/Bed:4B-03/003-A  YOB: 1960  MRN: [id number]   PCP: JOSH Mcgowan - CNP  Date of Admission: 11/17/2021  Chief Complaint:- Respiratory failure    Assessment and Plan:    1. Acute hypoxic respiratory failure - secondary to COVID-19.        - Intubated 11/18. 11/23 paralyzed for ventilator dyssynchrony. Off paralytics 11/26  Continue with lung protective strategies. Maintain pPeak <35 and pPlateau <22. Wean as tolerated for SpO2 >92%. He is currently on proning therapy q12 hours. 2. Severe ARDS        - Meets 3/3 [place] criteria. P/F ratio <100. Continue ASA for microthrombus prophylaxis. 11/24 PF ratio 61. Increase proning time to 16/8.     3. COVID-19 PNA        - Covid positive 11/11. S/p baricitinib and decadron    4. COPD         - 11/19 FEV1 45% consistent w/ GOLD 3         - No home O2. Stiolto 2 puff inhalation qDay, Mucomyst    5. Eye deviation, left - etiology unclear        - Left eye noted to deviate to the left. Possible etiology includes sedation. Weaned off propofol secondary to this and high triglycerides. CT scan of head revealed left globe towards left side. Per family this is chronic and prior to the hospitalization. 6. Possible arterial insufficiency, right foot        - Right foot noted to be pale and cool to touch. Venous Doppler revealed abnormal flow in right anterior and posterior tibial veins, suspicious for thrombosis. Lower extremity arterial Doppler negative. On heparin drip. 7. Leukocytosis - likely reactive vs steroid        - 11/29 WBC 24.8 from 20.7. Most likely 2/2 current tx with hydrocortisone 100mg IV q8 along with Florinef started on 11/23. Will continue to monitor WBC with daily CBC. 8. Laryngotracheitis        - Invasive fungal laryngotracheitis and secondary stenosis. This secondary to radiation for laryngeal cancer.   On home voriconazole 200 mg po BID and Bactrim 800-160 mg po BID - held. 9. History of laryngeal cancer        - s/p radiation 2017. Follows with Dr. Sandra Lazaro. Not amenable to tracheostomy per Dr Sandra Lazaro. Recommends slightly deflating cuff if tolerated    10. Adrenal insufficiency - etiology unclear, likely multifactorial        - Per morning a.m. cortisol and cosyntropin test.  On Solu-Cortef 100 mg IV q8hrs and fludrocortisone 0.2 mg p.o. qDay. 11. PAD        - Per chart review. 11/27 Serial Doppler of the both lower extremities revealed ankle brachial indices are within normal limits bilaterally. Currently on heparin drip    12. HLD        - On home pravastatin 40 mg p.o. qHS     13. History of rectal cancer        - Per chart review. S/p chemotherapy    14. DVT prophylaxis        - On heparin drip    15. Nutrition        - Tolerating tube feeds at 50. 16. Bacterial Pneumonia - resolved        - 11/19 Pneumonia panel positive for MSSA. Respiratory culture grew Staph aureus. Completed Rocephin course. 17. Hyperkalemia - likely secondary to acute adrenal insufficiency, resolved        - 11/23 Potassium 5.8 s/p insulin 10 units with D50. Given lokelma 10 g x1 this AM and on Bumex 1 mg BID. Cosyntropin test indicative of adrenal insufficiency, started on steroid replacement therapy, see below        - 11/24 potassium 6.0. Given Lokelma, insulin with D50, continue with Bumex 1 mg BID. Nephrology consulted for continued refractory hyperkalemia. Per nephrology started Lokelma 10 g 3 times daily and given IV insulin/D50 again. - 11/26 K+ 6.2. nephro following. Increased Lokelema 5 g TID, and fludrocortisone to 0.2 mg        - 11/27 improved, potassium 3.7. Will continue to monitor potassium with daily BMP.        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 He previously tested COVID-19 positive at outside facility 11/11. Per report patient felt short of breath and put himself on 3 L NC. Transition to 6 L by time EMS had arrived with SPO2 77%. In ED labs nonsignificant, CTA revealed groundglass opacities along with developing consolidation at lung base and groundglass opacities in upper lung. He was given albuterol, Decadron 8 mg once. The patient was admitted for further evaluation and management. He quickly escalated to HFNC.  11/18 respiratory status continued to worsen, HFNC was maxed out. Discussed with the patient the need for intubation and CODE STATUS. The patient was agreeable and was intubated 11/18. Of note, the patient has chronic fungal infection of vocal cords. This is secondary to chemotherapy for his laryngeal cancer. He takes voriconazole and Bactrim at home for this. Additionally due to history of multiple laryngoscopies the patient has developed laryngeal stenosis. 11/19 current vent settings FiO2 1.0, PEEP 20, PEEP control 16. Hemodynamically stable, vitals WNL. Pneumonia panel grew MSSA. Respiratory culture pending. Started on Rocephin.    11/20  Potassium 6.0 , kayexalate and calcium gluconate. Recheck K+ levels. Ordered ekg. Continue abx for MSSA. Wean off vent as patient tolerates. 11/21 Elevated potassium. Start low dose Bumex. Recheck BMP tonight. AM cortisol to evaluate for hypoaldosteronism. 11/23 overnight given insulin with D50 for continued elevated potassium. This morning repeat potassium high. Given Lokelma once and continue with Bumex till cosyntropin test results completed. Additionally the patient was paralyzed overnight for breath stacking every 2-3 breaths. Otherwise hemodynamically stable, vitals WNL. 11/24 no significant events overnight.   This morning while repositioning the patient in supine he was noted to desat. Vent settings were increased. SPO2 has slowly improved. Current vent settings FiO2 1.0, PEEP 16, Pcontrol 20. Will increase proning time. Potassium continues to remain elevated despite having started fludrocortisone and hydrocortisone for adrenal insufficiency. Will give insulin with D50, and Lokelma. Nephrology consulted for refractory hyperkalemia. 11/25 no significant events overnight. Tolerating proning well. Current vent settings FiO2 0.80, PEEP 16, Pcontrol 18. Potassium has improved, nephrology following. Hemodynamically stable, vitals WNL. 11/26 - dc nimbex as patient tolerates without ventilator dyssynchrony. Nephro following for electrolyte abnormalities. WBC 25.4 possibly 2/2 steroids. Patient currently on abx therapy for mssa. 11/27 Overnight right foot was noted to be cool to touch. Pulses were present. Doppler reveals abnormal flow in right anterior and posterior tibial veins suspicious for thrombosis. Will follow up with arterial Doppler lower extremity. Additionally left eye deviation towards the left was noted. CT head without contrast ordered. Earlier this morning, with supination the patient desatted, SpO2 slowly recovering. Current vent settings FiO2 0.80, PEEP 12, Pcontrol 18. Hemodynamically stable, SPO2 88% otherwise vitals WNL. 11/29 no significant events overnight. Continued high ventilatory settings. FiO2 0.90, PEEP 16, Pcontrol 18. Hemodynamically stable, vitals WNL. 11/30 no significant events overnight. Continue high ventilatory settings, FiO2 0.9, PEEP 18, Pcontrol 18. Patient is tachycardic this morning the 100s. He was noted to desat with supination. However SPO2 has slowly improved, currently at 92%. Otherwise hemodynamically stable, vitals WNL. Past Medical History: Per HPI  Family History:  Mother - diabetes, heart disease, stroke                            Father - prostate cancer, cancer, heart disease                            Brother - heart disease  Social History: Former smoker quit 2006 69 PPY, denies EtOH and illicit drug use    ROS   Unable to obtain secondary to sedation and intubation    Scheduled Meds:   bumetanide  1 mg IntraVENous BID    fludrocortisone  0.2 mg Oral Daily    polyethylene glycol  17 g Oral Daily    metoclopramide  10 mg Oral 4x Daily AC & HS    hydrocortisone sodium succinate PF  100 mg IntraVENous Q8H    senna  1 tablet Oral Nightly    insulin lispro  0-12 Units SubCUTAneous Q6H    sodium chloride flush  5-40 mL IntraVENous 2 times per day    tiotropium-olodaterol  2 puff Inhalation Daily    famotidine (PEPCID) injection  20 mg IntraVENous BID    pravastatin  40 mg Oral Nightly    voriconazole  200 mg Oral BID    [Held by provider] sulfamethoxazole-trimethoprim  1 tablet Oral BID    lactobacillus  1 capsule Oral Daily with breakfast     Continuous Infusions:   propofol 15 mcg/kg/min (11/30/21 0800)    heparin (PORCINE) Infusion 16.602 Units/kg/hr (11/30/21 0712)    fentaNYL (SUBLIMAZE) 1250 mcg in sodium chloride 0.9 % 250 mL 200 mcg/hr (11/30/21 8835)    sodium chloride      dextrose      midazolam 10 mg/hr (11/30/21 0712)       PHYSICAL EXAMINATION:  T:  98.8  P:  105  RR:  23. B/P:  91/61. FiO2:  0.90, PEEP 18, Pcontrol 18 O2 Sat: 89% on ventilator. I/O:  8652. 5/1875  Body mass index is 29.32 kg/m². GCS:   3  Pain/sedation: Versed 10, fentanyl 200, propofol 10, morphine 1 mg q4hrs PRN     General:   Ill-appearing, intubated and sedated, proned  HEENT:  normocephalic and atraumatic. No scleral icterus. PERR  Neck: supple. No Thyromegaly. Irregular mass noted on vocal cords during intubation  Lungs: Diminished breath sounds bilaterally. No retractions  Cardiac: RRR. No JVD. Abdomen: soft. Nontender. Distended  Extremities:  No clubbing, cyanosis, or edema x 4. Vasculature: capillary refill < 3 seconds.  Palpable dorsalis pedis pulses. Skin:  warm and dry. Psych: Unable to obtain due to intubation and sedation  Lymph:  No supraclavicular adenopathy. Neurologic:  No apparent focal deficit. No seizures. Data: (All radiographs, tracings, PFTs, and imaging are personally viewed and interpreted unless otherwise noted). 11/30 CBC -WBC 24.2, Hgb 9.3, HCT 31.4,    11/30 BMP - sodium 146, potassium 4.0, chloride 102, CO2 32, BUN 58, creatinine 0.6, glucose 167   11/30 triglycerides 353   11/29 CXR - lungs hyperinflated and fibroemphysematous in appearance. Cardiac silhouette small in size. Moderate pneumonia/pulmonary edema both mid and lower lung fields. Overall appearance worsened since yesterday.  11/18 Pneumonia panel - staph aureus   11/18 respiratory culture - staph aureus   11/17 blood cultures - pending      Meets Continued ICU Level Care Criteria:    [x] Yes         Electronically signed by Skye Akins DO on 11/30/2021 at 8:27 AM  Patient seen by me. Case discussed with resident physician. Family considering withdrawal of care over the weekend. They are wanting time to allow family to gather and say their farewell's. Patient is a DNR with no chest compressions or electric cardioversion. Italicized font represents changes to the note made by me. CC time 35 minutes. Time was discontiguous. Time does not include procedures. Time does include my direct assessment of the patient and coordination of care.   Electronically signed by Senia Garces MD on 11/30/2021 at 6:09 PM

## 2021-11-30 NOTE — SIGNIFICANT EVENT
Spoke to patient's daughter Teddi Dandy to update her for the evening. Spoke to her at length regarding goals of care and the patient's declining status. She stated \"I don't have everything in line yet so it would be better to keep him alive as long as possible\" Updated her that we are starting the third vasopressor and his blood pressures remain very low. Teddi Dandy stated \"You need to keep that machine breathing him until this weekend, after talking to the doctor it basically sounds like he is just a shell and he is not feeling anything\" After explaining that although the patient is sedated it is important to make decisions based on his quality of life. Josseline Hernandezsandra then stated \"Just start that third one to buy me some time\".

## 2021-11-30 NOTE — CARE COORDINATION
Discharge planning update:    Remain intubated on ventilator. Tmax 100.6, FiO2 100%, 40 liters oxygen with saturations at 90%. Nephrology following. TF via OG tube, Dietitian following. Supine. Diabetes management. From home alone. Limited code. Will follow for potential needs.    Electronically signed by Tremayne Membreno RN on 11/30/2021 at 1:10 PM

## 2021-11-30 NOTE — PROGRESS NOTES
Comprehensive Nutrition Assessment    Type and Reason for Visit:  Reassess    Nutrition Recommendations/Plan:   Continue current TF (Nepro carb steady) at goal rate of 50 ml/hr. Bolus 2.5 oz. Proteinex daily. Additional free water flush per MD.    Nutrition Assessment:   Pt. improving from a nutritional standpoint AEB tolerating TF at goal rate.  At risk for further nutrition compromise r/t COVID (home positive 11/11), respiratory failure, intubated 11/18/21, pneumonia, severe ARDS, Chronic fungal infection of vocal cords, constipation, and underlying medical condition (hx COPD, laryngeal cancer, rectal cancer, colostomy, chemotherapy). Nutrition recommendations/interventions as per above. Malnutrition Assessment:  Malnutrition Status:  Insufficient data    Context:  Acute Illness     Findings of the 6 clinical characteristics of malnutrition:  Energy Intake:  Unable to assess  Weight Loss:  Unable to assess     Body Fat Loss:  Unable to assess     Muscle Mass Loss:  Unable to assess    Fluid Accumulation:  7 - Moderate to Severe Extremities   Strength:  Not Performed    Estimated Daily Nutrient Needs:  Energy (kcal):  1246-1807 kcals (25-30); Weight Used for Energy Requirements:   (98 kgm)     Protein (g):  103-172 gm (1.2-2); Weight Used for Protein Requirements:  Ideal (86 kgm)        Fluid (ml/day):  per MD; Method Used for Fluid Requirements:  Other (Comment)      Nutrition Related Findings:   Patient remains intubated, pronation therapy; Diprivan infusing, last BM 11/27 per staff; belly not distended per RN, hypoactive bowel sounds; 11/30: Glucose 167, BUN 58, Cr 0.6, Sodium 146, Potassium 4.0, Triglycerides 353 mg/dl;  Rx includes Levophed, Bumex, Versed, Fentanyl, Culturelle, Senokot, Glycolax, Insulin; Nephrology notes continue w/ Nepro TF (for hyperkalemia)    Wounds:   (incision - throat)       Current Nutrition Therapies:    Diet NPO  ADULT TUBE FEEDING; Orogastric; Renal Formula; Continuous; 50; No; 30; Q 4 hours; Protein; 2.5 oz. Proteinex daily  Current Tube Feeding (TF) Orders:  · Feeding Route: Orogastric  · Formula:  (Nepro (11/23/21))  · Schedule: Continuous (50 ml/hr (goal))  · Additives/Modulars: Protein (2.5 oz daily)  · Water Flushes: per MD  · Current TF & Flush Orders Provides: at goal  · Goal TF & Flush Orders Provides: Nepro carb steady at 50 ml/hr provides pt. with 2555 kcals (2124 TF, 104 modular, 327 Diprivan), 123 gm protein (97 TF, 26 modular), 192 gm CHO, 30 grams fiber, 1159 mg Potassium (1139 mg TF, 20 mg modular), 872 ml free water in 1275 ml volume (1200 TF, 75 modular)/24 hours    Additional Calorie Sources:   Propofol at 12.4 ml/hr providing pt. with 327 kcals from IV lipid emulsion/24 hours    Anthropometric Measures:  · Height: 6' 2\" (188 cm)  · Current Body Weight: 243 lb 9.7 oz (110.5 kg) (11/30 +1, +2 edema)   · Admission Body Weight: 216 lb 12.8 oz (98.3 kg) (11/18 no edema)    · Usual Body Weight:  (Per EMR: 12/2/20: 223# 9.6 oz, 10/11/21: 220# 12.8 oz)     · Ideal Body Weight: 190 lbs;      · BMI: 31.3  · BMI Categories: Obese Class 1 (BMI 30.0-34. 9)       Nutrition Diagnosis:   · Inadequate protein-energy intake related to impaired respiratory function as evidenced by NPO or clear liquid status due to medical condition, intubation, nutrition support - enteral nutrition      Nutrition Interventions:   Food and/or Nutrient Delivery:  Continue NPO, Continue Current Tube Feeding  Nutrition Education/Counseling:  Education not appropriate   Coordination of Nutrition Care:  Continue to monitor while inpatient    Goals:  EN to provide 80% or more of nutrient needs for COVID recovery while intubated.        Nutrition Monitoring and Evaluation:   Behavioral-Environmental Outcomes:  None Identified   Food/Nutrient Intake Outcomes:  Enteral Nutrition Intake/Tolerance  Physical Signs/Symptoms Outcomes:  Biochemical Data, GI Status, Fluid Status or Edema, Hemodynamic Status, Nutrition Focused Physical Findings, Skin, Weight     Discharge Planning:     Too soon to determine     Electronically signed by Meriel Frankel, RD, LD on 11/30/21 at 1:11 PM EST    Contact: [phone]

## 2021-11-30 NOTE — SIGNIFICANT EVENT
Spoke with the patient's daughter [facility]. Updated her on the patient's current condition and poor prognosis. Discussed the patient's increasing vasopressor requirements. The daughter understands the situation and would like to continue care until this weekend when she, and the rest of her family can say there goodbyes. Acknowledged this. All other questions and concerns were addressed.

## 2021-12-01 LAB
BLOOD CULTURE, ROUTINE: NORMAL
BLOOD CULTURE, ROUTINE: NORMAL
ERYTHROCYTE [DISTWIDTH] IN BLOOD BY AUTOMATED COUNT: 14.6 % (ref 11.5–14.5)
ERYTHROCYTE [DISTWIDTH] IN BLOOD BY AUTOMATED COUNT: 57.3 FL (ref 35–45)
HCT VFR BLD CALC: 27.2 % (ref 42–52)
HEMOGLOBIN: 7.8 GM/DL (ref 14–18)
MCH RBC QN AUTO: 30.8 PG (ref 26–33)
MCHC RBC AUTO-ENTMCNC: 28.7 GM/DL (ref 32.2–35.5)
MCV RBC AUTO: 107.5 FL (ref 80–94)
PATHOLOGIST REVIEW: ABNORMAL
PLATELET # BLD: 456 THOU/MM3 (ref 130–400)
PMV BLD AUTO: 12.4 FL (ref 9.4–12.4)
RBC # BLD: 2.53 MILL/MM3 (ref 4.7–6.1)
WBC # BLD: 37.3 THOU/MM3 (ref 4.8–10.8)

## 2021-12-01 NOTE — PROGRESS NOTES
[address] [id number]  Notice of Patient Passing      Patient Name- Destini Calderon Number- [de-identified]   Attending Physician- Omar Sanders MD    Admitted on-11/17/2021  8:20 AM     On 11/30/2021 at 2119 patient was found in 4B03 with:   Absence of vital signs. Absence of neurological response. Confirmed time of death at 2119. Physician or On-call Physician notified of time of death- yes    Family present at time of death- yes,    Spiritual care present at time of death- no    Physician was notified and orders were obtained to release the body. Post-Mortem documentation completed; form printed, signed, and given to admitting.     Chacha Sherman RN RN Nursing Supervisor/ Manager  11/30/21   9:55 PM

## 2021-12-01 NOTE — DISCHARGE SUMMARY
DEATH SUMMARY      Patient:  Manuel Anne  YOB: 1960  MRN: [id number]   Acct: [de-identified]    Primary Care Physician: JOSH Bowman CNP    Admit date:  11/17/2021    Discharge date:  11/30/2021    Admission Condition:serious    Discharge Diagnoses:   Acute hypoxic respiratory failure  Acute respiratory distress syndrome  COVID-19 pneumonia  Suspected superimposed bacterial pneumonia  Undifferentiated shock  Leukocytosis, fever  Lactic, anion gap metabolic acidosis  Acute anemia  Adrenal insufficiency  Right lower extremity DVT  COPD, history of  PAD, history of  Hyperlipidemia, history of  Rectal cancer, history of  Laryngeal cancer, history of  Laryngeal tracheitis, history of      CC: acute hypoxic respiratory failure    INTIAL H AND P AND HOSPITAL COURSE:-  Chelle Haro is a 58 y/o male former smoker with past medical history of COPD, laryngeal cancer status post multiple laryngoscopies and radiation therapy in 2017, invasive fungal laryngeal tracheitis, rectal cancer status post chemotherapy, PAD, hyperlipidemia, arthritis. Patient presented to [facility] ED on 11/17/2021 for complaints of worsening dyspnea. He had tested positive for COVID-19 on 11/11/2021. Patient was hypoxic upon arrival requiring supplemental O2. Upon initial evaluation, he underwent CTA of the chest showing groundglass opacities consistent with COVID-19 infection. Patient was administered Decadron and baricitinib. He had progressive hypoxemia with escalation to high flow nasal cannula, and then BiPAP. Patient ultimately required intubation on 11/18/2021. Patient had developed ARDS secondary to COVID-19 pneumonia. He also had a superimposed MSSA pneumonia, treated with an 8-day course of Rocephin. Patient underwent pronation therapy. He required paralytic therapy as well for vent dyssynchrony, which was successfully weaned off on 11/26.   He developed hyperkalemia, for which nephrology was consulted. Cosyntropin stimulation test indicated adrenal insufficiency. He was started on fludrocortisone and hydrocortisone. During his stay, patient was also noted to have developed a right lower extremity DVT seen on lower extremity venous Doppler. He was treated with heparin infusion. Ongoing discussions were held with patient's family, as he continued to not improve clinically. On 2021 his CODE STATUS was changed to limited code (no defibrillation/cardioversion, no CPR). On 2021, patient developed hypotension requiring vasopressor support that escalated throughout the day. He had also developed a lactic acidosis, and underwent volume resuscitation. Patient initially required Levophed support, but due to refractory hypotension Lobo-Synephrine and vasopressin were added. Patient developed new fevers later that afternoon, and was noted to have worsening leukocytosis. Chest x-ray was repeated showing worsening bilateral infiltrates. Due to rapidly worsening clinical status, patient was started empirically on Zosyn and vancomycin. Respiratory, blood, urine cultures were sent. Additionally, patient was noted to have a metabolic acidosis with significant drop in serum bicarbonate. Patient was administered multiple amps of sodium bicarbonate, and started on sodium bicarb infusion. Patient was also noted to have a hemoglobin dropped to 7.8, from 9.3 earlier in the day. Stat orders were placed for 3 units PRBC and 2 L 0.9% NS bolus. Heparin drip was discontinued. Patient rapidly deteriorated, requiring maximal dosages of Levophed, Lobo-Synephrine, vasopressin. Patient's family (including primary contact Faviola Parmar) presented to the bedside. All questions answered, they voiced no further concerns. At that time they elected to pursue [facility] comfort measures, and terminal extubation. Patient  at  on 2021. PMH:  Per HPI  SHX: Former smoker.   Quit in 2006.  69-pack-year history. Denies alcohol or substance use. FHX: Cancer, diabetes, stroke, heart disease, prostate cancer, hypertension. Allergies: Povidone iodine. Cause of Death:   Acute hypoxic respiratory failure  Acute respiratory distress syndrome  Undifferentiated shock  COVID-19 pneumonia    Time of Death:   on 2021    Disposition:      Time Spent:  35 minutes    Case discussed with Dr. Dona Cedeno.     Electronically signed by JOSH Espitia CNP on 21 at 2:56 AM EST

## 2021-12-01 NOTE — SIGNIFICANT EVENT
I spoke with the patient's daughter at this time regarding the patient's deteriorating hemodynamic status today and this evening requiring rapid vasopressor escalation. Patient is noted to be a limited code status (no CPR, no shocks). Patient is now on near-maximal doses on 3 vasopressors (levo, josef, vaso). Patient with SBP remaining in 70's. Repeat labs including BMP, CBC, lactic acid have been sent. ABG is pending. Patient's daughter acknowledges that patient has a very poor prognosis, and I added that I have concerns about his survivability over the next hours. Jessica North states that she is already in route to the hospital from [place] with the intent to withdraw care at that time. She states that she would like us to continue aggressive supportive care with vasopressors (with escalation if needed) until she arrives, but she acknowledges that there is a possibility of the patient expiring prior to her arrival tonight. Emotional support provided.

## 2021-12-01 NOTE — PROGRESS NOTES
Pharmacy Note  Vancomycin Consult    Garcia Robison is a 57 y.o. male started on Vancomycin for sepsis; consult received from [facility] to manage therapy. Also receiving the following antibiotics: Zosyn.     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)    Radiation adverse effect, subsequent encounter    Chronic laryngitis    Gastroesophageal reflux disease without esophagitis    Chronic bronchitis (HCC)    Proteus mirabilis infection    Transglottic malignant neoplasm of larynx ([address] right side    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    Infection due to Candida glabrata    Subglottic stenosis not due to surgery    Invasive fungal infection    Stage 3 severe COPD by GOLD classification ([address]    Hoarseness    Tracheal stenosis    Laryngeal stenosis    Airway obstruction    Airway stricture    Bullous emphysema (HCC)    Refractory obstruction of nasal airway    Dependence on continuous supplemental oxygen    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    Laryngeal carcinoma (HCC)    Squamous cell carcinoma of larynx (HCC)    COVID-19    Acute hypoxemic respiratory failure due to COVID-19 (HCC)    Centrilobular emphysema (HCC)     Allergies:  Povidone iodine     Temp max: 100.4    Recent Labs     11/30/21  0315 11/30/21  1925   BUN 58* 76*   CREATININE 0.6 2.0*   WBC 24.2* 37.3*       Intake/Output Summary (Last 24 hours) at 11/30/2021 2012  Last data filed at 11/30/2021 1418  Gross per 24 hour   Intake 3677.56 ml   Output 1700 ml   Net 1977.56 ml     Culture Date Source Results   11/25/21 BCx2 ngtd   11/25/21 Pneumonia Panel Staph aureus meca neg   11/17/21 BCx2 neg       Ht Readings from Last 1 Encounters:   11/18/21 6' 2\" (1.88 m)        Wt Readings from Last 1 Encounters:   11/30/21 243 lb 9.7 oz (110.5 kg)       Body mass index is 31.28 kg/m². Estimated Creatinine Clearance: 51 mL/min (A) (based on SCr of 2 mg/dL (H)). Goal Level: -600    Assessment/Plan:  Will initiate Vancomycin with a one time loading dose of 2250 mg, followed by 1250 mg IV every 12 hours. Projected AUC of 461 and trough of 14.5 (see below)         Thank you for the consult. Will continue to follow.     Arabella Almaraz RPh  11/30/2021  8:33 PM

## 2021-12-13 NOTE — PROGRESS NOTES
Physician Progress Note      PATIENT:               Jess Barrow  CSN #:                  [id number]  :                       1960  ADMIT DATE:       2021 8:20 AM  DISCH DATE:        2021 11:00 PM  RESPONDING  PROVIDER #:        Fremont Salt APRN - CNP        QUERY TEXT:    Type of Shock: Please provide further specificity, if known. Clinical indicators include: shock, cpr, lactic acidosis, volume   resuscitation, blood, hemoglobin, 3 units prbc, ns bolus  Options provided:  -- Cardiogenic shock  -- Septic shock  -- Hypovolemic shock  -- Hemorrhagic shock due to trauma  -- Hemorrhagic shock related to surgery  -- Anaphylactic shock  -- Other - I will add my own diagnosis  -- Disagree - Not applicable / Not valid  -- Disagree - Clinically Unable to determine / Unknown        PROVIDER RESPONSE TEXT:    Provider was unable to determine a response for this query.       Electronically signed by:  Tl Dominique CNP 2021 9:32 PM

## 2023-07-30 NOTE — ADDENDUM NOTE
Addendum  created 02/05/21 1539 by JOHS Cox CRNA    Attestation recorded in [facility], [address] [facility] accepted, Intraprocedure Attestations filed no

## 2023-11-06 NOTE — PROGRESS NOTES
ADMITTED TO [facility] AND ORIENTED TO UNIT. SCDS ON. FALL AND ALLERGY BANDS ON. PT VERBALIZED APPROVAL FOR FIRST NAME, LAST INITIAL AND PHYSICIAN NAME ON UNIT WHITEBOARD. Sister, Donavon with the patient.
Alert. family at bedside. muffin and ginger ale given. Side rails up bed in low position.  Call light in reach
Discharge instructions given to patient and family. verbalized understanding.  Patient discharged via wheelchair
Office notified that lab work out of date for surgery.
Patient arrived to OR with no clothing, jewelry, hearing aides, dentures or glasses.
Up to the bathroom. Voided. Tolerated well. Back to room getting dressed.
Patient was informed of the reason for this intervention.

## 2023-12-27 NOTE — ED NOTES
At goal, continue current medications and continue current treatment plan doing well on current medications will refill this. Does have hernia does not want to see general surgery as of yet. No pain at this area.   Will call if he changes mind PT collects sputum & sent to lab     Macrina Martini RN  11/12/21 0836

## 2024-04-28 NOTE — PLAN OF CARE
Problem: Impaired respiratory status  Goal: Clear lung sounds  Description: Clear lung sounds  10/17/2020 1845 by Herman Us  Outcome: Ongoing     Continue therapy as ordered for improved lung sounds and aeration. No

## 2024-05-24 NOTE — PROGRESS NOTES
inflammatory response. Our current plan is to repeat the PET scan in another few months to ensure resolution. Paste remains good. Skinny Walton continues to have a raspy quality to his voice, but has no new complaints specific to his radiation therapy. TESTS:   08/20/2018: PSA: 0.95    07/13/2018: Comprehensive Metabolic Panel:  Sodium 537  mmol/L   Chloride 107  mmol/L   Potassium 5.0  mmol/L   BUN 15  mg/dL   CREATININE 0.83     Glucose 98  mg/dL   AST  U/L   ALT  U/L   Calcium 9.0  mg/dL   Total Protein     CO2 27  mmol/L   Alb     Alkaline Phosphatase  U/L   Total Bilirubin  0.1 - 1.4 mg/dL   Gfr Calculated     Anion Gap 4  mmol/L       MEDICATIONS:   Current Outpatient Prescriptions   Medication Sig Dispense Refill    sildenafil (REVATIO) 20 MG tablet Take 1-5 tablets by mouth as needed (ED) 30 tablet 5    tamsulosin (FLOMAX) 0.4 MG capsule Take 1 capsule by mouth nightly 30 capsule 5    albuterol sulfate HFA (VENTOLIN HFA) 108 (90 Base) MCG/ACT inhaler Inhale 2 puffs into the lungs every 4 hours as needed for Wheezing or Shortness of Breath 3 Inhaler 3    tiotropium (SPIRIVA RESPIMAT) 2.5 MCG/ACT AERS inhaler Inhale 2 puffs into the lungs daily 3 Inhaler 3    budesonide-formoterol (SYMBICORT) 80-4.5 MCG/ACT AERO Inhale 2 puffs into the lungs 2 times daily 3 Inhaler 3    dextromethorphan-guaiFENesin (MUCINEX DM)  MG per extended release tablet Take 1 tablet by mouth every 12 hours as needed      rosuvastatin (CRESTOR) 20 MG tablet Take 20 mg by mouth daily      ranitidine (ZANTAC) 150 MG tablet Take 1 tablet by mouth 2 times daily 60 tablet 3    Dextromethorphan Polistirex (ROBITUSSIN 12 HOUR COUGH PO) Take by mouth 2 times daily       loperamide (IMODIUM) 2 MG capsule Take 2 mg by mouth as needed for Diarrhea      acetaminophen (TYLENOL) 650 MG CR tablet Take 650 mg by mouth as needed for Pain       No current facility-administered medications for this encounter.         ROS: As noted in the moderate

## (undated) DEVICE — PACK PROCEDURE SURG SET UP SRMC

## (undated) DEVICE — GOWN,SIRUS,NONRNF,SETINSLV,XL,20/CS: Brand: MEDLINE

## (undated) DEVICE — PATIENT RETURN ELECTRODE, SINGLE-USE, CONTACT QUALITY MONITORING, ADULT, WITH 9FT CORD, FOR PATIENTS WEIGING OVER 33LBS. (15KG): Brand: MEGADYNE

## (undated) DEVICE — BLADE 1884031 RAD TRICUT 3PK 4MM 27.5CM: Brand: TRICUT®

## (undated) DEVICE — GLOVE SURG SZ 75 L12IN FNGR THK94MIL BRN BISQUE LTX POLYMER

## (undated) DEVICE — JELLY,LUBE,STERILE,FLIP TOP,TUBE,2-OZ: Brand: MEDLINE

## (undated) DEVICE — HERCULES 3 STAGE BALLOON ESOPHAGEAL: Brand: HERCULES

## (undated) DEVICE — COVER ARMBRD W13XL28.5IN IMPERV BLU FOR OP RM

## (undated) DEVICE — SYRINGE,EAR/ULCER, 2 OZ, STERILE: Brand: MEDLINE

## (undated) DEVICE — CODMAN® SURGICAL PATTIES 1/2" X 3" (1.27CM X 7.62CM): Brand: CODMAN®

## (undated) DEVICE — SUTURE MCRYL SZ 3-0 L36IN ABSRB UD L36MM CT-1 1/2 CIR Y944H

## (undated) DEVICE — CODMAN® SURGICAL PATTIES 1/2" X 1/2" (1.27CM X 1.27CM): Brand: CODMAN®

## (undated) DEVICE — Device

## (undated) DEVICE — CHLORAPREP 26ML ORANGE

## (undated) DEVICE — GAUZE,SPONGE,8"X4",12PLY,XRAY,STRL,LF: Brand: MEDLINE

## (undated) DEVICE — HYPODERMIC SAFETY NEEDLE: Brand: MAGELLAN

## (undated) DEVICE — FIBER CO2 FIBERLASE

## (undated) DEVICE — SUTURE MCRYL + SZ 3-0 L27IN ABSRB UD L26MM SH 1/2 CIR MCP416H

## (undated) DEVICE — GOWN,SIRUS,NON REINFRCD,LARGE,SET IN SL: Brand: MEDLINE

## (undated) DEVICE — KIT,ANTI FOG,W/SPONGE & FLUID,SOFT PACK: Brand: MEDLINE

## (undated) DEVICE — TUBING, SUCTION, 1/4" X 20', STRAIGHT: Brand: MEDLINE INDUSTRIES, INC.

## (undated) DEVICE — PAD,NON-ADHERENT,3X8,STERILE,LF,1/PK: Brand: MEDLINE

## (undated) DEVICE — YANKAUER,BULB TIP,W/O VENT,RIGID,STERILE: Brand: MEDLINE

## (undated) DEVICE — BLADE LARYNSCP SZ 3 ENH DIR INTUB GLIDESCOPE MCGRATH MAC

## (undated) DEVICE — NEEDLE SPNL L3.5IN DIA25GA QNCKE TYP BVL SPINOCAN

## (undated) DEVICE — BLADE ES L4IN INSUL EDGE

## (undated) DEVICE — CLIP LIG M TI 6 SIL HNDL FOR OPN AND ENDOSCP SGL APPL

## (undated) DEVICE — DILATION SYRINGE: Brand: COOK

## (undated) DEVICE — INTENDED FOR TISSUE SEPARATION, AND OTHER PROCEDURES THAT REQUIRE A SHARP SURGICAL BLADE TO PUNCTURE OR CUT.: Brand: BARD-PARKER ® CARBON RIB-BACK BLADES

## (undated) DEVICE — BLADE 1883523 SKIMMER 3PK 3.5MM 22.5CM: Brand: SKIMMER®

## (undated) DEVICE — NEEDLE SPNL 22GA L7IN SPINOCAN

## (undated) DEVICE — SOLUTION IV IRRIG POUR BRL 0.9% SODIUM CHL 2F7124

## (undated) DEVICE — DRAPE,EENT,SPLIT,STERILE: Brand: MEDLINE

## (undated) DEVICE — GLOVE SURG SZ 65 THK91MIL LTX FREE SYN POLYISOPRENE

## (undated) DEVICE — ELECTRODE PT RET AD L9FT HI MOIST COND ADH HYDRGEL CORDED

## (undated) DEVICE — GLOVE SURG SZ 75 L12IN FNGR THK94MIL TRNSLUC YEL LTX

## (undated) DEVICE — PACK-MAJOR

## (undated) DEVICE — TUBING, SUCTION, 1/4" X 6', STRAIGHT: Brand: MEDLINE

## (undated) DEVICE — SOLUTION IV 1000ML 0.9% SOD CHL PH 5 INJ USP VIAFLX PLAS

## (undated) DEVICE — SUTURE CHROMIC GUT SZ 4-0 L27IN ABSRB BRN FS-2 L19MM 3/8 635H

## (undated) DEVICE — BLADE 1884031HRE TRICUT 4MM 27CM M4 IRR: Brand: TRICUT®

## (undated) DEVICE — SUTURE PROL SZ 2-0 L36IN NONABSORBABLE BLU SH L26MM 1/2 CIR 8523H

## (undated) DEVICE — SUTURE PROL SZ 2-0 L18IN NONABSORBABLE BLU FS L26MM 3/8 CIR 8685H

## (undated) DEVICE — COAGULATOR SUCT 10FR L6IN HND FT SWCH VALLEYLAB

## (undated) DEVICE — APPLIER CLP L9.375IN APER 2.1MM CLS L3.8MM 20 SM TI CLP

## (undated) DEVICE — PENCIL SMK EVAC ALL IN 1 DSGN ENH VISIBILITY IMPROVED AIR

## (undated) DEVICE — SYRINGE MED 10ML SLIP TIP BLNT FILL AND LUERLOCK DISP

## (undated) DEVICE — DRAPE,SPLIT ,77X120: Brand: MEDLINE

## (undated) DEVICE — 4-PORT MANIFOLD: Brand: NEPTUNE 2

## (undated) DEVICE — SUTURE MCRYL SZ 4-0 L27IN ABSRB UD RB-1 L17MM 1/2 CIR Y214H

## (undated) DEVICE — UNIVERSAL MONOPOLAR LAPAROSCOPIC CABLE 10FT, 4MM PIN CONNECTOR: Brand: CONMED

## (undated) DEVICE — BLADE 1882904 TRICUT 5PK 2.9MM: Brand: TRICUT®

## (undated) DEVICE — TAPE ADH W1INXL10YD PLAS TRNSPAR H2O RESIST HYPOALRG CURAD

## (undated) DEVICE — CONTAINER,SPECIMEN,PNEU TUBE,4OZ,OR STRL: Brand: MEDLINE

## (undated) DEVICE — SYRINGE MED 10ML LUERLOCK TIP W/O SFTY DISP

## (undated) DEVICE — SINGLE USE BIOPSY VALVE MAJ-210: Brand: SINGLE USE BIOPSY VALVE (STERILE)

## (undated) DEVICE — GLOVE ORANGE PI 7 1/2   MSG9075

## (undated) DEVICE — LINER SUCT CANSTR 1500CC SEMI RIG W/ POR HYDROPHOBIC SHUT

## (undated) DEVICE — TRAP,MUCUS SPECIMEN, 80CC: Brand: MEDLINE

## (undated) DEVICE — SYRINGE MED 10ML TRNSLUC BRL PLUNG BLK MRK POLYPR CTRL

## (undated) DEVICE — BANDAGE,GAUZE,4.5"X4.1YD,STERILE,LF: Brand: MEDLINE

## (undated) DEVICE — FIAPC® PROBE W/ FILTER 3000 A OD 2.3MM/6.9FR; L 3M/9.8FT: Brand: ERBE

## (undated) DEVICE — FIAPC® PROBE W/ FILTER 2200 SC OD 2.3MM/6.9FR; L 2.2M/7.2FT: Brand: ERBE

## (undated) DEVICE — GLOVE ORANGE PI 7   MSG9070

## (undated) DEVICE — SPONGE,NEURO,1"X3",XR,STRL,LF,10/PK: Brand: MEDLINE

## (undated) DEVICE — CATHETER,URETHRAL,REDRUBBER,STRL,10FR: Brand: MEDLINE INDUSTRIES, INC.

## (undated) DEVICE — CORD ES L12FT BPLR FRCP

## (undated) DEVICE — SINGLE USE SUCTION VALVE MAJ-209: Brand: SINGLE USE SUCTION VALVE (STERILE)

## (undated) DEVICE — SYRINGE IRRIG 60ML SFT PLIABLE BLB EZ TO GRP 1 HND USE W/

## (undated) DEVICE — SUTURE ETHBND EXCEL SZ 0 L30IN NONABSORBABLE GRN CT1 L36MM X424H

## (undated) DEVICE — CORD,CAUTERY,BIPOLAR,STERILE: Brand: MEDLINE

## (undated) DEVICE — SPONGE,TONSIL,DBL STRNG,XRAY,SM,7/8",ST: Brand: MEDLINE INDUSTRIES, INC.

## (undated) DEVICE — SUTURE MCRYL SZ 4 0 L18IN ABSRB UD P 3 3 8 CIR REV CUT NDL MCP494G

## (undated) DEVICE — FIBER LASER 295 MH 40 W LNG 0.5X1.04 MMX2 MR FIBERLASE